# Patient Record
Sex: MALE | Race: WHITE | Employment: OTHER | URBAN - METROPOLITAN AREA
[De-identification: names, ages, dates, MRNs, and addresses within clinical notes are randomized per-mention and may not be internally consistent; named-entity substitution may affect disease eponyms.]

---

## 2017-01-16 ENCOUNTER — OFFICE VISIT (OUTPATIENT)
Dept: INTERNAL MEDICINE CLINIC | Age: 77
End: 2017-01-16

## 2017-01-16 VITALS
SYSTOLIC BLOOD PRESSURE: 130 MMHG | DIASTOLIC BLOOD PRESSURE: 82 MMHG | HEIGHT: 70 IN | BODY MASS INDEX: 26.2 KG/M2 | OXYGEN SATURATION: 98 % | HEART RATE: 56 BPM | TEMPERATURE: 97.7 F | WEIGHT: 183 LBS

## 2017-01-16 DIAGNOSIS — J01.90 SUBACUTE RHINOSINUSITIS: Primary | ICD-10-CM

## 2017-01-16 RX ORDER — LEVOFLOXACIN 500 MG/1
500 TABLET, FILM COATED ORAL DAILY
Qty: 10 TAB | Refills: 0 | Status: SHIPPED | OUTPATIENT
Start: 2017-01-16 | End: 2017-01-27

## 2017-01-16 RX ORDER — MOMETASONE FUROATE 50 UG/1
SPRAY, METERED NASAL
Qty: 1 CONTAINER | Refills: 5 | Status: SHIPPED | OUTPATIENT
Start: 2017-01-16 | End: 2021-07-20 | Stop reason: SINTOL

## 2017-01-16 NOTE — PROGRESS NOTES
HPI/History  Hue Pope is a 67 y.o.  male who returns for evaluation. Pt reports having cold like symptoms around 12/12 which improved to point of resolution but then worsening sinus issues then discolored drainage which prompted visit with Dr. Yasir Matos on 12/29. Was prescribed augmentin and instructed to continue mucinex. He has seen no changes since last visit. Green nasal drainage, mostly postnasal. Reports he seems fine from a pulmonary standpoint but occasionally with cough from copious drainage. Possible elevated temps but afrebrile before visit with Dr. Yasir Matos but wnl since. No other sxs or complaints. Of note, pt recalls bush-hogging and similar outdoor activities in [place] around time of original onset. He has not taken nasonex. He is not taking antihistamines. He reports a hx of deviated septum. 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 No WMA. Mild LVH. Gr 1 DDfx. RVE.  AKANKSHA. Mild TR. No significant chg from study of 6/8/11.  Cardiac nuclear imaging test 10/07/2009     Likely inferior & anterior septal artifact. EF 55%. Mild septal WMA could be related to pacemaker.  Cardiomyopathy ([address]     Diverticulosis of colon 11/10/2015    Dysphagia      Multiple esophageal dilations.     Essential hypertension with goal blood pressure less than 140/90     HLD (hyperlipidemia)     Hx of colonoscopy 11/10/2015     Dr. Sharri Green; tubulovillous adenoma.  Pacemaker 2007     Medtronic dual-chamber device.  Paroxysmal atrial fibrillation (HCC)      single episode noted on routine device check     Past Surgical History   Procedure Laterality Date    Pr cardiac surg procedure unlist      Endoscopy, colon, diagnostic      Hx cataract removal      Hx pacemaker  05/29/07     Medtronic Versa dual-chamber pacemaker for intermittent high-grade AV block and symptomatic bradycardia.  Hx pacemaker  5/3/16     Social History     Social History    Marital status:      Spouse name: N/A    Number of children: N/A    Years of education: N/A     Occupational History    Not on file. Social History Main Topics    Smoking status: Former Smoker     Quit date: 7/27/1966    Smokeless tobacco: Never Used    Alcohol use No      Comment: 03/16/1998 last time he had alcohol.  Drug use: No    Sexual activity: Yes     Partners: Female     Birth control/ protection: Condom     Other Topics Concern    Not on file     Social History Narrative     Family History   Problem Relation Age of Onset    Heart Surgery Mother     Stroke Father     Alcohol abuse Father      Current Outpatient Prescriptions   Medication Sig    levoFLOXacin (LEVAQUIN) 500 mg tablet Take 1 Tab by mouth daily for 10 days.  mometasone (NASONEX) 50 mcg/actuation nasal spray USE TWO SPRAYS IN EACH NOSTRIL DAILY    LORazepam (ATIVAN) 1 mg tablet Take 1 Tab by mouth every eight (8) hours as needed for Anxiety. Max Daily Amount: 3 mg.  lisinopril (PRINIVIL, ZESTRIL) 2.5 mg tablet TAKE ONE TABLET BY MOUTH TWICE DAILY.  butalbital-acetaminophen-caffeine (FIORICET, ESGIC) -40 mg per tablet Take 1 Tab by mouth every six (6) hours as needed for Pain.  VIT C/E/ZN/COPPR/LUTEIN/ZEAXAN (PRESERVISION AREDS 2 PO) Take  by mouth.     omeprazole (PRILOSEC) 20 mg capsule Take 20 mg by mouth daily as needed.  timolol (TIMOPTIC) 0.25 % ophthalmic solution Administer 1 Drop to both eyes two (2) times a day.  cyclobenzaprine (FLEXERIL) 5 mg tablet TAKE 1-2 TABLETS BY MOUTH EVERY 8 HOURS AS NEEDED FOR MUSCLE SPASM    MULTIVITAMINS W-MINERALS/LUT (CENTRUM SILVER PO) Take  by mouth daily.  OMEGA-3 FATTY ACIDS (OMEGA 3 PO) Take 2,400 mg by mouth two (2) times a day.  coenzyme q10 100 mg Cap Take  by mouth daily.  metronidazole (METROGEL) 1 % topical gel Apply  to affected area daily. Use a thin layer to affected areas after washing     zinc 50 mg capsule Take  by mouth daily.  magnesium 250 mg Tab Take  by mouth daily.  selenium 100 mcg Cap Take  by mouth daily.  ascorbic acid (VITAMIN C) 500 mg tablet Take  by mouth two (2) times a day.  Ginkgo Biloba Leaf Extract 30 mg Cap Take  by mouth daily. No current facility-administered medications for this visit. No Known Allergies    Review of Systems  Significant findings included in HPI. Physical Examination  Visit Vitals    /82    Pulse (!) 56    Temp 97.7 °F (36.5 °C) (Oral)    Ht 5' 10\" (1.778 m)    Wt 183 lb (83 kg)    SpO2 98%    BMI 26.26 kg/m2       General - Alert and in no acute distress. Pt appears well, comfortable, and in good spirits. Nontoxic. Not anxious, non-diaphoretic. Mental status - Appropriate mood, behavior, speech content, dress, and thought processes. Eyes - No periorbital findings. Pupils equal and reactive, extraocular eye movements intact. No erythema or discharge. Ears - Auditory canals and TMs unremarkable. Nose - Mild erythema. No rhinorrhea. Possible deviation toward right. Patent. Mouth - Mucous membranes moist. Pharynx without lesions, swelling, erythema, or exudate. Neck - Supple without rigidity. Lymph - No periauricular, perimandibular, or cervical tenderness or swelling. Pulm - No cough today. Complete sentences. No tachypnea, retractions, or cyanosis.  Good respiratory effort. Clear to auscultation bilat. No wheezes, rales, or rhonchi noted. Cardiovascular - Normal rate, slightly irregular rhythm. Assessment and Plan  1. Rhinosinusitis (acute vs subacute) - discussed possible causes and contributing factors including viral, bacterial, and allergic/irritant factors. Pt recently treated with augmentin without any change. Will start levaquin, zyrtec, and nasonex today. He will increase fluids and continue mucinex if needed. He will keep previously scheduled appt with Dr. Deborah Curiel in February and can use this as f/u. May consider ENT pending his progression. Pt happily agrees with plan. PLEASE NOTE:   This document has been produced using voice recognition software. Unrecognized errors in transcription may be present.     [facility] of [address] [place] [address]  [phone]  1/16/2017

## 2017-01-16 NOTE — MR AVS SNAPSHOT
Visit Information Date & Time Provider Department Dept. Phone Encounter #  
 1/16/2017  3:30 PM Shilpi Saenz, [address] Internist of [address] [place] [address] [id number] Your Appointments 1/27/2017  2:00 PM  
Follow Up with Marley Shetty MD  
[facility]"" ([facility]) Appt Note: 6 month f/u; 6 month f/u  
 [address] [place] [address] [address]  
[phone] [facility] [place] [facility] [phone]  
  
    
 2/1/2017  8:55 AM  
LAB with [facility] NURSE VISIT Internist of [facility] ([facility]) Appt Note: lab; lab  
 [address] [facility], [address] [place]  
  
   
 [address], [address]  
  
    
 2/9/2017 12:00 PM  
Office Visit with Beck Long MD  
Internist of [address] [place] [address]) Appt Note: ov sarris [address], [address] [place]  
  
   
 [address], [address]  
  
    
 4/26/2017  2:20 PM  
[facility]EL[facility] with [facility] \[facility]"" ([facility]) Appt Note: [address] [facility] [address]  
[phone] [address] [place] [address] [address] Upcoming Health Maintenance Date Due  
 MEDICARE YEARLY EXAM 11/28/2004 GLAUCOMA SCREENING Q2Y 7/22/2017 COLONOSCOPY 11/10/2018 DTaP/Tdap/Td series (2 - Td) 7/12/2022 Allergies as of 1/16/2017  Review Complete On: 1/16/2017 By: Winnie Reza LPN No Known Allergies Current Immunizations  Reviewed on 8/3/2015 Name Date Influenza High Dose Vaccine PF 12/9/2016, 11/16/2015, 1/10/2013 Influenza Vaccine 11/20/2014, 12/31/2013 Pneumococcal Conjugate (PCV-13) 8/3/2015  1:34 PM  
 Pneumococcal Vaccine (Unspecified Type) 1/1/2007 TDAP Vaccine 7/12/2012 Zoster 7/25/2007 Not reviewed this visit You Were Diagnosed With   
  
 Codes Comments Subacute rhinosinusitis    -  Primary ICD-10-CM: J01.90 ICD-9-CM: 461.9 Vitals BP Pulse Temp Height(growth percentile) Weight(growth percentile) SpO2  
 130/82 (!) 56 97.7 °F (36.5 °C) (Oral) 5' 10\" (1.778 m) 183 lb (83 kg) 98% BMI Smoking Status 26.26 kg/m2 Former Smoker Vitals History BMI and BSA Data Body Mass Index Body Surface Area  
 26.26 kg/m 2 2.02 m 2 Preferred Pharmacy Pharmacy Name Phone [facility]  [phone] Your Updated Medication List  
  
   
This list is accurate as of: 1/16/17  3:39 PM.  Always use your most recent med list.  
  
  
  
  
 butalbital-acetaminophen-caffeine -40 mg per tablet Commonly known as:  Violet Yanira Take 1 Tab by mouth every six (6) hours as needed for Pain. CENTRUM SILVER PO Take  by mouth daily. co-enzyme Q-10 100 mg capsule Commonly known as:  CO Q-10 Take  by mouth daily. cyclobenzaprine 5 mg tablet Commonly known as:  FLEXERIL  
TAKE 1-2 TABLETS BY MOUTH EVERY 8 HOURS AS NEEDED FOR MUSCLE SPASM Ginkgo Biloba Leaf Extract 30 mg Cap Take  by mouth daily. levoFLOXacin 500 mg tablet Commonly known as:  Yenni Schneiders Take 1 Tab by mouth daily for 10 days. lisinopril 2.5 mg tablet Commonly known as:  PRINIVIL, ZESTRIL  
TAKE ONE TABLET BY MOUTH TWICE DAILY. LORazepam 1 mg tablet Commonly known as:  ATIVAN Take 1 Tab by mouth every eight (8) hours as needed for Anxiety. Max Daily Amount: 3 mg.  
  
 magnesium 250 mg Tab Take  by mouth daily. METROGEL 1 % topical gel Generic drug:  metroNIDAZOLE Apply  to affected area daily. Use a thin layer to affected areas after washing  
  
 mometasone 50 mcg/actuation nasal spray Commonly known as:  NASONEX  
USE TWO SPRAYS IN EACH NOSTRIL DAILY OMEGA 3 PO Take 2,400 mg by mouth two (2) times a day. PRESERVISION AREDS 2 PO Take  by mouth. PriLOSEC 20 mg capsule Generic drug:  omeprazole Take 20 mg by mouth daily as needed. selenium 100 mcg Cap Take  by mouth daily. TIMOPTIC 0.25 % ophthalmic solution Generic drug:  timolol Administer 1 Drop to both eyes two (2) times a day. VITAMIN C 500 mg tablet Generic drug:  ascorbic acid (vitamin C) Take  by mouth two (2) times a day. zinc 50 mg capsule Take  by mouth daily. Prescriptions Printed Refills  
 levoFLOXacin (LEVAQUIN) 500 mg tablet 0 Sig: Take 1 Tab by mouth daily for 10 days. Class: Print Route: Oral  
 mometasone (NASONEX) 50 mcg/actuation nasal spray 5 Sig: USE TWO SPRAYS IN EACH NOSTRIL DAILY Class: Print Patient Instructions 1. Plenty of water. 2. Continue mucinex if needed. 3. Nasonex for at least 3 wks. 4. Zyrtec (OTC) for at least 3 wks. 5. Complete antibiotic. 6. Will consider ENT if no improvement. Introducing [facility]! Dear Nile Lundborg: 
Thank you for requesting a [facility]odlee account. Our records indicate that you already have an active Yodlee account. You can access your account anytime at [email or web address] Did you know that you can access your hospital and ER discharge instructions at any time in Yodlee? You can also review all of your test results from your hospital stay or ER visit. Additional Information If you have questions, please visit the Frequently Asked Questions section of the Yodlee website at [email or web address] Remember, Yodlee is NOT to be used for urgent needs. For medical emergencies, dial 911. Now available from your iPhone and Android! Please provide this summary of care documentation to your next provider. Your primary care clinician is listed as Sushant Hernandez.  If you have any questions after today's visit, please call [phone].

## 2017-01-16 NOTE — PATIENT INSTRUCTIONS
1. Plenty of water. 2. Continue mucinex if needed. 3. Nasonex for at least 3 wks. 4. Zyrtec (OTC) for at least 3 wks. 5. Complete antibiotic. 6. Will consider ENT if no improvement.

## 2017-01-20 ENCOUNTER — OFFICE VISIT (OUTPATIENT)
Dept: INTERNAL MEDICINE CLINIC | Age: 77
End: 2017-01-20

## 2017-01-20 VITALS
SYSTOLIC BLOOD PRESSURE: 148 MMHG | OXYGEN SATURATION: 97 % | HEART RATE: 64 BPM | DIASTOLIC BLOOD PRESSURE: 84 MMHG | BODY MASS INDEX: 26.05 KG/M2 | TEMPERATURE: 97.7 F | WEIGHT: 182 LBS | HEIGHT: 70 IN

## 2017-01-20 DIAGNOSIS — J31.0 RHINOSINUSITIS: Primary | ICD-10-CM

## 2017-01-20 DIAGNOSIS — J32.9 RHINOSINUSITIS: Primary | ICD-10-CM

## 2017-01-20 RX ORDER — AMOXICILLIN AND CLAVULANATE POTASSIUM 875; 125 MG/1; MG/1
1 TABLET, FILM COATED ORAL 2 TIMES DAILY
Qty: 20 TAB | Refills: 0 | Status: SHIPPED | OUTPATIENT
Start: 2017-01-20 | End: 2017-01-30

## 2017-01-20 NOTE — PROGRESS NOTES
HPI/History  Hue Pope is a 67 y.o.  male who presents for evaluation. Pt with cold like symptoms around 12/12. Saw Dr. Yasir Matos on 12/29 and prescribed augmentin. No change and saw me on 1/16. At that time he reported some outdoor exposures around initial time of onset from bush-hogging, etc. We discussed various potential causes and contributing factors but decided to treat with levaquin, zyrtec, and nasonex. Pt report he is taking nasonex and zyrtec and thought he was improving but then returned previous status. He did not start the levaquin as he has a hx of tendinopathy and ruptures. No other developments. He is rather insistent on trying augmentin again but brand only. 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 No WMA. Mild LVH. Gr 1 DDfx. RVE.  AKANKSHA. Mild TR. No significant chg from study of 6/8/11.  Cardiac nuclear imaging test 10/07/2009     Likely inferior & anterior septal artifact. EF 55%. Mild septal WMA could be related to pacemaker.  Cardiomyopathy ([address]     Diverticulosis of colon 11/10/2015    Dysphagia      Multiple esophageal dilations.  Essential hypertension with goal blood pressure less than 140/90     HLD (hyperlipidemia)     Hx of colonoscopy 11/10/2015     Dr. Go Soriano; tubulovillous adenoma.    Ankit Shield Pacemaker 2007     Medtronic dual-chamber device.  Paroxysmal atrial fibrillation (HCC)      single episode noted on routine device check     Past Surgical History   Procedure Laterality Date    Pr cardiac surg procedure unlist      Endoscopy, colon, diagnostic      Hx cataract removal      Hx pacemaker  05/29/07     Medtronic Versa dual-chamber pacemaker for intermittent high-grade AV block and symptomatic bradycardia.  Hx pacemaker  5/3/16     Social History     Social History    Marital status:      Spouse name: N/A    Number of children: N/A    Years of education: N/A     Occupational History    Not on file. Social History Main Topics    Smoking status: Former Smoker     Quit date: 7/27/1966    Smokeless tobacco: Never Used    Alcohol use No      Comment: 03/16/1998 last time he had alcohol.  Drug use: No    Sexual activity: Yes     Partners: Female     Birth control/ protection: Condom     Other Topics Concern    Not on file     Social History Narrative     Family History   Problem Relation Age of Onset    Heart Surgery Mother     Stroke Father     Alcohol abuse Father      Current Outpatient Prescriptions   Medication Sig    amoxicillin-clavulanate (AUGMENTIN) 875-125 mg per tablet Take 1 Tab by mouth two (2) times a day for 10 days. Brand only.  levoFLOXacin (LEVAQUIN) 500 mg tablet Take 1 Tab by mouth daily for 10 days.  mometasone (NASONEX) 50 mcg/actuation nasal spray USE TWO SPRAYS IN EACH NOSTRIL DAILY    LORazepam (ATIVAN) 1 mg tablet Take 1 Tab by mouth every eight (8) hours as needed for Anxiety. Max Daily Amount: 3 mg.  lisinopril (PRINIVIL, ZESTRIL) 2.5 mg tablet TAKE ONE TABLET BY MOUTH TWICE DAILY.  butalbital-acetaminophen-caffeine (FIORICET, ESGIC) -40 mg per tablet Take 1 Tab by mouth every six (6) hours as needed for Pain.  VIT C/E/ZN/COPPR/LUTEIN/ZEAXAN (PRESERVISION AREDS 2 PO) Take  by mouth.  omeprazole (PRILOSEC) 20 mg capsule Take 20 mg by mouth daily as needed.     timolol (TIMOPTIC) 0.25 % ophthalmic solution Administer 1 Drop to both eyes two (2) times a day.  cyclobenzaprine (FLEXERIL) 5 mg tablet TAKE 1-2 TABLETS BY MOUTH EVERY 8 HOURS AS NEEDED FOR MUSCLE SPASM    MULTIVITAMINS W-MINERALS/LUT (CENTRUM SILVER PO) Take  by mouth daily.  OMEGA-3 FATTY ACIDS (OMEGA 3 PO) Take 2,400 mg by mouth two (2) times a day.  coenzyme q10 100 mg Cap Take  by mouth daily.  metronidazole (METROGEL) 1 % topical gel Apply  to affected area daily. Use a thin layer to affected areas after washing     zinc 50 mg capsule Take  by mouth daily.  magnesium 250 mg Tab Take  by mouth daily.  selenium 100 mcg Cap Take  by mouth daily.  ascorbic acid (VITAMIN C) 500 mg tablet Take  by mouth two (2) times a day.  Ginkgo Biloba Leaf Extract 30 mg Cap Take  by mouth daily. No current facility-administered medications for this visit. No Known Allergies    Review of Systems  Significant findings included in HPI. Physical Examination  Visit Vitals    /84    Pulse 64    Temp 97.7 °F (36.5 °C)    Ht 5' 10\" (1.778 m)    Wt 182 lb (82.6 kg)    SpO2 97%    BMI 26.11 kg/m2       General - Alert and in no acute distress. Pt appears well, comfortable, and in good spirits. Nontoxic. Not anxious, non-diaphoretic. Mental status - Appropriate mood, behavior, speech content, dress, and thought processes. Eyes - No periorbital findings. No erythema or discharge. Mouth - Normal phonation. Pulm - No tachypnea, retractions, or cyanosis. Good respiratory effort. Cardiovascular - Normal rate. Assessment and Plan  1. Rhinosinusitis - At last visit, we discussed possible causes and contributing factors including viral, bacterial, and allergic/irritant factors. Pt previously treated with augmentin without any change. He has started zyrtec and nasonex as instructed at last visit but did not start levaquin due to pre-existing tendinopathy with ruptures.  He is rather insistent upon trying brand name augmentin even after discussing multiple considerations and alternatives. Will refer to Dr. Ginette Rubio if no improvement with this round. He will keep previously scheduled appt with Dr. Lacy Felix in February. Pt happily agrees with plan. PLEASE NOTE:   This document has been produced using voice recognition software. Unrecognized errors in transcription may be present.     [employer] of [address] [place] [address]  [phone]  1/20/2017

## 2017-01-20 NOTE — MR AVS SNAPSHOT
Visit Information Date & Time Provider Department Dept. Phone Encounter #  
 1/20/2017  1:30 PM Yonny De Jesusma Internist of [address] [id number] Your Appointments 1/20/2017  1:30 PM  
Office Visit with CESAR De Jesus Internist of [facility] ([facility]) Appt Note: f/u sinus problems  syb 01/19/17  
 [address] [facility], [address] [address] [address]  
  
   
 [address], [phone]  
  
    
 1/27/2017  2:00 PM  
Follow Up with Priscilla Richmond MD  
[facility] ([facility]) Appt Note: 6 month f/u; 6 month f/u  
 [address] [id number] [address] [id number] [phone] [address] [phone]  
  
    
 2/1/2017  8:55 AM  
LAB with [facility] NURSE VISIT Internist of [facility] ([facility]) Appt Note: lab; lab  
 [address] [facility], [address] [address] [address]  
  
   
 [address], [address]  
  
    
 2/9/2017 12:00 PM  
Office Visit with Maranda Tovar MD  
Internist of [address] [place] [address]) Appt Note: ov sarris [address], [address] [place] [address] [address]  
  
   
 [address], [address]  
  
    
 4/26/2017  2:20 PM  
CARELINK with Pedrito Musei [facility] ([facility]) Appt Note: [address] [facility] [address] [address] [address]  
[phone] [address] [facility] [address] Upcoming Health Maintenance Date Due  
 MEDICARE YEARLY EXAM 11/28/2004 GLAUCOMA SCREENING Q2Y 7/22/2017 COLONOSCOPY 11/10/2018 DTaP/Tdap/Td series (2 - Td) 7/12/2022 Allergies as of 1/20/2017  Review Complete On: 1/20/2017 By: CESAR De Jesus No Known Allergies Current Immunizations  Reviewed on 8/3/2015 Name Date Influenza High Dose Vaccine PF 12/9/2016, 11/16/2015, 1/10/2013 Influenza Vaccine 11/20/2014, 12/31/2013 Pneumococcal Conjugate (PCV-13) 8/3/2015  1:34 PM  
 Pneumococcal Vaccine (Unspecified Type) 1/1/2007 TDAP Vaccine 7/12/2012 Zoster 7/25/2007 Not reviewed this visit You Were Diagnosed With   
  
 Codes Comments Rhinosinusitis    -  Primary ICD-10-CM: J32.9 ICD-9-CM: 473.9 Vitals BP Pulse Temp Height(growth percentile) Weight(growth percentile) SpO2  
 148/84 64 97.7 °F (36.5 °C) 5' 10\" (1.778 m) 182 lb (82.6 kg) 97% BMI Smoking Status 26.11 kg/m2 Former Smoker Vitals History BMI and BSA Data Body Mass Index Body Surface Area  
 26.11 kg/m 2 2.02 m 2 Preferred Pharmacy Pharmacy Name Phone [facility], [address] [place] [address] [phone] Your Updated Medication List  
  
   
This list is accurate as of: 1/20/17  1:19 PM.  Always use your most recent med list.  
  
  
  
  
 amoxicillin-clavulanate 875-125 mg per tablet Commonly known as:  AUGMENTIN Take 1 Tab by mouth two (2) times a day for 10 days. Brand only. butalbital-acetaminophen-caffeine -40 mg per tablet Commonly known as:  [place] Take 1 Tab by mouth every six (6) hours as needed for Pain. CENTRUM SILVER PO Take  by mouth daily. co-enzyme Q-10 100 mg capsule Commonly known as:  CO Q-10 Take  by mouth daily. cyclobenzaprine 5 mg tablet Commonly known as:  FLEXERIL  
TAKE 1-2 TABLETS BY MOUTH EVERY 8 HOURS AS NEEDED FOR MUSCLE SPASM Ginkgo Biloba Leaf Extract 30 mg Cap Take  by mouth daily. levoFLOXacin 500 mg tablet Commonly known as:  Madiha Born Take 1 Tab by mouth daily for 10 days. lisinopril 2.5 mg tablet Commonly known as:  PRINIVIL, ZESTRIL  
TAKE ONE TABLET BY MOUTH TWICE DAILY. LORazepam 1 mg tablet Commonly known as:  ATIVAN Take 1 Tab by mouth every eight (8) hours as needed for Anxiety. Max Daily Amount: 3 mg.  
  
 magnesium 250 mg Tab Take  by mouth daily. METROGEL 1 % topical gel Generic drug:  metroNIDAZOLE Apply  to affected area daily. Use a thin layer to affected areas after washing  
  
 mometasone 50 mcg/actuation nasal spray Commonly known as:  NASONEX  
USE TWO SPRAYS IN EACH NOSTRIL DAILY  
  
 OMEGA 3 PO Take 2,400 mg by mouth two (2) times a day. PRESERVISION AREDS 2 PO Take  by mouth. PriLOSEC 20 mg capsule Generic drug:  omeprazole Take 20 mg by mouth daily as needed. selenium 100 mcg Cap Take  by mouth daily. TIMOPTIC 0.25 % ophthalmic solution Generic drug:  timolol Administer 1 Drop to both eyes two (2) times a day. VITAMIN C 500 mg tablet Generic drug:  ascorbic acid (vitamin C) Take  by mouth two (2) times a day. zinc 50 mg capsule Take  by mouth daily. Prescriptions Printed Refills  
 amoxicillin-clavulanate (AUGMENTIN) 875-125 mg per tablet 0 Sig: Take 1 Tab by mouth two (2) times a day for 10 days. Brand only. Class: Print Route: Oral  
  
Introducing [facility] & HEALTH SERVICES! Dear Gordy Juan: 
Thank you for requesting a [employer] account. Our records indicate that you already have an active [employer] account. You can access your account anytime at [email or web address] Did you know that you can access your hospital and ER discharge instructions at any time in [employer]? You can also review all of your test results from your hospital stay or ER visit. Additional Information If you have questions, please visit the Frequently Asked Questions section of the [employer] website at [email or web address] Remember, [employer] is NOT to be used for urgent needs. For medical emergencies, dial 911. Now available from your iPhone and Android! Please provide this summary of care documentation to your next provider. Your primary care clinician is listed as Tom Gaitan. If you have any questions after today's visit, please call [phone].

## 2017-01-27 ENCOUNTER — OFFICE VISIT (OUTPATIENT)
Dept: CARDIOLOGY CLINIC | Age: 77
End: 2017-01-27

## 2017-01-27 VITALS
SYSTOLIC BLOOD PRESSURE: 128 MMHG | WEIGHT: 178 LBS | HEART RATE: 80 BPM | DIASTOLIC BLOOD PRESSURE: 78 MMHG | HEIGHT: 70 IN | OXYGEN SATURATION: 98 % | BODY MASS INDEX: 25.48 KG/M2

## 2017-01-27 DIAGNOSIS — I48.0 PAROXYSMAL ATRIAL FIBRILLATION (HCC): Primary | ICD-10-CM

## 2017-01-27 DIAGNOSIS — I44.30 AV BLOCK: ICD-10-CM

## 2017-01-27 DIAGNOSIS — I42.9 CARDIOMYOPATHY (HCC): ICD-10-CM

## 2017-01-27 DIAGNOSIS — I10 ESSENTIAL HYPERTENSION: ICD-10-CM

## 2017-01-27 NOTE — MR AVS SNAPSHOT
Visit Information Date & Time Provider Department Dept. Phone Encounter #  
 1/27/2017  2:00 PM Karyle Fairly, MD [facility] [address] [id number] Your Appointments 2/1/2017  8:55 AM  
LAB with IOC NURSE VISIT Internist of [facility] ([facility]) Appt Note: lab; lab  
 [address] [facility], [address] [address] [address]  
  
   
 [address], [address]  
  
    
 2/9/2017 12:00 PM  
Office Visit with Lay Garcia MD  
Internist of [address] [place] [address] Appt Note: ov sarris [address], [address] [place] [address]  
  
   
 [address], [address]  
  
    
 4/26/2017  2:20 PM  
CARELINK with Pacer [facility] Appt Note: [address] [address] [address] [address]  
[phone] [address] Upcoming Health Maintenance Date Due  
 MEDICARE YEARLY EXAM 11/28/2004 GLAUCOMA SCREENING Q2Y 7/22/2017 COLONOSCOPY 11/10/2018 DTaP/Tdap/Td series (2 - Td) 7/12/2022 Allergies as of 1/27/2017  Review Complete On: 1/20/2017 By: CESAR Gooden No Known Allergies Current Immunizations  Reviewed on 8/3/2015 Name Date Influenza High Dose Vaccine PF 12/9/2016, 11/16/2015, 1/10/2013 Influenza Vaccine 11/20/2014, 12/31/2013 Pneumococcal Conjugate (PCV-13) 8/3/2015  1:34 PM  
 Pneumococcal Vaccine (Unspecified Type) 1/1/2007 TDAP Vaccine 7/12/2012 Zoster 7/25/2007 Not reviewed this visit You Were Diagnosed With   
  
 Codes Comments Paroxysmal atrial fibrillation (HCC)    -  Primary ICD-10-CM: I48.0 ICD-9-CM: 427.31 Essential hypertension     ICD-10-CM: I10 
ICD-9-CM: 401.9 Vitals BP Pulse Height(growth percentile) Weight(growth percentile) SpO2 BMI  
 128/78 80 5' 10\" (1.778 m) 178 lb (80.7 kg) 98% 25.54 kg/m2 Smoking Status Former Smoker BMI and BSA Data Body Mass Index Body Surface Area 25.54 kg/m 2 2 m 2 Preferred Pharmacy Pharmacy Name Phone [facility], [address] [phone] Your Updated Medication List  
  
   
This list is accurate as of: 1/27/17  2:36 PM.  Always use your most recent med list.  
  
  
  
  
 amoxicillin-clavulanate 875-125 mg per tablet Commonly known as:  AUGMENTIN Take 1 Tab by mouth two (2) times a day for 10 days. Brand only. butalbital-acetaminophen-caffeine -40 mg per tablet Commonly known as:  Violet Yanira Take 1 Tab by mouth every six (6) hours as needed for Pain. CENTRUM SILVER PO Take  by mouth daily. co-enzyme Q-10 100 mg capsule Commonly known as:  CO Q-10 Take  by mouth daily. cyclobenzaprine 5 mg tablet Commonly known as:  FLEXERIL  
TAKE 1-2 TABLETS BY MOUTH EVERY 8 HOURS AS NEEDED FOR MUSCLE SPASM Ginkgo Biloba Leaf Extract 30 mg Cap Take  by mouth daily. lisinopril 2.5 mg tablet Commonly known as:  PRINIVIL, ZESTRIL  
TAKE ONE TABLET BY MOUTH TWICE DAILY. LORazepam 1 mg tablet Commonly known as:  ATIVAN Take 1 Tab by mouth every eight (8) hours as needed for Anxiety. Max Daily Amount: 3 mg.  
  
 magnesium 250 mg Tab Take  by mouth daily. METROGEL 1 % topical gel Generic drug:  metroNIDAZOLE Apply  to affected area daily. Use a thin layer to affected areas after washing  
  
 mometasone 50 mcg/actuation nasal spray Commonly known as:  NASONEX  
USE TWO SPRAYS IN EACH NOSTRIL DAILY  
  
 OMEGA 3 PO Take 2,400 mg by mouth two (2) times a day. PRESERVISION AREDS 2 PO Take  by mouth. PriLOSEC 20 mg capsule Generic drug:  omeprazole Take 20 mg by mouth daily as needed. selenium 100 mcg Cap Take  by mouth daily. TIMOPTIC 0.25 % ophthalmic solution Generic drug:  timolol Administer 1 Drop to both eyes two (2) times a day. VITAMIN C 500 mg tablet Generic drug:  ascorbic acid (vitamin C) Take  by mouth two (2) times a day. zinc 50 mg capsule Take  by mouth daily. We Performed the Following AMB POC EKG ROUTINE W/ 12 LEADS, INTER & REP [26182 CPT(R)] Introducing [facility] SERVICES! Dear Jeniffer Scales: 
Thank you for requesting a [employer] account. Our records indicate that you already have an active [employer] account. You can access your account anytime at [email or web address] Did you know that you can access your hospital and ER discharge instructions at any time in [employer]? You can also review all of your test results from your hospital stay or ER visit. Additional Information If you have questions, please visit the Frequently Asked Questions section of the [employer] website at [email or web address] Remember, [employer] is NOT to be used for urgent needs. For medical emergencies, dial 911. Now available from your iPhone and Android! Please provide this summary of care documentation to your next provider. Your primary care clinician is listed as Vadim Sandhu. If you have any questions after today's visit, please call [phone].

## 2017-01-27 NOTE — PROGRESS NOTES
1. Have you been to the ER, urgent care clinic since your last visit? Hospitalized since your last visit? No    2. Have you seen or consulted any other health care providers outside of the [address] [place] [address] since your last visit? Include any pap smears or colon screening.  No

## 2017-01-27 NOTE — PROGRESS NOTES
HISTORY OF PRESENT ILLNESS  Christal Hill is a 67 y.o. male. Follow-up   Pertinent negatives include no chest pain, no abdominal pain, no headaches and no shortness of breath. Patient presents for a scheduled followup visit. He has a history of intermittent high-grade AV block, status post to dual-chamber permanent pacemaker in 2007. He also has a history of hypertension, Brief episodes approximately atrial fibrillation, and a mildly depressed LV systolic function,  Which returned to low normal on echocardiogram in June 2013 showing an ejection fraction of 50-55%. The patient underwent a pacemaker generator exchange in May 2016 With a Medtronic MRI safe device after his device has reached elective replacement indicator. The patient was last seen in the office 6-7 months ago. Since that time, he has been feeling well. He denies any new palpitations, no dizziness, no syncope. No major changes in his activity tolerance. No orthopnea, PND, leg swelling. No chest pain or pressure. He does report that his blood pressure had been running higher than usual this winter, so he increased his lisinopril dosage from 5 mg daily to 7.5 mg daily and this has brought his blood pressure back down to normal.    Past Medical History   Diagnosis Date    Anxiety     AV block      intermittent, high-grade    Bilateral inguinal hernia     Cardiac echocardiogram 06/25/2013     EF 50-55%. No WMA. Mild LVH. Gr 1 DDfx. RVE.  AKANKSHA. Mild TR. No significant chg from study of 6/8/11.  Cardiac nuclear imaging test 10/07/2009     Likely inferior & anterior septal artifact. EF 55%. Mild septal WMA could be related to pacemaker.  Cardiomyopathy ([address]     Diverticulosis of colon 11/10/2015    Dysphagia      Multiple esophageal dilations.  Essential hypertension with goal blood pressure less than 140/90     HLD (hyperlipidemia)     Hx of colonoscopy 11/10/2015     Dr. Siri Acevedo; tubulovillous adenoma.  Pacemaker 2007     Medtronic dual-chamber device.  Paroxysmal atrial fibrillation (HCC)      single episode noted on routine device check      Current Outpatient Prescriptions   Medication Sig Dispense Refill    amoxicillin-clavulanate (AUGMENTIN) 875-125 mg per tablet Take 1 Tab by mouth two (2) times a day for 10 days. Brand only. 20 Tab 0    mometasone (NASONEX) 50 mcg/actuation nasal spray USE TWO SPRAYS IN EACH NOSTRIL DAILY 1 Container 5    LORazepam (ATIVAN) 1 mg tablet Take 1 Tab by mouth every eight (8) hours as needed for Anxiety. Max Daily Amount: 3 mg. 60 Tab 0    lisinopril (PRINIVIL, ZESTRIL) 2.5 mg tablet TAKE ONE TABLET BY MOUTH TWICE DAILY. (Patient taking differently: Take 2 tabs qam 1 qhs.) 180 Tab 2    butalbital-acetaminophen-caffeine (FIORICET, ESGIC) -40 mg per tablet Take 1 Tab by mouth every six (6) hours as needed for Pain. 30 Tab 0    VIT C/E/ZN/COPPR/LUTEIN/ZEAXAN (PRESERVISION AREDS 2 PO) Take  by mouth.  omeprazole (PRILOSEC) 20 mg capsule Take 20 mg by mouth daily as needed.  timolol (TIMOPTIC) 0.25 % ophthalmic solution Administer 1 Drop to both eyes two (2) times a day.  cyclobenzaprine (FLEXERIL) 5 mg tablet TAKE 1-2 TABLETS BY MOUTH EVERY 8 HOURS AS NEEDED FOR MUSCLE SPASM 25 Tab 0    MULTIVITAMINS W-MINERALS/LUT (CENTRUM SILVER PO) Take  by mouth daily.  OMEGA-3 FATTY ACIDS (OMEGA 3 PO) Take 2,400 mg by mouth two (2) times a day.  coenzyme q10 100 mg Cap Take  by mouth daily.  metronidazole (METROGEL) 1 % topical gel Apply  to affected area daily. Use a thin layer to affected areas after washing       zinc 50 mg capsule Take  by mouth daily.  magnesium 250 mg Tab Take  by mouth daily.  selenium 100 mcg Cap Take  by mouth daily.  ascorbic acid (VITAMIN C) 500 mg tablet Take  by mouth two (2) times a day.  Ginkgo Biloba Leaf Extract 30 mg Cap Take  by mouth daily.          No Known Allergies     Social History Substance Use Topics    Smoking status: Former Smoker     Quit date: 7/27/1966    Smokeless tobacco: Never Used    Alcohol use No      Comment: 03/16/1998 last time he had alcohol. Review of Systems   Constitutional: Negative for chills, fever and weight loss. HENT: Negative for nosebleeds. Eyes: Negative for blurred vision and double vision. Respiratory: Negative for cough, shortness of breath and wheezing. Cardiovascular: Negative for chest pain, palpitations, orthopnea, claudication, leg swelling and PND. Gastrointestinal: Negative for abdominal pain, heartburn, nausea and vomiting. Genitourinary: Negative for dysuria and hematuria. Musculoskeletal: Negative for falls and myalgias. Skin: Negative for rash. Neurological: Negative for dizziness, focal weakness and headaches. Endo/Heme/Allergies: Does not bruise/bleed easily. Psychiatric/Behavioral: Negative for substance abuse. Visit Vitals    /78    Pulse 80    Ht 5' 10\" (1.778 m)    Wt 80.7 kg (178 lb)    SpO2 98%    BMI 25.54 kg/m2      Physical Exam   Constitutional: He is oriented to person, place, and time. He appears well-developed and well-nourished. HENT:   Head: Normocephalic and atraumatic. Eyes: Conjunctivae are normal.   Neck: Neck supple. No JVD present. Carotid bruit is not present. Cardiovascular: Normal rate, regular rhythm, S1 normal, S2 normal and normal pulses. Exam reveals no gallop and no S3. No murmur heard. Pulmonary/Chest: Breath sounds normal. He has no wheezes. He has no rales. Abdominal: Soft. Bowel sounds are normal. There is no tenderness. Musculoskeletal: He exhibits no edema. Neurological: He is alert and oriented to person, place, and time. Skin: Skin is warm and dry. EKG: Normal sinus rhythm, 100% Ventricular paced rhythm, No change from the previous EKG. Pacemaker interrogation: Pacemaker generator at beginning of life. Estimated longevity 9.5 years. No arrhythmias. Pacemaker dependent in the ventricle. Stable activity level. ASSESSMENT and PLAN  Paroxysmal atrial fibrillation. This was a short documented episode several years ago in the past on routine device check, and this has not reoccurred since. No significant arrhythmias on his last 2 device checks. No need for anticoagulation. High-grade AV block: Status post dual-chamber permanent pacemaker with recent generator exchange in May 2016. Patient has an MRI compatible device. Normal device function on interrogation today. He is pacemaker dependent in the ventricle. History of cardiomyopathy: His LV function did return to low normal on echocardiogram in June 2013. He has remained asymptomatic and euvolemic. Hypertension: Remains well controlled on lisinopril, which I would continue. Dyslipidemia. Patient has been managing this with diet control and takes a fish oil supplement. [facility] device check in 3 months. Followup in 6 months, sooner if needed.

## 2017-02-01 ENCOUNTER — HOSPITAL ENCOUNTER (OUTPATIENT)
Dept: LAB | Age: 77
Discharge: HOME | End: 2017-02-01
Payer: MEDICARE

## 2017-02-01 DIAGNOSIS — I10 ESSENTIAL HYPERTENSION WITH GOAL BLOOD PRESSURE LESS THAN 140/90: ICD-10-CM

## 2017-02-01 DIAGNOSIS — E78.5 HYPERLIPIDEMIA, UNSPECIFIED HYPERLIPIDEMIA TYPE: ICD-10-CM

## 2017-02-01 DIAGNOSIS — Z80.42 FH: PROSTATE CANCER: ICD-10-CM

## 2017-02-01 LAB
ANION GAP BLD CALC-SCNC: 8 MMOL/L (ref 3–18)
BUN SERPL-MCNC: 11 MG/DL (ref 7–18)
BUN/CREAT SERPL: 13 (ref 12–20)
CALCIUM SERPL-MCNC: 8.9 MG/DL (ref 8.5–10.1)
CHLORIDE SERPL-SCNC: 100 MMOL/L (ref 100–108)
CHOLEST SERPL-MCNC: 182 MG/DL
CO2 SERPL-SCNC: 29 MMOL/L (ref 21–32)
CREAT SERPL-MCNC: 0.87 MG/DL (ref 0.6–1.3)
GLUCOSE SERPL-MCNC: 94 MG/DL (ref 74–99)
HDLC SERPL-MCNC: 44 MG/DL (ref 40–60)
HDLC SERPL: 4.1 {RATIO} (ref 0–5)
LDLC SERPL CALC-MCNC: 123.6 MG/DL (ref 0–100)
LIPID PROFILE,FLP: ABNORMAL
POTASSIUM SERPL-SCNC: 4.7 MMOL/L (ref 3.5–5.5)
PSA SERPL-MCNC: 0.8 NG/ML (ref 0–4)
SODIUM SERPL-SCNC: 137 MMOL/L (ref 136–145)
TRIGL SERPL-MCNC: 72 MG/DL (ref ?–150)
VLDLC SERPL CALC-MCNC: 14.4 MG/DL

## 2017-02-01 PROCEDURE — 36415 COLL VENOUS BLD VENIPUNCTURE: CPT | Performed by: INTERNAL MEDICINE

## 2017-02-01 PROCEDURE — 84153 ASSAY OF PSA TOTAL: CPT | Performed by: INTERNAL MEDICINE

## 2017-02-01 PROCEDURE — 80061 LIPID PANEL: CPT | Performed by: INTERNAL MEDICINE

## 2017-02-01 PROCEDURE — 80048 BASIC METABOLIC PNL TOTAL CA: CPT | Performed by: INTERNAL MEDICINE

## 2017-02-09 ENCOUNTER — OFFICE VISIT (OUTPATIENT)
Dept: INTERNAL MEDICINE CLINIC | Age: 77
End: 2017-02-09

## 2017-02-09 ENCOUNTER — PATIENT OUTREACH (OUTPATIENT)
Dept: INTERNAL MEDICINE CLINIC | Age: 77
End: 2017-02-09

## 2017-02-09 VITALS
HEIGHT: 70 IN | SYSTOLIC BLOOD PRESSURE: 130 MMHG | WEIGHT: 182.2 LBS | HEART RATE: 72 BPM | BODY MASS INDEX: 26.08 KG/M2 | TEMPERATURE: 97.6 F | DIASTOLIC BLOOD PRESSURE: 88 MMHG

## 2017-02-09 DIAGNOSIS — Z12.5 SCREENING PSA (PROSTATE SPECIFIC ANTIGEN): ICD-10-CM

## 2017-02-09 DIAGNOSIS — K63.5 COLON POLYP: ICD-10-CM

## 2017-02-09 DIAGNOSIS — F41.9 ANXIETY: ICD-10-CM

## 2017-02-09 DIAGNOSIS — I10 ESSENTIAL HYPERTENSION: Primary | ICD-10-CM

## 2017-02-09 DIAGNOSIS — E78.5 HYPERLIPIDEMIA, UNSPECIFIED HYPERLIPIDEMIA TYPE: ICD-10-CM

## 2017-02-09 DIAGNOSIS — Z80.42 FH: PROSTATE CANCER: ICD-10-CM

## 2017-02-09 DIAGNOSIS — I48.0 PAROXYSMAL ATRIAL FIBRILLATION (HCC): ICD-10-CM

## 2017-02-09 DIAGNOSIS — Z78.9 ADVANCE DIRECTIVE ON FILE: ICD-10-CM

## 2017-02-09 DIAGNOSIS — Z71.89 OTHER SPECIFIED COUNSELING: ICD-10-CM

## 2017-02-09 DIAGNOSIS — K21.9 GASTROESOPHAGEAL REFLUX DISEASE WITHOUT ESOPHAGITIS: ICD-10-CM

## 2017-02-09 DIAGNOSIS — I44.30 AV BLOCK: ICD-10-CM

## 2017-02-09 DIAGNOSIS — Z00.00 MEDICARE ANNUAL WELLNESS VISIT, INITIAL: ICD-10-CM

## 2017-02-09 RX ORDER — BUTALBITAL, ACETAMINOPHEN AND CAFFEINE 50; 325; 40 MG/1; MG/1; MG/1
1 TABLET ORAL
Qty: 30 TAB | Refills: 0 | Status: SHIPPED | OUTPATIENT
Start: 2017-02-09 | End: 2017-08-15 | Stop reason: SDUPTHER

## 2017-02-09 RX ORDER — LORAZEPAM 1 MG/1
1 TABLET ORAL
Qty: 60 TAB | Refills: 0 | Status: SHIPPED | OUTPATIENT
Start: 2017-02-09 | End: 2017-03-16 | Stop reason: SDUPTHER

## 2017-02-09 NOTE — PROGRESS NOTES
1. Have you been to the ER, urgent care clinic or hospitalized since your last visit? NO.     2. Have you seen or consulted any other health care providers outside of the [facility] since your last visit (Include any pap smears or colon screening)? NO      Do you have an Advanced Directive? YES    Would you like information on Advanced Directives?  NO

## 2017-02-09 NOTE — PROGRESS NOTES
Advance Care Planning (ACP) Provider Note - Comprehensive     Date of ACP Conversation: 02/09/17  Persons included in Conversation:  patient  Length of ACP Conversation in minutes:  16 minutes    Authorized Decision Maker (if patient is incapable of making informed decisions): Deepika Gtz  This person is:  Healthcare Agent/Medical Power of  under Advance Directive          General ACP for ALL Patients with Decision Making Capacity:   Importance of advance care planning, including choosing a healthcare agent to communicate patient's healthcare decisions if patient lost the ability to make decisions, such as after a sudden illness or accident  Understanding of the healthcare agent role was assessed and information provided  Exploration of values, goals, and preferences if recovery is not expected, even with continued medical treatment in the event of: Imminent death  Severe, permanent brain injury  \"In these circumstances, what matters most to you? \"  Care focused more on comfort or quality of life. Review of Existing Advance Directive:  Patient has a completed living will and advance directive on file. He states that he does not have family in the area, so he selected an aquaintance to be his health care agent. Huey Arizmendi He is aware of his wishes and will abide by them.      For Serious or Chronic Illness:  Understanding of medical condition      Interventions Provided:  Reviewed existing Advance Directive   Recommended review of completed ACP document annually or upon change in health status

## 2017-02-09 NOTE — PROGRESS NOTES
Mateo Earl is a 67 y.o. male and presents for annual Medicare Wellness Visit. Problem List: Reviewed with patient and discussed risk factors. 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    Gastroesophageal reflux disease without esophagitis K21.9    Acute non-recurrent maxillary sinusitis J01.00       Current medical providers:  Patient Care Team:  Sushant Hernandez MD as PCP - General (Internal Medicine)  Linda Carrel, MD (Cardiology)  Kristian Ross RN as [facility] (Internal Medicine)  Ricki Hernandez MD (Gastroenterology)  Teresa Barrett RN as [facility] (Internal Medicine)  Ángel Guillen MD (Ophthalmology)  Donny Saxena MD (Dermatology)    PSH: Reviewed with patient  Past Surgical History   Procedure Laterality Date    Pr cardiac surg procedure unlist      Endoscopy, colon, diagnostic      Hx cataract removal      Hx pacemaker  05/29/07     Medtronic Versa dual-chamber pacemaker for intermittent high-grade AV block and symptomatic bradycardia.  Hx pacemaker  5/3/16        SH: Reviewed with patient  Social History   Substance Use Topics    Smoking status: Former Smoker     Quit date: 7/27/1966    Smokeless tobacco: Never Used    Alcohol use No      Comment: 03/16/1998 last time he had alcohol.        FH: Reviewed with patient  Family History   Problem Relation Age of Onset    Heart Surgery Mother     Stroke Father     Alcohol abuse Father        Medications/Allergies: Reviewed with patient  Current Outpatient Prescriptions on File Prior to Visit   Medication Sig Dispense Refill    mometasone (NASONEX) 50 mcg/actuation nasal spray USE TWO SPRAYS IN EACH NOSTRIL DAILY 1 Container 5    LORazepam (ATIVAN) 1 mg tablet Take 1 Tab by mouth every eight (8) hours as needed for Anxiety. Max Daily Amount: 3 mg. 60 Tab 0    lisinopril (PRINIVIL, ZESTRIL) 2.5 mg tablet TAKE ONE TABLET BY MOUTH TWICE DAILY. (Patient taking differently: Take 2 tabs qam 1 qhs.) 180 Tab 2    butalbital-acetaminophen-caffeine (FIORICET, ESGIC) -40 mg per tablet Take 1 Tab by mouth every six (6) hours as needed for Pain. 30 Tab 0    VIT C/E/ZN/COPPR/LUTEIN/ZEAXAN (PRESERVISION AREDS 2 PO) Take  by mouth.  omeprazole (PRILOSEC) 20 mg capsule Take 20 mg by mouth daily as needed.  timolol (TIMOPTIC) 0.25 % ophthalmic solution Administer 1 Drop to both eyes two (2) times a day.  MULTIVITAMINS W-MINERALS/LUT (CENTRUM SILVER PO) Take  by mouth daily.  OMEGA-3 FATTY ACIDS (OMEGA 3 PO) Take 2,400 mg by mouth two (2) times a day.  coenzyme q10 100 mg Cap Take  by mouth daily.  metronidazole (METROGEL) 1 % topical gel Apply  to affected area daily. Use a thin layer to affected areas after washing       zinc 50 mg capsule Take  by mouth daily.  magnesium 250 mg Tab Take  by mouth daily.  selenium 100 mcg Cap Take  by mouth daily.  ascorbic acid (VITAMIN C) 500 mg tablet Take  by mouth two (2) times a day.  Ginkgo Biloba Leaf Extract 30 mg Cap Take  by mouth daily.  cyclobenzaprine (FLEXERIL) 5 mg tablet TAKE 1-2 TABLETS BY MOUTH EVERY 8 HOURS AS NEEDED FOR MUSCLE SPASM 25 Tab 0     No current facility-administered medications on file prior to visit. No Known Allergies    Objective:  Visit Vitals    /88 (BP 1 Location: Left arm, BP Patient Position: Sitting)    Pulse 72    Temp 97.6 °F (36.4 °C)    Ht 5' 10\" (1.778 m)    Wt 182 lb 3.2 oz (82.6 kg)    BMI 26.14 kg/m2    Body mass index is 26.14 kg/(m^2).     Assessment of cognitive impairment: Alert and oriented x 3    Depression Screen:   PHQ 2 / 9, over the last two weeks 2/9/2017   Little interest or pleasure in doing things Not at all   Feeling down, depressed or hopeless Not at all   Total Score PHQ 2 0       Fall Risk Assessment:    Fall Risk Assessment, last 12 mths 2/9/2017   Able to walk? Yes   Fall in past 12 months? No       Functional Ability:   Does the patient exhibit a steady gait? yes   How long did it take the patient to get up and walk from a sitting position? 1 second. Is the patient self reliant?  (ie can do own laundry, meals, household chores)  yes     Does the patient handle his/her own medications? yes     Does the patient handle his/her own money? yes     Is the patients home safe (ie good lighting, handrails on stairs and bath, etc.)? yes     Did you notice or did patient express any hearing difficulties? no     Did you notice or did patient express any vision difficulties?   no     Were distance and reading eye charts used? no       Advance Care Planning:   Patient was offered the opportunity to discuss advance care planning:  yes     Does patient have an Advance Directive:  Yes, copy on file. If no, did you provide information on [facility]? N/A       Plan:      No orders of the defined types were placed in this encounter. Health Maintenance   Topic Date Due    GLAUCOMA SCREENING Q2Y  07/22/2017    MEDICARE YEARLY EXAM  02/10/2018    COLONOSCOPY  11/10/2018    DTaP/Tdap/Td series (2 - Td) 07/12/2022    ZOSTER VACCINE AGE 60>  Completed    Pneumococcal 65+ Low/Medium Risk  Completed    INFLUENZA AGE 9 TO ADULT  Completed       *Patient verbalized understanding and agreement with the plan. A copy of the After Visit Summary with personalized health plan was given to the patient today.

## 2017-02-09 NOTE — PROGRESS NOTES
Nurse Navigator contacted Dr. Dinh Lacks office to request a copy of the patients most recent glaucoma screening; contact information provided.

## 2017-02-09 NOTE — MR AVS SNAPSHOT
Visit Information Date & Time Provider Department Dept. Phone Encounter #  
 2/9/2017 12:00 PM Blanka Corona MD Internist of [address] [place] [address] [phone] [id number] Follow-up Instructions Return in about 6 months (around 8/9/2017), or if symptoms worsen or fail to improve. Your Appointments 4/26/2017  2:20 PM  
CARELINK with [facility] ([facility]) Appt Note: [address] [place] [address] [address]  
[phone] [address] [facility] [address] [address]  
  
    
 7/28/2017  2:20 PM  
Follow Up with Ceci Luke MD  
[facility] ([facility]) Appt Note: 6 month f/up [place] [place] [address]  
[phone] [facility] [address] [phone]  
  
    
 8/8/2017  8:35 AM  
LAB with [facility] NURSE VISIT Internist of [facility] ([facility]) Appt Note: labs 6mos. sarris [address], [address] [place] [address] [place]  
  
   
 [address], [address]  
  
    
 8/15/2017  1:00 PM  
Office Visit with Blanka Corona MD  
Internist of [facility]) Appt Note: ov 6mos. sarris [address], [address] [facility] [place] [address]  
  
   
 [address], [address] Upcoming Health Maintenance Date Due  
 GLAUCOMA SCREENING Q2Y 7/22/2017 MEDICARE YEARLY EXAM 2/10/2018 COLONOSCOPY 11/10/2018 DTaP/Tdap/Td series (2 - Td) 7/12/2022 Allergies as of 2/9/2017  Review Complete On: 1/27/2017 By: Ceci Luke MD  
 No Known Allergies Current Immunizations  Reviewed on 8/3/2015 Name Date Influenza High Dose Vaccine PF 12/9/2016, 11/16/2015, 1/10/2013 Influenza Vaccine 11/20/2014, 12/31/2013 Pneumococcal Conjugate (PCV-13) 8/3/2015  1:34 PM  
 Pneumococcal Vaccine (Unspecified Type) 1/1/2007 TDAP Vaccine 7/12/2012 Zoster 7/25/2007 Not reviewed this visit You Were Diagnosed With   
  
 Codes Comments Medicare annual wellness visit, initial    -  Primary ICD-10-CM: Z00.00 ICD-9-CM: V70.0 Advance directive on file     ICD-10-CM: Z78.9 ICD-9-CM: V49.89 Vitals BP Pulse Temp Height(growth percentile) Weight(growth percentile) BMI  
 130/88 (BP 1 Location: Left arm, BP Patient Position: Sitting) 72 97.6 °F (36.4 °C) 5' 10\" (1.778 m) 182 lb 3.2 oz (82.6 kg) 26.14 kg/m2 Smoking Status Former Smoker Vitals History BMI and BSA Data Body Mass Index Body Surface Area  
 26.14 kg/m 2 2.02 m 2 Preferred Pharmacy Pharmacy Name Phone [address] [phone] Your Updated Medication List  
  
   
This list is accurate as of: 2/9/17 12:47 PM.  Always use your most recent med list.  
  
  
  
  
 butalbital-acetaminophen-caffeine -40 mg per tablet Commonly known as:  Laveda Cristin Take 1 Tab by mouth every six (6) hours as needed for Pain. CENTRUM SILVER PO Take  by mouth daily. co-enzyme Q-10 100 mg capsule Commonly known as:  CO Q-10 Take  by mouth daily. cyclobenzaprine 5 mg tablet Commonly known as:  FLEXERIL  
TAKE 1-2 TABLETS BY MOUTH EVERY 8 HOURS AS NEEDED FOR MUSCLE SPASM Ginkgo Biloba Leaf Extract 30 mg Cap Take  by mouth daily. lisinopril 2.5 mg tablet Commonly known as:  PRINIVIL, ZESTRIL  
TAKE ONE TABLET BY MOUTH TWICE DAILY. LORazepam 1 mg tablet Commonly known as:  ATIVAN Take 1 Tab by mouth every eight (8) hours as needed for Anxiety. Max Daily Amount: 3 mg.  
  
 magnesium 250 mg Tab Take  by mouth daily. METROGEL 1 % topical gel Generic drug:  metroNIDAZOLE  
 Apply  to affected area daily. Use a thin layer to affected areas after washing  
  
 mometasone 50 mcg/actuation nasal spray Commonly known as:  NASONEX  
USE TWO SPRAYS IN EACH NOSTRIL DAILY  
  
 OMEGA 3 PO Take 2,400 mg by mouth two (2) times a day. PRESERVISION AREDS 2 PO Take  by mouth. PriLOSEC 20 mg capsule Generic drug:  omeprazole Take 20 mg by mouth daily as needed. selenium 100 mcg Cap Take  by mouth daily. TIMOPTIC 0.25 % ophthalmic solution Generic drug:  timolol Administer 1 Drop to both eyes two (2) times a day. VITAMIN C 500 mg tablet Generic drug:  ascorbic acid (vitamin C) Take  by mouth two (2) times a day. zinc 50 mg capsule Take  by mouth daily. Follow-up Instructions Return in about 6 months (around 8/9/2017), or if symptoms worsen or fail to improve. Patient Instructions Medicare Part B Preventive Services Limitations Recommendation Scheduled Bone Mass Measurement 
(age 72 & older, biennial) Requires diagnosis related to osteoporosis or estrogen deficiency. Biennial benefit unless patient has history of long-term glucocorticoid tx or baseline is needed because initial test was by other method Every 2 years or more frequently if medically necessary. N/A Cardiovascular Screening Blood Tests (every 5 years) Total cholesterol, HDL, Triglycerides Order as a panel if possible Every 5 years. Done: 
7/28/2016 Colorectal Cancer Screening 
-Fecal occult blood test (annual) -Flexible sigmoidoscopy (5y) 
-Screening colonoscopy (10y) -Barium Enema Colorectal cancer screening, ages 54-65. For all patients 48 and older: 
-Annual fecal occult blood test or 
colonoscopy every 10 years or 
-Flexible sigmoidoscopy every 5 years or 
-lower endoscopy to be performed more frequently, if advised by GI. Done: 
11/10/2015 Counseling to Prevent Tobacco Use (up to 8 sessions per year) - Counseling greater than 3 and up to 10 minutes - Counseling greater than 10 minutes Patients must be asymptomatic of tobacco-related conditions to receive as preventive service Two cessation counseling attempts (up to 8 counseling sessions) per year. N/A Diabetes Screening Tests (at least every 3 years, Medicare covers annually or at 6-month intervals for prediabetic patients) Fasting blood sugar (FBS) or glucose tolerance test (GTT) Patient must be diagnosed with one of the following: 
-Hypertension, Dyslipidemia, obesity, previous impaired FBS or GTT 
Or any two of the following: overweight, FH of diabetes, age ? 72, history of gestational diabetes, birth of baby weighing more than 9 pounds Annually or every 6 months if previous diagnosis of elevated FBS, elevated HbA1c, or impaired GTT, or glucosuria. Done: 
7/28/2016 Diabetes Self-Management Training (DSMT) (no USPSTF recommendation) Requires referral by treating physician for patient with diabetes or renal disease. 10 hours of initial DSMT session of no less than 30 minutes each in a continuous 12-month period. 2 hours of follow-up DSMT in subsequent years. Up to 10 hours of initial training within a continuous 12 month period of subsequent years: up to 2 hours of follow-up training each year after the initial year. N/A Glaucoma Screening (no USPSTF recommendation) Diabetes mellitus, family history, , age 48 or over,  American, age 72 or over Annually for covered beneficiaries. Done: 
1/2017 Human Immunodeficiency Virus (HIV) Screening (annually for increased risk patients) HIV-1 and HIV-2 by EIA, FELIX, rapid antibody test, or oral mucosa transudate Patient must be at increased risk for HIV infection per USPSTF guidelines or pregnant. Tests covered annually for patients at increased risk. Pregnant patients may receive up to 3 test during pregnancy. Annually for beneficiaries at increased risk, including anyone who asks for the test. Not high risk Medical Nutrition Therapy (MNT) (for diabetes or renal disease not recommended schedule) Requires referral by treating physician for patient with diabetes or renal disease. Can be provided in same year as diabetes self-management training (DSMT), and CMS recommends medical nutrition therapy take place after DSMT. Up to 3 hours for initial year and 2 hours in subsequent years. First year: 3 hours of one-on-one counseling or subsequent years: 2 hours. N/A Prostate Cancer Screening (annually up to age 76) - Digital rectal exam (WEN) - Prostate specific antigen (PSA) Annually (age 48 or over), WEN not paid separately when covered E/M service is provided on same date Once every 12 months for patients age older than 48years of age includes: digital rectal exam and/or prostate specific antigen test. Done: 
2/1/2017 PSA: 0.8 Seasonal Influenza Vaccination (annually)  Once per fall or winter season. Done: 
12/9/2016 Pneumococcal Vaccination (once after 72)  Once after age 72 and if more than 5 years since last vaccination and/or uncertainty of vaccine status. Pneumococcal: 
1/1/2007 Prevnar 13: 
8/3/2015 Hepatitis B Vaccinations (if medium/high risk) Medium/high risk factors:  End-stage renal disease, Hemophiliacs who received Factor VIII or IX concentrates, Clients of institutions for the mentally retarded, Persons who live in the same house as a HepB virus carrier, Homosexual men, Illicit injectable drug abusers. Schedule course of vaccines if patient not previously vaccinated *additional shots if medically necessary. Not high risk Screening Mammography (biennial age 54-69)? Annually (age 36 or over) Age 28 through 44: one baseline or aged 36 and older: annually. N/A Screening Pap Tests and Pelvic Examination (up to age 79 and after 79 if unknown history or abnormal study last 10 years) Every 24 months except high risk Annually if at high risk for developing cervical or vaginal cancer, or childbearing, age with abnormal Pap test within past 3 years or every 2 years for women at normal risk. N/A Ultrasound Screening for Abdominal Aortic Aneurysm (AAA) (once) Patient must be referred through IPPE and not have had a screening for abdominal aortic aneurysm before under Medicare. Limited to patients who meet one of the following criteria: 
- Men who are 73-68 years old and have smoked more than 100 cigarettes in their lifetime. 
-Anyone with a FH of AAA 
-Anyone recommended for screening by USPSTF Once in a lifetime. N/A Schedule of Personalized Health Plan (Provide Copy to Patient) The best way to stay healthy is to live a healthy lifestyle. A healthy lifestyle includes regular exercise, eating a well-balanced diet, keeping a healthy weight and not smoking. Regular physical exams and screening tests are another important way to take care of yourself. Preventive exams provided by health care providers can find health problems early when treatment works best and can keep you from getting certain diseases or illnesses. Preventive services include exams, lab tests, screenings, shots, monitoring and information to help you take care of your own health. All people over 65 should have a pneumonia shot. Pneumonia shots are usually only needed once in a lifetime unless your doctor decides differently. All people over 65 should have a yearly flu shot. People over 65 are at medium to high risk for Hepatitis B. Three shots are needed for complete protection. In addition to your physical exam, some screening tests are recommended: 
 
Bone mass measurement (dexa scan) is recommended every two years if you have certain risk factors, such as personal history of vertebral fracture or chronic steroid medication use Diabetes Mellitus screening is recommended every year. Glaucoma is an eye disease caused by high pressure in the eye. An eye exam is recommended every year. Cardiovascular screening tests that check your cholesterol and other blood fat (lipid) levels are recommended every five years. Colorectal Cancer screening tests help to find pre-cancerous polyps (growths in the colon) so they can be removed before they turn into cancer. Tests ordered for screening depend on your personal and family history risk factors. Screening for Prostate Cancer is recommended yearly with a digital rectal exam and/or a PSA test 
 
Here is a list of your current Health Maintenance items with a due date: 
Health Maintenance Topic Date Due  GLAUCOMA SCREENING Q2Y  07/22/2017  MEDICARE YEARLY EXAM  02/10/2018  COLONOSCOPY  11/10/2018  DTaP/Tdap/Td series (2 - Td) 07/12/2022  ZOSTER VACCINE AGE 60>  Completed  Pneumococcal 65+ Low/Medium Risk  Completed  INFLUENZA AGE 9 TO ADULT  Completed Preventing Falls: Care Instructions Your Care Instructions Getting around your home safely can be a challenge if you have injuries or health problems that make it easy for you to fall. Loose rugs and furniture in walkways are among the dangers for many older people who have problems walking or who have poor eyesight. People who have conditions such as arthritis, osteoporosis, or dementia also have to be careful not to fall. You can make your home safer with a few simple measures. Follow-up care is a key part of your treatment and safety. Be sure to make and go to all appointments, and call your doctor if you are having problems. It's also a good idea to know your test results and keep a list of the medicines you take. How can you care for yourself at home? Taking care of yourself · You may get dizzy if you do not drink enough water.  To prevent dehydration, drink plenty of fluids, enough so that your urine is light yellow or clear like water. Choose water and other caffeine-free clear liquids. If you have kidney, heart, or liver disease and have to limit fluids, talk with your doctor before you increase the amount of fluids you drink. · Exercise regularly to improve your strength, muscle tone, and balance. Walk if you can. Swimming may be a good choice if you cannot walk easily. · Have your vision and hearing checked each year or any time you notice a change. If you have trouble seeing and hearing, you might not be able to avoid objects and could lose your balance. · Know the side effects of the medicines you take. Ask your doctor or pharmacist whether the medicines you take can affect your balance. Sleeping pills or sedatives can affect your balance. · Limit the amount of alcohol you drink. Alcohol can impair your balance and other senses. · Ask your doctor whether calluses or corns on your feet need to be removed. If you wear loose-fitting shoes because of calluses or corns, you can lose your balance and fall. · Talk to your doctor if you have numbness in your feet. Preventing falls at home · Remove raised doorway thresholds, throw rugs, and clutter. Repair loose carpet or raised areas in the floor. · Move furniture and electrical cords to keep them out of walking paths. · Use nonskid floor wax, and wipe up spills right away, especially on ceramic tile floors. · If you use a walker or cane, put rubber tips on it. If you use crutches, clean the bottoms of them regularly with an abrasive pad, such as steel wool. · Keep your house well lit, especially [facility], and outside walkways. Use night-lights in areas such as hallways and bathrooms. Add extra light switches or use remote switches (such as switches that go on or off when you clap your hands) to make it easier to turn lights on if you have to get up during the night. · Install sturdy handrails on stairways. · Move items in your cabinets so that the things you use a lot are on the lower shelves (about waist level). · Keep a cordless phone and a flashlight with new batteries by your bed. If possible, put a phone in each of the main rooms of your house, or carry a cell phone in case you fall and cannot reach a phone. Or, you can wear a device around your neck or wrist. You push a button that sends a signal for help. · Wear low-heeled shoes that fit well and give your feet good support. Use footwear with nonskid soles. Check the heels and soles of your shoes for wear. Repair or replace worn heels or soles. · Do not wear socks without shoes on wood floors. · Walk on the grass when the sidewalks are slippery. If you live in an area that gets snow and ice in the winter, sprinkle salt on slippery steps and sidewalks. Preventing falls in the bath · Install grab bars and nonskid mats inside and outside your shower or tub and near the toilet and sinks. · Use shower chairs and bath benches. · Use a hand-held shower head that will allow you to sit while showering. · Get into a tub or shower by putting the weaker leg in first. Get out of a tub or shower with your strong side first. 
· Repair loose toilet seats and consider installing a raised toilet seat to make getting on and off the toilet easier. · Keep your bathroom door unlocked while you are in the shower. Where can you learn more? Go to http://hector-scott.info/. Enter [id number] [id number] in the search box to learn more about \"Preventing Falls: Care Instructions. \" Current as of: August 4, 2016 Content Version: 11.1 © 5010-0397 [employer]. Care instructions adapted under license by [employer] (which disclaims liability or warranty for this information).  If you have questions about a medical condition or this instruction, always ask your healthcare professional. [employer] disclaims any warranty or liability for your use of this information. Hyperlipidemia: After Your Visit Your Care Instructions Hyperlipidemia is too much fat in your blood. The body has several kinds of fat, including cholesterol and triglycerides. Your body needs fat for many things, such as making new cells. But too much fat in your blood increases your chances of having a heart attack or stroke. You may be able to lower your cholesterol and triglycerides with a heart-healthy diet, exercise, and if needed, medicine. Your doctor may want you to try lifestyle changes first to see whether they lower the fat in your blood. You may need to take medicine if lifestyle changes do not lower the fat in your blood enough. Follow-up care is a key part of your treatment and safety. Be sure to make and go to all appointments, and call your doctor if you are having problems. Its also a good idea to know your test results and keep a list of the medicines you take. How can you care for yourself at home? Take your medicines · Take your medicines exactly as prescribed. Call your doctor if you think you are having a problem with your medicine. · If you take medicine to lower your cholesterol, go to follow-up visits. You will need to have blood tests. · Do not take large doses of niacin, which is a B vitamin, while taking medicine called statins. It may increase the chance of muscle pain and liver problems. · Talk to your doctor about avoiding grapefruit juice if you are taking statins. Grapefruit juice can raise the level of this medicine in your blood. This could increase side effects. Eat more fruits, vegetables, and fiber · Fruits and vegetables have lots of nutrients that help protect against heart disease, and they have littleif anyfat. Try to eat at least five servings a day. Dark green, deep orange, or yellow fruits and vegetables are healthy choices. · Keep carrots, celery, and other veggies handy for snacks. Buy fruit that is in season and store it where you can see it so that you will be tempted to eat it. Cook dishes that have a lot of veggies in them, such as stir-fries and soups. · Foods high in fiber may reduce your cholesterol and provide important vitamins and minerals. High-fiber foods include whole-grain cereals and breads, oatmeal, beans, brown rice, citrus fruits, and apples. · Buy whole-grain breads and cereals instead of white bread and pastries. Limit saturated fat · Read food labels and try to avoid saturated fat and trans fat. They increase your risk of heart disease. · Use olive or canola oil when you cook. Try cholesterol-lowering spreads, such as Benecol or Take Control. · Bake, broil, grill, or steam foods instead of frying them. · Limit the amount of high-fat meats you eat, including hot dogs and sausages. Cut out all visible fat when you prepare meat. · Eat fish, skinless poultry, and soy products such as tofu instead of high-fat meats. Soybeans may be especially good for your heart. Eat at least two servings of fish a week. Certain fish, such as salmon, contain omega-3 fatty acids, which may help reduce your risk of heart attack. · Choose low-fat or fat-free milk and dairy products. Get exercise, limit alcohol, and quit smoking · Get more exercise. Work with your doctor to set up an exercise program. Even if you can do only a small amount, exercise will help you get stronger, have more energy, and manage your weight and your stress. Walking is an easy way to get exercise. Gradually increase the amount you walk every day. Aim for at least 30 minutes on most days of the week. You also may want to swim, bike, or do other activities. · Limit alcohol to no more than 2 drinks a day for men and 1 drink a day for women. · Do not smoke.  If you need help quitting, talk to your doctor about stop-smoking programs and medicines. These can increase your chances of quitting for good. When should you call for help? Call 911 anytime you think you may need emergency care. For example, call if: 
· You have symptoms of a heart attack. These may include: ¨ Chest pain or pressure, or a strange feeling in the chest. 
¨ Sweating. ¨ Shortness of breath. ¨ Nausea or vomiting. ¨ Pain, pressure, or a strange feeling in the back, neck, jaw, or upper belly or in one or both shoulders or arms. ¨ Lightheadedness or sudden weakness. ¨ A fast or irregular heartbeat. After you call 911, the  may tell you to chew 1 adult-strength or 2 to 4 low-dose aspirin. Wait for an ambulance. Do not try to drive yourself. · You have signs of a stroke. These may include: 
¨ Sudden numbness, paralysis, or weakness in your face, arm, or leg, especially on only one side of your body. ¨ New problems with walking or balance. ¨ Sudden vision changes. ¨ Drooling or slurred speech. ¨ New problems speaking or understanding simple statements, or feeling confused. ¨ A sudden, severe headache that is different from past headaches. · You passed out (lost consciousness). Call your doctor now or seek immediate medical care if: 
· You have muscle pain or weakness. Watch closely for changes in your health, and be sure to contact your doctor if: 
· You are very tired. · You have an upset stomach, gas, constipation, or belly pain or cramps. Where can you learn more? Go to Power Liens.be Enter C406 in the search box to learn more about \"Hyperlipidemia: After Your Visit. \"  
© 4620-0990 [employer], Incorporated. Care instructions adapted under license by Meka De Jesus (which disclaims liability or warranty for this information).  This care instruction is for use with your licensed healthcare professional. If you have questions about a medical condition or this instruction, always ask your healthcare professional. [address] any warranty or liability for your use of this information. Content Version: 3.1.314824; Last Revised: October 13, 2011 Introducing [facility]! Dear Vivienne Parker: 
Thank you for requesting a [employer] account. Our records indicate that you already have an active [employer] account. You can access your account anytime at [email or web address] Did you know that you can access your hospital and ER discharge instructions at any time in [employer]? You can also review all of your test results from your hospital stay or ER visit. Additional Information If you have questions, please visit the Frequently Asked Questions section of the [employer] website at [email or web address] Remember, [employer] is NOT to be used for urgent needs. For medical emergencies, dial 911. Now available from your iPhone and Android! Please provide this summary of care documentation to your next provider. Your primary care clinician is listed as Blanka Corona. If you have any questions after today's visit, please call [phone].

## 2017-02-13 NOTE — PROGRESS NOTES
HPI:   Kelly Riely is a 67y.o. year old male who presents today for evaluation of hypertension, hyperlipidemia, AV block s/p pacemaker, paroxysmal atrial fibrillation, and anxiety. He reports that he is doing relatively well. He was last seen on 12/29/2016 and diagnosed with acute rhinosinusitis and treated with a course of Augmentin. He returned and saw PA Mei Borges on 1/16/2016 and 1/20/2016 for recurrent symptoms, and was given a second course of Augmentin. He reports today that he has significantly improved. He does still have allergy symptoms, for which he is treated with Mucinex and Zyrtec. He reports a six pound weight gain since 8/2016 which he attributes to decreased activity. He is otherwise without complaints. He has a history of hypertension, treated with lisinopril. He does monitor his blood pressure regularly, and adjusts his dose of lisinopril accordingly. He was taking lisinopril 2.5 mg twice per day, and he increased his dose to 5 mg in the morning and 2.5 mg in the evening when he noticed his blood pressure was elevated. He reports that his blood pressure in mainly 125-130/82-85 at home. He has a history of intermittent high grade AV block, and underwent placement of a dual chamber Medtronic pacemaker in 2007. He underwent pacemaker generator exchange in 5/2016 after his device reached elective replacement indicator. A Medtronic MRI safe device was installed. He has a history of a single episode of atrial fibrillation noted on device check several years ago. It has not recurred. He was noted in the past to have mildly decreased LV function, but a repeat echocardiogram in 6/2013 showed normal size and lower limits of normal function with EF 50-55%; no wall motion abnormalities, and grade 1 diastolic dysfunction. He is followed by Dr. Erick Puentes. 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 He denies any chest pain, shortness of breath at rest or with exertion, palpitations, lightheadedness, or edema. He has a history of dyslipidemia, managed by diet in the past. His weight has currently increased 22 pounds since 5/2016. He has a history of a GI bleeding in 1997 secondary to NSAID use for headaches. He also has a history of GERD and dysphagia and has undergone multiple esophageal dilatations in the past by Dr. Jelly Herrera. In 11/2015, he had an upper endoscopy which showed a small hiatal hernia in the cardia, a Schatzki's ring at the gastroesophageal junction, which was dilated, and otherwise normal stomach and duodenum. He also had a screening colonoscopy which revealed mild colonic diverticulosis and a 7 mm polyp in the proximal ascending colon (pathology: tubulovillous adenoma). Follow-up recommended for 3 years. He has a history of anxiety, and uses lorazepam occasionally. He also reports he uses cyclobenzaprine occasionally for back spasms due to straining his back with excessive physical work. Past Medical History   Diagnosis Date    Anxiety     AV block      intermittent, high-grade    Bilateral inguinal hernia     Cardiac echocardiogram 06/25/2013     EF 50-55%. No WMA. Mild LVH. Gr 1 DDfx. RVE.  AKANKSHA. Mild TR. No significant chg from study of 6/8/11.  Cardiac nuclear imaging test 10/07/2009     Likely inferior & anterior septal artifact. EF 55%. Mild septal WMA could be related to pacemaker.  Cardiomyopathy ([address]     Diverticulosis of colon 11/10/2015    Dysphagia      Multiple esophageal dilations.  Essential hypertension with goal blood pressure less than 140/90     HLD (hyperlipidemia)     Hx of colonoscopy 11/10/2015     Dr. Jelly Herrera; tubulovillous adenoma.  Pacemaker 2007     Medtronic dual-chamber device.     Paroxysmal atrial fibrillation (HCC)      single episode noted on routine device check     Past Surgical History Procedure Laterality Date    Pr cardiac surg procedure unlist      Endoscopy, colon, diagnostic      Hx cataract removal      Hx pacemaker  05/29/07     Medtronic Versa dual-chamber pacemaker for intermittent high-grade AV block and symptomatic bradycardia.  Hx pacemaker  5/3/16     Current Outpatient Prescriptions   Medication Sig    mometasone (NASONEX) 50 mcg/actuation nasal spray USE TWO SPRAYS IN EACH NOSTRIL DAILY    lisinopril (PRINIVIL, ZESTRIL) 2.5 mg tablet TAKE ONE TABLET BY MOUTH TWICE DAILY. (Patient taking differently: Take 2 tabs qam 1 qhs.)    VIT C/E/ZN/COPPR/LUTEIN/ZEAXAN (PRESERVISION AREDS 2 PO) Take  by mouth.  omeprazole (PRILOSEC) 20 mg capsule Take 20 mg by mouth daily as needed.  timolol (TIMOPTIC) 0.25 % ophthalmic solution Administer 1 Drop to both eyes two (2) times a day.  MULTIVITAMINS W-MINERALS/LUT (CENTRUM SILVER PO) Take  by mouth daily.  OMEGA-3 FATTY ACIDS (OMEGA 3 PO) Take 2,400 mg by mouth two (2) times a day.  coenzyme q10 100 mg Cap Take  by mouth daily.  metronidazole (METROGEL) 1 % topical gel Apply  to affected area daily. Use a thin layer to affected areas after washing     zinc 50 mg capsule Take  by mouth daily.  magnesium 250 mg Tab Take  by mouth daily.  selenium 100 mcg Cap Take  by mouth daily.  ascorbic acid (VITAMIN C) 500 mg tablet Take  by mouth two (2) times a day.  Ginkgo Biloba Leaf Extract 30 mg Cap Take  by mouth daily.  butalbital-acetaminophen-caffeine (FIORICET, ESGIC) -40 mg per tablet Take 1 Tab by mouth every six (6) hours as needed for Pain.  LORazepam (ATIVAN) 1 mg tablet Take 1 Tab by mouth every eight (8) hours as needed for Anxiety. Max Daily Amount: 3 mg.  cyclobenzaprine (FLEXERIL) 5 mg tablet TAKE 1-2 TABLETS BY MOUTH EVERY 8 HOURS AS NEEDED FOR MUSCLE SPASM     No current facility-administered medications for this visit.       Allergies and Intolerances:   No Known Allergies     Family History: His brother  from metastatic prostate cancer. His mother lived until the age of 80, although she had CAD. His father  at the age of 59 from a CVA. He was an alcoholic. He has 5 sisters that are all healthy. Family History   Problem Relation Age of Onset    Heart Surgery Mother     Stroke Father     Alcohol abuse Father      Social History:   He  reports that he quit smoking about 50 years ago. He has never used smokeless tobacco. He smoked 1 ppd for 2 years, and then quit (). He is retired  and worked for the [employer]. He lives with his girlfriend and has one son. History   Alcohol Use No     Comment: 1998 last time he had alcohol. Immunization History:  Immunization History   Administered Date(s) Administered    Influenza High Dose Vaccine PF 01/10/2013, 2015, 2016    Influenza Vaccine 2013, 2014    Pneumococcal Conjugate (PCV-13) 2015    Pneumococcal Vaccine (Unspecified Type) 2007    TDAP Vaccine 2012    Zoster 2007       Review of Systems:   As above included in HPI. Otherwise 11 point review of systems negative including constitutional, skin, HENT, eyes, respiratory, cardiovascular, gastrointestinal, genitourinary, musculoskeletal, endocrine, hematologic, allergy, and neurologic. Physical:   Vitals:   BP: 130/88   HR: 72  WT: 182 lb 3.2 oz (82.6 kg)  BMI:  26.14 kg/m2    Exam:   Pt appears well; alert and oriented x 3; appropriate affect. HEENT: PERRLA, anicteric, oropharynx clear, no JVD, adenopathy or thyromegaly. No sinus tenderness to palpation. No carotid bruits or radiated murmur. Lungs: clear to auscultation, no wheezes, rhonchi, or rales. Heart: regular rate and rhythm. No murmur, rubs, gallops  Abdomen: soft, nontender, nondistended, normal bowel sounds, no hepatosplenomegaly or masses. Extremities: without edema. Pulses 1-2+ bilaterally.     Review of Data:  Labs:  Hospital Outpatient Visit on 02/01/2017   Component Date Value Ref Range Status    LIPID PROFILE 02/01/2017        Final    Cholesterol, total 02/01/2017 182  <200 MG/DL Final    Triglyceride 02/01/2017 72  <150 MG/DL Final    HDL Cholesterol 02/01/2017 44  40 - 60 MG/DL Final    LDL, calculated 02/01/2017 123.6* 0 - 100 MG/DL Final    VLDL, calculated 02/01/2017 14.4  MG/DL Final    CHOL/HDL Ratio 02/01/2017 4.1  0 - 5.0   Final    Prostate Specific Ag 02/01/2017 0.8  0.0 - 4.0 ng/mL Final    Sodium 02/01/2017 137  136 - 145 mmol/L Final    Potassium 02/01/2017 4.7  3.5 - 5.5 mmol/L Final    Chloride 02/01/2017 100  100 - 108 mmol/L Final    CO2 02/01/2017 29  21 - 32 mmol/L Final    Anion gap 02/01/2017 8  3.0 - 18 mmol/L Final    Glucose 02/01/2017 94  74 - 99 mg/dL Final    BUN 02/01/2017 11  7.0 - 18 MG/DL Final    Creatinine 02/01/2017 0.87  0.6 - 1.3 MG/DL Final    BUN/Creatinine ratio 02/01/2017 13  12 - 20   Final    GFR est AA 02/01/2017 >60  >60 ml/min/1.73m2 Final    GFR est non-AA 02/01/2017 >60  >60 ml/min/1.73m2 Final    Calcium 02/01/2017 8.9  8.5 - 10.1 MG/DL Final       Calculated 10 year ASCVD risk score: 32.8 %    Health Maintenance:  Screening:    Colorectal: colonoscopy (11/2015) tubulovillous adenoma. Dr. Sharri Green. Due 11/2018. Depression: none   DM (HbA1c/FPG): FPG 94 (2/2017)   Hepatitis C: N/A   Falls: none   DEXA: N/A   PSA/WEN: PSA 0.8/ WEN (8/2016)   Glaucoma: regular eye exams with Dr. Claudette Promise (last 1/2017) for glaucoma q 4 months.    Smoking: none   Vitamin D: 32.2 (7/2016)   Medicare Wellness: today    Impression:  Patient Active Problem List   Diagnosis Code    AV block I44.30    Pacemaker Z95.0    Cardiomyopathy ([facility] [address] I42.9    Paroxysmal atrial fibrillation (HCC) I48.0    HLD (hyperlipidemia) E78.5    FH: prostate cancer Z80.42    Bilateral inguinal hernia K40.20    Anxiety F41.9    Essential hypertension I10    Colon polyp; tubulovillous adenoma 2015 K63.5    Gastroesophageal reflux disease without esophagitis K21.9    Acute non-recurrent maxillary sinusitis J01.00       Plan:  1. Acute sinusitis. Resolved with Augmentin. Now with seasonal allergies responsive to Mucinex and Zyrtec. Follow. 2. Hypertension. Blood pressure controlled today on 7.5 mg dose of lisinopril. He continues to monitor his blood pressure carefully at home. Renal function normal with creatinine 0.87 / eGFR >60. Continue to follow. 3. AV block, intermittent high grade. S/P Medtronic dual chamber pacemaker with generator change in 2016 with an MRI safe device installed. Interrogated and followed every 3 months by Dr. Prieto Penaloza, last 2017. Repeat echocardiogram with low normal LV function. 4. Paroxysmal atrial fibrillation. Single episode noted on device check several years ago. None since. No need for anti-coagulation. Continue to follow. 5. Hyperlipidemia. Calculated 10 year ASCVD risk is 32.8 %. However, his age > 76 does not place him in one of the four statin benefit groups as per new AHA/ACC guidelines. Also, with no evidence of ASCVD. However,  which has increased steadily over the last year. Most likely attributed to recent weight gain. Weight increased another  6 pounds since last visit. Emphasized importance of lifestyle modifications, including diet, exercise, and weight loss. Continue to follow and repeat lipid panel at next visit. 6. GERD/dysphagia. Recent EGD with esophageal dilatation for Schatzki's ring. Small hiatal hernia. Symptoms well controlled with omeprazole. Follow. 7. Family history of prostate cancer. Patient quite concerned about his own risk given that his brother  quite rapidly from metastatic disease. Reassured that his PSA level was quite low,  He requested to have it rechecked in 6 months. 8. Anxiety. Patient is quite anxious about his health. Reassured that he is living a healthy lifestyle and doing well.  Continue lorazepam prn.   9. Health maintenance. Already received influenza vaccine. Other immunizations up to date. Colonoscopy due 2018. Continue regular eye exams with Dr. Tristen Van. Vitamin D level normal. Will perform prostate cancer screening at next visit. Total time: 40 minutes spent with the patient in face-to-face consultation of which greater than 50% was spent on counseling, answering questions and/or coordination of care. Complex medical review and management performed. In addition, an annual Medicare wellness visit was done today. Reviewed and agree with assessment. Patient understands recommendations and agrees with plan. Follow-up in 6 months.

## 2017-03-13 ENCOUNTER — PATIENT OUTREACH (OUTPATIENT)
Dept: INTERNAL MEDICINE CLINIC | Age: 77
End: 2017-03-13

## 2017-03-16 RX ORDER — LORAZEPAM 1 MG/1
1 TABLET ORAL
Qty: 60 TAB | Refills: 0 | OUTPATIENT
Start: 2017-03-16 | End: 2017-05-01 | Stop reason: SDUPTHER

## 2017-03-16 NOTE — TELEPHONE ENCOUNTER
Reviewed report generated by the [facility]. Does not demonstrate aberrancies or inconsistencies with regard to the prescribing of controlled medications to this patient by other providers.   Last filled 2/10/17 by Dr German Ta

## 2017-03-16 NOTE — TELEPHONE ENCOUNTER
From: Anaid Navarro  To:  Berta Farrell MD  Sent: 3/16/2017 1:31 PM EDT  Subject: Medication Renewal Request    Original authorizing provider: MD Anaid Tomlinson would like a refill of the following medications:  LORazepam (ATIVAN) 1 mg tablet Berta Farrell MD]    Preferred pharmacy: [address]:

## 2017-04-25 ENCOUNTER — OFFICE VISIT (OUTPATIENT)
Dept: INTERNAL MEDICINE CLINIC | Age: 77
End: 2017-04-25

## 2017-04-25 VITALS
BODY MASS INDEX: 25.2 KG/M2 | HEART RATE: 73 BPM | DIASTOLIC BLOOD PRESSURE: 82 MMHG | OXYGEN SATURATION: 98 % | TEMPERATURE: 98 F | WEIGHT: 176 LBS | HEIGHT: 70 IN | SYSTOLIC BLOOD PRESSURE: 126 MMHG

## 2017-04-25 DIAGNOSIS — Z95.0 PACEMAKER: ICD-10-CM

## 2017-04-25 DIAGNOSIS — E86.0 DEHYDRATION: ICD-10-CM

## 2017-04-25 DIAGNOSIS — I10 ESSENTIAL HYPERTENSION: ICD-10-CM

## 2017-04-25 DIAGNOSIS — R42 POSTURAL DIZZINESS WITH PRESYNCOPE: Primary | ICD-10-CM

## 2017-04-25 DIAGNOSIS — K21.9 GASTROESOPHAGEAL REFLUX DISEASE WITHOUT ESOPHAGITIS: ICD-10-CM

## 2017-04-25 DIAGNOSIS — F41.9 ANXIETY: ICD-10-CM

## 2017-04-25 DIAGNOSIS — E78.5 HYPERLIPIDEMIA, UNSPECIFIED HYPERLIPIDEMIA TYPE: ICD-10-CM

## 2017-04-25 DIAGNOSIS — R55 POSTURAL DIZZINESS WITH PRESYNCOPE: Primary | ICD-10-CM

## 2017-04-25 DIAGNOSIS — R91.1 PULMONARY NODULE, LEFT: ICD-10-CM

## 2017-04-25 DIAGNOSIS — I44.30 AV BLOCK: ICD-10-CM

## 2017-04-25 NOTE — PROGRESS NOTES
1. Have you been to the ER, urgent care clinic or hospitalized since your last visit? YES. [address] [place] [address] ER for dehydration 4/21/2017    2. Have you seen or consulted any other health care providers outside of the [address] [place] [address] since your last visit (Include any pap smears or colon screening)? NO      Do you have an Advanced Directive? YES     Would you like information on Advanced Directives?  NO

## 2017-04-25 NOTE — PATIENT INSTRUCTIONS

## 2017-04-25 NOTE — MR AVS SNAPSHOT
Visit Information Date & Time Provider Department Dept. Phone Encounter #  
 4/25/2017 11:30 AM Renetta Gutierrez MD Internist of [address] [place] [address] Road [phone] [id number] Your Appointments 4/26/2017  2:20 PM  
CAREL[facility] with Pedrito [facility]so [facility] ([facility]) Appt Note: [address] [place] [address] [place] [id number] [phone] [address] [place] [address] [address] [address]  
  
    
 7/28/2017  2:20 PM  
Follow Up with Radha Soto MD  
[facility] ([facility]) Appt Note: 6 month f/up [place] [phone]  
[phone] [facility] [address] [phone]  
  
    
 8/8/2017  8:35 AM  
LAB with [facility] NURSE VISIT Internist of [facility] ([facility]) Appt Note: labs 6mos. sarris [address], [address] [place] [address]  
  
   
 [address], [address]  
  
    
 8/15/2017  1:00 PM  
Office Visit with Renetta Gutierrez MD  
Internist of [facility]) Appt Note: ov 6mos. sarris [address], [address] [place]  
  
   
 [address], [address] Upcoming Health Maintenance Date Due  
 MEDICARE YEARLY EXAM 2/10/2018 COLONOSCOPY 11/10/2018 GLAUCOMA SCREENING Q2Y 1/26/2019 DTaP/Tdap/Td series (2 - Td) 7/12/2022 Allergies as of 4/25/2017  Review Complete On: 4/25/2017 By: Tad Chase No Known Allergies Current Immunizations  Reviewed on 8/3/2015 Name Date Influenza High Dose Vaccine PF 12/9/2016, 11/16/2015, 1/10/2013 Influenza Vaccine 11/20/2014, 12/31/2013 Pneumococcal Conjugate (PCV-13) 8/3/2015  1:34 PM  
 Pneumococcal Vaccine (Unspecified Type) 1/1/2007 TDAP Vaccine 7/12/2012 Zoster 7/25/2007 Not reviewed this visit Vitals BP Pulse Temp Height(growth percentile) Weight(growth percentile) SpO2  
 126/82 73 98 °F (36.7 °C) (Oral) 5' 10\" (1.778 m) 176 lb (79.8 kg) 98% BMI Smoking Status 25.25 kg/m2 Former Smoker Vitals History BMI and BSA Data Body Mass Index Body Surface Area  
 25.25 kg/m 2 1.99 m 2 Preferred Pharmacy Pharmacy Name Phone [facility], [address] [place] [address] [phone] Your Updated Medication List  
  
   
This list is accurate as of: 4/25/17 12:22 PM.  Always use your most recent med list.  
  
  
  
  
 butalbital-acetaminophen-caffeine -40 mg per tablet Commonly known as:  Huma Bougie Take 1 Tab by mouth every six (6) hours as needed for Pain. CENTRUM SILVER PO Take  by mouth daily. co-enzyme Q-10 100 mg capsule Commonly known as:  CO Q-10 Take  by mouth daily. cyclobenzaprine 5 mg tablet Commonly known as:  FLEXERIL  
TAKE 1-2 TABLETS BY MOUTH EVERY 8 HOURS AS NEEDED FOR MUSCLE SPASM Ginkgo Biloba Leaf Extract 30 mg Cap Take  by mouth daily. lisinopril 2.5 mg tablet Commonly known as:  PRINIVIL, ZESTRIL  
TAKE ONE TABLET BY MOUTH TWICE DAILY. LORazepam 1 mg tablet Commonly known as:  ATIVAN Take 1 Tab by mouth every eight (8) hours as needed for Anxiety. Max Daily Amount: 3 mg.  
  
 magnesium 250 mg Tab Take  by mouth daily. METROGEL 1 % topical gel Generic drug:  metroNIDAZOLE Apply  to affected area daily. Use a thin layer to affected areas after washing  
  
 mometasone 50 mcg/actuation nasal spray Commonly known as:  NASONEX  
USE TWO SPRAYS IN EACH NOSTRIL DAILY  
  
 OMEGA 3 PO Take 2,400 mg by mouth two (2) times a day. PRESERVISION AREDS 2 PO Take  by mouth. PriLOSEC 20 mg capsule Generic drug:  omeprazole Take 20 mg by mouth daily as needed. selenium 100 mcg Cap Take  by mouth daily. TIMOPTIC 0.25 % ophthalmic solution Generic drug:  timolol Administer 1 Drop to both eyes two (2) times a day. VITAMIN C 500 mg tablet Generic drug:  ascorbic acid (vitamin C) Take  by mouth two (2) times a day. zinc 50 mg capsule Take  by mouth daily. Patient Instructions High Blood Pressure: Care Instructions Your Care Instructions If your blood pressure is usually above 140/90, you have high blood pressure, or hypertension. That means the top number is 140 or higher or the bottom number is 90 or higher, or both. Despite what a lot of people think, high blood pressure usually doesn't cause headaches or make you feel dizzy or lightheaded. It usually has no symptoms. But it does increase your risk for heart attack, stroke, and kidney or eye damage. The higher your blood pressure, the more your risk increases. Your doctor will give you a goal for your blood pressure. Your goal will be based on your health and your age. An example of a goal is to keep your blood pressure below 140/90. Lifestyle changes, such as eating healthy and being active, are always important to help lower blood pressure. You might also take medicine to reach your blood pressure goal. 
Follow-up care is a key part of your treatment and safety. Be sure to make and go to all appointments, and call your doctor if you are having problems. It's also a good idea to know your test results and keep a list of the medicines you take. How can you care for yourself at home? Medical treatment · If you stop taking your medicine, your blood pressure will go back up. You may take one or more types of medicine to lower your blood pressure. Be safe with medicines. Take your medicine exactly as prescribed. Call your doctor if you think you are having a problem with your medicine. · Talk to your doctor before you start taking aspirin every day.  Aspirin can help certain people lower their risk of a heart attack or stroke. But taking aspirin isn't right for everyone, because it can cause serious bleeding. · See your doctor regularly. You may need to see the doctor more often at first or until your blood pressure comes down. · If you are taking blood pressure medicine, talk to your doctor before you take decongestants or anti-inflammatory medicine, such as ibuprofen. Some of these medicines can raise blood pressure. · Learn how to check your blood pressure at home. Lifestyle changes · Stay at a healthy weight. This is especially important if you put on weight around the waist. Losing even 10 pounds can help you lower your blood pressure. · If your doctor recommends it, get more exercise. Walking is a good choice. Bit by bit, increase the amount you walk every day. Try for at least 30 minutes on most days of the week. You also may want to swim, bike, or do other activities. · Avoid or limit alcohol. Talk to your doctor about whether you can drink any alcohol. · Try to limit how much sodium you eat to less than 2,300 milligrams (mg) a day. Your doctor may ask you to try to eat less than 1,500 mg a day. · Eat plenty of fruits (such as bananas and oranges), vegetables, legumes, whole grains, and low-fat dairy products. · Lower the amount of saturated fat in your diet. Saturated fat is found in animal products such as milk, cheese, and meat. Limiting these foods may help you lose weight and also lower your risk for heart disease. · Do not smoke. Smoking increases your risk for heart attack and stroke. If you need help quitting, talk to your doctor about stop-smoking programs and medicines. These can increase your chances of quitting for good. When should you call for help? Call 911 anytime you think you may need emergency care.  This may mean having symptoms that suggest that your blood pressure is causing a serious heart or blood vessel problem. Your blood pressure may be over 180/110. For example, call 911 if: 
· You have symptoms of a heart attack. These may include: ¨ Chest pain or pressure, or a strange feeling in the chest. 
¨ Sweating. ¨ Shortness of breath. ¨ Nausea or vomiting. ¨ Pain, pressure, or a strange feeling in the back, neck, jaw, or upper belly or in one or both shoulders or arms. ¨ Lightheadedness or sudden weakness. ¨ A fast or irregular heartbeat. · You have symptoms of a stroke. These may include: 
¨ Sudden numbness, tingling, weakness, or loss of movement in your face, arm, or leg, especially on only one side of your body. ¨ Sudden vision changes. ¨ Sudden trouble speaking. ¨ Sudden confusion or trouble understanding simple statements. ¨ Sudden problems with walking or balance. ¨ A sudden, severe headache that is different from past headaches. · You have severe back or belly pain. Do not wait until your blood pressure comes down on its own. Get help right away. Call your doctor now or seek immediate care if: 
· Your blood pressure is much higher than normal (such as 180/110 or higher), but you don't have symptoms. · You think high blood pressure is causing symptoms, such as: ¨ Severe headache. ¨ Blurry vision. Watch closely for changes in your health, and be sure to contact your doctor if: 
· Your blood pressure measures 140/90 or higher at least 2 times. That means the top number is 140 or higher or the bottom number is 90 or higher, or both. · You think you may be having side effects from your blood pressure medicine. · Your blood pressure is usually normal, but it goes above normal at least 2 times. Where can you learn more? Go to http://hector-scott.info/. Enter T138 in the search box to learn more about \"High Blood Pressure: Care Instructions. \" Current as of: August 8, 2016 Content Version: 11.2 © 0005-3728 Healthwise[employer]orporated. Care instructions adapted under license by [employer] (which disclaims liability or warranty for this information). If you have questions about a medical condition or this instruction, always ask your healthcare professional. [address] any warranty or liability for your use of this information. Introducing [facility]! Dear Marianne Nj: 
Thank you for requesting a Mygeni account. Our records indicate that you already have an active Mygeni account. You can access your account anytime at [email or web address] Did you know that you can access your hospital and ER discharge instructions at any time in Mygeni? You can also review all of your test results from your hospital stay or ER visit. Additional Information If you have questions, please visit the Frequently Asked Questions section of the Mygeni website at [email or web address] Remember, Mygeni is NOT to be used for urgent needs. For medical emergencies, dial 911. Now available from your iPhone and Android! Please provide this summary of care documentation to your next provider. Your primary care clinician is listed as Hari Shepherd. If you have any questions after today's visit, please call [phone].

## 2017-04-28 PROBLEM — R55 POSTURAL DIZZINESS WITH PRESYNCOPE: Status: ACTIVE | Noted: 2017-04-28

## 2017-04-28 PROBLEM — R42 POSTURAL DIZZINESS WITH PRESYNCOPE: Status: ACTIVE | Noted: 2017-04-28

## 2017-04-28 PROBLEM — R91.1 PULMONARY NODULE, LEFT: Status: ACTIVE | Noted: 2017-04-28

## 2017-04-28 PROBLEM — E86.0 DEHYDRATION: Status: ACTIVE | Noted: 2017-04-28

## 2017-04-28 NOTE — PROGRESS NOTES
HPI:   Mook Mcarthur is a 67y.o. year old male who presents today for follow-up following [address] ED evaluation on 4/21/2017 for presyncopal episode. He has a history of   hypertension, hyperlipidemia, AV block s/p pacemaker, paroxysmal atrial fibrillation, and anxiety. He reports that on 4/21/2017 he was outside painting a trailer for approximately four hours. After he finished painting, he went inside and took a shower and began noticing that he was feeling light headed. He took an Ativan because he thought he was experiencing anxiety. However, his symptoms persisted and he began to feel like he was going to pass out so he called 911 for help. He was instructed to four baby aspirins while he waited for EMS to arrive. When EMS arrived, his symptoms had resolved but he was noted to have a low blood pressure of approximately 112/67. He was taken to [facility] for evaluation and was asymptomatic upon arrival. He admitted to not eating or drinking anything for the four hour period that he was outside, and acknowledged that the day was quite warm. Evaluation in the ED included an EKG which showing a paced rhythm, serial troponins which were negative, and blood work showing WBC 14.2, Hb 14.5, Hct 42.7, creatinine 1.3/ eGFR 51.7. Chest x-ray showed no acute process, but did note an incidental pulmonary nodule over the left lower lobe. His vitals remained stable and he was discharged home with instructions to drink plenty of fluids. He presents today and states that he feels fine. He reports that he felt foolish for not realizing that his symptoms were from not drinking fluids while outside painting in the heat. He is otherwise without complaints. .    He has a history of hypertension, treated with lisinopril. He does monitor his blood pressure regularly, and adjusts his dose of lisinopril accordingly.  He was taking lisinopril 2.5 mg twice per day, and he increased his dose to 5 mg in the morning and 2.5 mg in the evening when he noticed his blood pressure was elevated. He reports that his blood pressure in mainly 125-130/82-85 at home. He has a history of intermittent high grade AV block, and underwent placement of a dual chamber Medtronic pacemaker in 2007. He underwent pacemaker generator exchange in 5/2016 after his device reached elective replacement indicator. A Medtronic MRI safe device was installed. He has a history of a single episode of atrial fibrillation noted on device check several years ago. It has not recurred. He was noted in the past to have mildly decreased LV function, but a repeat echocardiogram in 6/2013 showed normal size and lower limits of normal function with EF 50-55%; no wall motion abnormalities, and grade 1 diastolic dysfunction. He is followed by Dr. Tisa Lundborg. 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 He denies any chest pain, shortness of breath at rest or with exertion, palpitations, lightheadedness, or edema. He has a history of dyslipidemia, managed by diet in the past.    He has a history of a GI bleeding in 1997 secondary to NSAID use for headaches. He also has a history of GERD and dysphagia and has undergone multiple esophageal dilatations in the past by Dr. Pio Bautista. In 11/2015, he had an upper endoscopy which showed a small hiatal hernia in the cardia, a Schatzki's ring at the gastroesophageal junction, which was dilated, and otherwise normal stomach and duodenum. He also had a screening colonoscopy which revealed mild colonic diverticulosis and a 7 mm polyp in the proximal ascending colon (pathology: tubulovillous adenoma). Follow-up recommended for 3 years. He has a history of anxiety, and uses lorazepam occasionally.  He also reports he uses cyclobenzaprine occasionally for back spasms which occur at times due to straining his back with excessive physical work. Past Medical History:   Diagnosis Date    Anxiety     AV block     intermittent, high-grade    Bilateral inguinal hernia     Cardiac echocardiogram 06/25/2013    EF 50-55%. No WMA. Mild LVH. Gr 1 DDfx. RVE.  AKANKSHA. Mild TR. No significant chg from study of 6/8/11.  Cardiac nuclear imaging test 10/07/2009    Likely inferior & anterior septal artifact. EF 55%. Mild septal WMA could be related to pacemaker.  Cardiomyopathy ([address]     Diverticulosis of colon 11/10/2015    Dysphagia     Multiple esophageal dilations.  Essential hypertension with goal blood pressure less than 140/90     HLD (hyperlipidemia)     Hx of colonoscopy 11/10/2015    Dr. Kristina Bernard; tubulovillous adenoma.  Pacemaker 2007    Medtronic dual-chamber device.  Paroxysmal atrial fibrillation (HCC)     single episode noted on routine device check     Past Surgical History:   Procedure Laterality Date    CARDIAC SURG PROCEDURE UNLIST      ENDOSCOPY, COLON, DIAGNOSTIC      HX CATARACT REMOVAL      HX PACEMAKER  05/29/07    Medtronic Versa dual-chamber pacemaker for intermittent high-grade AV block and symptomatic bradycardia.  HX PACEMAKER  5/3/16     Current Outpatient Prescriptions   Medication Sig    LORazepam (ATIVAN) 1 mg tablet Take 1 Tab by mouth every eight (8) hours as needed for Anxiety. Max Daily Amount: 3 mg.  butalbital-acetaminophen-caffeine (FIORICET, ESGIC) -40 mg per tablet Take 1 Tab by mouth every six (6) hours as needed for Pain.  lisinopril (PRINIVIL, ZESTRIL) 2.5 mg tablet TAKE ONE TABLET BY MOUTH TWICE DAILY. (Patient taking differently: Take 2 tabs qam 1 qhs.)    VIT C/E/ZN/COPPR/LUTEIN/ZEAXAN (PRESERVISION AREDS 2 PO) Take  by mouth.  omeprazole (PRILOSEC) 20 mg capsule Take 20 mg by mouth daily as needed.  timolol (TIMOPTIC) 0.25 % ophthalmic solution Administer 1 Drop to both eyes two (2) times a day.       MULTIVITAMINS W-MINERALS/LUT (CENTRUM SILVER PO) Take  by mouth daily.  OMEGA-3 FATTY ACIDS (OMEGA 3 PO) Take 2,400 mg by mouth two (2) times a day.  coenzyme q10 100 mg Cap Take  by mouth daily.  metronidazole (METROGEL) 1 % topical gel Apply  to affected area daily. Use a thin layer to affected areas after washing     zinc 50 mg capsule Take  by mouth daily.  magnesium 250 mg Tab Take  by mouth daily.  selenium 100 mcg Cap Take  by mouth daily.  ascorbic acid (VITAMIN C) 500 mg tablet Take  by mouth two (2) times a day.  Ginkgo Biloba Leaf Extract 30 mg Cap Take  by mouth daily.  mometasone (NASONEX) 50 mcg/actuation nasal spray USE TWO SPRAYS IN EACH NOSTRIL DAILY    cyclobenzaprine (FLEXERIL) 5 mg tablet TAKE 1-2 TABLETS BY MOUTH EVERY 8 HOURS AS NEEDED FOR MUSCLE SPASM     No current facility-administered medications for this visit. Allergies and Intolerances:   No Known Allergies     Family History: His brother  from metastatic prostate cancer. His mother lived until the age of 80, although she had CAD. His father  at the age of 59 from a CVA. He was an alcoholic. He has 5 sisters that are all healthy. Family History   Problem Relation Age of Onset    Heart Surgery Mother     Stroke Father     Alcohol abuse Father      Social History:   He  reports that he quit smoking about 50 years ago. He has never used smokeless tobacco. He smoked 1 ppd for 2 years, and then quit (). He is retired  and worked for the [employer]. He lives with his girlfriend and has one son. History   Alcohol Use No     Comment: 1998 last time he had alcohol.      Immunization History:  Immunization History   Administered Date(s) Administered    Influenza High Dose Vaccine PF 01/10/2013, 2015, 2016    Influenza Vaccine 2013, 2014    Pneumococcal Conjugate (PCV-13) 2015    Pneumococcal Vaccine (Unspecified Type) 2007    TDAP Vaccine 07/12/2012    Zoster 07/25/2007       Review of Systems:   As above included in HPI. Otherwise 11 point review of systems negative including constitutional, skin, HENT, eyes, respiratory, cardiovascular, gastrointestinal, genitourinary, musculoskeletal, endocrine, hematologic, allergy, and neurologic. Physical:   Vitals:   BP: 126/82   HR: 73  WT: 176 lb (79.8 kg)  BMI:  25.25 kg/m2    Exam:   Pt appears well; alert and oriented x 3; appropriate affect. HEENT: PERRLA, anicteric, oropharynx clear, no JVD, adenopathy or thyromegaly. No sinus tenderness to palpation. No carotid bruits or radiated murmur. Lungs: clear to auscultation, no wheezes, rhonchi, or rales. Heart: regular rate and rhythm. No murmur, rubs, gallops  Abdomen: soft, nontender, nondistended, normal bowel sounds, no hepatosplenomegaly or masses. Extremities: without edema. Pulses 1-2+ bilaterally. Review of Data:  Labs:  No visits with results within 1 Month(s) from this visit.   Latest known visit with results is:    Hospital Outpatient Visit on 02/01/2017   Component Date Value Ref Range Status    LIPID PROFILE 02/01/2017        Final    Cholesterol, total 02/01/2017 182  <200 MG/DL Final    Triglyceride 02/01/2017 72  <150 MG/DL Final    HDL Cholesterol 02/01/2017 44  40 - 60 MG/DL Final    LDL, calculated 02/01/2017 123.6* 0 - 100 MG/DL Final    VLDL, calculated 02/01/2017 14.4  MG/DL Final    CHOL/HDL Ratio 02/01/2017 4.1  0 - 5.0   Final    Prostate Specific Ag 02/01/2017 0.8  0.0 - 4.0 ng/mL Final    Sodium 02/01/2017 137  136 - 145 mmol/L Final    Potassium 02/01/2017 4.7  3.5 - 5.5 mmol/L Final    Chloride 02/01/2017 100  100 - 108 mmol/L Final    CO2 02/01/2017 29  21 - 32 mmol/L Final    Anion gap 02/01/2017 8  3.0 - 18 mmol/L Final    Glucose 02/01/2017 94  74 - 99 mg/dL Final    BUN 02/01/2017 11  7.0 - 18 MG/DL Final    Creatinine 02/01/2017 0.87  0.6 - 1.3 MG/DL Final    BUN/Creatinine ratio 02/01/2017 13 12 - 20   Final    GFR est AA 02/01/2017 >60  >60 ml/min/1.73m2 Final    GFR est non-AA 02/01/2017 >60  >60 ml/min/1.73m2 Final    Calcium 02/01/2017 8.9  8.5 - 10.1 MG/DL Final       Calculated 10 year ASCVD risk score: 32.8 %    Health Maintenance:  Screening:    Colorectal: colonoscopy (11/2015) tubulovillous adenoma. Dr. Adriana Lau. Due 11/2018. Depression: none   DM (HbA1c/FPG): FPG 94 (2/2017)   Hepatitis C: N/A   Falls: none   DEXA: N/A   PSA/WEN: PSA 0.8/ WEN (8/2016)   Glaucoma: regular eye exams with Dr. Amirah Cee (last 1/2017) for glaucoma q 4 months. Smoking: none   Vitamin D: 32.2 (7/2016)   Medicare Wellness: 2/9/2017    Impression:  Patient Active Problem List   Diagnosis Code    AV block I44.30    Pacemaker Z95.0    Cardiomyopathy ([address] I42.9    Paroxysmal atrial fibrillation ([address] I48.0    HLD (hyperlipidemia) E78.5    FH: prostate cancer Z80.42    Bilateral inguinal hernia K40.20    Anxiety F41.9    Essential hypertension I10    Colon polyp; tubulovillous adenoma 11/2015 K63.5    Gastroesophageal reflux disease without esophagitis K21.9    Postural dizziness with presyncope R42, R55    Dehydration E86.0    Pulmonary nodule, left R91.1       Plan:  1. Presyncope. Appears clearly related to dehydration secondary to physical exertion without adequate hydration. Reports no further symptoms. Monitors his blood pressure daily and has been controlled at his baseline. Encouraged to continue drinking plenty of fluids. Follow. 2. Hypertension. Blood pressure well controlled today on 7.5 mg dose of lisinopril. He continues to monitor his blood pressure carefully at home. Renal function has been normal with creatinine 0.87/ eGFR >60. Mild elevation of creatinine in ED due to dehydration. Will recheck at next visit. Continue to follow. 3. AV block, intermittent high grade. S/P Medtronic dual chamber pacemaker with generator change in 5/2016 with an MRI safe device installed.  Interrogated and followed every 3 months by Dr. Jaquan Manzanares. Reports interrogation scheduled for next week. Repeat echocardiogram with low normal LV function. 4. Paroxysmal atrial fibrillation. Single episode noted on device check several years ago. None since. No need for anti-coagulation. Continue to follow. 5. Hyperlipidemia. Calculated 10 year ASCVD risk is 32.8 %. However, his age > 76 does not place him in one of the four statin benefit groups as per new AHA/ACC guidelines. Also, with no evidence of ASCVD. However,  which has increased steadily over the last year. Most likely attributed to recent weight gain. Weight increased another  6 pounds since last visit. Emphasized importance of lifestyle modifications, including diet, exercise, and weight loss. Continue to follow and repeat lipid panel at next visit. 6. Pulmonary nodule, LLL. Incidental finding on chest xray at [facility]. Will order chest CT scan to evaluate. No significant history of tobacco use. 7. GERD/dysphagia. Recent EGD with esophageal dilatation for Schatzki's ring. Small hiatal hernia. Symptoms well controlled with omeprazole. Follow. 8. Family history of prostate cancer. Patient quite concerned about his own risk given that his brother  quite rapidly from metastatic disease. Reassured that his PSA level remains quite low,  He requested to have it rechecked in 6 months. 9. Anxiety. Patient is quite anxious about his health. Reassured that he is living a healthy lifestyle and doing well. Continue lorazepam as needed. 10. Health maintenance. Already received influenza vaccine. Other immunizations up to date. Colonoscopy due 2018. Continue regular eye exams with Dr. Jaylin Snyder. Vitamin D level normal. Will perform prostate cancer screening at next visit. Patient understands recommendations and agrees with plan. Follow-up in 6 months.

## 2017-05-01 RX ORDER — LORAZEPAM 1 MG/1
1 TABLET ORAL
Qty: 60 TAB | Refills: 0 | OUTPATIENT
Start: 2017-05-01 | End: 2017-06-05 | Stop reason: SDUPTHER

## 2017-05-01 NOTE — TELEPHONE ENCOUNTER
Reviewed report generated by the [facility]. Does not demonstrate aberrancies or inconsistencies with regard to the prescribing of controlled medications to this patient by other providers.   Last filled 3/16/17

## 2017-05-01 NOTE — TELEPHONE ENCOUNTER
From: Diana Callahan  To:  Farhat Pickering MD  Sent: 5/1/2017 10:15 AM EDT  Subject: Medication Renewal Request    Original authorizing provider: MD Diana Kirkland would like a refill of the following medications:  LORazepam (ATIVAN) 1 mg tablet Farhat Pickering MD]    Preferred pharmacy: [address]:

## 2017-05-03 ENCOUNTER — TELEPHONE (OUTPATIENT)
Dept: INTERNAL MEDICINE CLINIC | Age: 77
End: 2017-05-03

## 2017-05-03 ENCOUNTER — HOSPITAL ENCOUNTER (OUTPATIENT)
Dept: CT IMAGING | Age: 77
Discharge: HOME | End: 2017-05-03
Attending: INTERNAL MEDICINE
Payer: MEDICARE

## 2017-05-03 DIAGNOSIS — R91.1 PULMONARY NODULE, LEFT: ICD-10-CM

## 2017-05-03 PROCEDURE — 71250 CT THORAX DX C-: CPT

## 2017-05-03 NOTE — TELEPHONE ENCOUNTER
Please let the patient know that the chest CT scan showed that the nodule seen on chest xray corresponded to an area of scarring at his left lung base and there was no focal nodule seen. Nothing else of concern was noted.

## 2017-06-05 RX ORDER — LORAZEPAM 1 MG/1
1 TABLET ORAL
Qty: 60 TAB | Refills: 0 | OUTPATIENT
Start: 2017-06-05 | End: 2017-07-21 | Stop reason: SDUPTHER

## 2017-06-05 NOTE — TELEPHONE ENCOUNTER
From: Ashok Osullivan  To:  Hoa Glass MD  Sent: 6/5/2017 11:03 AM EDT  Subject: Medication Renewal Request    Original authorizing provider: MD Ashok Chavez would like a refill of the following medications:  LORazepam (ATIVAN) 1 mg tablet Hoa Glass MD]    Preferred pharmacy: [address]:

## 2017-07-21 RX ORDER — LORAZEPAM 1 MG/1
1 TABLET ORAL
Qty: 60 TAB | Refills: 0 | OUTPATIENT
Start: 2017-07-21 | End: 2017-08-15 | Stop reason: SDUPTHER

## 2017-07-21 NOTE — TELEPHONE ENCOUNTER
From: Jenni Guzmán  To: CESAR Rubio  Sent: 7/21/2017 11:06 AM EDT  Subject: Medication Renewal Request    Original authorizing provider: CESAR Rubio would like a refill of the following medications:  LORazepam (ATIVAN) 1 mg tablet [CESAR Batista]    Preferred pharmacy: [facility] [place], [address]:

## 2017-07-28 ENCOUNTER — OFFICE VISIT (OUTPATIENT)
Dept: CARDIOLOGY CLINIC | Age: 77
End: 2017-07-28

## 2017-07-28 VITALS
HEART RATE: 64 BPM | BODY MASS INDEX: 25.48 KG/M2 | SYSTOLIC BLOOD PRESSURE: 134 MMHG | WEIGHT: 178 LBS | HEIGHT: 70 IN | OXYGEN SATURATION: 98 % | DIASTOLIC BLOOD PRESSURE: 90 MMHG

## 2017-07-28 DIAGNOSIS — I10 ESSENTIAL HYPERTENSION: ICD-10-CM

## 2017-07-28 DIAGNOSIS — I48.0 PAROXYSMAL ATRIAL FIBRILLATION (HCC): Primary | ICD-10-CM

## 2017-07-28 DIAGNOSIS — I44.30 AV BLOCK: ICD-10-CM

## 2017-07-28 DIAGNOSIS — E78.5 HYPERLIPIDEMIA, UNSPECIFIED HYPERLIPIDEMIA TYPE: ICD-10-CM

## 2017-07-28 NOTE — PROGRESS NOTES
1. Have you been to the ER, urgent care clinic since your last visit? Hospitalized since your last visit? No     2. Have you seen or consulted any other health care providers outside of the [address] [place] [address] since your last visit? Include any pap smears or colon screening.  No

## 2017-07-28 NOTE — PROGRESS NOTES
HISTORY OF PRESENT ILLNESS  Mary Cifuentes is a 67 y.o. male. Irregular Heart Beat    Pertinent negatives include no fever, no chest pain, no claudication, no orthopnea, no PND, no abdominal pain, no nausea, no vomiting, no headaches, no dizziness, no cough and no shortness of breath. Follow-up   Pertinent negatives include no chest pain, no abdominal pain, no headaches and no shortness of breath. Patient presents for a scheduled followup visit. He has a history of intermittent high-grade AV block, status post to dual-chamber permanent pacemaker in 2007. He also has a history of hypertension, Brief episodes approximately atrial fibrillation, and a mildly depressed LV systolic function,  Which returned to low normal on echocardiogram in June 2013 showing an ejection fraction of 50-55%. The patient underwent a pacemaker generator exchange in May 2016 With a Individual Digital MRI safe device after his device has reached elective replacement indicator. The patient was last seen in the office 6 months ago. Since that time, he has been feeling well. He denies any new palpitations, no dizziness, no syncope. No major changes in his activity tolerance. No new chest pain or shortness of breath. No orthopnea, PND, or leg swelling. Past Medical History:   Diagnosis Date    Anxiety     AV block     intermittent, high-grade    Bilateral inguinal hernia     Cardiac echocardiogram 06/25/2013    EF 50-55%. No WMA. Mild LVH. Gr 1 DDfx. RVE.  AKANKSHA. Mild TR. No significant chg from study of 6/8/11.  Cardiac nuclear imaging test 10/07/2009    Likely inferior & anterior septal artifact. EF 55%. Mild septal WMA could be related to pacemaker.  Cardiomyopathy ([address]     Diverticulosis of colon 11/10/2015    Dysphagia     Multiple esophageal dilations.     Essential hypertension with goal blood pressure less than 140/90     HLD (hyperlipidemia)     Hx of colonoscopy 11/10/2015    Dr. Bret Morataya; tubulovillous adenoma.  Pacemaker 2007    Medtronic dual-chamber device.  Paroxysmal atrial fibrillation (HCC)     single episode noted on routine device check      Current Outpatient Prescriptions   Medication Sig Dispense Refill    LORazepam (ATIVAN) 1 mg tablet Take 1 Tab by mouth every eight (8) hours as needed for Anxiety. Max Daily Amount: 3 mg. 60 Tab 0    butalbital-acetaminophen-caffeine (FIORICET, ESGIC) -40 mg per tablet Take 1 Tab by mouth every six (6) hours as needed for Pain. 30 Tab 0    mometasone (NASONEX) 50 mcg/actuation nasal spray USE TWO SPRAYS IN EACH NOSTRIL DAILY 1 Container 5    lisinopril (PRINIVIL, ZESTRIL) 2.5 mg tablet TAKE ONE TABLET BY MOUTH TWICE DAILY. (Patient taking differently: Take 2 tabs qam 1 qhs.) 180 Tab 2    VIT C/E/ZN/COPPR/LUTEIN/ZEAXAN (PRESERVISION AREDS 2 PO) Take  by mouth.  omeprazole (PRILOSEC) 20 mg capsule Take 20 mg by mouth daily as needed.  timolol (TIMOPTIC) 0.25 % ophthalmic solution Administer 1 Drop to both eyes two (2) times a day.  cyclobenzaprine (FLEXERIL) 5 mg tablet TAKE 1-2 TABLETS BY MOUTH EVERY 8 HOURS AS NEEDED FOR MUSCLE SPASM 25 Tab 0    MULTIVITAMINS W-MINERALS/LUT (CENTRUM SILVER PO) Take  by mouth daily.  OMEGA-3 FATTY ACIDS (OMEGA 3 PO) Take 2,400 mg by mouth two (2) times a day.  coenzyme q10 100 mg Cap Take  by mouth daily.  metronidazole (METROGEL) 1 % topical gel Apply  to affected area daily. Use a thin layer to affected areas after washing       zinc 50 mg capsule Take  by mouth daily.  magnesium 250 mg Tab Take  by mouth daily.  selenium 100 mcg Cap Take  by mouth daily.  ascorbic acid (VITAMIN C) 500 mg tablet Take  by mouth two (2) times a day.  Ginkgo Biloba Leaf Extract 30 mg Cap Take  by mouth daily.          No Known Allergies     Social History   Substance Use Topics    Smoking status: Former Smoker     Quit date: 7/27/1966    Smokeless tobacco: Never Used  Alcohol use No      Comment: 1998 last time he had alcohol. Review of Systems   Constitutional: Negative for chills, fever and weight loss. HENT: Negative for nosebleeds. Eyes: Negative for blurred vision and double vision. Respiratory: Negative for cough, shortness of breath and wheezing. Cardiovascular: Negative for chest pain, palpitations, orthopnea, claudication, leg swelling and PND. Gastrointestinal: Negative for abdominal pain, heartburn, nausea and vomiting. Genitourinary: Negative for dysuria and hematuria. Musculoskeletal: Negative for falls and myalgias. Skin: Negative for rash. Neurological: Negative for dizziness, focal weakness and headaches. Endo/Heme/Allergies: Does not bruise/bleed easily. Psychiatric/Behavioral: Negative for substance abuse. Visit Vitals    /90    Pulse 64    Ht 5' 10\" (1.778 m)    Wt 80.7 kg (178 lb)    SpO2 98%    BMI 25.54 kg/m2      Physical Exam   Constitutional: He is oriented to person, place, and time. He appears well-developed and well-nourished. HENT:   Head: Normocephalic and atraumatic. Eyes: Conjunctivae are normal.   Neck: Neck supple. No JVD present. Carotid bruit is not present. Cardiovascular: Normal rate, regular rhythm, S1 normal, S2 normal and normal pulses. Exam reveals no gallop and no S3. No murmur heard. Pulmonary/Chest: Breath sounds normal. He has no wheezes. He has no rales. Abdominal: Soft. Bowel sounds are normal. There is no tenderness. Musculoskeletal: He exhibits no edema. Neurological: He is alert and oriented to person, place, and time. Skin: Skin is warm and dry. EK% AV paced rhythm. Pacemaker interrogation: Pacemaker generator at beginning of life. Estimated longevity 8 years. No arrhythmias. Pacing in the atrium 14% in the ventricle 100%. ASSESSMENT and PLAN    Paroxysmal atrial fibrillation.   This was a short documented episode several years ago in the past on routine device check, and this has not reoccurred since. No significant arrhythmias on his last several device checks. No need for anticoagulation. High-grade AV block: Status post dual-chamber permanent pacemaker with recent generator exchange in May 2016. Patient has an MRI compatible device. Normal device function on interrogation today. He is pacemaker dependent in the ventricle. Hypertension: Remains well controlled on lisinopril, which I would continue. Dyslipidemia. Patient has been managing this with diet control and takes a fish oil supplement. [facility] device check in 3 months. Followup in 6 months, sooner if needed.

## 2017-07-28 NOTE — MR AVS SNAPSHOT
Visit Information Date & Time Provider Department Dept. Phone Encounter #  
 7/28/2017  2:20 PM Regla Anthony MD [facility] [id number] Your Appointments 8/8/2017  8:35 AM  
LAB with [facility] NURSE VISIT Internist of [facility] ([facility]) Appt Note: labs 6mos. sarris [address], [address] [place] [address] [place]  
  
   
 [address], [address]  
  
    
 8/15/2017  1:00 PM  
Office Visit with Annemarie Boyer MD  
Internist of [facility]) Appt Note: ov 6mos. sarris [address], [address] [place] [address] [place]  
  
   
 [address], [address] Upcoming Health Maintenance Date Due INFLUENZA AGE 9 TO ADULT 8/1/2017 MEDICARE YEARLY EXAM 2/10/2018 COLONOSCOPY 11/10/2018 GLAUCOMA SCREENING Q2Y 1/26/2019 DTaP/Tdap/Td series (2 - Td) 7/12/2022 Allergies as of 7/28/2017  Review Complete On: 4/28/2017 By: Annemarie Boyer MD  
 No Known Allergies Current Immunizations  Reviewed on 8/3/2015 Name Date Influenza High Dose Vaccine PF 12/9/2016, 11/16/2015, 1/10/2013 Influenza Vaccine 11/20/2014, 12/31/2013 Pneumococcal Conjugate (PCV-13) 8/3/2015  1:34 PM  
 TDAP Vaccine 7/12/2012 ZZZ-RETIRED (DO NOT USE) Pneumococcal Vaccine (Unspecified Type) 1/1/2007 Zoster 7/25/2007 Not reviewed this visit You Were Diagnosed With   
  
 Codes Comments Paroxysmal atrial fibrillation (HCC)    -  Primary ICD-10-CM: I48.0 ICD-9-CM: 427.31   
 AV block     ICD-10-CM: I44.30 ICD-9-CM: 426.10 Hyperlipidemia, unspecified hyperlipidemia type     ICD-10-CM: E78.5 ICD-9-CM: 272.4 Vitals BP Pulse Height(growth percentile) Weight(growth percentile) SpO2 BMI  
 134/90 64 5' 10\" (1.778 m) 178 lb (80.7 kg) 98% 25.54 kg/m2 Smoking Status Former Smoker Vitals History BMI and BSA Data Body Mass Index Body Surface Area 25.54 kg/m 2 2 m 2 Preferred Pharmacy Pharmacy Name Phone [facility], [address] [phone] Your Updated Medication List  
  
   
This list is accurate as of: 7/28/17  2:46 PM.  Always use your most recent med list.  
  
  
  
  
 butalbital-acetaminophen-caffeine -40 mg per tablet Commonly known as:  Vandana Round Take 1 Tab by mouth every six (6) hours as needed for Pain. CENTRUM SILVER PO Take  by mouth daily. co-enzyme Q-10 100 mg capsule Commonly known as:  CO Q-10 Take  by mouth daily. cyclobenzaprine 5 mg tablet Commonly known as:  FLEXERIL  
TAKE 1-2 TABLETS BY MOUTH EVERY 8 HOURS AS NEEDED FOR MUSCLE SPASM Ginkgo Biloba Leaf Extract 30 mg Cap Take  by mouth daily. lisinopril 2.5 mg tablet Commonly known as:  PRINIVIL, ZESTRIL  
TAKE ONE TABLET BY MOUTH TWICE DAILY. LORazepam 1 mg tablet Commonly known as:  ATIVAN Take 1 Tab by mouth every eight (8) hours as needed for Anxiety. Max Daily Amount: 3 mg.  
  
 magnesium 250 mg Tab Take  by mouth daily. METROGEL 1 % topical gel Generic drug:  metroNIDAZOLE Apply  to affected area daily. Use a thin layer to affected areas after washing  
  
 mometasone 50 mcg/actuation nasal spray Commonly known as:  NASONEX  
USE TWO SPRAYS IN EACH NOSTRIL DAILY  
  
 OMEGA 3 PO Take 2,400 mg by mouth two (2) times a day. PRESERVISION AREDS 2 PO Take  by mouth. PriLOSEC 20 mg capsule Generic drug:  omeprazole Take 20 mg by mouth daily as needed. selenium 100 mcg Cap Take  by mouth daily. TIMOPTIC 0.25 % ophthalmic solution Generic drug:  timolol Administer 1 Drop to both eyes two (2) times a day. VITAMIN C 500 mg tablet Generic drug:  ascorbic acid (vitamin C) Take  by mouth two (2) times a day. zinc 50 mg capsule Take  by mouth daily. We Performed the Following AMB POC EKG ROUTINE W/ 12 LEADS, INTER & REP [65230 CPT(R)] Introducing [facility]! Dear Jj Keen: 
Thank you for requesting a [employer] account. Our records indicate that you already have an active [employer] account. You can access your account anytime at [email or web address] Did you know that you can access your hospital and ER discharge instructions at any time in [employer]? You can also review all of your test results from your hospital stay or ER visit. Additional Information If you have questions, please visit the Frequently Asked Questions section of the [employer] website at [email or web address] Remember, [employer] is NOT to be used for urgent needs. For medical emergencies, dial 911. Now available from your iPhone and Android! Please provide this summary of care documentation to your next provider. Your primary care clinician is listed as Buzz Parra. If you have any questions after today's visit, please call [phone].

## 2017-08-08 ENCOUNTER — HOSPITAL ENCOUNTER (OUTPATIENT)
Dept: LAB | Age: 77
Discharge: HOME | End: 2017-08-08
Payer: MEDICARE

## 2017-08-08 DIAGNOSIS — I10 ESSENTIAL HYPERTENSION: ICD-10-CM

## 2017-08-08 DIAGNOSIS — Z12.5 SCREENING PSA (PROSTATE SPECIFIC ANTIGEN): ICD-10-CM

## 2017-08-08 DIAGNOSIS — E78.5 HYPERLIPIDEMIA, UNSPECIFIED HYPERLIPIDEMIA TYPE: ICD-10-CM

## 2017-08-08 LAB
ALBUMIN SERPL BCP-MCNC: 3.9 G/DL (ref 3.4–5)
ALBUMIN/GLOB SERPL: 1.3 {RATIO} (ref 0.8–1.7)
ALP SERPL-CCNC: 60 U/L (ref 45–117)
ALT SERPL-CCNC: 20 U/L (ref 16–61)
ANION GAP BLD CALC-SCNC: 10 MMOL/L (ref 3–18)
APPEARANCE UR: CLEAR
AST SERPL W P-5'-P-CCNC: 14 U/L (ref 15–37)
BACTERIA URNS QL MICRO: NEGATIVE /HPF
BASOPHILS # BLD AUTO: 0.1 K/UL (ref 0–0.06)
BASOPHILS # BLD: 1 % (ref 0–2)
BILIRUB SERPL-MCNC: 0.6 MG/DL (ref 0.2–1)
BILIRUB UR QL: NEGATIVE
BUN SERPL-MCNC: 11 MG/DL (ref 7–18)
BUN/CREAT SERPL: 13 (ref 12–20)
CALCIUM SERPL-MCNC: 8.8 MG/DL (ref 8.5–10.1)
CHLORIDE SERPL-SCNC: 101 MMOL/L (ref 100–108)
CHOLEST SERPL-MCNC: 156 MG/DL
CO2 SERPL-SCNC: 25 MMOL/L (ref 21–32)
COLOR UR: YELLOW
CREAT SERPL-MCNC: 0.87 MG/DL (ref 0.6–1.3)
DIFFERENTIAL METHOD BLD: ABNORMAL
EOSINOPHIL # BLD: 0.6 K/UL (ref 0–0.4)
EOSINOPHIL NFR BLD: 8 % (ref 0–5)
EPITH CASTS URNS QL MICRO: NORMAL /LPF (ref 0–5)
ERYTHROCYTE [DISTWIDTH] IN BLOOD BY AUTOMATED COUNT: 13.2 % (ref 11.6–14.5)
GLOBULIN SER CALC-MCNC: 2.9 G/DL (ref 2–4)
GLUCOSE SERPL-MCNC: 92 MG/DL (ref 74–99)
GLUCOSE UR STRIP.AUTO-MCNC: NEGATIVE MG/DL
HCT VFR BLD AUTO: 42.5 % (ref 36–48)
HDLC SERPL-MCNC: 42 MG/DL (ref 40–60)
HDLC SERPL: 3.7 {RATIO} (ref 0–5)
HGB BLD-MCNC: 14.4 G/DL (ref 13–16)
HGB UR QL STRIP: NEGATIVE
KETONES UR QL STRIP.AUTO: NEGATIVE MG/DL
LDLC SERPL CALC-MCNC: 95.2 MG/DL (ref 0–100)
LEUKOCYTE ESTERASE UR QL STRIP.AUTO: NEGATIVE
LIPID PROFILE,FLP: NORMAL
LYMPHOCYTES # BLD AUTO: 33 % (ref 21–52)
LYMPHOCYTES # BLD: 2.4 K/UL (ref 0.9–3.6)
MCH RBC QN AUTO: 31 PG (ref 24–34)
MCHC RBC AUTO-ENTMCNC: 33.9 G/DL (ref 31–37)
MCV RBC AUTO: 91.4 FL (ref 74–97)
MONOCYTES # BLD: 0.8 K/UL (ref 0.05–1.2)
MONOCYTES NFR BLD AUTO: 11 % (ref 3–10)
NEUTS SEG # BLD: 3.4 K/UL (ref 1.8–8)
NEUTS SEG NFR BLD AUTO: 47 % (ref 40–73)
NITRITE UR QL STRIP.AUTO: NEGATIVE
PH UR STRIP: 8 [PH] (ref 5–8)
PLATELET # BLD AUTO: 213 K/UL (ref 135–420)
PMV BLD AUTO: 9.4 FL (ref 9.2–11.8)
POTASSIUM SERPL-SCNC: 4.2 MMOL/L (ref 3.5–5.5)
PROT SERPL-MCNC: 6.8 G/DL (ref 6.4–8.2)
PROT UR STRIP-MCNC: NEGATIVE MG/DL
PSA SERPL-MCNC: 0.7 NG/ML (ref 0–4)
RBC # BLD AUTO: 4.65 M/UL (ref 4.7–5.5)
RBC #/AREA URNS HPF: 0 /HPF (ref 0–5)
SODIUM SERPL-SCNC: 136 MMOL/L (ref 136–145)
SP GR UR REFRACTOMETRY: 1.01 (ref 1–1.03)
TRIGL SERPL-MCNC: 94 MG/DL (ref ?–150)
TSH SERPL DL<=0.05 MIU/L-ACNC: 1.45 UIU/ML (ref 0.36–3.74)
UROBILINOGEN UR QL STRIP.AUTO: 0.2 EU/DL (ref 0.2–1)
VLDLC SERPL CALC-MCNC: 18.8 MG/DL
WBC # BLD AUTO: 7.3 K/UL (ref 4.6–13.2)
WBC URNS QL MICRO: NORMAL /HPF (ref 0–4)

## 2017-08-08 PROCEDURE — 80061 LIPID PANEL: CPT | Performed by: INTERNAL MEDICINE

## 2017-08-08 PROCEDURE — 84153 ASSAY OF PSA TOTAL: CPT | Performed by: INTERNAL MEDICINE

## 2017-08-08 PROCEDURE — 80053 COMPREHEN METABOLIC PANEL: CPT | Performed by: INTERNAL MEDICINE

## 2017-08-08 PROCEDURE — 84443 ASSAY THYROID STIM HORMONE: CPT | Performed by: INTERNAL MEDICINE

## 2017-08-08 PROCEDURE — 36415 COLL VENOUS BLD VENIPUNCTURE: CPT | Performed by: INTERNAL MEDICINE

## 2017-08-08 PROCEDURE — 81001 URINALYSIS AUTO W/SCOPE: CPT | Performed by: INTERNAL MEDICINE

## 2017-08-08 PROCEDURE — 85025 COMPLETE CBC W/AUTO DIFF WBC: CPT | Performed by: INTERNAL MEDICINE

## 2017-08-15 ENCOUNTER — OFFICE VISIT (OUTPATIENT)
Dept: INTERNAL MEDICINE CLINIC | Age: 77
End: 2017-08-15

## 2017-08-15 VITALS
WEIGHT: 174 LBS | HEIGHT: 70 IN | BODY MASS INDEX: 24.91 KG/M2 | DIASTOLIC BLOOD PRESSURE: 84 MMHG | OXYGEN SATURATION: 96 % | HEART RATE: 66 BPM | RESPIRATION RATE: 16 BRPM | SYSTOLIC BLOOD PRESSURE: 132 MMHG | TEMPERATURE: 98.2 F

## 2017-08-15 DIAGNOSIS — I10 ESSENTIAL HYPERTENSION: Primary | ICD-10-CM

## 2017-08-15 DIAGNOSIS — K21.9 GASTROESOPHAGEAL REFLUX DISEASE WITHOUT ESOPHAGITIS: ICD-10-CM

## 2017-08-15 DIAGNOSIS — Z80.42 FH: PROSTATE CANCER: ICD-10-CM

## 2017-08-15 DIAGNOSIS — Z95.0 PACEMAKER: ICD-10-CM

## 2017-08-15 DIAGNOSIS — F41.9 ANXIETY: ICD-10-CM

## 2017-08-15 DIAGNOSIS — I44.30 AV BLOCK: ICD-10-CM

## 2017-08-15 DIAGNOSIS — E78.5 HYPERLIPIDEMIA, UNSPECIFIED HYPERLIPIDEMIA TYPE: ICD-10-CM

## 2017-08-15 DIAGNOSIS — D12.2 ADENOMATOUS POLYP OF ASCENDING COLON: ICD-10-CM

## 2017-08-15 DIAGNOSIS — I48.0 PAROXYSMAL ATRIAL FIBRILLATION (HCC): ICD-10-CM

## 2017-08-15 RX ORDER — BUTALBITAL, ACETAMINOPHEN AND CAFFEINE 50; 325; 40 MG/1; MG/1; MG/1
1 TABLET ORAL
Qty: 30 TAB | Refills: 0 | Status: SHIPPED | OUTPATIENT
Start: 2017-08-15 | End: 2018-02-20 | Stop reason: SDUPTHER

## 2017-08-15 RX ORDER — LORAZEPAM 1 MG/1
1 TABLET ORAL
Qty: 60 TAB | Refills: 0 | Status: SHIPPED | OUTPATIENT
Start: 2017-08-15 | End: 2017-10-18 | Stop reason: SDUPTHER

## 2017-08-15 RX ORDER — LISINOPRIL 2.5 MG/1
7.5 TABLET ORAL DAILY
Qty: 270 TAB | Refills: 2 | Status: SHIPPED | OUTPATIENT
Start: 2017-08-15 | End: 2018-05-28 | Stop reason: SDUPTHER

## 2017-08-15 NOTE — PROGRESS NOTES
1. Have you been to the ER, urgent care clinic or hospitalized since your last visit? NO.     2. Have you seen or consulted any other health care providers outside of the [address] [place] [address] since your last visit (Include any pap smears or colon screening)? YES Patient states he saw Dr. Olman Johnson for eye exam about a month ago. Health Maintenance Due   Topic Date Due    INFLUENZA AGE 9 TO ADULT  08/01/2017       Patient would like to have all his medications refilled and printed out separately. Patient states he is taking Lisinopril 2.5 mg three times a day.

## 2017-08-15 NOTE — PATIENT INSTRUCTIONS
Hyperlipidemia: After Your Visit  Your Care Instructions  Hyperlipidemia is too much fat in your blood. The body has several kinds of fat, including cholesterol and triglycerides. Your body needs fat for many things, such as making new cells. But too much fat in your blood increases your chances of having a heart attack or stroke. You may be able to lower your cholesterol and triglycerides with a heart-healthy diet, exercise, and if needed, medicine. Your doctor may want you to try lifestyle changes first to see whether they lower the fat in your blood. You may need to take medicine if lifestyle changes do not lower the fat in your blood enough. Follow-up care is a key part of your treatment and safety. Be sure to make and go to all appointments, and call your doctor if you are having problems. Its also a good idea to know your test results and keep a list of the medicines you take. How can you care for yourself at home? Take your medicines  · Take your medicines exactly as prescribed. Call your doctor if you think you are having a problem with your medicine. · If you take medicine to lower your cholesterol, go to follow-up visits. You will need to have blood tests. · Do not take large doses of niacin, which is a B vitamin, while taking medicine called statins. It may increase the chance of muscle pain and liver problems. · Talk to your doctor about avoiding grapefruit juice if you are taking statins. Grapefruit juice can raise the level of this medicine in your blood. This could increase side effects. Eat more fruits, vegetables, and fiber  · Fruits and vegetables have lots of nutrients that help protect against heart disease, and they have littleif anyfat. Try to eat at least five servings a day. Dark green, deep orange, or yellow fruits and vegetables are healthy choices. · Keep carrots, celery, and other veggies handy for snacks.  Buy fruit that is in season and store it where you can see it so that you will be tempted to eat it. Cook dishes that have a lot of veggies in them, such as stir-fries and soups. · Foods high in fiber may reduce your cholesterol and provide important vitamins and minerals. High-fiber foods include whole-grain cereals and breads, oatmeal, beans, brown rice, citrus fruits, and apples. · Buy whole-grain breads and cereals instead of white bread and pastries. Limit saturated fat  · Read food labels and try to avoid saturated fat and trans fat. They increase your risk of heart disease. · Use olive or canola oil when you cook. Try cholesterol-lowering spreads, such as Benecol or Take Control. · Bake, broil, grill, or steam foods instead of frying them. · Limit the amount of high-fat meats you eat, including hot dogs and sausages. Cut out all visible fat when you prepare meat. · Eat fish, skinless poultry, and soy products such as tofu instead of high-fat meats. Soybeans may be especially good for your heart. Eat at least two servings of fish a week. Certain fish, such as salmon, contain omega-3 fatty acids, which may help reduce your risk of heart attack. · Choose low-fat or fat-free milk and dairy products. Get exercise, limit alcohol, and quit smoking  · Get more exercise. Work with your doctor to set up an exercise program. Even if you can do only a small amount, exercise will help you get stronger, have more energy, and manage your weight and your stress. Walking is an easy way to get exercise. Gradually increase the amount you walk every day. Aim for at least 30 minutes on most days of the week. You also may want to swim, bike, or do other activities. · Limit alcohol to no more than 2 drinks a day for men and 1 drink a day for women. · Do not smoke. If you need help quitting, talk to your doctor about stop-smoking programs and medicines. These can increase your chances of quitting for good. When should you call for help?   Call 911 anytime you think you may need emergency care. For example, call if:  · You have symptoms of a heart attack. These may include:  ¨ Chest pain or pressure, or a strange feeling in the chest.  ¨ Sweating. ¨ Shortness of breath. ¨ Nausea or vomiting. ¨ Pain, pressure, or a strange feeling in the back, neck, jaw, or upper belly or in one or both shoulders or arms. ¨ Lightheadedness or sudden weakness. ¨ A fast or irregular heartbeat. After you call 911, the  may tell you to chew 1 adult-strength or 2 to 4 low-dose aspirin. Wait for an ambulance. Do not try to drive yourself. · You have signs of a stroke. These may include:  ¨ Sudden numbness, paralysis, or weakness in your face, arm, or leg, especially on only one side of your body. ¨ New problems with walking or balance. ¨ Sudden vision changes. ¨ Drooling or slurred speech. ¨ New problems speaking or understanding simple statements, or feeling confused. ¨ A sudden, severe headache that is different from past headaches. · You passed out (lost consciousness). Call your doctor now or seek immediate medical care if:  · You have muscle pain or weakness. Watch closely for changes in your health, and be sure to contact your doctor if:  · You are very tired. · You have an upset stomach, gas, constipation, or belly pain or cramps. Where can you learn more? Go to BidRazor.be  Enter C406 in the search box to learn more about \"Hyperlipidemia: After Your Visit. \"   © 1672-6508 [employer]wise, [employer]. Care instructions adapted under license by Emily Barraza (which disclaims liability or warranty for this information). This care instruction is for use with your licensed healthcare professional. If you have questions about a medical condition or this instruction, always ask your healthcare professional. [place] [address] any warranty or liability for your use of this information.   Content Version: 2.7.162294; Last Revised: October 13, 2011 Learning About Healthy Weight  What is a healthy weight? A healthy weight is the weight at which you feel good about yourself and have energy for work and play. It's also one that lowers your risk for health problems. What can you do to stay at a healthy weight? It can be hard to stay at a healthy weight, especially when fast food, vending-machine snacks, and processed foods are so easy to find. And with your busy lifestyle, activity may be low on your list of things to do. But staying at a healthy weight may be easier than you think. Here are some dos and don'ts for staying at a healthy weight:  Do eat healthy foods  The kinds of foods you eat have a big impact on both your weight and your health. Reaching and staying at a healthy weight is not about going on a diet. It's about making healthier food choices every day and changing your diet for good. Healthy eating means eating a variety of foods so that you get all the nutrients you need. Your body needs protein, carbohydrate, and fats for energy. They keep your heart beating, your brain active, and your muscles working. On most days, try to eat from each food group. This means eating a variety of:  · Whole grains, such as whole wheat breads and pastas. · Fruits and vegetables. · Dairy products, such as low-fat milk, yogurt, and cheese. · Lean proteins, such as all types of fish, chicken without the skin, and beans. Don't have too much or too little of one thing. All foods, if eaten in moderation, can be part of healthy eating. Even sweets can be okay. If your favorite foods are high in fat, salt, sugar, or calories, limit how often you eat them. Eat smaller servings, or look for healthy substitutes. Do watch what you eat  Many people eat more than their bodies need. Part of staying at a healthy weight means learning how much food you really need from day to day and not eating more than that.  Even with healthy foods, eating too much can make you gain weight. Having a well-balanced diet means that you eat enough, but not too much, and that your food gives you the nutrients you need to stay healthy. So listen to your body. Eat when you're hungry. Stop when you feel satisfied. It's a good idea to have healthy snacks ready for when you get hungry. Keep healthy snacks with you at work, in your car, and at home. If you have a healthy snack easily available, you'll be less likely to pick a candy bar or bag of chips from a vending machine instead. Some healthy snacks you might want to keep on hand are fruit, low-fat yogurt, string cheese, low-fat microwave popcorn, raisins and other dried fruit, nuts, whole wheat crackers, pretzels, carrots, celery sticks, and broccoli. Do some physical activity  A big part of reaching and staying at a healthy weight is being active. When you're active, you burn calories. This makes it easier to reach and stay at a healthy weight. When you're active on a regular basis, your body burns more calories, even when you're at rest. Being active helps you lose fat and build lean muscle. Try to be active for at least 1 hour every day. This may sound like a lot, but it's okay to be active in smaller blocks of time that add up to 1 hour a day. Any activity that makes your heart beat faster and keeps it there for a while counts. A brisk walk, run, or swim will get your heart beating faster. So will climbing stairs, shooting baskets, or cycling. Even some household chores like vacuuming and mowing the lawn will get your heart rate up. Pick activities that you enjoyones that make your heart beat faster, your muscles stronger, and your muscles and joints more flexible. If you find more than one thing you like doing, do them all. You don't have to do the same thing every day. Don't diet  Diets don't work. Diets are temporary. Because you give up so much when you diet, you may be hungry and think about food all the time.  And after you stop dieting, you also may overeat to make up for what you missed. Most people who diet end up gaining back the pounds they lostand more. Remember that healthy bodies come in lots of shapes and sizes. Everyone can get healthier by eating better and being more active. Where can you learn more? Go to http://hector-scott.info/. Enter 408 [id number] in the search box to learn more about \"Learning About Healthy Weight. \"  Current as of: October 13, 2016  Content Version: 11.3  © 7525-6881 [employer]. Care instructions adapted under license by [employer] (which disclaims liability or warranty for this information). If you have questions about a medical condition or this instruction, always ask your healthcare professional. [address] any warranty or liability for your use of this information.

## 2017-08-15 NOTE — MR AVS SNAPSHOT
Visit Information Date & Time Provider Department Dept. Phone Encounter #  
 8/15/2017  1:00 PM Pete Serrato MD Internist of [address] [place] [address] [phone] Follow-up Instructions Return in about 6 months (around 2/15/2018), or if symptoms worsen or fail to improve. Your Appointments 10/2/2017  2:00 PM  
IMMUNIZATIONS/INJECTIONS with [facility] [place] NURSE VISIT Internist of [facility] ([address] [place] [address]) Appt Note: flu shot [address], [address] [place] [address] [place] [address] [address] [place]  
  
   
 [address], [address]  
  
    
 1/29/2018  2:20 PM  
Follow Up with Romana Villa MD  
[facility] ([facility]) Appt Note: 6 month f/up [place] [address] [place] [id number]  
[phone] [place] [phone]  
  
    
 2/13/2018  8:45 AM  
LAB with [facility] [place] NURSE VISIT Internist of [facility] ([address] [place] [address]) Appt Note: fasting labs [address], [address] [place] [address] [address] [place]  
  
   
 [address], [address]  
  
    
 2/20/2018  1:00 PM  
Office Visit with Pete Serrato MD  
Internist of [address]) Appt Note: 6 month ov with labs [address], [address] [place]  
  
   
 [address], [address] Upcoming Health Maintenance Date Due INFLUENZA AGE 9 TO ADULT 8/1/2017 MEDICARE YEARLY EXAM 2/10/2018 COLONOSCOPY 11/10/2018 GLAUCOMA SCREENING Q2Y 1/26/2019 DTaP/Tdap/Td series (2 - Td) 7/12/2022 Allergies as of 8/15/2017  Review Complete On: 8/15/2017 By: Osbaldo Carbajal No Known Allergies Current Immunizations  Reviewed on 8/3/2015 Name Date Influenza High Dose Vaccine PF 12/9/2016, 11/16/2015, 1/10/2013 Influenza Vaccine 11/20/2014, 12/31/2013 Pneumococcal Conjugate (PCV-13) 8/3/2015  1:34 PM  
 TDAP Vaccine 7/12/2012 ZZZ-RETIRED (DO NOT USE) Pneumococcal Vaccine (Unspecified Type) 1/1/2007 Zoster 7/25/2007 Not reviewed this visit Vitals BP Pulse Temp Resp Height(growth percentile) Weight(growth percentile) 132/84 (BP 1 Location: Left arm, BP Patient Position: Sitting) 66 98.2 °F (36.8 °C) (Oral) 16 5' 10\" (1.778 m) 174 lb (78.9 kg) SpO2 BMI Smoking Status 96% 24.97 kg/m2 Former Smoker Vitals History BMI and BSA Data Body Mass Index Body Surface Area 24.97 kg/m 2 1.97 m 2 Preferred Pharmacy Pharmacy Name Phone [facility], [address] [place], [place] [address]  [phone] Your Updated Medication List  
  
   
This list is accurate as of: 8/15/17  2:04 PM.  Always use your most recent med list.  
  
  
  
  
 butalbital-acetaminophen-caffeine -40 mg per tablet Commonly known as:  Georgiann Pallas Take 1 Tab by mouth every six (6) hours as needed for Pain. CENTRUM SILVER PO Take  by mouth daily. co-enzyme Q-10 100 mg capsule Commonly known as:  CO Q-10 Take  by mouth daily. cyclobenzaprine 5 mg tablet Commonly known as:  FLEXERIL  
TAKE 1-2 TABLETS BY MOUTH EVERY 8 HOURS AS NEEDED FOR MUSCLE SPASM Ginkgo Biloba Leaf Extract 30 mg Cap Take  by mouth daily. lisinopril 2.5 mg tablet Commonly known as:  Aparna Tee Take 3 Tabs by mouth daily. LORazepam 1 mg tablet Commonly known as:  ATIVAN Take 1 Tab by mouth every eight (8) hours as needed for Anxiety. Max Daily Amount: 3 mg.  
  
 magnesium 250 mg Tab Take  by mouth daily. METROGEL 1 % topical gel Generic drug:  metroNIDAZOLE Apply  to affected area daily. Use a thin layer to affected areas after washing  
  
 mometasone 50 mcg/actuation nasal spray Commonly known as:  NASONEX  
USE TWO SPRAYS IN EACH NOSTRIL DAILY  
  
 OMEGA 3 PO  
 Take 2,400 mg by mouth two (2) times a day. PRESERVISION AREDS 2 PO Take  by mouth. PriLOSEC 20 mg capsule Generic drug:  omeprazole Take 20 mg by mouth daily as needed. selenium 100 mcg Cap Take  by mouth daily. TIMOPTIC 0.25 % ophthalmic solution Generic drug:  timolol Administer 1 Drop to both eyes two (2) times a day. VITAMIN C 500 mg tablet Generic drug:  ascorbic acid (vitamin C) Take  by mouth two (2) times a day. zinc 50 mg capsule Take  by mouth daily. Prescriptions Printed Refills LORazepam (ATIVAN) 1 mg tablet 0 Sig: Take 1 Tab by mouth every eight (8) hours as needed for Anxiety. Max Daily Amount: 3 mg. Class: Print Route: Oral  
 lisinopril (PRINIVIL, ZESTRIL) 2.5 mg tablet 2 Sig: Take 3 Tabs by mouth daily. Class: Print Route: Oral  
 butalbital-acetaminophen-caffeine (FIORICET, ESGIC) -40 mg per tablet 0 Sig: Take 1 Tab by mouth every six (6) hours as needed for Pain. Class: Print Route: Oral  
  
Follow-up Instructions Return in about 6 months (around 2/15/2018), or if symptoms worsen or fail to improve. Patient Instructions Hyperlipidemia: After Your Visit Your Care Instructions Hyperlipidemia is too much fat in your blood. The body has several kinds of fat, including cholesterol and triglycerides. Your body needs fat for many things, such as making new cells. But too much fat in your blood increases your chances of having a heart attack or stroke. You may be able to lower your cholesterol and triglycerides with a heart-healthy diet, exercise, and if needed, medicine. Your doctor may want you to try lifestyle changes first to see whether they lower the fat in your blood. You may need to take medicine if lifestyle changes do not lower the fat in your blood enough. Follow-up care is a key part of your treatment and safety.  Be sure to make and go to all appointments, and call your doctor if you are having problems. Its also a good idea to know your test results and keep a list of the medicines you take. How can you care for yourself at home? Take your medicines · Take your medicines exactly as prescribed. Call your doctor if you think you are having a problem with your medicine. · If you take medicine to lower your cholesterol, go to follow-up visits. You will need to have blood tests. · Do not take large doses of niacin, which is a B vitamin, while taking medicine called statins. It may increase the chance of muscle pain and liver problems. · Talk to your doctor about avoiding grapefruit juice if you are taking statins. Grapefruit juice can raise the level of this medicine in your blood. This could increase side effects. Eat more fruits, vegetables, and fiber · Fruits and vegetables have lots of nutrients that help protect against heart disease, and they have littleif anyfat. Try to eat at least five servings a day. Dark green, deep orange, or yellow fruits and vegetables are healthy choices. · Keep carrots, celery, and other veggies handy for snacks. Buy fruit that is in season and store it where you can see it so that you will be tempted to eat it. Cook dishes that have a lot of veggies in them, such as stir-fries and soups. · Foods high in fiber may reduce your cholesterol and provide important vitamins and minerals. High-fiber foods include whole-grain cereals and breads, oatmeal, beans, brown rice, citrus fruits, and apples. · Buy whole-grain breads and cereals instead of white bread and pastries. Limit saturated fat · Read food labels and try to avoid saturated fat and trans fat. They increase your risk of heart disease. · Use olive or canola oil when you cook. Try cholesterol-lowering spreads, such as Benecol or Take Control. · Bake, broil, grill, or steam foods instead of frying them. · Limit the amount of high-fat meats you eat, including hot dogs and sausages. Cut out all visible fat when you prepare meat. · Eat fish, skinless poultry, and soy products such as tofu instead of high-fat meats. Soybeans may be especially good for your heart. Eat at least two servings of fish a week. Certain fish, such as salmon, contain omega-3 fatty acids, which may help reduce your risk of heart attack. · Choose low-fat or fat-free milk and dairy products. Get exercise, limit alcohol, and quit smoking · Get more exercise. Work with your doctor to set up an exercise program. Even if you can do only a small amount, exercise will help you get stronger, have more energy, and manage your weight and your stress. Walking is an easy way to get exercise. Gradually increase the amount you walk every day. Aim for at least 30 minutes on most days of the week. You also may want to swim, bike, or do other activities. · Limit alcohol to no more than 2 drinks a day for men and 1 drink a day for women. · Do not smoke. If you need help quitting, talk to your doctor about stop-smoking programs and medicines. These can increase your chances of quitting for good. When should you call for help? Call 911 anytime you think you may need emergency care. For example, call if: 
· You have symptoms of a heart attack. These may include: ¨ Chest pain or pressure, or a strange feeling in the chest. 
¨ Sweating. ¨ Shortness of breath. ¨ Nausea or vomiting. ¨ Pain, pressure, or a strange feeling in the back, neck, jaw, or upper belly or in one or both shoulders or arms. ¨ Lightheadedness or sudden weakness. ¨ A fast or irregular heartbeat. After you call 911, the  may tell you to chew 1 adult-strength or 2 to 4 low-dose aspirin. Wait for an ambulance. Do not try to drive yourself. · You have signs of a stroke.  These may include: 
¨ Sudden numbness, paralysis, or weakness in your face, arm, or leg, especially on only one side of your body. ¨ New problems with walking or balance. ¨ Sudden vision changes. ¨ Drooling or slurred speech. ¨ New problems speaking or understanding simple statements, or feeling confused. ¨ A sudden, severe headache that is different from past headaches. · You passed out (lost consciousness). Call your doctor now or seek immediate medical care if: 
· You have muscle pain or weakness. Watch closely for changes in your health, and be sure to contact your doctor if: 
· You are very tired. · You have an upset stomach, gas, constipation, or belly pain or cramps. Where can you learn more? Go to G[employer]ii [employer].be Enter C406 in the search box to learn more about \"Hyperlipidemia: After Your Visit. \"  
© 7851-4870 [employer]wise, [employer]orporated. Care instructions adapted under license by [facility] (which disclaims liability or warranty for this information). This care instruction is for use with your licensed healthcare professional. If you have questions about a medical condition or this instruction, always ask your healthcare professional. [place] [address] any warranty or liability for your use of this information. Content Version: 4.2.113033; Last Revised: October 13, 2011 Learning About Healthy Weight What is a healthy weight? A healthy weight is the weight at which you feel good about yourself and have energy for work and play. It's also one that lowers your risk for health problems. What can you do to stay at a healthy weight? It can be hard to stay at a healthy weight, especially when fast food, vending-machine snacks, and processed foods are so easy to find. And with your busy lifestyle, activity may be low on your list of things to do. But staying at a healthy weight may be easier than you think. Here are some dos and don'ts for staying at a healthy weight: 
Do eat healthy foods The kinds of foods you eat have a big impact on both your weight and your health. Reaching and staying at a healthy weight is not about going on a diet. It's about making healthier food choices every day and changing your diet for good. Healthy eating means eating a variety of foods so that you get all the nutrients you need. Your body needs protein, carbohydrate, and fats for energy. They keep your heart beating, your brain active, and your muscles working. On most days, try to eat from each food group. This means eating a variety of: · Whole grains, such as whole wheat breads and pastas. · Fruits and vegetables. · Dairy products, such as low-fat milk, yogurt, and cheese. · Lean proteins, such as all types of fish, chicken without the skin, and beans. Don't have too much or too little of one thing. All foods, if eaten in moderation, can be part of healthy eating. Even sweets can be okay. If your favorite foods are high in fat, salt, sugar, or calories, limit how often you eat them. Eat smaller servings, or look for healthy substitutes. Do watch what you eat Many people eat more than their bodies need. Part of staying at a healthy weight means learning how much food you really need from day to day and not eating more than that. Even with healthy foods, eating too much can make you gain weight. Having a well-balanced diet means that you eat enough, but not too much, and that your food gives you the nutrients you need to stay healthy. So listen to your body. Eat when you're hungry. Stop when you feel satisfied. It's a good idea to have healthy snacks ready for when you get hungry. Keep healthy snacks with you at work, in your car, and at home. If you have a healthy snack easily available, you'll be less likely to pick a candy bar or bag of chips from a vending machine instead.  
Some healthy snacks you might want to keep on hand are fruit, low-fat yogurt, string cheese, low-fat microwave popcorn, raisins and other dried fruit, nuts, whole wheat crackers, pretzels, carrots, celery sticks, and broccoli. Do some physical activity A big part of reaching and staying at a healthy weight is being active. When you're active, you burn calories. This makes it easier to reach and stay at a healthy weight. When you're active on a regular basis, your body burns more calories, even when you're at rest. Being active helps you lose fat and build lean muscle. Try to be active for at least 1 hour every day. This may sound like a lot, but it's okay to be active in smaller blocks of time that add up to 1 hour a day. Any activity that makes your heart beat faster and keeps it there for a while counts. A brisk walk, run, or swim will get your heart beating faster. So will climbing stairs, shooting baskets, or cycling. Even some household chores like vacuuming and mowing the lawn will get your heart rate up. Pick activities that you enjoyones that make your heart beat faster, your muscles stronger, and your muscles and joints more flexible. If you find more than one thing you like doing, do them all. You don't have to do the same thing every day. Don't diet Diets don't work. Diets are temporary. Because you give up so much when you diet, you may be hungry and think about food all the time. And after you stop dieting, you also may overeat to make up for what you missed. Most people who diet end up gaining back the pounds they lostand more. Remember that healthy bodies come in lots of shapes and sizes. Everyone can get healthier by eating better and being more active. Where can you learn more? Go to http://hector-scott.info/. Enter 104 6402 in the search box to learn more about \"Learning About Healthy Weight. \" Current as of: October 13, 2016 Content Version: 11.3 © 2786-3721 [employer].  Care instructions adapted under license by [address] (which disclaims liability or warranty for this information). If you have questions about a medical condition or this instruction, always ask your healthcare professional. [address] any warranty or liability for your use of this information. Introducing \[facility] [place] [facility]\"" & HEALTH SERVICES! Dear Sonia Velasquez: 
Thank you for requesting a Songfor account. Our records indicate that you already have an active Songfor account. You can access your account anytime at [email or web address] Did you know that you can access your hospital and ER discharge instructions at any time in Songfor? You can also review all of your test results from your hospital stay or ER visit. Additional Information If you have questions, please visit the Frequently Asked Questions section of the Songfor website at [email or web address] Remember, Songfor is NOT to be used for urgent needs. For medical emergencies, dial 911. Now available from your iPhone and Android! Please provide this summary of care documentation to your next provider. Your primary care clinician is listed as Harriet Cruz. If you have any questions after today's visit, please call [phone].

## 2017-08-19 PROBLEM — R91.1 PULMONARY NODULE, LEFT: Status: RESOLVED | Noted: 2017-04-28 | Resolved: 2017-08-19

## 2017-08-19 PROBLEM — E86.0 DEHYDRATION: Status: RESOLVED | Noted: 2017-04-28 | Resolved: 2017-08-19

## 2017-08-19 NOTE — PROGRESS NOTES
HPI:   Nellie Curran is a 67y.o. year old male who presents today for evaluation of hypertension, hyperlipidemia, AV block s/p pacemaker, paroxysmal atrial fibrillation, and anxiety. He reports that he is generally doing well and is currently without complaints. He has a history of hypertension, treated with lisinopril. He does monitor his blood pressure regularly, and is currently taking 5 mg in the morning and 2.5 mg in the evening. He reports that his blood pressure in mainly 120-130/ 70-80 at home. He has a history of intermittent high grade AV block, and underwent placement of a dual chamber Medtronic pacemaker in 2007. He underwent pacemaker generator exchange in 5/2016 after his device reached elective replacement indicator. A Medtronic MRI safe device was installed. He has a history of a single episode of atrial fibrillation noted on device check several years ago. It has not recurred. He was noted in the past to have mildly decreased LV function, but a repeat echocardiogram in 6/2013 showed normal size and lower limits of normal function with EF 50-55%; no wall motion abnormalities, and grade 1 diastolic dysfunction. He is followed by Dr. Babar Caldwell. 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 He denies any chest pain, shortness of breath at rest or with exertion, palpitations, lightheadedness, or edema. He has a history of dyslipidemia, managed by diet in the past.    In 4/2017 he developed lightheadedness after painting outside for several hours. He took an Ativan because he thought he was experiencing anxiety. However, his symptoms persisted and he began to feel like he was going to pass out so he called 911 for help. When EMS arrived, his symptoms had resolved but he was noted to have a low blood pressure of approximately 112/67.  He was taken to [facility] for evaluation and was asymptomatic upon arrival. He admitted to not eating or drinking anything for the four hour period that he was outside, and acknowledged that the day was quite warm. Evaluation in the ED included an EKG which showed a paced rhythm, serial troponins which were negative, and blood work showing WBC 14.2, Hb 14.5, Hct 42.7, creatinine 1.3/ eGFR 51.7. Chest x-ray showed no acute process, but did note an incidental pulmonary nodule over the left lower lobe. A chest CT scan was obtained (5/2017) showing mild subsegmental atelectasis or scarring at the left lung base without focal pulmonary nodule. He has a history of a GI bleeding in 1997 secondary to NSAID use for headaches. He also has a history of GERD and dysphagia and has undergone multiple esophageal dilatations in the past by Dr. Rosa Gtz. In 11/2015, he had an upper endoscopy which showed a small hiatal hernia in the cardia, a Schatzki's ring at the gastroesophageal junction, which was dilated, and otherwise normal stomach and duodenum. He also had a screening colonoscopy which revealed mild colonic diverticulosis and a 7 mm polyp in the proximal ascending colon (pathology: tubulovillous adenoma). Follow-up recommended for 3 years. He has a history of anxiety, and uses lorazepam occasionally. He also reports he uses cyclobenzaprine occasionally for back spasms which occur at times due to straining his back with excessive physical work. Past Medical History:   Diagnosis Date    Anxiety     AV block     intermittent, high-grade    Bilateral inguinal hernia     Cardiac echocardiogram 06/25/2013    EF 50-55%. No WMA. Mild LVH. Gr 1 DDfx. RVE.  AKANKSHA. Mild TR. No significant chg from study of 6/8/11.  Cardiac nuclear imaging test 10/07/2009    Likely inferior & anterior septal artifact. EF 55%. Mild septal WMA could be related to pacemaker.     Cardiomyopathy ([address]     Diverticulosis of colon 11/10/2015    Dysphagia Multiple esophageal dilations.  Essential hypertension with goal blood pressure less than 140/90     HLD (hyperlipidemia)     Hx of colonoscopy 11/10/2015    Dr. Sylvain Soriano; tubulovillous adenoma.  Pacemaker 2007    Medtronic dual-chamber device.  Paroxysmal atrial fibrillation (HCC)     single episode noted on routine device check     Past Surgical History:   Procedure Laterality Date    CARDIAC SURG PROCEDURE UNLIST      ENDOSCOPY, COLON, DIAGNOSTIC      HX CATARACT REMOVAL      HX PACEMAKER  05/29/07    Medtronic Versa dual-chamber pacemaker for intermittent high-grade AV block and symptomatic bradycardia.  HX PACEMAKER  5/3/16     Current Outpatient Prescriptions   Medication Sig    LORazepam (ATIVAN) 1 mg tablet Take 1 Tab by mouth every eight (8) hours as needed for Anxiety. Max Daily Amount: 3 mg.  lisinopril (PRINIVIL, ZESTRIL) 2.5 mg tablet Take 3 Tabs by mouth daily.  butalbital-acetaminophen-caffeine (FIORICET, ESGIC) -40 mg per tablet Take 1 Tab by mouth every six (6) hours as needed for Pain.  VIT C/E/ZN/COPPR/LUTEIN/ZEAXAN (PRESERVISION AREDS 2 PO) Take  by mouth.  omeprazole (PRILOSEC) 20 mg capsule Take 20 mg by mouth daily as needed.  timolol (TIMOPTIC) 0.25 % ophthalmic solution Administer 1 Drop to both eyes two (2) times a day.  MULTIVITAMINS W-MINERALS/LUT (CENTRUM SILVER PO) Take  by mouth daily.  OMEGA-3 FATTY ACIDS (OMEGA 3 PO) Take 2,400 mg by mouth two (2) times a day.  coenzyme q10 100 mg Cap Take  by mouth daily.  metronidazole (METROGEL) 1 % topical gel Apply  to affected area daily. Use a thin layer to affected areas after washing     zinc 50 mg capsule Take  by mouth daily.  magnesium 250 mg Tab Take  by mouth daily.  selenium 100 mcg Cap Take  by mouth daily.  ascorbic acid (VITAMIN C) 500 mg tablet Take  by mouth two (2) times a day.  Ginkgo Biloba Leaf Extract 30 mg Cap Take  by mouth daily.     mometasone (NASONEX) 50 mcg/actuation nasal spray USE TWO SPRAYS IN EACH NOSTRIL DAILY    cyclobenzaprine (FLEXERIL) 5 mg tablet TAKE 1-2 TABLETS BY MOUTH EVERY 8 HOURS AS NEEDED FOR MUSCLE SPASM     No current facility-administered medications for this visit. Allergies and Intolerances:   No Known Allergies     Family History: His brother  from metastatic prostate cancer. His mother lived until the age of 80, although she had CAD. His father  at the age of 59 from a CVA. He was an alcoholic. He has five sisters that are all healthy. Family History   Problem Relation Age of Onset    Heart Surgery Mother     Stroke Father     Alcohol abuse Father      Social History:   He  reports that he quit smoking about 51 years ago. He has never used smokeless tobacco. He smoked 1 ppd for 2 years, and then quit (). He is retired  and worked for the [employer]. He lives with his girlfriend and has one son. History   Alcohol Use No     Comment: 1998 last time he had alcohol. Immunization History:  Immunization History   Administered Date(s) Administered    Influenza High Dose Vaccine PF 01/10/2013, 2015, 2016    Influenza Vaccine 2013, 2014    Pneumococcal Conjugate (PCV-13) 2015    TDAP Vaccine 2012    ZZZ-RETIRED (DO NOT USE) Pneumococcal Vaccine (Unspecified Type) 2007    Zoster 2007       Review of Systems:   As above included in HPI. Otherwise 11 point review of systems negative including constitutional, skin, HENT, eyes, respiratory, cardiovascular, gastrointestinal, genitourinary, musculoskeletal, endocrine, hematologic, allergy, and neurologic. Physical:   Vitals:   BP: 132/84; repeat 126/80 left arm  HR: 66  WT: 174 lb (78.9 kg)  BMI:  24.97 kg/m2    Exam:   Patient appears in no apparent distress. Affect is appropriate.   HEENT --Anicteric sclerae, tympanic membranes normal,  ear canals normal.  PERRL, EOMI, conjunctiva and lids normal.   Sinuses were nontender, turbinates normal, hearing normal.  Oropharynx without  erythema, normal tongue, oral mucosa and tonsils. No cervical lymphadenopathy. No thyromegaly, JVD, or bruits. Carotid pulses 2+ with normal upstroke. Lungs --Clear to auscultation. No wheezing or rales. Heart --Regular rate and rhythm, no murmurs, rubs, gallops, or clicks. Chest wall --Nontender to palpation. PMI normal.  Abdomen -- Soft and nontender, no hepatosplenomegaly or masses. Extremities -- Without cyanosis, clubbing, edema. 2+ pulses equally and bilaterally. Normal looking digits, ROM intact  Neuro -- CN 2-12 intact, strength 5/5 with intact soft touch in all extremities  Derm  no obvious abnormalities noted, no rash    Review of Data:  Labs:  Hospital Outpatient Visit on 08/08/2017   Component Date Value Ref Range Status    WBC 08/08/2017 7.3  4.6 - 13.2 K/uL Final    RBC 08/08/2017 4.65* 4.70 - 5.50 M/uL Final    HGB 08/08/2017 14.4  13.0 - 16.0 g/dL Final    HCT 08/08/2017 42.5  36.0 - 48.0 % Final    MCV 08/08/2017 91.4  74.0 - 97.0 FL Final    MCH 08/08/2017 31.0  24.0 - 34.0 PG Final    MCHC 08/08/2017 33.9  31.0 - 37.0 g/dL Final    RDW 08/08/2017 13.2  11.6 - 14.5 % Final    PLATELET 69/87/4361 986  135 - 420 K/uL Final    MPV 08/08/2017 9.4  9.2 - 11.8 FL Final    NEUTROPHILS 08/08/2017 47  40 - 73 % Final    LYMPHOCYTES 08/08/2017 33  21 - 52 % Final    MONOCYTES 08/08/2017 11* 3 - 10 % Final    EOSINOPHILS 08/08/2017 8* 0 - 5 % Final    BASOPHILS 08/08/2017 1  0 - 2 % Final    ABS. NEUTROPHILS 08/08/2017 3.4  1.8 - 8.0 K/UL Final    ABS. LYMPHOCYTES 08/08/2017 2.4  0.9 - 3.6 K/UL Final    ABS. MONOCYTES 08/08/2017 0.8  0.05 - 1.2 K/UL Final    ABS. EOSINOPHILS 08/08/2017 0.6* 0.0 - 0.4 K/UL Final    ABS.  BASOPHILS 08/08/2017 0.1* 0.0 - 0.06 K/UL Final    DF 08/08/2017 AUTOMATED    Final    LIPID PROFILE 08/08/2017        Final    Cholesterol, total 08/08/2017 156  <200 MG/DL Final    Triglyceride 08/08/2017 94  <150 MG/DL Final    HDL Cholesterol 08/08/2017 42  40 - 60 MG/DL Final    LDL, calculated 08/08/2017 95.2  0 - 100 MG/DL Final    VLDL, calculated 08/08/2017 18.8  MG/DL Final    CHOL/HDL Ratio 08/08/2017 3.7  0 - 5.0   Final    Sodium 08/08/2017 136  136 - 145 mmol/L Final    Potassium 08/08/2017 4.2  3.5 - 5.5 mmol/L Final    Chloride 08/08/2017 101  100 - 108 mmol/L Final    CO2 08/08/2017 25  21 - 32 mmol/L Final    Anion gap 08/08/2017 10  3.0 - 18 mmol/L Final    Glucose 08/08/2017 92  74 - 99 mg/dL Final    BUN 08/08/2017 11  7.0 - 18 MG/DL Final    Creatinine 08/08/2017 0.87  0.6 - 1.3 MG/DL Final    BUN/Creatinine ratio 08/08/2017 13  12 - 20   Final    GFR est AA 08/08/2017 >60  >60 ml/min/1.73m2 Final    GFR est non-AA 08/08/2017 >60  >60 ml/min/1.73m2 Final    Calcium 08/08/2017 8.8  8.5 - 10.1 MG/DL Final    Bilirubin, total 08/08/2017 0.6  0.2 - 1.0 MG/DL Final    ALT (SGPT) 08/08/2017 20  16 - 61 U/L Final    AST (SGOT) 08/08/2017 14* 15 - 37 U/L Final    Alk.  phosphatase 08/08/2017 60  45 - 117 U/L Final    Protein, total 08/08/2017 6.8  6.4 - 8.2 g/dL Final    Albumin 08/08/2017 3.9  3.4 - 5.0 g/dL Final    Globulin 08/08/2017 2.9  2.0 - 4.0 g/dL Final    A-G Ratio 08/08/2017 1.3  0.8 - 1.7   Final    Color 08/08/2017 YELLOW    Final    Appearance 08/08/2017 CLEAR    Final    Specific gravity 08/08/2017 1.011  1.005 - 1.030   Final    pH (UA) 08/08/2017 8.0  5.0 - 8.0   Final    Protein 08/08/2017 NEGATIVE   NEG mg/dL Final    Glucose 08/08/2017 NEGATIVE   NEG mg/dL Final    Ketone 08/08/2017 NEGATIVE   NEG mg/dL Final    Bilirubin 08/08/2017 NEGATIVE   NEG   Final    Blood 08/08/2017 NEGATIVE   NEG   Final    Urobilinogen 08/08/2017 0.2  0.2 - 1.0 EU/dL Final    Nitrites 08/08/2017 NEGATIVE   NEG   Final    Leukocyte Esterase 08/08/2017 NEGATIVE   NEG   Final    WBC 08/08/2017 0 to 1  0 - 4 /hpf Final    RBC 08/08/2017 0  0 - 5 /hpf Final    Epithelial cells 08/08/2017 FEW  0 - 5 /lpf Final    Bacteria 08/08/2017 NEGATIVE   NEG /hpf Final    TSH 08/08/2017 1.45  0.36 - 3.74 uIU/mL Final    Prostate Specific Ag 08/08/2017 0.7  0.0 - 4.0 ng/mL Final       Calculated 10 year ASCVD risk score: 28.5 %    Health Maintenance:  Screening:    Colorectal: colonoscopy (11/2015) tubulovillous adenoma. Dr. Miquel Lora. Due 11/2018. Depression: none   DM (HbA1c/FPG): FPG 92 (8/2017)   Hepatitis C: N/A   Falls: none   DEXA: N/A   PSA/WEN: PSA 0.7 (8/2017)/ WEN (8/2016)   Glaucoma: regular eye exams with Dr. Savanah Robles for glaucoma q 4 months. Smoking: none   Vitamin D: 32.2 (7/2016)   Medicare Wellness: 2/9/2017    Impression:  Patient Active Problem List   Diagnosis Code    AV block I44.30    Pacemaker Z95.0    Cardiomyopathy ([address] I42.9    Paroxysmal atrial fibrillation ([address] I48.0    HLD (hyperlipidemia) E78.5    FH: prostate cancer Z80.42    Bilateral inguinal hernia K40.20    Anxiety F41.9    Essential hypertension I10    Colon polyp; tubulovillous adenoma 11/2015 K63.5    Gastroesophageal reflux disease without esophagitis K21.9    Postural dizziness with presyncope R42, R55    Dehydration E86.0    Pulmonary nodule, left R91.1       Plan:  1. Presyncope. Related to dehydration secondary to physical exertion in the heat without adequate hydration. Reports no further symptoms. Monitors his blood pressure daily and has been controlled at his baseline. Encouraged to continue drinking plenty of fluids. Follow. 2. Hypertension. Blood pressure remains well controlled today on 7.5 mg daily dose of lisinopril. He continues to monitor his blood pressure carefully at home. Renal function remains normal with creatinine 0.87/ eGFR >60. Continue to follow. 3. AV block, intermittent high grade. S/P Medtronic dual chamber pacemaker with generator change in 5/2016 with an MRI safe device installed.  Interrogated and followed every 3 months by Dr. Samanta Enrique, last 2017. Repeat echocardiogram with low normal LV function. 4. Paroxysmal atrial fibrillation. Single episode noted on device check several years ago. None since. No need for anti-coagulation. Continue to follow. 5. Hyperlipidemia. Calculated 10 year ASCVD risk is 28.5 %. However, his age > 76 does not place him in one of the four statin benefit groups as per new AHA/ACC guidelines. Also, with no evidence of ASCVD. LDL improved to 95 with eight pound weight loss over last 6 months. Emphasized importance of continued lifestyle modifications, including diet, exercise, and weight loss. Continue to follow and repeat lipid panel at next visit. 6. Pulmonary nodule on chest x-ray. Incidental finding on chest xray at [facility], but chest CT scan without evidence of any nodule. Did note elevated left hemidiaphragm and focal atelectasis or scarring at left lung base. No follow-up needed. 7. GERD/dysphagia. Recent EGD with esophageal dilatation for Schatzki's ring. Small hiatal hernia. Symptoms well controlled with omeprazole. Follow. 8. Family history of prostate cancer. Patient quite concerned about his own risk given that his brother  quite rapidly from metastatic disease. Reassured that his PSA level remains quite low. He is requesting to continue to have it checked despite his age, which would suggest that testing should be discontinued. Discussed the controversy of continuing prostatic cancer screening after age 75. Patient still requesting to continue PSA testing, but states that he would prefer not to have WEN unless PSA increases. 9. Anxiety. Patient is quite anxious about his health. Reassured that he is living a healthy lifestyle and doing well. Continue lorazepam as needed. 10. Health maintenance. Will schedule appointment for influenza vaccine. Other immunizations up to date. Colonoscopy due 2018. Continue regular eye exams with Dr. Apple Mckay. Vitamin D level normal.     Patient understands recommendations and agrees with plan. Follow-up in 6 months.

## 2017-10-02 ENCOUNTER — CLINICAL SUPPORT (OUTPATIENT)
Dept: INTERNAL MEDICINE CLINIC | Age: 77
End: 2017-10-02

## 2017-10-02 DIAGNOSIS — Z23 ENCOUNTER FOR IMMUNIZATION: ICD-10-CM

## 2017-10-02 NOTE — PROGRESS NOTES
Keila Wade is a 67 y.o. male who presents for routine immunizations. He denies any symptoms , reactions or allergies that would exclude them from being immunized today. Risks and adverse reactions were discussed and the VIS was given to them. All questions were addressed. He was observed for 10 min post injection. There were no reactions observed.     Jose Lan LPN

## 2017-10-18 RX ORDER — LORAZEPAM 1 MG/1
1 TABLET ORAL
Qty: 60 TAB | Refills: 0 | OUTPATIENT
Start: 2017-10-18 | End: 2017-11-22 | Stop reason: SDUPTHER

## 2017-10-18 NOTE — TELEPHONE ENCOUNTER
Last OV: 08/15/2017  Last Fill: 08/15/2017    Reviewed report generated by the [address] [place] [address]. Does not demonstrate aberrancies or inconsistencies with regard to the prescribing of controlled medications to this patient by other providers.

## 2017-10-18 NOTE — TELEPHONE ENCOUNTER
From: Jean Ashley  To: Shobha Hancock MD  Sent: 10/18/2017 9:52 AM EDT  Subject: Medication Renewal Request    Original authorizing provider: MD Raphael Parks would like a refill of the following medications:  LORazepam (ATIVAN) 1 mg tablet Shobha Hancock MD]    Preferred pharmacy: [facility] [id number] [place] [address] 59:

## 2017-11-22 RX ORDER — LORAZEPAM 1 MG/1
1 TABLET ORAL
Qty: 60 TAB | Refills: 0 | OUTPATIENT
Start: 2017-11-22 | End: 2017-12-26 | Stop reason: SDUPTHER

## 2017-11-22 NOTE — TELEPHONE ENCOUNTER
From: Oral Ringer  To: Lay Garcia MD  Sent: 11/22/2017 9:04 AM EST  Subject: Medication Renewal Request    Original authorizing provider: MD Saeid Rowley Caro.  Palak would like a refill of the following medications:  LORazepam (ATIVAN) 1 mg tablet Lay Garcia MD]    Preferred pharmacy: [facility] [id number] [place] [address] 59:

## 2017-11-22 NOTE — TELEPHONE ENCOUNTER
[facility]19[facility]17 [facility]   Reviewed report generated by the [facility]. Does not demonstrate aberrancies or inconsistencies with regard to the prescribing of controlled medications to this patient by other providers.

## 2017-12-26 DIAGNOSIS — F41.9 ANXIETY: Primary | ICD-10-CM

## 2017-12-26 RX ORDER — LORAZEPAM 1 MG/1
1 TABLET ORAL
Qty: 60 TAB | Refills: 0 | OUTPATIENT
Start: 2017-12-26 | End: 2018-01-30 | Stop reason: SDUPTHER

## 2017-12-26 NOTE — TELEPHONE ENCOUNTER
From: Letty Huang  To: Sarah Buckner MD  Sent: 12/26/2017 1:17 PM EST  Subject: Medication Renewal Request    Original authorizing provider: MD Krystina Lee.  Palak would like a refill of the following medications:  LORazepam (ATIVAN) 1 mg tablet Sarah Buckner MD]    [facility] [address] [facility]:

## 2018-01-29 ENCOUNTER — OFFICE VISIT (OUTPATIENT)
Dept: CARDIOLOGY CLINIC | Age: 78
End: 2018-01-29

## 2018-01-29 VITALS
SYSTOLIC BLOOD PRESSURE: 110 MMHG | HEART RATE: 74 BPM | OXYGEN SATURATION: 97 % | BODY MASS INDEX: 25.2 KG/M2 | DIASTOLIC BLOOD PRESSURE: 76 MMHG | HEIGHT: 70 IN | WEIGHT: 176 LBS

## 2018-01-29 DIAGNOSIS — I44.30 AV BLOCK: ICD-10-CM

## 2018-01-29 DIAGNOSIS — I10 ESSENTIAL HYPERTENSION: ICD-10-CM

## 2018-01-29 DIAGNOSIS — I48.0 PAROXYSMAL ATRIAL FIBRILLATION (HCC): Primary | ICD-10-CM

## 2018-01-29 DIAGNOSIS — E78.5 HYPERLIPIDEMIA, UNSPECIFIED HYPERLIPIDEMIA TYPE: ICD-10-CM

## 2018-01-29 NOTE — MR AVS SNAPSHOT
[address] [address] [address] [address] 
[phone] Patient: Ronen Putnam MRN: [id number] :1940 Visit Information Date & Time Provider Department Dept. Phone Encounter #  
 2018  2:20 PM Saintclair Hires, MD [facility] [place] [phone] Your Appointments 2018  8:45 AM  
LAB with [facility] NURSE VISIT [facility] [address] [place] [address] ([address] [place] [address]) Appt Note: fasting labs [address], [address] [place] [id number] [place] [address] [address] [place]  
  
   
 [address], [place]  
  
    
 2018  1:00 PM  
Office Visit with Justino Tamayo MD  
[facility] [address] Appt Note: 6 month ov with labs [address], [address] [place]  
  
   
 [address], [address] [phone]  
  
    
 2018  2:00 PM  
Follow Up with Saintclair Hires, MD  
[facility] [place] [facility] ([address] [place] [address]) [address] [address]  
[phone] [address] [place] [address] [address] Upcoming Health Maintenance Date Due  
 MEDICARE YEARLY EXAM 2/10/2018 COLONOSCOPY 11/10/2018 GLAUCOMA SCREENING Q2Y 2019 DTaP/Tdap/Td series (2 - Td) 2022 Allergies as of 2018  Review Complete On: 10/2/2017 By: Brannon Ferreira LPN No Known Allergies Current Immunizations  Reviewed on 8/3/2015 Name Date Influenza High Dose Vaccine PF 10/2/2017, 2016, 2015, 1/10/2013 Influenza Vaccine 2014, 2013 Pneumococcal Conjugate (PCV-13) 8/3/2015  1:34 PM  
 TDAP Vaccine 2012 ZZZ-RETIRED (DO NOT USE) Pneumococcal Vaccine (Unspecified Type) 2007 Zoster 2007 Not reviewed this visit You Were Diagnosed With   
  
 Codes Comments Paroxysmal atrial fibrillation (HCC)    -  Primary ICD-10-CM: I48.0 ICD-9-CM: 427.31 Essential hypertension     ICD-10-CM: I10 
ICD-9-CM: 401.9 Hyperlipidemia, unspecified hyperlipidemia type     ICD-10-CM: E78.5 ICD-9-CM: 272.4 AV block     ICD-10-CM: I44.30 ICD-9-CM: 426.10 Vitals BP Pulse Height(growth percentile) Weight(growth percentile) SpO2 BMI  
 110/76 74 5' 10\" (1.778 m) 176 lb (79.8 kg) 97% 25.25 kg/m2 Smoking Status Former Smoker Vitals History BMI and BSA Data Body Mass Index Body Surface Area  
 25.25 kg/m 2 1.99 m 2 Preferred Pharmacy Pharmacy Name Phone [facility], [address] [place] [address] [phone] Your Updated Medication List  
  
   
This list is accurate as of: 1/29/18  3:15 PM.  Always use your most recent med list.  
  
  
  
  
 butalbital-acetaminophen-caffeine -40 mg per tablet Commonly known as:  Flonnie Min Take 1 Tab by mouth every six (6) hours as needed for Pain. CENTRUM SILVER PO Take  by mouth daily. co-enzyme Q-10 100 mg capsule Commonly known as:  CO Q-10 Take  by mouth daily. cyclobenzaprine 5 mg tablet Commonly known as:  FLEXERIL  
TAKE 1-2 TABLETS BY MOUTH EVERY 8 HOURS AS NEEDED FOR MUSCLE SPASM Ginkgo Biloba Leaf Extract 30 mg Cap Take  by mouth daily. lisinopril 2.5 mg tablet Commonly known as:  Valiente Mina Take 3 Tabs by mouth daily. LORazepam 1 mg tablet Commonly known as:  ATIVAN Take 1 Tab by mouth every eight (8) hours as needed for Anxiety. Max Daily Amount: 3 mg.  
  
 magnesium 250 mg Tab Take  by mouth daily. METROGEL 1 % topical gel Generic drug:  metroNIDAZOLE Apply  to affected area daily. Use a thin layer to affected areas after washing  
  
 mometasone 50 mcg/actuation nasal spray Commonly known as:  Real Johannyk USE TWO SPRAYS IN EACH NOSTRIL DAILY  
  
 OMEGA 3 PO Take 2,400 mg by mouth two (2) times a day. PRESERVISION AREDS 2 PO Take  by mouth. PriLOSEC 20 mg capsule Generic drug:  omeprazole Take 20 mg by mouth daily as needed. selenium 100 mcg Cap Take  by mouth daily. TIMOPTIC 0.25 % ophthalmic solution Generic drug:  timolol Administer 1 Drop to both eyes two (2) times a day. VITAMIN C 500 mg tablet Generic drug:  ascorbic acid (vitamin C) Take  by mouth two (2) times a day. zinc 50 mg capsule Take  by mouth daily. We Performed the Following AMB POC EKG ROUTINE W/ 12 LEADS, INTER & REP [57777 CPT(R)] Introducing [facility]! Dear Fany Abbott: 
Thank you for requesting a [employer] account. Our records indicate that you already have an active [employer] account. You can access your account anytime at [email or web address] Did you know that you can access your hospital and ER discharge instructions at any time in [employer]? You can also review all of your test results from your hospital stay or ER visit. Additional Information If you have questions, please visit the Frequently Asked Questions section of the [employer] website at [email or web address] Remember, [employer] is NOT to be used for urgent needs. For medical emergencies, dial 911. Now available from your iPhone and Android! Please provide this summary of care documentation to your next provider. Your primary care clinician is listed as Annemarie Gauthier. If you have any questions after today's visit, please call [phone].

## 2018-01-29 NOTE — PROGRESS NOTES
1. Have you been to the ER, urgent care clinic since your last visit? Hospitalized since your last visit? No     2. Have you seen or consulted any other health care providers outside of the [address] [place] [address] since your last visit? Include any pap smears or colon screening.  No

## 2018-01-29 NOTE — PROGRESS NOTES
HISTORY OF PRESENT ILLNESS  Marichuy Johnson is a 65 y.o. male. Irregular Heart Beat    Pertinent negatives include no fever, no chest pain, no claudication, no orthopnea, no PND, no abdominal pain, no nausea, no vomiting, no headaches, no dizziness, no cough and no shortness of breath. Follow-up   Pertinent negatives include no chest pain, no abdominal pain, no headaches and no shortness of breath. Patient presents for a scheduled followup visit. He has a history of intermittent high-grade AV block, status post to dual-chamber permanent pacemaker in 2007. He also has a history of hypertension, Brief episodes approximately atrial fibrillation, and a mildly depressed LV systolic function,  Which returned to low normal on echocardiogram in June 2013 showing an ejection fraction of 50-55%. The patient underwent a pacemaker generator exchange in May 2016 With a [employer] MRI safe device after his device has reached elective replacement indicator. The patient was last seen in the office 6 months ago. Since that time, he has been feeling well. He denies any new palpitations, no dizziness, no syncope. No major changes in his activity tolerance. He states  that he is still active as he was at last visit. He has not noted any leg swelling, orthopnea, PND or claudication. Past Medical History:   Diagnosis Date    Anxiety     AV block     intermittent, high-grade    Bilateral inguinal hernia     Cardiac echocardiogram 06/25/2013    EF 50-55%. No WMA. Mild LVH. Gr 1 DDfx. RVE.  AKANKSHA. Mild TR. No significant chg from study of 6/8/11.  Cardiac nuclear imaging test 10/07/2009    Likely inferior & anterior septal artifact. EF 55%. Mild septal WMA could be related to pacemaker.  Cardiomyopathy ([address]     Diverticulosis of colon 11/10/2015    Dysphagia     Multiple esophageal dilations.     Essential hypertension with goal blood pressure less than 140/90     HLD (hyperlipidemia)     Hx of colonoscopy 11/10/2015    Dr. Neo Osorio; tubulovillous adenoma.  Pacemaker 2007    Medtronic dual-chamber device.  Paroxysmal atrial fibrillation (HCC)     single episode noted on routine device check      Current Outpatient Prescriptions   Medication Sig Dispense Refill    LORazepam (ATIVAN) 1 mg tablet Take 1 Tab by mouth every eight (8) hours as needed for Anxiety. Max Daily Amount: 3 mg. 60 Tab 0    lisinopril (PRINIVIL, ZESTRIL) 2.5 mg tablet Take 3 Tabs by mouth daily. 270 Tab 2    butalbital-acetaminophen-caffeine (FIORICET, ESGIC) -40 mg per tablet Take 1 Tab by mouth every six (6) hours as needed for Pain. 30 Tab 0    mometasone (NASONEX) 50 mcg/actuation nasal spray USE TWO SPRAYS IN EACH NOSTRIL DAILY 1 Container 5    VIT C/E/ZN/COPPR/LUTEIN/ZEAXAN (PRESERVISION AREDS 2 PO) Take  by mouth.  omeprazole (PRILOSEC) 20 mg capsule Take 20 mg by mouth daily as needed.  timolol (TIMOPTIC) 0.25 % ophthalmic solution Administer 1 Drop to both eyes two (2) times a day.  cyclobenzaprine (FLEXERIL) 5 mg tablet TAKE 1-2 TABLETS BY MOUTH EVERY 8 HOURS AS NEEDED FOR MUSCLE SPASM 25 Tab 0    MULTIVITAMINS W-MINERALS/LUT (CENTRUM SILVER PO) Take  by mouth daily.  OMEGA-3 FATTY ACIDS (OMEGA 3 PO) Take 2,400 mg by mouth two (2) times a day.  coenzyme q10 100 mg Cap Take  by mouth daily.  metronidazole (METROGEL) 1 % topical gel Apply  to affected area daily. Use a thin layer to affected areas after washing       zinc 50 mg capsule Take  by mouth daily.  magnesium 250 mg Tab Take  by mouth daily.  selenium 100 mcg Cap Take  by mouth daily.  ascorbic acid (VITAMIN C) 500 mg tablet Take  by mouth two (2) times a day.  Ginkgo Biloba Leaf Extract 30 mg Cap Take  by mouth daily.          No Known Allergies     Social History   Substance Use Topics    Smoking status: Former Smoker     Quit date: 7/27/1966    Smokeless tobacco: Never Used    Alcohol use No Comment: 1998 last time he had alcohol. Review of Systems   Constitutional: Negative for chills, fever and weight loss. HENT: Negative for nosebleeds. Eyes: Negative for blurred vision and double vision. Respiratory: Negative for cough, shortness of breath and wheezing. Cardiovascular: Negative for chest pain, palpitations, orthopnea, claudication, leg swelling and PND. Gastrointestinal: Negative for abdominal pain, heartburn, nausea and vomiting. Genitourinary: Negative for dysuria and hematuria. Musculoskeletal: Negative for falls and myalgias. Skin: Negative for rash. Neurological: Negative for dizziness, focal weakness and headaches. Endo/Heme/Allergies: Does not bruise/bleed easily. Psychiatric/Behavioral: Negative for substance abuse. Visit Vitals    /76    Pulse 74    Ht 5' 10\" (1.778 m)    Wt 79.8 kg (176 lb)    SpO2 97%    BMI 25.25 kg/m2      Physical Exam   Constitutional: He is oriented to person, place, and time. He appears well-developed and well-nourished. HENT:   Head: Normocephalic and atraumatic. Eyes: Conjunctivae are normal.   Neck: Neck supple. No JVD present. Carotid bruit is not present. Cardiovascular: Normal rate, regular rhythm, S1 normal, S2 normal and normal pulses. Exam reveals no gallop and no S3. No murmur heard. Pulmonary/Chest: Breath sounds normal. He has no wheezes. He has no rales. Abdominal: Soft. Bowel sounds are normal. There is no tenderness. Musculoskeletal: He exhibits no edema. Neurological: He is alert and oriented to person, place, and time. Skin: Skin is warm and dry. EK% AV paced rhythm. Occasional PVCs. Compared to the previous EKG, PVCs are new    Pacemaker interrogation: Pacemaker generator at beginning of life. Estimated longevity 7.5 years. Single episode of atrial fibrillation lasting for 1 hour and 45 minutes. No high rate episodes.   Pacing in the atrium 18% in the ventricle 100%. Scanned document for details. ASSESSMENT and PLAN    Paroxysmal atrial fibrillation. Patient had a single episode of atrial fibrillation in November 2017 lasting for little less than 2 hours. He was asymptomatic to the arrhythmia. Unless he starts developing more frequent or prolonged episodes, I do not feel anticoagulation is  needed. However we will continue to monitor this on routine device checks. High-grade AV block: Status post dual-chamber permanent pacemaker with recent generator exchange in May 2016. Patient has an MRI compatible device. Normal device function on interrogation today. He is now pacemaker dependent in the ventricle. Hypertension: Remains well controlled on lisinopril, which I would continue. Dyslipidemia. Patient has been managing this with diet control and takes a fish oil supplement. [facility] device check in 3 months. Followup in 6 months, sooner if needed.

## 2018-01-29 NOTE — LETTER
12/7/2017 1:38 PM 
 
Mr. Korin Santiago [address] [address] Dear Mr. Cid, This letter is being sent to you because we have been unable to reach you by telephone. Please call the our office at your earliest convenience to reschedule your appointment with Dr. Giancarlo Limon on 1/29/17. Please call [phone] and press 1 to reschedule. We look forward to hearing from you soon.

## 2018-01-30 DIAGNOSIS — F41.9 ANXIETY: ICD-10-CM

## 2018-01-30 RX ORDER — LORAZEPAM 1 MG/1
1 TABLET ORAL
Qty: 60 TAB | Refills: 0 | OUTPATIENT
Start: 2018-01-30 | End: 2018-02-20 | Stop reason: SDUPTHER

## 2018-01-30 NOTE — TELEPHONE ENCOUNTER
From: Albaro Villaseñor  To: Demetris Paul MD  Sent: 1/30/2018 1:19 PM EST  Subject: Medication Renewal Request    Original authorizing provider: MD Toshia Tinoco.  Palak would like a refill of the following medications:  LORazepam (ATIVAN) 1 mg tablet Demetris Paul MD]    Preferred pharmacy: [address]:

## 2018-02-13 ENCOUNTER — HOSPITAL ENCOUNTER (OUTPATIENT)
Dept: LAB | Age: 78
Discharge: HOME | End: 2018-02-13
Payer: MEDICARE

## 2018-02-13 ENCOUNTER — LAB ONLY (OUTPATIENT)
Dept: INTERNAL MEDICINE CLINIC | Age: 78
End: 2018-02-13

## 2018-02-13 DIAGNOSIS — I10 ESSENTIAL HYPERTENSION: ICD-10-CM

## 2018-02-13 DIAGNOSIS — E78.5 HYPERLIPIDEMIA, UNSPECIFIED HYPERLIPIDEMIA TYPE: ICD-10-CM

## 2018-02-13 DIAGNOSIS — Z12.5 SCREENING PSA (PROSTATE SPECIFIC ANTIGEN): Primary | ICD-10-CM

## 2018-02-13 DIAGNOSIS — Z12.5 SCREENING PSA (PROSTATE SPECIFIC ANTIGEN): ICD-10-CM

## 2018-02-13 LAB
ANION GAP SERPL CALC-SCNC: 9 MMOL/L (ref 3–18)
BUN SERPL-MCNC: 12 MG/DL (ref 7–18)
BUN/CREAT SERPL: 14 (ref 12–20)
CALCIUM SERPL-MCNC: 8.9 MG/DL (ref 8.5–10.1)
CHLORIDE SERPL-SCNC: 100 MMOL/L (ref 100–108)
CHOLEST SERPL-MCNC: 174 MG/DL
CO2 SERPL-SCNC: 29 MMOL/L (ref 21–32)
CREAT SERPL-MCNC: 0.85 MG/DL (ref 0.6–1.3)
GLUCOSE SERPL-MCNC: 87 MG/DL (ref 74–99)
HDLC SERPL-MCNC: 56 MG/DL (ref 40–60)
HDLC SERPL: 3.1 {RATIO} (ref 0–5)
LDLC SERPL CALC-MCNC: 101.2 MG/DL (ref 0–100)
LIPID PROFILE,FLP: ABNORMAL
POTASSIUM SERPL-SCNC: 4.7 MMOL/L (ref 3.5–5.5)
SODIUM SERPL-SCNC: 138 MMOL/L (ref 136–145)
TRIGL SERPL-MCNC: 84 MG/DL (ref ?–150)
VLDLC SERPL CALC-MCNC: 16.8 MG/DL

## 2018-02-13 PROCEDURE — 36415 COLL VENOUS BLD VENIPUNCTURE: CPT | Performed by: INTERNAL MEDICINE

## 2018-02-13 PROCEDURE — 84153 ASSAY OF PSA TOTAL: CPT | Performed by: INTERNAL MEDICINE

## 2018-02-13 PROCEDURE — 80048 BASIC METABOLIC PNL TOTAL CA: CPT | Performed by: INTERNAL MEDICINE

## 2018-02-13 PROCEDURE — 80061 LIPID PANEL: CPT | Performed by: INTERNAL MEDICINE

## 2018-02-14 LAB — PSA SERPL-MCNC: 0.6 NG/ML (ref 0–4)

## 2018-02-20 ENCOUNTER — OFFICE VISIT (OUTPATIENT)
Dept: INTERNAL MEDICINE CLINIC | Age: 78
End: 2018-02-20

## 2018-02-20 VITALS
WEIGHT: 175 LBS | HEART RATE: 62 BPM | OXYGEN SATURATION: 99 % | RESPIRATION RATE: 16 BRPM | DIASTOLIC BLOOD PRESSURE: 84 MMHG | BODY MASS INDEX: 25.05 KG/M2 | TEMPERATURE: 98.5 F | SYSTOLIC BLOOD PRESSURE: 132 MMHG | HEIGHT: 70 IN

## 2018-02-20 DIAGNOSIS — D12.2 ADENOMATOUS POLYP OF ASCENDING COLON: ICD-10-CM

## 2018-02-20 DIAGNOSIS — R42 POSTURAL DIZZINESS WITH PRESYNCOPE: ICD-10-CM

## 2018-02-20 DIAGNOSIS — Z12.5 SCREENING PSA (PROSTATE SPECIFIC ANTIGEN): ICD-10-CM

## 2018-02-20 DIAGNOSIS — F41.9 ANXIETY: ICD-10-CM

## 2018-02-20 DIAGNOSIS — Z71.89 ACP (ADVANCE CARE PLANNING): ICD-10-CM

## 2018-02-20 DIAGNOSIS — Z80.42 FH: PROSTATE CANCER: ICD-10-CM

## 2018-02-20 DIAGNOSIS — E78.5 HYPERLIPIDEMIA, UNSPECIFIED HYPERLIPIDEMIA TYPE: ICD-10-CM

## 2018-02-20 DIAGNOSIS — Z00.00 MEDICARE ANNUAL WELLNESS VISIT, SUBSEQUENT: ICD-10-CM

## 2018-02-20 DIAGNOSIS — I10 ESSENTIAL HYPERTENSION: Primary | ICD-10-CM

## 2018-02-20 DIAGNOSIS — Z95.0 PACEMAKER: ICD-10-CM

## 2018-02-20 DIAGNOSIS — I44.30 AV BLOCK: ICD-10-CM

## 2018-02-20 DIAGNOSIS — K21.9 GASTROESOPHAGEAL REFLUX DISEASE WITHOUT ESOPHAGITIS: ICD-10-CM

## 2018-02-20 DIAGNOSIS — I42.9 CARDIOMYOPATHY, UNSPECIFIED TYPE (HCC): ICD-10-CM

## 2018-02-20 DIAGNOSIS — R55 POSTURAL DIZZINESS WITH PRESYNCOPE: ICD-10-CM

## 2018-02-20 DIAGNOSIS — I48.0 PAROXYSMAL ATRIAL FIBRILLATION (HCC): ICD-10-CM

## 2018-02-20 RX ORDER — BUTALBITAL, ACETAMINOPHEN AND CAFFEINE 50; 325; 40 MG/1; MG/1; MG/1
1 TABLET ORAL
Qty: 30 TAB | Refills: 0 | Status: SHIPPED | OUTPATIENT
Start: 2018-02-20 | End: 2018-09-27 | Stop reason: SDUPTHER

## 2018-02-20 RX ORDER — LORAZEPAM 1 MG/1
1 TABLET ORAL
Qty: 60 TAB | Refills: 0 | Status: SHIPPED | OUTPATIENT
Start: 2018-02-20 | End: 2018-04-03 | Stop reason: SDUPTHER

## 2018-02-20 NOTE — PROGRESS NOTES
This is a Subsequent Medicare Annual Wellness Exam (AWV) (Performed 12 months after IPPE or effective date of Medicare Part B enrollment)    I have reviewed the patient's medical history in detail and updated the computerized patient record. History     Past Medical History:   Diagnosis Date    Anxiety     AV block     intermittent, high-grade    Bilateral inguinal hernia     Cardiac echocardiogram 06/25/2013    EF 50-55%. No WMA. Mild LVH. Gr 1 DDfx. RVE.  AKANKSHA. Mild TR. No significant chg from study of 6/8/11.  Cardiac nuclear imaging test 10/07/2009    Likely inferior & anterior septal artifact. EF 55%. Mild septal WMA could be related to pacemaker.  Cardiomyopathy ([address]     Diverticulosis of colon 11/10/2015    Dysphagia     Multiple esophageal dilations.  Essential hypertension with goal blood pressure less than 140/90     HLD (hyperlipidemia)     Hx of colonoscopy 11/10/2015    Dr. Tomas Donovan; tubulovillous adenoma.  Pacemaker 2007    Medtronic dual-chamber device.  Paroxysmal atrial fibrillation (HCC)     single episode noted on routine device check      Past Surgical History:   Procedure Laterality Date    CARDIAC SURG PROCEDURE UNLIST      ENDOSCOPY, COLON, DIAGNOSTIC      HX CATARACT REMOVAL      HX PACEMAKER  05/29/07    Medtronic Versa dual-chamber pacemaker for intermittent high-grade AV block and symptomatic bradycardia.  HX PACEMAKER  5/3/16     Current Outpatient Prescriptions   Medication Sig Dispense Refill    butalbital-acetaminophen-caffeine (FIORICET, ESGIC) -40 mg per tablet Take 1 Tab by mouth every six (6) hours as needed for Pain. 30 Tab 0    LORazepam (ATIVAN) 1 mg tablet Take 1 Tab by mouth every eight (8) hours as needed for Anxiety. Max Daily Amount: 3 mg. 60 Tab 0    lisinopril (PRINIVIL, ZESTRIL) 2.5 mg tablet Take 3 Tabs by mouth daily. 270 Tab 2    VIT C/E/ZN/COPPR/LUTEIN/ZEAXAN (PRESERVISION AREDS 2 PO) Take  by mouth.       omeprazole (PRILOSEC) 20 mg capsule Take 20 mg by mouth daily as needed.  timolol (TIMOPTIC) 0.25 % ophthalmic solution Administer 1 Drop to both eyes two (2) times a day.  MULTIVITAMINS W-MINERALS/LUT (CENTRUM SILVER PO) Take  by mouth daily.  OMEGA-3 FATTY ACIDS (OMEGA 3 PO) Take 2,400 mg by mouth two (2) times a day.  coenzyme q10 100 mg Cap Take  by mouth daily.  metronidazole (METROGEL) 1 % topical gel Apply  to affected area daily. Use a thin layer to affected areas after washing       zinc 50 mg capsule Take  by mouth daily.  magnesium 250 mg Tab Take  by mouth daily.  selenium 100 mcg Cap Take  by mouth daily.  ascorbic acid (VITAMIN C) 500 mg tablet Take  by mouth two (2) times a day.  Ginkgo Biloba Leaf Extract 30 mg Cap Take  by mouth daily.  mometasone (NASONEX) 50 mcg/actuation nasal spray USE TWO SPRAYS IN EACH NOSTRIL DAILY 1 Container 5    cyclobenzaprine (FLEXERIL) 5 mg tablet TAKE 1-2 TABLETS BY MOUTH EVERY 8 HOURS AS NEEDED FOR MUSCLE SPASM 25 Tab 0     No Known Allergies  Family History   Problem Relation Age of Onset    Heart Surgery Mother     Stroke Father     Alcohol abuse Father      Social History   Substance Use Topics    Smoking status: Former Smoker     Quit date: 7/27/1966    Smokeless tobacco: Never Used    Alcohol use No      Comment: 03/16/1998 last time he had alcohol.      Patient Active Problem List   Diagnosis Code    AV block I44.30    Pacemaker Z95.0    Cardiomyopathy ([address] I42.9    Paroxysmal atrial fibrillation ([address] I48.0    HLD (hyperlipidemia) E78.5    FH: prostate cancer Z80.42    Bilateral inguinal hernia K40.20    Anxiety F41.9    Essential hypertension I10    Colon polyp; tubulovillous adenoma 11/2015 K63.5    Gastroesophageal reflux disease without esophagitis K21.9    Postural dizziness with presyncope R42, R55       Depression Risk Factor Screening:     PHQ over the last two weeks 2/20/2018   Little interest or pleasure in doing things Not at all   Feeling down, depressed or hopeless Not at all   Total Score PHQ 2 0     Alcohol Risk Factor Screening: You do not drink alcohol or very rarely. Functional Ability and Level of Safety:   Hearing Loss  Hearing is good. Activities of Daily Living  The home contains: no safety equipment. Patient does total self care    Fall Risk  Fall Risk Assessment, last 12 mths 2/20/2018   Able to walk? Yes   Fall in past 12 months? No   Number of falls in past 12 months -       Abuse Screen  Patient is not abused    Cognitive Screening   Evaluation of Cognitive Function:  Has your family/caregiver stated any concerns about your memory: no  Normal    Patient Care Team   Patient Care Team:  Tomás Duke MD as PCP - General (Internal Medicine)  Sourav Brady MD (Cardiology)  Anat Soto, RN as Ambulatory Care Navigator (Internal Medicine)  Travon Isaac MD (Gastroenterology)  Aquilino Santiago, VAMSHI as Ambulatory Care Navigator (Internal Medicine)  Ziyad Guevara MD (Ophthalmology)  Los Hudson MD (Dermatology)    Assessment/Plan   Education and counseling provided:  Are appropriate based on today's review and evaluation  End-of-Life planning (with patient's consent)  Influenza Vaccine  Prostate cancer screening tests (PSA, covered annually)  Colorectal cancer screening tests  Cardiovascular screening blood test  Screening for glaucoma  Diabetes screening test    Diagnoses and all orders for this visit:    1. Essential hypertension    2. Paroxysmal atrial fibrillation (HCC)    3. Cardiomyopathy, unspecified type ([address]    4. AV block    5. Postural dizziness with presyncope    6. Hyperlipidemia, unspecified hyperlipidemia type    7. Pacemaker    8. Anxiety  -     LORazepam (ATIVAN) 1 mg tablet; Take 1 Tab by mouth every eight (8) hours as needed for Anxiety. Max Daily Amount: 3 mg.    9. Gastroesophageal reflux disease without esophagitis    10. Adenomatous polyp of ascending colon    11. FH: prostate cancer    12. Medicare annual wellness visit, subsequent    13. ACP (advance care planning)    Other orders  -     butalbital-acetaminophen-caffeine (FIORICET, ESGIC) -40 mg per tablet; Take 1 Tab by mouth every six (6) hours as needed for Pain. There are no preventive care reminders to display for this patient.

## 2018-02-20 NOTE — MR AVS SNAPSHOT
[address] [facility] 
 
 
 [address], [address] 
[phone] Patient: Asif Donovan MRN: [id number] Visit Information Date & Time Provider Department Dept. Phone Encounter #  
 2018  1:00 PM Arlette Catalan MD [facility] [address] [place] [address] [phone] [id number] Follow-up Instructions Return in about 6 months (around 2018), or if symptoms worsen or fail to improve. Your Appointments 2018  3:20 PM  
CARELINK with [facility] ([facility] Appt Note: 3 month carelink [address] [address]  
[phone] [facility] [place] [address] [address]  
  
    
 2018  2:00 PM  
Follow Up with Diana Ta MD  
[facility] ([facility] Appt Note: 6 month follow up [facility] [address] [place] [id number]  
[phone] [place] [phone]  
  
    
 2018  8:00 AM  
LAB with [facility] NURSE VISIT [facility] [address] [place] [address] ([facility]) Appt Note: lab  
 [address], [address] [id number] [address] [place]  
  
   
 [address], [address]  
  
    
 2018  1:00 PM  
Office Visit with Arlette Catalan MD  
[facility] Appt Note: 6 month f/u  
 [address] [facility], [address] [address] [address] [place]  
  
   
 [address], [address] Upcoming Health Maintenance Date Due COLONOSCOPY 11/10/2018 GLAUCOMA SCREENING Q2Y 2019 MEDICARE YEARLY EXAM 2019 DTaP/Tdap/Td series (2 - Td) 2022 Allergies as of 2018  Review Complete On: 2018 By: Raymundo Durant No Known Allergies Current Immunizations  Reviewed on 8/3/2015 Name Date Influenza High Dose Vaccine PF 10/2/2017, 12/9/2016, 11/16/2015, 1/10/2013 Influenza Vaccine 11/20/2014, 12/31/2013 Pneumococcal Conjugate (PCV-13) 8/3/2015  1:34 PM  
 TDAP Vaccine 7/12/2012 ZZZ-RETIRED (DO NOT USE) Pneumococcal Vaccine (Unspecified Type) 1/1/2007 Zoster 7/25/2007 Not reviewed this visit You Were Diagnosed With   
  
 Codes Comments Anxiety     ICD-10-CM: F41.9 ICD-9-CM: 300.00 Vitals BP Pulse Temp Resp Height(growth percentile) Weight(growth percentile) 132/84 (BP 1 Location: Left arm, BP Patient Position: Sitting) 62 98.5 °F (36.9 °C) (Oral) 16 5' 10\" (1.778 m) 175 lb (79.4 kg) SpO2 BMI Smoking Status 99% 25.11 kg/m2 Former Smoker Vitals History BMI and BSA Data Body Mass Index Body Surface Area  
 25.11 kg/m 2 1.98 m 2 Preferred Pharmacy Pharmacy Name Phone [address] [phone] Your Updated Medication List  
  
   
This list is accurate as of: 2/20/18  2:12 PM.  Always use your most recent med list.  
  
  
  
  
 butalbital-acetaminophen-caffeine -40 mg per tablet Commonly known as:  Sage Poisson Take 1 Tab by mouth every six (6) hours as needed for Pain. CENTRUM SILVER PO Take  by mouth daily. co-enzyme Q-10 100 mg capsule Commonly known as:  CO Q-10 Take  by mouth daily. cyclobenzaprine 5 mg tablet Commonly known as:  FLEXERIL  
TAKE 1-2 TABLETS BY MOUTH EVERY 8 HOURS AS NEEDED FOR MUSCLE SPASM Ginkgo Biloba Leaf Extract 30 mg Cap Take  by mouth daily. lisinopril 2.5 mg tablet Commonly known as:  Zully Fair Take 3 Tabs by mouth daily. LORazepam 1 mg tablet Commonly known as:  ATIVAN Take 1 Tab by mouth every eight (8) hours as needed for Anxiety. Max Daily Amount: 3 mg.  
  
 magnesium 250 mg Tab Take  by mouth daily. METROGEL 1 % topical gel Generic drug:  metroNIDAZOLE Apply  to affected area daily. Use a thin layer to affected areas after washing  
  
 mometasone 50 mcg/actuation nasal spray Commonly known as:  NASONEX  
USE TWO SPRAYS IN EACH NOSTRIL DAILY  
  
 OMEGA 3 PO Take 2,400 mg by mouth two (2) times a day. PRESERVISION AREDS 2 PO Take  by mouth. PriLOSEC 20 mg capsule Generic drug:  omeprazole Take 20 mg by mouth daily as needed. selenium 100 mcg Cap Take  by mouth daily. TIMOPTIC 0.25 % ophthalmic solution Generic drug:  timolol Administer 1 Drop to both eyes two (2) times a day. VITAMIN C 500 mg tablet Generic drug:  ascorbic acid (vitamin C) Take  by mouth two (2) times a day. zinc 50 mg capsule Take  by mouth daily. Prescriptions Printed Refills  
 butalbital-acetaminophen-caffeine (FIORICET, ESGIC) -40 mg per tablet 0 Sig: Take 1 Tab by mouth every six (6) hours as needed for Pain. Class: Print Route: Oral  
 LORazepam (ATIVAN) 1 mg tablet 0 Sig: Take 1 Tab by mouth every eight (8) hours as needed for Anxiety. Max Daily Amount: 3 mg. Class: Print Route: Oral  
  
Follow-up Instructions Return in about 6 months (around 8/20/2018), or if symptoms worsen or fail to improve. Patient Instructions Medicare Wellness Visit, Male The best way to improve and maintain good health is to have a healthy lifestyle by eating a well-balanced diet, exercising regularly, limiting alcohol and stopping smoking. Regular visits with your physician or non-physician health care provider also support your good health. Preventive screening tests can find health problems before they become diseases or illnesses. Preventive services such as immunizations prevent serious infections.  
 
All people over age 72 should have a Pneumovax and a Prevnar-13 shot to prevent potentially life threatening infections with the pneumococcus bacteria, a common cause of pneumonia. These are once in a lifetime unless you and your provider decide differently. All people over 65 should have a yearly influenza vaccine or \"flu\" shot. This does not prevent infection with cold viruses but has been proven to prevent hospitalization and death from influenza. Although Medicare part B \"regular Medicare\" currently only covers tetanus vaccination in the context of an injury, a tetanus vaccine (Tdap or Td) is recommended every 10 years. A shingles vaccine is recommended once in a lifetime after age 61. The Shingles vaccine is also not covered by Medicare part B. Note, however, that both the Shingles vaccine and Tdap/Td are generally covered by secondary carriers. Please check your coverage and out of pocket expenses. Consider contacting your local health department because it may stock these vaccines for a reasonable charge. We currently have documentation of the following immunization history for you: 
Immunization History Administered Date(s) Administered  Influenza High Dose Vaccine PF 01/10/2013, 11/16/2015, 12/09/2016, 10/02/2017  Influenza Vaccine 12/31/2013, 11/20/2014  Pneumococcal Conjugate (PCV-13) 08/03/2015  TDAP Vaccine 07/12/2012  ZZZ-RETIRED (DO NOT USE) Pneumococcal Vaccine (Unspecified Type) 01/01/2007  Zoster 07/25/2007 Screening for infection with Hepatitis C is recommended for anyone born between [birth year] through [address]. The table at the bottom of this document indicates the status of this and other screening services. Screening for diabetes mellitus with a blood sugar test (glucose) should be done at least every 3 years until age 79. You and your health care provider may decide whether to continue screening after age 79. The most recent blood glucose we have on file for you is:  
Lab Results Component Value Date/Time Glucose 87 02/13/2018 08:43 AM  
 
 
Glaucoma is a disease of the eye due to increased ocular pressure that can lead to blindness. People with risk factors for glaucoma ( race, diabetes, family history) should be screened at least every 2 years by an eye professional. This may be covered annually if indicated as determined by you and your doctor. Cardiovascular screening tests that check for elevated lipids or cholesterol (fatty part of blood) which can lead to heart disease and strokes should be done every 4-6 years through age 79. You and your health care provider may decide whether to continue screening after age 79. The most recent lipid panel we have on file for you is:  
Lab Results Component Value Date/Time Cholesterol, total 174 02/13/2018 08:43 AM  
 HDL Cholesterol 56 02/13/2018 08:43 AM  
 LDL, calculated 101.2 (H) 02/13/2018 08:43 AM  
 VLDL, calculated 16.8 02/13/2018 08:43 AM  
 Triglyceride 84 02/13/2018 08:43 AM  
 CHOL/HDL Ratio 3.1 02/13/2018 08:43 AM  
 
 
Colorectal cancer screening that evaluates for blood or polyps in your colon for people with average risk should be done yearly as a stool test, every five years as a flexible sigmoidoscope or every 10 years as a colonoscopy up to age 76. You and your health care provider may decide whether to continue screening after age 76. Men up to age 76 may elect to screen for prostate cancer with a blood test called a PSA at certain intervals, depending on their personal and family history. This decision is between the patient and his provider. The most recent PSA values we have on file for you are: 
Lab Results Component Value Date/Time  
 Prostate Specific Ag 0.6 02/13/2018 08:43 AM  
 Prostate Specific Ag 0.7 08/08/2017 08:49 AM  
 Prostate Specific Ag 0.8 02/01/2017 09:00 AM  
 Prostate Specific Ag 1.4 07/27/2015 07:41 AM  
 Prostate Specific Ag 0.4 07/15/2014 07:42 AM  
 Prostate Specific Ag 0.5 07/12/2013 07:30 AM  
 
 
 If you have been a smoker or had family history of abdominal aortic aneurysms, you and your provider may decide to schedule an ultrasound test of your aorta. Our records show this was done on:  N/A People who have smoked the equivalent of 1 pack per day for 30 years or more may benefit from screening for lung cancer with a yearly low dose CT scan until they have been non smokers for 15 years or competing health conditions render this unlikely to be beneficial. Our records show: N/A Your Medicare Wellness Exam is recommended annually. Here is a list of your current Health Maintenance items with a due date: 
Health Maintenance Topic Date Due  
 COLONOSCOPY  11/10/2018  GLAUCOMA SCREENING Q2Y  01/26/2019  MEDICARE YEARLY EXAM  02/21/2019  
 DTaP/Tdap/Td series (2 - Td) 07/12/2022  ZOSTER VACCINE AGE 60>  Completed  Pneumococcal 65+ Low/Medium Risk  Completed  Influenza Age 5 to Adult  Completed Introducing [facility] HE[facility]! Dear Ara Simeon: 
Thank you for requesting a [employer] account. Our records indicate that you already have an active Trunk[employer] account. You can access your account anytime at [email or web address] Did you know that you can access your hospital and ER discharge instructions at any time in Trunk[employer]? You can also review all of your test results from your hospital stay or ER visit. Additional Information If you have questions, please visit the Frequently Asked Questions section of the [employer] website at [email or web address] Remember, Trunk[employer] is NOT to be used for urgent needs. For medical emergencies, dial 911. Now available from your iPhone and Android! Please provide this summary of care documentation to your next provider. Your primary care clinician is listed as Varun Block. If you have any questions after today's visit, please call [phone].

## 2018-02-20 NOTE — PROGRESS NOTES
Chief Complaint   Patient presents with    Hypertension     6 month follow up with lab review. Health Maintenance Due   Topic Date Due    MEDICARE YEARLY EXAM  02/10/2018     1. Have you been to the ER, urgent care clinic or hospitalized since your last visit? NO.     2. Have you seen or consulted any other health care providers outside of the [address] [place] [address] since your last visit (Include any pap smears or colon screening)?  NO

## 2018-02-20 NOTE — ACP (ADVANCE CARE PLANNING)
Advance Care Planning (ACP) Provider Note - Comprehensive     Date of ACP Conversation: 02/20/18  Persons included in Conversation:  patient  Length of ACP Conversation in minutes:  16 minutes    Authorized Decision Maker (if patient is incapable of making informed decisions): Katarzyna Reveles ()  This person is:  Healthcare Agent/Medical Power of  under Advance Directive          General ACP for ALL Patients with Decision Making Capacity:   Importance of advance care planning, including choosing a healthcare agent to communicate patient's healthcare decisions if patient lost the ability to make decisions, such as after a sudden illness or accident  Understanding of the healthcare agent role was assessed and information provided  Exploration of values, goals, and preferences if recovery is not expected, even with continued medical treatment in the event of: Imminent death  Severe, permanent brain injury  \"In these circumstances, what matters most to you? \"  Care focused more on comfort or quality of life. Review of Existing Advance Directive:  Patient has an existing advance directive on file, signed 2/22/2007. It designates his  as his healthcare agent and expresses that she does not wish life prolonging procedures for end of life care. It was reveiwed with him and still reflects his wishes.      For Serious or Chronic Illness:  Understanding of medical condition      Interventions Provided:  Reviewed existing Advance Directive   Recommended review of completed ACP document annually or upon change in health status

## 2018-02-20 NOTE — PATIENT INSTRUCTIONS
Medicare Wellness Visit, Male    The best way to improve and maintain good health is to have a healthy lifestyle by eating a well-balanced diet, exercising regularly, limiting alcohol and stopping smoking. Regular visits with your physician or non-physician health care provider also support your good health. Preventive screening tests can find health problems before they become diseases or illnesses. Preventive services such as immunizations prevent serious infections. All people over age 72 should have a Pneumovax and a Prevnar-13 shot to prevent potentially life threatening infections with the pneumococcus bacteria, a common cause of pneumonia. These are once in a lifetime unless you and your provider decide differently. All people over 65 should have a yearly influenza vaccine or \"flu\" shot. This does not prevent infection with cold viruses but has been proven to prevent hospitalization and death from influenza. Although Medicare part B \"regular Medicare\" currently only covers tetanus vaccination in the context of an injury, a tetanus vaccine (Tdap or Td) is recommended every 10 years. A shingles vaccine is recommended once in a lifetime after age 61. The Shingles vaccine is also not covered by Medicare part B. Note, however, that both the Shingles vaccine and Tdap/Td are generally covered by secondary carriers. Please check your coverage and out of pocket expenses. Consider contacting your local health department because it may stock these vaccines for a reasonable charge.     We currently have documentation of the following immunization history for you:  Immunization History   Administered Date(s) Administered    Influenza High Dose Vaccine PF 01/10/2013, 11/16/2015, 12/09/2016, 10/02/2017    Influenza Vaccine 12/31/2013, 11/20/2014    Pneumococcal Conjugate (PCV-13) 08/03/2015    TDAP Vaccine 07/12/2012    ZZZ-RETIRED (DO NOT USE) Pneumococcal Vaccine (Unspecified Type) 01/01/2007    Zoster 07/25/2007       Screening for infection with Hepatitis C is recommended for anyone born between [birth year] through [address]. The table at the bottom of this document indicates the status of this and other screening services. Screening for diabetes mellitus with a blood sugar test (glucose) should be done at least every 3 years until age 79. You and your health care provider may decide whether to continue screening after age 79. The most recent blood glucose we have on file for you is:   Lab Results   Component Value Date/Time    Glucose 87 02/13/2018 08:43 AM       Glaucoma is a disease of the eye due to increased ocular pressure that can lead to blindness. People with risk factors for glaucoma ( race, diabetes, family history) should be screened at least every 2 years by an eye professional. This may be covered annually if indicated as determined by you and your doctor. Cardiovascular screening tests that check for elevated lipids or cholesterol (fatty part of blood) which can lead to heart disease and strokes should be done every 4-6 years through age 79. You and your health care provider may decide whether to continue screening after age 79. The most recent lipid panel we have on file for you is:   Lab Results   Component Value Date/Time    Cholesterol, total 174 02/13/2018 08:43 AM    HDL Cholesterol 56 02/13/2018 08:43 AM    LDL, calculated 101.2 (H) 02/13/2018 08:43 AM    VLDL, calculated 16.8 02/13/2018 08:43 AM    Triglyceride 84 02/13/2018 08:43 AM    CHOL/HDL Ratio 3.1 02/13/2018 08:43 AM       Colorectal cancer screening that evaluates for blood or polyps in your colon for people with average risk should be done yearly as a stool test, every five years as a flexible sigmoidoscope or every 10 years as a colonoscopy up to age 76. You and your health care provider may decide whether to continue screening after age 76.     Men up to age 76 may elect to screen for prostate cancer with a blood test called a PSA at certain intervals, depending on their personal and family history. This decision is between the patient and his provider. The most recent PSA values we have on file for you are:  Lab Results   Component Value Date/Time    Prostate Specific Ag 0.6 02/13/2018 08:43 AM    Prostate Specific Ag 0.7 08/08/2017 08:49 AM    Prostate Specific Ag 0.8 02/01/2017 09:00 AM    Prostate Specific Ag 1.4 07/27/2015 07:41 AM    Prostate Specific Ag 0.4 07/15/2014 07:42 AM    Prostate Specific Ag 0.5 07/12/2013 07:30 AM       If you have been a smoker or had family history of abdominal aortic aneurysms, you and your provider may decide to schedule an ultrasound test of your aorta. Our records show this was done on:  N/A    People who have smoked the equivalent of 1 pack per day for 30 years or more may benefit from screening for lung cancer with a yearly low dose CT scan until they have been non smokers for 15 years or competing health conditions render this unlikely to be beneficial. Our records show: N/A    Your Medicare Wellness Exam is recommended annually.     Here is a list of your current Health Maintenance items with a due date:  Health Maintenance   Topic Date Due    COLONOSCOPY  11/10/2018    GLAUCOMA SCREENING Q2Y  01/26/2019    MEDICARE YEARLY EXAM  02/21/2019    DTaP/Tdap/Td series (2 - Td) 07/12/2022    ZOSTER VACCINE AGE 60>  Completed    Pneumococcal 65+ Low/Medium Risk  Completed    Influenza Age 5 to Adult  Completed

## 2018-02-24 NOTE — PROGRESS NOTES
HPI:   Shantanu Hull is a 65y.o. year old male who presents today for evaluation of hypertension, hyperlipidemia, AV block s/p pacemaker, paroxysmal atrial fibrillation, and anxiety. He reports that he is generally doing well and is currently without complaints. He has a history of hypertension, treated with lisinopril. He does monitor his blood pressure regularly, and is currently taking 3.25 mg in the morning and evening. He reports that his blood pressure in mainly 120-130/ 70-80 at home. He has a history of intermittent high grade AV block, and underwent placement of a dual chamber Medtronic pacemaker in 2007. He underwent pacemaker generator exchange in 5/2016 after his device reached elective replacement indicator. A Medtronic MRI safe device was installed. He has a history of a single episode of atrial fibrillation noted on device check several years ago. It has not recurred. He was noted in the past to have mildly decreased LV function, but a repeat echocardiogram in 6/2013 showed normal size and lower limits of normal function with EF 50-55%; no wall motion abnormalities, and grade 1 diastolic dysfunction. He is followed by Dr. Alford Ganser. 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 He also reports walking 2.5 miles each day. He denies any chest pain, shortness of breath at rest or with exertion, palpitations, lightheadedness, or edema. He has a history of dyslipidemia, managed by diet in the past.    In 4/2017 he developed lightheadedness after painting outside for several hours. He took an Ativan because he thought he was experiencing anxiety. However, his symptoms persisted and he began to feel like he was going to pass out so he called 911 for help. When EMS arrived, his symptoms had resolved but he was noted to have a low blood pressure of approximately 112/67.  He was taken to [facility] for evaluation and was asymptomatic upon arrival. He admitted to not eating or drinking anything for the four hour period that he was outside, and acknowledged that the day was quite warm. Evaluation in the ED included an EKG which showed a paced rhythm, serial troponins which were negative, and blood work showing WBC 14.2, Hb 14.5, Hct 42.7, creatinine 1.3/ eGFR 51.7. Chest x-ray showed no acute process, but did note an incidental pulmonary nodule over the left lower lobe. A chest CT scan was obtained (5/2017) showing mild subsegmental atelectasis or scarring at the left lung base without focal pulmonary nodule. He has a history of a GI bleeding in 1997 secondary to NSAID use for headaches. He also has a history of GERD and dysphagia and has undergone multiple esophageal dilatations in the past by Dr. Joss Fry. In 11/2015, he had an upper endoscopy which showed a small hiatal hernia in the cardia, a Schatzki's ring at the gastroesophageal junction, which was dilated, and otherwise normal stomach and duodenum. He also had a screening colonoscopy which revealed mild colonic diverticulosis and a 7 mm polyp in the proximal ascending colon (pathology: tubulovillous adenoma). Follow-up recommended for 3 years. He has a history of anxiety, and uses lorazepam occasionally. He also reports he uses cyclobenzaprine occasionally for back spasms which occur at times due to straining his back with excessive physical work. Past Medical History:   Diagnosis Date    Anxiety     AV block     intermittent, high-grade    Bilateral inguinal hernia     Cardiac echocardiogram 06/25/2013    EF 50-55%. No WMA. Mild LVH. Gr 1 DDfx. RVE.  AKANKSHA. Mild TR. No significant chg from study of 6/8/11.  Cardiac nuclear imaging test 10/07/2009    Likely inferior & anterior septal artifact. EF 55%. Mild septal WMA could be related to pacemaker.     Cardiomyopathy ([address]     Diverticulosis of colon 11/10/2015    Dysphagia     Multiple esophageal dilations.  Essential hypertension with goal blood pressure less than 140/90     HLD (hyperlipidemia)     Hx of colonoscopy 11/10/2015    Dr. Joss Fry; tubulovillous adenoma.  Pacemaker 2007    Medtronic dual-chamber device.  Paroxysmal atrial fibrillation (HCC)     single episode noted on routine device check     Past Surgical History:   Procedure Laterality Date    CARDIAC SURG PROCEDURE UNLIST      ENDOSCOPY, COLON, DIAGNOSTIC      HX CATARACT REMOVAL      HX PACEMAKER  05/29/07    Medtronic Versa dual-chamber pacemaker for intermittent high-grade AV block and symptomatic bradycardia.  HX PACEMAKER  5/3/16     Current Outpatient Prescriptions   Medication Sig    butalbital-acetaminophen-caffeine (FIORICET, ESGIC) -40 mg per tablet Take 1 Tab by mouth every six (6) hours as needed for Pain.  LORazepam (ATIVAN) 1 mg tablet Take 1 Tab by mouth every eight (8) hours as needed for Anxiety. Max Daily Amount: 3 mg.  lisinopril (PRINIVIL, ZESTRIL) 2.5 mg tablet Take 3 Tabs by mouth daily.  VIT C/E/ZN/COPPR/LUTEIN/ZEAXAN (PRESERVISION AREDS 2 PO) Take  by mouth.  omeprazole (PRILOSEC) 20 mg capsule Take 20 mg by mouth daily as needed.  timolol (TIMOPTIC) 0.25 % ophthalmic solution Administer 1 Drop to both eyes two (2) times a day.  MULTIVITAMINS W-MINERALS/LUT (CENTRUM SILVER PO) Take  by mouth daily.  OMEGA-3 FATTY ACIDS (OMEGA 3 PO) Take 2,400 mg by mouth two (2) times a day.  coenzyme q10 100 mg Cap Take  by mouth daily.  metronidazole (METROGEL) 1 % topical gel Apply  to affected area daily. Use a thin layer to affected areas after washing     zinc 50 mg capsule Take  by mouth daily.  magnesium 250 mg Tab Take  by mouth daily.  selenium 100 mcg Cap Take  by mouth daily.  ascorbic acid (VITAMIN C) 500 mg tablet Take  by mouth two (2) times a day.  Ginkgo Biloba Leaf Extract 30 mg Cap Take  by mouth daily.  mometasone (NASONEX) 50 mcg/actuation nasal spray USE TWO SPRAYS IN EACH NOSTRIL DAILY    cyclobenzaprine (FLEXERIL) 5 mg tablet TAKE 1-2 TABLETS BY MOUTH EVERY 8 HOURS AS NEEDED FOR MUSCLE SPASM     No current facility-administered medications for this visit. Allergies and Intolerances:   No Known Allergies     Family History: His brother  from metastatic prostate cancer. His mother lived until the age of 80, although she had CAD. His father  at the age of 59 from a CVA. He was an alcoholic. He has five sisters that are all healthy. Family History   Problem Relation Age of Onset    Heart Surgery Mother     Stroke Father     Alcohol abuse Father      Social History:   He  reports that he quit smoking about 51 years ago. He has never used smokeless tobacco. He smoked 1 ppd for 2 years, and then quit (). He is retired  and worked for the [employer]. He lives with his girlfriend and has one son. History   Alcohol Use No     Comment: 1998 last time he had alcohol. Immunization History:  Immunization History   Administered Date(s) Administered    Influenza High Dose Vaccine PF 01/10/2013, 2015, 2016, 10/02/2017    Influenza Vaccine 2013, 2014    Pneumococcal Conjugate (PCV-13) 2015    TDAP Vaccine 2012    ZZZ-RETIRED (DO NOT USE) Pneumococcal Vaccine (Unspecified Type) 2007    Zoster 2007       Review of Systems:   As above included in HPI. Otherwise 11 point review of systems negative including constitutional, skin, HENT, eyes, respiratory, cardiovascular, gastrointestinal, genitourinary, musculoskeletal, endocrine, hematologic, allergy, and neurologic. Physical:   Vitals:   BP: 132/84  HR: 62  WT: 175 lb (79.4 kg)  BMI:  25.11 kg/m2    Exam:   Pt appears well; alert and oriented x 3; appropriate affect. HEENT: PERRLA, anicteric, oropharynx clear, no JVD, adenopathy or thyromegaly.  No carotid bruits or radiated murmur. Lungs: clear to auscultation, no wheezes, rhonchi, or rales. Heart: regular rate and rhythm. No murmur, rubs, gallops  Abdomen: soft, nontender, nondistended, normal bowel sounds, no hepatosplenomegaly or masses. Extremities: without edema. Pulses 1-2+ bilaterally. Review of Data:  Labs:  Hospital Outpatient Visit on 02/13/2018   Component Date Value Ref Range Status    LIPID PROFILE 02/13/2018        Final    Cholesterol, total 02/13/2018 174  <200 MG/DL Final    Triglyceride 02/13/2018 84  <150 MG/DL Final    HDL Cholesterol 02/13/2018 56  40 - 60 MG/DL Final    LDL, calculated 02/13/2018 101.2* 0 - 100 MG/DL Final    VLDL, calculated 02/13/2018 16.8  MG/DL Final    CHOL/HDL Ratio 02/13/2018 3.1  0 - 5.0   Final    Sodium 02/13/2018 138  136 - 145 mmol/L Final    Potassium 02/13/2018 4.7  3.5 - 5.5 mmol/L Final    Chloride 02/13/2018 100  100 - 108 mmol/L Final    CO2 02/13/2018 29  21 - 32 mmol/L Final    Anion gap 02/13/2018 9  3.0 - 18 mmol/L Final    Glucose 02/13/2018 87  74 - 99 mg/dL Final    BUN 02/13/2018 12  7.0 - 18 MG/DL Final    Creatinine 02/13/2018 0.85  0.6 - 1.3 MG/DL Final    BUN/Creatinine ratio 02/13/2018 14  12 - 20   Final    GFR est AA 02/13/2018 >60  >60 ml/min/1.73m2 Final    GFR est non-AA 02/13/2018 >60  >60 ml/min/1.73m2 Final    Calcium 02/13/2018 8.9  8.5 - 10.1 MG/DL Final    Prostate Specific Ag 02/13/2018 0.6  0.0 - 4.0 ng/mL Final       Calculated 10 year ASCVD risk score: 32.5 %    Health Maintenance:  Screening:    Colorectal: colonoscopy (11/2015) tubulovillous adenoma. Dr. Roxanne Obrien. Due 11/2018. Depression: none   DM (HbA1c/FPG): FPG 87 (2/2018)   Hepatitis C: N/A   Falls: none   DEXA: N/A   PSA/WEN: PSA 0.6 (2/2018)/ WEN (8/2016)   Glaucoma: regular eye exams with Dr. Homero Madsen for glaucoma q 4 months.    Smoking: none   Vitamin D: 32.2 (7/2016)   Medicare Wellness: today    Impression:  Patient Active Problem List   Diagnosis Code    AV block I44.30    Pacemaker Z95.0    Cardiomyopathy ([facility] I42.9    Paroxysmal atrial fibrillation ([facility] I48.0    HLD (hyperlipidemia) E78.5    FH: prostate cancer Z80.42    Bilateral inguinal hernia K40.20    Anxiety F41.9    Essential hypertension I10    Colon polyp; tubulovillous adenoma 11/2015 K63.5    Gastroesophageal reflux disease without esophagitis K21.9    Postural dizziness with presyncope R42, R55       Plan:  1. Hypertension. Blood pressure remains well controlled today on 7.5 mg daily dose of lisinopril. He continues to monitor his blood pressure carefully at home. Renal function remains normal with creatinine 0.85/ eGFR >60. Continue to follow. 2. AV block, intermittent high grade. S/P Medtronic dual chamber pacemaker with generator change in 5/2016 with an MRI safe device installed. Interrogated and followed every 3 months by Dr. Alford Ganser, last1/2018. Repeat echocardiogram with low normal LV function. 3. Paroxysmal atrial fibrillation. Single episode noted on device check several years ago. None since. No need for anti-coagulation. Continue to follow. 4. Hyperlipidemia. Calculated 10 year ASCVD risk is 32.5 %. However, his age > 76 does not place him in one of the four statin benefit groups as per new AHA/ACC guidelines. Also, with no evidence of ASCVD. LDL with mild elevation at 101. Emphasized importance of continued lifestyle modifications, including diet, exercise, and weight loss. Continue to follow and repeat lipid panel at next visit. 5. Presyncope. Related to dehydration secondary to physical exertion in the heat without adequate hydration. Reports no further symptoms. Monitors his blood pressure daily and has been controlled at his baseline. Encouraged to continue drinking plenty of fluids. Follow. 6. Pulmonary nodule on chest x-ray. Incidental finding on chest xray at [facility], but chest CT scan without evidence of any nodule.  Did note elevated left hemidiaphragm and focal atelectasis or scarring at left lung base. No follow-up needed. 7. GERD/dysphagia. Recent EGD with esophageal dilatation for Schatzki's ring. Small hiatal hernia. Symptoms well controlled with omeprazole. Follow. 8. Family history of prostate cancer. Patient quite concerned about his own risk given that his brother  quite rapidly from metastatic disease. Reassured that his PSA level remains quite low. He is requesting to continue to have it checked despite his age, which would suggest that testing should be discontinued. Discussed the controversy of continuing prostatic cancer screening after age 75. Patient still requesting to continue PSA testing, but states that he would prefer not to have WEN unless PSA increases. 9. Anxiety. Patient is quite anxious about his health. Reassured that he is living a healthy lifestyle and doing well. Continue lorazepam as needed. 10. Health maintenance. Already received influenza vaccine. Other immunizations up to date. Colonoscopy due 2018. Continue regular eye exams with Dr. Dianne Tellez. Vitamin D level normal.     In addition, an annual Medicare wellness visit was done today. Patient understands recommendations and agrees with plan. Follow-up in 6 months.

## 2018-04-03 DIAGNOSIS — F41.9 ANXIETY: ICD-10-CM

## 2018-04-03 RX ORDER — LORAZEPAM 1 MG/1
1 TABLET ORAL
Qty: 60 TAB | Refills: 0 | OUTPATIENT
Start: 2018-04-03 | End: 2018-05-09 | Stop reason: SDUPTHER

## 2018-04-03 NOTE — TELEPHONE ENCOUNTER
Last filled per [facility] 2/20/18  I'm locked out of the [facility] website right now

## 2018-04-03 NOTE — TELEPHONE ENCOUNTER
Reviewed report generated by the [facility]. Does not demonstrate aberrancies or inconsistencies with regard to the prescribing of controlled medications to this patient by other providers. Last filled Ativan 2/26/2018 per .

## 2018-04-03 NOTE — TELEPHONE ENCOUNTER
From: Mallory Cantrell  To: Casandra Gonzales MD  Sent: 4/3/2018 10:31 AM EDT  Subject: Medication Renewal Request    Original authorizing provider: MD Kylie Rich would like a refill of the following medications:  LORazepam (ATIVAN) 1 mg tablet Casandra Gonzales MD]    Preferred pharmacy: [facility] [id number] [place] [address] 59:

## 2018-04-25 ENCOUNTER — OFFICE VISIT (OUTPATIENT)
Dept: CARDIOLOGY CLINIC | Age: 78
End: 2018-04-25

## 2018-04-25 DIAGNOSIS — I44.30 AV BLOCK: Primary | ICD-10-CM

## 2018-04-25 DIAGNOSIS — Z95.0 PACEMAKER: ICD-10-CM

## 2018-05-03 NOTE — PROGRESS NOTES
I have personally seen and evaluated the device findings. Interrogation reviewed and I agree with assessment.     Nessa Molina

## 2018-05-09 DIAGNOSIS — F41.9 ANXIETY: ICD-10-CM

## 2018-05-09 NOTE — TELEPHONE ENCOUNTER
From: Valente Chandler  To: Harriet Cruz MD  Sent: 5/9/2018 9:27 AM EDT  Subject: Medication Renewal Request    Original authorizing provider: MD Rebecca Bateman would like a refill of the following medications:  LORazepam (ATIVAN) 1 mg tablet Harriet Cruz MD]    [facility]:

## 2018-05-10 RX ORDER — LORAZEPAM 1 MG/1
1 TABLET ORAL
Qty: 60 TAB | Refills: 0 | OUTPATIENT
Start: 2018-05-10 | End: 2018-06-14 | Stop reason: SDUPTHER

## 2018-05-10 NOTE — TELEPHONE ENCOUNTER
Reviewed report generated by the [facility]. Does not demonstrate aberrancies or inconsistencies with regard to the prescribing of controlled medications to this patient by other providers. Last filled Ativan 4/4/2018 per .

## 2018-05-28 RX ORDER — LISINOPRIL 2.5 MG/1
TABLET ORAL
Qty: 270 TAB | Refills: 2 | Status: SHIPPED | OUTPATIENT
Start: 2018-05-28 | End: 2019-03-05 | Stop reason: SDUPTHER

## 2018-06-13 ENCOUNTER — TELEPHONE (OUTPATIENT)
Dept: INTERNAL MEDICINE CLINIC | Age: 78
End: 2018-06-13

## 2018-06-13 NOTE — TELEPHONE ENCOUNTER
I was not aware he was in the hospital. Where was he hospitalized? Not seen in [facility]. Please inquire.

## 2018-06-13 NOTE — TELEPHONE ENCOUNTER
Franklyn Holloway from [facility] ([phone]) regarding the patient.   He wanted to let the provider know that the patient had been admitted into [address] [place] [address] Marcus Vazquez [facility] after leaving the hospital.

## 2018-06-13 NOTE — TELEPHONE ENCOUNTER
Wrong patient. I called Tino Larsen from [address] [place] [address] Marcus Araujo and he states that this isn't the patient he was talking about.

## 2018-06-14 DIAGNOSIS — F41.9 ANXIETY: ICD-10-CM

## 2018-06-14 RX ORDER — LORAZEPAM 1 MG/1
1 TABLET ORAL
Qty: 60 TAB | Refills: 0 | OUTPATIENT
Start: 2018-06-14 | End: 2018-07-26 | Stop reason: SDUPTHER

## 2018-06-14 NOTE — TELEPHONE ENCOUNTER
From: Valente Chandler  To: Harriet Cruz MD  Sent: 6/14/2018 3:45 PM EDT  Subject: Medication Renewal Request    Original authorizing provider: MD Rebecca Bateman would like a refill of the following medications:  LORazepam (ATIVAN) 1 mg tablet Harriet Cruz MD]    Preferred pharmacy: [facility] [id number] [place] [address] 59:

## 2018-06-14 NOTE — TELEPHONE ENCOUNTER
Last ov 2/20/18  Last fill 5/10/18    Reviewed report generated by the [facility]. Does not demonstrate aberrancies or inconsistencies with regard to the prescribing of controlled medications to this patient by other providers.

## 2018-07-26 DIAGNOSIS — F41.9 ANXIETY: ICD-10-CM

## 2018-07-26 RX ORDER — LORAZEPAM 1 MG/1
1 TABLET ORAL
Qty: 60 TAB | Refills: 0 | OUTPATIENT
Start: 2018-07-26 | End: 2018-09-05 | Stop reason: SDUPTHER

## 2018-07-26 NOTE — TELEPHONE ENCOUNTER
PHONE IN [facility] reports the last fill date for Ativan as 06/14/2018. There appears to be no inconsistencies in regards to the prescribing of this medication. Last Visit: 02/20/2018 with MD Adenike Lebron    Next Appointment: noted to f/u in 6 months   Previous Refill Encounters: 06/14/2018 per MD Adenike Lebron #[id number]    Requested Prescriptions     Pending Prescriptions Disp Refills    LORazepam (ATIVAN) 1 mg tablet 60 Tab 0     Sig: Take 1 Tab by mouth every eight (8) hours as needed for Anxiety. Max Daily Amount: 3 mg.

## 2018-07-30 ENCOUNTER — OFFICE VISIT (OUTPATIENT)
Dept: CARDIOLOGY CLINIC | Age: 78
End: 2018-07-30

## 2018-07-30 VITALS
BODY MASS INDEX: 24.91 KG/M2 | HEART RATE: 73 BPM | SYSTOLIC BLOOD PRESSURE: 130 MMHG | OXYGEN SATURATION: 98 % | HEIGHT: 70 IN | WEIGHT: 174 LBS | DIASTOLIC BLOOD PRESSURE: 90 MMHG

## 2018-07-30 DIAGNOSIS — Z95.0 PACEMAKER: ICD-10-CM

## 2018-07-30 DIAGNOSIS — I48.0 PAROXYSMAL ATRIAL FIBRILLATION (HCC): Primary | ICD-10-CM

## 2018-07-30 DIAGNOSIS — I10 ESSENTIAL HYPERTENSION: ICD-10-CM

## 2018-07-30 DIAGNOSIS — E78.5 HYPERLIPIDEMIA, UNSPECIFIED HYPERLIPIDEMIA TYPE: ICD-10-CM

## 2018-07-30 DIAGNOSIS — I44.30 AV BLOCK: ICD-10-CM

## 2018-07-30 NOTE — PROGRESS NOTES
HISTORY OF PRESENT ILLNESS Jose Rafael Gamez is a 65 y.o. male. Irregular Heart Beat Pertinent negatives include no fever, no chest pain, no claudication, no orthopnea, no PND, no abdominal pain, no nausea, no vomiting, no headaches, no dizziness, no cough and no shortness of breath. Follow-up Pertinent negatives include no chest pain, no abdominal pain, no headaches and no shortness of breath. Patient presents for a scheduled followup visit. He has a history of intermittent high-grade AV block, status post to dual-chamber permanent pacemaker in 2007. He also has a history of hypertension, Brief episodes approximately atrial fibrillation, and a mildly depressed LV systolic function,  Which returned to low normal on echocardiogram in June 2013 showing an ejection fraction of 50-55%. The patient underwent a pacemaker generator exchange in May 2016 With a [employer] MRI safe device after his device has reached elective replacement indicator. The patient was last seen in the office 6 months ago. Since last visit he has been feeling well. He denies any major change in his activity level. No new shortness of breath at rest or with exertion, no orthopnea, PND or leg swelling. No claudication. No chest pain or pressure. No palpitations, dizziness or syncope. Past Medical History:  
Diagnosis Date  Anxiety  AV block   
 intermittent, high-grade  Bilateral inguinal hernia  Cardiac echocardiogram 06/25/2013 EF 50-55%. No WMA. Mild LVH. Gr 1 DDfx. RVE.  AKANKSHA. Mild TR. No significant chg from study of 6/8/11.  Cardiac nuclear imaging test 10/07/2009 Likely inferior & anterior septal artifact. EF 55%. Mild septal WMA could be related to pacemaker.  Cardiomyopathy ([address]  Diverticulosis of colon 11/10/2015  Dysphagia Multiple esophageal dilations.  Essential hypertension with goal blood pressure less than 140/90   
 HLD (hyperlipidemia)  Hx of colonoscopy 11/10/2015 Dr. Go Soriano; tubulovillous adenoma. [facility] Pacemaker 2007 Medtronic dual-chamber device.  Paroxysmal atrial fibrillation (HCC) single episode noted on routine device check Current Outpatient Prescriptions Medication Sig Dispense Refill  LORazepam (ATIVAN) 1 mg tablet Take 1 Tab by mouth every eight (8) hours as needed for Anxiety. Max Daily Amount: 3 mg. 60 Tab 0  
 lisinopril (PRINIVIL, ZESTRIL) 2.5 mg tablet TAKE THREE TABLETS BY MOUTH ONCE DAILY 270 Tab 2  
 butalbital-acetaminophen-caffeine (FIORICET, ESGIC) -40 mg per tablet Take 1 Tab by mouth every six (6) hours as needed for Pain. 30 Tab 0  
 mometasone (NASONEX) 50 mcg/actuation nasal spray USE TWO SPRAYS IN EACH NOSTRIL DAILY 1 Container 5  
 VIT C/E/ZN/COPPR/LUTEIN/ZEAXAN (PRESERVISION AREDS 2 PO) Take  by mouth.  omeprazole (PRILOSEC) 20 mg capsule Take 20 mg by mouth daily as needed.  timolol (TIMOPTIC) 0.25 % ophthalmic solution Administer 1 Drop to both eyes two (2) times a day.  cyclobenzaprine (FLEXERIL) 5 mg tablet TAKE 1-2 TABLETS BY MOUTH EVERY 8 HOURS AS NEEDED FOR MUSCLE SPASM 25 Tab 0  MULTIVITAMINS W-MINERALS/LUT (CENTRUM SILVER PO) Take  by mouth daily.  OMEGA-3 FATTY ACIDS (OMEGA 3 PO) Take 2,400 mg by mouth two (2) times a day.  coenzyme q10 100 mg Cap Take  by mouth daily.  metronidazole (METROGEL) 1 % topical gel Apply  to affected area daily. Use a thin layer to affected areas after washing  zinc 50 mg capsule Take  by mouth daily.  magnesium 250 mg Tab Take  by mouth daily.  selenium 100 mcg Cap Take  by mouth daily.  ascorbic acid (VITAMIN C) 500 mg tablet Take  by mouth two (2) times a day.  Ginkgo Biloba Leaf Extract 30 mg Cap Take  by mouth daily. No Known Allergies Social History Substance Use Topics  Smoking status: Former Smoker Quit date: 7/27/1966  Smokeless tobacco: Never Used  Alcohol use No  
   Comment: 03/16/1998 last time he had alcohol. Review of Systems Constitutional: Negative for chills, fever and weight loss. HENT: Negative for nosebleeds. Eyes: Negative for blurred vision and double vision. Respiratory: Negative for cough, shortness of breath and wheezing. Cardiovascular: Negative for chest pain, palpitations, orthopnea, claudication, leg swelling and PND. Gastrointestinal: Negative for abdominal pain, heartburn, nausea and vomiting. Genitourinary: Negative for dysuria and hematuria. Musculoskeletal: Negative for falls and myalgias. Skin: Negative for rash. Neurological: Negative for dizziness, focal weakness and headaches. Endo/Heme/Allergies: Does not bruise/bleed easily. Psychiatric/Behavioral: Negative for substance abuse. Visit Vitals  /90  Pulse 73  Ht 5' 10\" (1.778 m)  Wt 78.9 kg (174 lb)  SpO2 98%  BMI 24.97 kg/m2 Physical Exam  
Constitutional: He is oriented to person, place, and time. He appears well-developed and well-nourished. HENT:  
Head: Normocephalic and atraumatic. Eyes: Conjunctivae are normal.  
Neck: Neck supple. No JVD present. Carotid bruit is not present. Cardiovascular: Normal rate, regular rhythm, S1 normal, S2 normal and normal pulses. Exam reveals no gallop and no S3. No murmur heard. Pulmonary/Chest: Breath sounds normal. He has no wheezes. He has no rales. Abdominal: Soft. Bowel sounds are normal. There is no tenderness. Musculoskeletal: He exhibits no edema. Neurological: He is alert and oriented to person, place, and time. Skin: Skin is warm and dry. EKG: Underlying sinus rhythm 100% ventricular pacing. Pacemaker interrogation: Pacemaker generator at beginning of life. Estimated longevity 7 years. No significant arrhythmias since last device check. Pacing in the atrium 16% in the ventricle 100%. Scanned document for details.  
 
ASSESSMENT and PLAN 
 
Paroxysmal atrial fibrillation. Patient had a single episode of atrial fibrillation in November 2017 lasting for little less than 2 hours. No recurrent episodes since that time. He was asymptomatic to the arrhythmia. Unless he starts developing more frequent or prolonged episodes, I do not feel anticoagulation is needed. However we will continue to monitor this on routine device checks. High-grade AV block: Status post dual-chamber permanent pacemaker with recent generator exchange in May 2016. Patient has an MRI compatible device. Normal device function on interrogation today. He is now pacemaker dependent in the ventricle. His battery status 7 years left. Hypertension: Remains well controlled on lisinopril, which I would continue. Dyslipidemia. Patient has been managing this with diet control and takes a fish oil supplement. [facility] device check in 3 months. Followup in 6 months, sooner if needed.

## 2018-07-30 NOTE — MR AVS SNAPSHOT
[address] [address] [address] [address] 
[phone] Patient: Joao Melendez MRN: [id number] :1940 Visit Information Date & Time Provider Department Dept. Phone Encounter #  
 2018  2:00 PM Mildred Woodard MD [facility] [address] [id number] Your Appointments 2018  8:00 AM  
LAB with [facility] NURSE VISIT [facility] ([address]) Appt Note: lab  
 [address], [address] [address] [address] [place]  
  
   
 [address], [address]  
  
    
 2019  2:00 PM  
Follow Up with Mildred Woodard MD  
[facility] ([facility]) Appt Note: 6 month f/up [place] [address] [address] [id number] [phone] [address] [place] [address] [address] Upcoming Health Maintenance Date Due COLONOSCOPY 11/10/2018 Influenza Age 5 to Adult 2018 GLAUCOMA SCREENING Q2Y 2019 MEDICARE YEARLY EXAM 2019 DTaP/Tdap/Td series (2 - Td) 2022 Allergies as of 2018  Review Complete On: 2018 By: Uma Coles MD  
 No Known Allergies Current Immunizations  Reviewed on 8/3/2015 Name Date Influenza High Dose Vaccine PF 10/2/2017, 2016, 2015, 1/10/2013 Influenza Vaccine 2014, 2013 Pneumococcal Conjugate (PCV-13) 8/3/2015  1:34 PM  
 TDAP Vaccine 2012 ZZZ-RETIRED (DO NOT USE) Pneumococcal Vaccine (Unspecified Type) 2007 Zoster 2007 Not reviewed this visit You Were Diagnosed With   
  
 Codes Comments Paroxysmal atrial fibrillation (HCC)    -  Primary ICD-10-CM: I48.0 ICD-9-CM: 427.31 Essential hypertension     ICD-10-CM: I10 
ICD-9-CM: 401.9 Hyperlipidemia, unspecified hyperlipidemia type     ICD-10-CM: E78.5 ICD-9-CM: 272.4 AV block     ICD-10-CM: I44.30 ICD-9-CM: 426.10 Cardiomyopathy, unspecified type ([facility].)     ICD-10-CM: I42.9 ICD-9-CM: 425.4 Vitals BP Pulse Height(growth percentile) Weight(growth percentile) SpO2 BMI  
 130/90 73 5' 10\" (1.778 m) 174 lb (78.9 kg) 98% 24.97 kg/m2 Smoking Status Former Smoker Vitals History BMI and BSA Data Body Mass Index Body Surface Area 24.97 kg/m 2 1.97 m 2 Preferred Pharmacy Pharmacy Name Phone [facility], [facility] [phone] Your Updated Medication List  
  
   
This list is accurate as of 7/30/18  2:23 PM.  Always use your most recent med list.  
  
  
  
  
 butalbital-acetaminophen-caffeine -40 mg per tablet Commonly known as:  [place] Fresh Take 1 Tab by mouth every six (6) hours as needed for Pain. CENTRUM SILVER PO Take  by mouth daily. co-enzyme Q-10 100 mg capsule Commonly known as:  CO Q-10 Take  by mouth daily. cyclobenzaprine 5 mg tablet Commonly known as:  FLEXERIL  
TAKE 1-2 TABLETS BY MOUTH EVERY 8 HOURS AS NEEDED FOR MUSCLE SPASM Ginkgo Biloba Leaf Extract 30 mg Cap Take  by mouth daily. lisinopril 2.5 mg tablet Commonly known as:  PRINIVIL, ZESTRIL  
TAKE THREE TABLETS BY MOUTH ONCE DAILY LORazepam 1 mg tablet Commonly known as:  ATIVAN Take 1 Tab by mouth every eight (8) hours as needed for Anxiety. Max Daily Amount: 3 mg.  
  
 magnesium 250 mg Tab Take  by mouth daily. METROGEL 1 % topical gel Generic drug:  metroNIDAZOLE Apply  to affected area daily. Use a thin layer to affected areas after washing  
  
 mometasone 50 mcg/actuation nasal spray Commonly known as:  NASONEX  
USE TWO SPRAYS IN EACH NOSTRIL DAILY  
  
 OMEGA 3 PO Take 2,400 mg by mouth two (2) times a day. PRESERVISION AREDS 2 PO Take  by mouth. PriLOSEC 20 mg capsule Generic drug:  omeprazole Take 20 mg by mouth daily as needed. selenium 100 mcg Cap Take  by mouth daily. TIMOPTIC 0.25 % ophthalmic solution Generic drug:  timolol Administer 1 Drop to both eyes two (2) times a day. VITAMIN C 500 mg tablet Generic drug:  ascorbic acid (vitamin C) Take  by mouth two (2) times a day. zinc 50 mg capsule Take  by mouth daily. We Performed the Following AMB POC EKG ROUTINE W/ 12 LEADS, INTER & REP [42516 CPT(R)] Introducing [facility] & [facility] SERVICES! Dear Carley Jimenez: 
Thank you for requesting a Cognotion account. Our records indicate that you already have an active Cognotion account. You can access your account anytime at [email or web address]. [email or web address] Did you know that you can access your hospital and ER discharge instructions at any time in Cognotion? You can also review all of your test results from your hospital stay or ER visit. Additional Information If you have questions, please visit the Frequently Asked Questions section of the Cognotion website at [email or web address] Remember, Cognotion is NOT to be used for urgent needs. For medical emergencies, dial 911. Now available from your iPhone and Android! Please provide this summary of care documentation to your next provider. Your primary care clinician is listed as Yadi Hansen. If you have any questions after today's visit, please call [phone].

## 2018-07-30 NOTE — PROGRESS NOTES
1. Have you been to the ER, urgent care clinic since your last visit? Hospitalized since your last visit? No  
 
2. Have you seen or consulted any other health care providers outside of the [facility] since your last visit? Include any pap smears or colon screening.  No

## 2018-08-22 ENCOUNTER — APPOINTMENT (OUTPATIENT)
Dept: INTERNAL MEDICINE CLINIC | Age: 78
End: 2018-08-22

## 2018-08-22 ENCOUNTER — HOSPITAL ENCOUNTER (OUTPATIENT)
Dept: LAB | Age: 78
Discharge: HOME | End: 2018-08-22
Payer: MEDICARE

## 2018-08-22 DIAGNOSIS — Z12.5 SCREENING PSA (PROSTATE SPECIFIC ANTIGEN): ICD-10-CM

## 2018-08-22 DIAGNOSIS — I48.0 PAROXYSMAL ATRIAL FIBRILLATION (HCC): ICD-10-CM

## 2018-08-22 DIAGNOSIS — E78.5 HYPERLIPIDEMIA, UNSPECIFIED HYPERLIPIDEMIA TYPE: ICD-10-CM

## 2018-08-22 DIAGNOSIS — I10 ESSENTIAL HYPERTENSION: ICD-10-CM

## 2018-08-22 LAB
ALBUMIN SERPL-MCNC: 3.8 G/DL (ref 3.4–5)
ALBUMIN/GLOB SERPL: 1.3 {RATIO} (ref 0.8–1.7)
ALP SERPL-CCNC: 62 U/L (ref 45–117)
ALT SERPL-CCNC: 23 U/L (ref 16–61)
ANION GAP SERPL CALC-SCNC: 8 MMOL/L (ref 3–18)
APPEARANCE UR: CLEAR
AST SERPL-CCNC: 21 U/L (ref 15–37)
BACTERIA URNS QL MICRO: NEGATIVE /HPF
BASOPHILS # BLD: 0 K/UL (ref 0–0.1)
BASOPHILS NFR BLD: 1 % (ref 0–2)
BILIRUB SERPL-MCNC: 0.6 MG/DL (ref 0.2–1)
BILIRUB UR QL: NEGATIVE
BUN SERPL-MCNC: 12 MG/DL (ref 7–18)
BUN/CREAT SERPL: 14 (ref 12–20)
CALCIUM SERPL-MCNC: 8.9 MG/DL (ref 8.5–10.1)
CHLORIDE SERPL-SCNC: 100 MMOL/L (ref 100–108)
CHOLEST SERPL-MCNC: 180 MG/DL
CO2 SERPL-SCNC: 28 MMOL/L (ref 21–32)
COLOR UR: YELLOW
CREAT SERPL-MCNC: 0.87 MG/DL (ref 0.6–1.3)
DIFFERENTIAL METHOD BLD: ABNORMAL
EOSINOPHIL # BLD: 0.6 K/UL (ref 0–0.4)
EOSINOPHIL NFR BLD: 8 % (ref 0–5)
EPITH CASTS URNS QL MICRO: NORMAL /LPF (ref 0–5)
ERYTHROCYTE [DISTWIDTH] IN BLOOD BY AUTOMATED COUNT: 13.7 % (ref 11.6–14.5)
GLOBULIN SER CALC-MCNC: 3 G/DL (ref 2–4)
GLUCOSE SERPL-MCNC: 83 MG/DL (ref 74–99)
GLUCOSE UR STRIP.AUTO-MCNC: NEGATIVE MG/DL
HCT VFR BLD AUTO: 43.2 % (ref 36–48)
HDLC SERPL-MCNC: 49 MG/DL (ref 40–60)
HDLC SERPL: 3.7 {RATIO} (ref 0–5)
HGB BLD-MCNC: 14.5 G/DL (ref 13–16)
HGB UR QL STRIP: NEGATIVE
KETONES UR QL STRIP.AUTO: NEGATIVE MG/DL
LDLC SERPL CALC-MCNC: 113.6 MG/DL (ref 0–100)
LEUKOCYTE ESTERASE UR QL STRIP.AUTO: NEGATIVE
LIPID PROFILE,FLP: ABNORMAL
LYMPHOCYTES # BLD: 2.8 K/UL (ref 0.9–3.6)
LYMPHOCYTES NFR BLD: 38 % (ref 21–52)
MCH RBC QN AUTO: 31.1 PG (ref 24–34)
MCHC RBC AUTO-ENTMCNC: 33.6 G/DL (ref 31–37)
MCV RBC AUTO: 92.7 FL (ref 74–97)
MONOCYTES # BLD: 0.6 K/UL (ref 0.05–1.2)
MONOCYTES NFR BLD: 8 % (ref 3–10)
NEUTS SEG # BLD: 3.3 K/UL (ref 1.8–8)
NEUTS SEG NFR BLD: 45 % (ref 40–73)
NITRITE UR QL STRIP.AUTO: NEGATIVE
PH UR STRIP: 7.5 [PH] (ref 5–8)
PLATELET # BLD AUTO: 215 K/UL (ref 135–420)
PMV BLD AUTO: 8.9 FL (ref 9.2–11.8)
POTASSIUM SERPL-SCNC: 4.2 MMOL/L (ref 3.5–5.5)
PROT SERPL-MCNC: 6.8 G/DL (ref 6.4–8.2)
PROT UR STRIP-MCNC: NEGATIVE MG/DL
PSA SERPL-MCNC: 0.7 NG/ML (ref 0–4)
RBC # BLD AUTO: 4.66 M/UL (ref 4.7–5.5)
RBC #/AREA URNS HPF: 0 /HPF (ref 0–5)
SODIUM SERPL-SCNC: 136 MMOL/L (ref 136–145)
SP GR UR REFRACTOMETRY: 1.01 (ref 1–1.03)
TRIGL SERPL-MCNC: 87 MG/DL (ref ?–150)
TSH SERPL DL<=0.05 MIU/L-ACNC: 2.5 UIU/ML (ref 0.36–3.74)
UROBILINOGEN UR QL STRIP.AUTO: 0.2 EU/DL (ref 0.2–1)
VLDLC SERPL CALC-MCNC: 17.4 MG/DL
WBC # BLD AUTO: 7.3 K/UL (ref 4.6–13.2)
WBC URNS QL MICRO: NORMAL /HPF (ref 0–4)

## 2018-08-22 PROCEDURE — 80053 COMPREHEN METABOLIC PANEL: CPT | Performed by: INTERNAL MEDICINE

## 2018-08-22 PROCEDURE — 36415 COLL VENOUS BLD VENIPUNCTURE: CPT | Performed by: INTERNAL MEDICINE

## 2018-08-22 PROCEDURE — 80061 LIPID PANEL: CPT | Performed by: INTERNAL MEDICINE

## 2018-08-22 PROCEDURE — 81001 URINALYSIS AUTO W/SCOPE: CPT | Performed by: INTERNAL MEDICINE

## 2018-08-22 PROCEDURE — 84443 ASSAY THYROID STIM HORMONE: CPT | Performed by: INTERNAL MEDICINE

## 2018-08-22 PROCEDURE — 84153 ASSAY OF PSA TOTAL: CPT | Performed by: INTERNAL MEDICINE

## 2018-08-22 PROCEDURE — 85025 COMPLETE CBC W/AUTO DIFF WBC: CPT | Performed by: INTERNAL MEDICINE

## 2018-09-05 DIAGNOSIS — F41.9 ANXIETY: ICD-10-CM

## 2018-09-05 RX ORDER — LORAZEPAM 1 MG/1
1 TABLET ORAL
Qty: 60 TAB | Refills: 0 | OUTPATIENT
Start: 2018-09-05 | End: 2018-09-27 | Stop reason: SDUPTHER

## 2018-09-05 NOTE — TELEPHONE ENCOUNTER
Reviewed report generated by the [facility]. Does not demonstrate aberrancies or inconsistencies with regard to the prescribing of controlled medications to this patient by other providers. Last filled Ativan 7/26/2018 per .

## 2018-09-05 NOTE — TELEPHONE ENCOUNTER
From: [facility] To: Get Martinez MD 
Sent: 9/5/2018 3:16 PM EDT Subject:  Medication Renewal Request 
 
Original  authorizing provider: Get Martinez MD 
 
[facility] [address] [place] would like a refill of the following medications: LORazepam  (ATIVAN) 1 mg tablet Get Martinez MD] Preferred  pharmacy: [address] Comment:

## 2018-09-27 ENCOUNTER — OFFICE VISIT (OUTPATIENT)
Dept: INTERNAL MEDICINE CLINIC | Age: 78
End: 2018-09-27

## 2018-09-27 VITALS
HEIGHT: 70 IN | SYSTOLIC BLOOD PRESSURE: 132 MMHG | BODY MASS INDEX: 24.65 KG/M2 | RESPIRATION RATE: 16 BRPM | HEART RATE: 62 BPM | WEIGHT: 172.2 LBS | OXYGEN SATURATION: 96 % | DIASTOLIC BLOOD PRESSURE: 80 MMHG | TEMPERATURE: 97.7 F

## 2018-09-27 DIAGNOSIS — R55 POSTURAL DIZZINESS WITH PRESYNCOPE: ICD-10-CM

## 2018-09-27 DIAGNOSIS — K21.9 GASTROESOPHAGEAL REFLUX DISEASE WITHOUT ESOPHAGITIS: ICD-10-CM

## 2018-09-27 DIAGNOSIS — I10 ESSENTIAL HYPERTENSION: Primary | ICD-10-CM

## 2018-09-27 DIAGNOSIS — E78.5 HYPERLIPIDEMIA, UNSPECIFIED HYPERLIPIDEMIA TYPE: ICD-10-CM

## 2018-09-27 DIAGNOSIS — I44.30 AV BLOCK: ICD-10-CM

## 2018-09-27 DIAGNOSIS — I48.0 PAROXYSMAL ATRIAL FIBRILLATION (HCC): ICD-10-CM

## 2018-09-27 DIAGNOSIS — D12.2 ADENOMATOUS POLYP OF ASCENDING COLON: ICD-10-CM

## 2018-09-27 DIAGNOSIS — Z95.0 PACEMAKER: ICD-10-CM

## 2018-09-27 DIAGNOSIS — I42.9 CARDIOMYOPATHY, UNSPECIFIED TYPE (HCC): ICD-10-CM

## 2018-09-27 DIAGNOSIS — Z12.11 COLON CANCER SCREENING: ICD-10-CM

## 2018-09-27 DIAGNOSIS — R42 POSTURAL DIZZINESS WITH PRESYNCOPE: ICD-10-CM

## 2018-09-27 DIAGNOSIS — F41.9 ANXIETY: ICD-10-CM

## 2018-09-27 RX ORDER — LORAZEPAM 1 MG/1
1 TABLET ORAL
Qty: 60 TAB | Refills: 0 | Status: SHIPPED | OUTPATIENT
Start: 2018-09-27 | End: 2018-11-08 | Stop reason: SDUPTHER

## 2018-09-27 RX ORDER — BUTALBITAL, ACETAMINOPHEN AND CAFFEINE 50; 325; 40 MG/1; MG/1; MG/1
1 TABLET ORAL
Qty: 30 TAB | Refills: 0 | Status: SHIPPED | OUTPATIENT
Start: 2018-09-27 | End: 2019-04-02 | Stop reason: SDUPTHER

## 2018-09-27 NOTE — PROGRESS NOTES
1. Have you been to the ER, urgent care clinic or hospitalized since your last visit? NO 
      
 
2. Have you seen or consulted any other health care providers outside of the [facility] since your last visit (Include any pap smears or colon screening)? YES Do you have an Advanced Directive? YES Would you like information on Advanced Directives?  NO

## 2018-09-27 NOTE — MR AVS SNAPSHOT
[facility] 
 
 
 [address], [address] [place] [address] [facility] 
[phone] Patient: Steven Israel MRN: [id number] :1940 Visit Information Date & Time Provider Department Dept. Phone Encounter #  
 2018  2:30 PM Sofía Hooper MD [phone] Follow-up Instructions Return in about 6 months (around 3/27/2019), or if symptoms worsen or fail to improve. Your Appointments 2018  2:00 PM  
IMMUNIZATIONS/INJECTIONS with [place] [facility] NURSE VISIT [facility] ([facility]) Appt Note: flu shot [address], [address] [place] [address] [address] [place]  
  
   
 [address], [address]  
  
    
 2019  2:00 PM  
Follow Up with Paulo Delaney MD  
[facility] ([facility]) Appt Note: 6 month f/up [place] [address] [address] [address]  
[phone] [facility] 53 [address]  
  
    
 3/26/2019  8:00 AM  
LAB with [place] [facility] NURSE VISIT [facility] ([facility]) Appt Note: lab  
 [address], [address] [address] [place]  
  
   
 [address], [address]  
  
    
 2019  1:00 PM  
Office Visit with Sofía Hooper MD  
[facility]) Appt Note: 6 month f/u  
 [address] [facility], [address] [address] [place]  
  
   
 [address], [address] Upcoming Health Maintenance Date Due Shingrix Vaccine Age 50> (1 of 2) 1989 Influenza Age 5 to Adult 2018 COLONOSCOPY 11/10/2018 GLAUCOMA SCREENING Q2Y 2019 MEDICARE YEARLY EXAM 2019 DTaP/Tdap/Td series (2 - Td) 2022 Allergies as of 2018  Review Complete On: 2018 By: Harley Kayser Essie Cavanaugh No Known Allergies Current Immunizations  Reviewed on 8/3/2015 Name Date Influenza High Dose Vaccine PF 10/2/2017, 12/9/2016, 11/16/2015, 1/10/2013 Influenza Vaccine 11/20/2014, 12/31/2013 Pneumococcal Conjugate (PCV-13) 8/3/2015  1:34 PM  
 TDAP Vaccine 7/12/2012 ZZZ-RETIRED (DO NOT USE) Pneumococcal Vaccine (Unspecified Type) 1/1/2007 Zoster 7/25/2007 Not reviewed this visit You Were Diagnosed With   
  
 Codes Comments Anxiety     ICD-10-CM: F41.9 ICD-9-CM: 300.00 Vitals BP Pulse Temp Resp Height(growth percentile) Weight(growth percentile) 132/80 (BP 1 Location: Right arm, BP Patient Position: Sitting) 62 97.7 °F (36.5 °C) (Oral) 16 5' 10\" (1.778 m) 172 lb 3.2 oz (78.1 kg) SpO2 BMI Smoking Status 96% 24.71 kg/m2 Former Smoker BMI and BSA Data Body Mass Index Body Surface Area 24.71 kg/m 2 1.96 m 2 Preferred Pharmacy Pharmacy Name Phone [facility], [address] [place] [address] [phone] Your Updated Medication List  
  
   
This list is accurate as of 9/27/18  3:26 PM.  Always use your most recent med list.  
  
  
  
  
 butalbital-acetaminophen-caffeine -40 mg per tablet Commonly known as:  Andrés Perez Take 1 Tab by mouth every six (6) hours as needed for Pain. CENTRUM SILVER PO Take  by mouth daily. co-enzyme Q-10 100 mg capsule Commonly known as:  CO Q-10 Take  by mouth daily. cyclobenzaprine 5 mg tablet Commonly known as:  FLEXERIL  
TAKE 1-2 TABLETS BY MOUTH EVERY 8 HOURS AS NEEDED FOR MUSCLE SPASM Ginkgo Biloba Leaf Extract 30 mg Cap Take  by mouth daily. lisinopril 2.5 mg tablet Commonly known as:  PRINIVIL, ZESTRIL  
TAKE THREE TABLETS BY MOUTH ONCE DAILY LORazepam 1 mg tablet Commonly known as:  ATIVAN Take 1 Tab by mouth every eight (8) hours as needed for Anxiety. Max Daily Amount: 3 mg. magnesium 250 mg Tab Take  by mouth daily. METROGEL 1 % topical gel Generic drug:  metroNIDAZOLE Apply  to affected area daily. Use a thin layer to affected areas after washing  
  
 mometasone 50 mcg/actuation nasal spray Commonly known as:  NASONEX  
USE TWO SPRAYS IN EACH NOSTRIL DAILY  
  
 OMEGA 3 PO Take 2,400 mg by mouth two (2) times a day. PRESERVISION AREDS 2 PO Take  by mouth. PriLOSEC 20 mg capsule Generic drug:  omeprazole Take 20 mg by mouth daily as needed. selenium 100 mcg Cap Take  by mouth daily. TIMOPTIC 0.25 % ophthalmic solution Generic drug:  timolol Administer 1 Drop to both eyes two (2) times a day. varicella-zoster recombinant (PF) 50 mcg/0.5 mL Susr injection Commonly known as:  SHINGRIX  
0.5 mL by IntraMUSCular route once for 1 dose. Repeat x 1 dose in 2-6 months VITAMIN C 500 mg tablet Generic drug:  ascorbic acid (vitamin C) Take  by mouth two (2) times a day. zinc 50 mg capsule Take  by mouth daily. Prescriptions Printed Refills  
 butalbital-acetaminophen-caffeine (FIORICET, ESGIC) -40 mg per tablet 0 Sig: Take 1 Tab by mouth every six (6) hours as needed for Pain. Class: Print Route: Oral  
 LORazepam (ATIVAN) 1 mg tablet 0 Sig: Take 1 Tab by mouth every eight (8) hours as needed for Anxiety. Max Daily Amount: 3 mg. Class: Print Route: Oral  
 varicella-zoster recombinant, PF, (SHINGRIX) 50 mcg/0.5 mL susr injection 1 Si.5 mL by IntraMUSCular route once for 1 dose. Repeat x 1 dose in 2-6 months Class: Print Route: IntraMUSCular Follow-up Instructions Return in about 6 months (around 3/27/2019), or if symptoms worsen or fail to improve. Patient Instructions Hyperlipidemia: After Your Visit Your Care Instructions Hyperlipidemia is too much fat in your blood.  The body has several kinds of fat, including cholesterol and triglycerides. Your body needs fat for many things, such as making new cells. But too much fat in your blood increases your chances of having a heart attack or stroke. You may be able to lower your cholesterol and triglycerides with a heart-healthy diet, exercise, and if needed, medicine. Your doctor may want you to try lifestyle changes first to see whether they lower the fat in your blood. You may need to take medicine if lifestyle changes do not lower the fat in your blood enough. Follow-up care is a key part of your treatment and safety. Be sure to make and go to all appointments, and call your doctor if you are having problems. Its also a good idea to know your test results and keep a list of the medicines you take. How can you care for yourself at home? Take your medicines · Take your medicines exactly as prescribed. Call your doctor if you think you are having a problem with your medicine. · If you take medicine to lower your cholesterol, go to follow-up visits. You will need to have blood tests. · Do not take large doses of niacin, which is a B vitamin, while taking medicine called statins. It may increase the chance of muscle pain and liver problems. · Talk to your doctor about avoiding grapefruit juice if you are taking statins. Grapefruit juice can raise the level of this medicine in your blood. This could increase side effects. Eat more fruits, vegetables, and fiber · Fruits and vegetables have lots of nutrients that help protect against heart disease, and they have littleif anyfat. Try to eat at least five servings a day. Dark green, deep orange, or yellow fruits and vegetables are healthy choices. · Keep carrots, celery, and other veggies handy for snacks. Buy fruit that is in season and store it where you can see it so that you will be tempted to eat it. Cook dishes that have a lot of veggies in them, such as stir-fries and soups. · Foods high in fiber may reduce your cholesterol and provide important vitamins and minerals. High-fiber foods include whole-grain cereals and breads, oatmeal, beans, brown rice, citrus fruits, and apples. · Buy whole-grain breads and cereals instead of white bread and pastries. Limit saturated fat · Read food labels and try to avoid saturated fat and trans fat. They increase your risk of heart disease. · Use olive or canola oil when you cook. Try cholesterol-lowering spreads, such as Benecol or Take Control. · Bake, broil, grill, or steam foods instead of frying them. · Limit the amount of high-fat meats you eat, including hot dogs and sausages. Cut out all visible fat when you prepare meat. · Eat fish, skinless poultry, and soy products such as tofu instead of high-fat meats. Soybeans may be especially good for your heart. Eat at least two servings of fish a week. Certain fish, such as salmon, contain omega-3 fatty acids, which may help reduce your risk of heart attack. · Choose low-fat or fat-free milk and dairy products. Get exercise, limit alcohol, and quit smoking · Get more exercise. Work with your doctor to set up an exercise program. Even if you can do only a small amount, exercise will help you get stronger, have more energy, and manage your weight and your stress. Walking is an easy way to get exercise. Gradually increase the amount you walk every day. Aim for at least 30 minutes on most days of the week. You also may want to swim, bike, or do other activities. · Limit alcohol to no more than 2 drinks a day for men and 1 drink a day for women. · Do not smoke. If you need help quitting, talk to your doctor about stop-smoking programs and medicines. These can increase your chances of quitting for good. When should you call for help? Call 911 anytime you think you may need emergency care. For example, call if: 
· You have symptoms of a heart attack. These may include: ¨ Chest pain or pressure, or a strange feeling in the chest. 
¨ Sweating. ¨ Shortness of breath. ¨ Nausea or vomiting. ¨ Pain, pressure, or a strange feeling in the back, neck, jaw, or upper belly or in one or both shoulders or arms. ¨ Lightheadedness or sudden weakness. ¨ A fast or irregular heartbeat. After you call 911, the  may tell you to chew 1 adult-strength or 2 to 4 low-dose aspirin. Wait for an ambulance. Do not try to drive yourself. · You have signs of a stroke. These may include: 
¨ Sudden numbness, paralysis, or weakness in your face, arm, or leg, especially on only one side of your body. ¨ New problems with walking or balance. ¨ Sudden vision changes. ¨ Drooling or slurred speech. ¨ New problems speaking or understanding simple statements, or feeling confused. ¨ A sudden, severe headache that is different from past headaches. · You passed out (lost consciousness). Call your doctor now or seek immediate medical care if: 
· You have muscle pain or weakness. Watch closely for changes in your health, and be sure to contact your doctor if: 
· You are very tired. · You have an upset stomach, gas, constipation, or belly pain or cramps. Where can you learn more? Go to TapTrack.be Enter C406 in the search box to learn more about \"Hyperlipidemia: After Your Visit. \"  
© 5105-9686 Healthwise, Incorporated. Care instructions adapted under license by Emelina Mcintyre (which disclaims liability or warranty for this information). This care instruction is for use with your licensed healthcare professional. If you have questions about a medical condition or this instruction, always ask your healthcare professional. [place] [address] any warranty or liability for your use of this information. Content Version: 6.4.736132; Last Revised: October 13, 2011 Introducing \[facility]\"" & HEALTH SERVICES! Dear Alissa Cavazos: Thank you for requesting a Cellufun account. Our records indicate that you already have an active Cellufun account. You can access your account anytime at [email or web address] Did you know that you can access your hospital and ER discharge instructions at any time in Cellu[employer]? You can also review all of your test results from your hospital stay or ER visit. Additional Information If you have questions, please visit the Frequently Asked Questions section of the Cellu[employer] website at [email or web address] Remember, Cellufun is NOT to be used for urgent needs. For medical emergencies, dial 911. Now available from your iPhone and Android! Please provide this summary of care documentation to your next provider. Your primary care clinician is listed as Ramonita Ayala. If you have any questions after today's visit, please call [phone].

## 2018-09-27 NOTE — PATIENT INSTRUCTIONS
Hyperlipidemia: After Your Visit Your Care Instructions Hyperlipidemia is too much fat in your blood. The body has several kinds of fat, including cholesterol and triglycerides. Your body needs fat for many things, such as making new cells. But too much fat in your blood increases your chances of having a heart attack or stroke. You may be able to lower your cholesterol and triglycerides with a heart-healthy diet, exercise, and if needed, medicine. Your doctor may want you to try lifestyle changes first to see whether they lower the fat in your blood. You may need to take medicine if lifestyle changes do not lower the fat in your blood enough. Follow-up care is a key part of your treatment and safety. Be sure to make and go to all appointments, and call your doctor if you are having problems. Its also a good idea to know your test results and keep a list of the medicines you take. How can you care for yourself at home? Take your medicines · Take your medicines exactly as prescribed. Call your doctor if you think you are having a problem with your medicine. · If you take medicine to lower your cholesterol, go to follow-up visits. You will need to have blood tests. · Do not take large doses of niacin, which is a B vitamin, while taking medicine called statins. It may increase the chance of muscle pain and liver problems. · Talk to your doctor about avoiding grapefruit juice if you are taking statins. Grapefruit juice can raise the level of this medicine in your blood. This could increase side effects. Eat more fruits, vegetables, and fiber · Fruits and vegetables have lots of nutrients that help protect against heart disease, and they have littleif anyfat. Try to eat at least five servings a day. Dark green, deep orange, or yellow fruits and vegetables are healthy choices. · Keep carrots, celery, and other veggies handy for snacks.  Buy fruit that is in season and store it where you can see it so that you will be tempted to eat it. Cook dishes that have a lot of veggies in them, such as stir-fries and soups. · Foods high in fiber may reduce your cholesterol and provide important vitamins and minerals. High-fiber foods include whole-grain cereals and breads, oatmeal, beans, brown rice, citrus fruits, and apples. · Buy whole-grain breads and cereals instead of white bread and pastries. Limit saturated fat · Read food labels and try to avoid saturated fat and trans fat. They increase your risk of heart disease. · Use olive or canola oil when you cook. Try cholesterol-lowering spreads, such as Benecol or Take Control. · Bake, broil, grill, or steam foods instead of frying them. · Limit the amount of high-fat meats you eat, including hot dogs and sausages. Cut out all visible fat when you prepare meat. · Eat fish, skinless poultry, and soy products such as tofu instead of high-fat meats. Soybeans may be especially good for your heart. Eat at least two servings of fish a week. Certain fish, such as salmon, contain omega-3 fatty acids, which may help reduce your risk of heart attack. · Choose low-fat or fat-free milk and dairy products. Get exercise, limit alcohol, and quit smoking · Get more exercise. Work with your doctor to set up an exercise program. Even if you can do only a small amount, exercise will help you get stronger, have more energy, and manage your weight and your stress. Walking is an easy way to get exercise. Gradually increase the amount you walk every day. Aim for at least 30 minutes on most days of the week. You also may want to swim, bike, or do other activities. · Limit alcohol to no more than 2 drinks a day for men and 1 drink a day for women. · Do not smoke. If you need help quitting, talk to your doctor about stop-smoking programs and medicines. These can increase your chances of quitting for good. When should you call for help? Call 911 anytime you think you may need emergency care. For example, call if: 
· You have symptoms of a heart attack. These may include: ¨ Chest pain or pressure, or a strange feeling in the chest. 
¨ Sweating. ¨ Shortness of breath. ¨ Nausea or vomiting. ¨ Pain, pressure, or a strange feeling in the back, neck, jaw, or upper belly or in one or both shoulders or arms. ¨ Lightheadedness or sudden weakness. ¨ A fast or irregular heartbeat. After you call 911, the  may tell you to chew 1 adult-strength or 2 to 4 low-dose aspirin. Wait for an ambulance. Do not try to drive yourself. · You have signs of a stroke. These may include: 
¨ Sudden numbness, paralysis, or weakness in your face, arm, or leg, especially on only one side of your body. ¨ New problems with walking or balance. ¨ Sudden vision changes. ¨ Drooling or slurred speech. ¨ New problems speaking or understanding simple statements, or feeling confused. ¨ A sudden, severe headache that is different from past headaches. · You passed out (lost consciousness). Call your doctor now or seek immediate medical care if: 
· You have muscle pain or weakness. Watch closely for changes in your health, and be sure to contact your doctor if: 
· You are very tired. · You have an upset stomach, gas, constipation, or belly pain or cramps. Where can you learn more? Go to [employer] Enter C406 in the search box to learn more about \"Hyperlipidemia: After Your Visit. \"  
© 6842-9927 [employer]. Care instructions adapted under license by [employer] (which disclaims liability or warranty for this information).  This care instruction is for use with your licensed healthcare professional. If you have questions about a medical condition or this instruction, always ask your healthcare professional. [place] [address] any warranty or liability for your use of this information. Content Version: 2.6.866202; Last Revised: October 13, 2011

## 2018-10-01 NOTE — PROGRESS NOTES
HPI:  
Navarro Acevedo is a 65y.o. year old male who presents today for evaluation of hypertension, hyperlipidemia, AV block s/p pacemaker, paroxysmal atrial fibrillation, and anxiety. He reports that he is generally doing well. He reports that he underwent follow-up appointment with Dr. Asia Ribera on 7/30/2018, and interrogation of his pacemaker during the visit revealed a single episode of atrial fibrillation in November 2017 lasting for less than 2 hours. There were no recurrent episodes since that time, and he was asymptomatic during the episode. He denies any palpitations and is otherwise without complaints. He has a history of hypertension, treated with lisinopril. He does monitor his blood pressure regularly, and is currently taking 3.25 mg in the morning and evening. He reports that his blood pressure in mainly 120-130/ 70-80 at home. He has a history of intermittent high grade AV block, and underwent placement of a dual chamber Medtronic pacemaker in 2007. He underwent pacemaker generator exchange in 5/2016 after his device reached elective replacement indicator. A Medtronic MRI safe device was installed. He has a history of a single episode of atrial fibrillation noted on device check several years ago. It has not recurred. He was noted in the past to have mildly decreased LV function, but a repeat echocardiogram in 6/2013 showed normal size and lower limits of normal function with EF 50-55%; no wall motion abnormalities, and grade 1 diastolic dysfunction. He is followed by Dr. Asia Ribera. 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 He also was walking 2.5 miles each day. However, he states that he has not been that active this summer due to the heat. He denies any chest pain, shortness of breath at rest or with exertion, palpitations, lightheadedness, or edema. He has a history of dyslipidemia, managed by diet in the past. 
 
In 4/2017 he developed lightheadedness after painting outside for several hours. He took an Ativan because he thought he was experiencing anxiety. However, his symptoms persisted and he began to feel like he was going to pass out so he called 911 for help. When EMS arrived, his symptoms had resolved but he was noted to have a low blood pressure of approximately 112/67. He was taken to [facility] for evaluation and was asymptomatic upon arrival. He admitted to not eating or drinking anything for the four hour period that he was outside, and acknowledged that the day was quite warm. Evaluation in the ED included an EKG which showed a paced rhythm, serial troponins which were negative, and blood work showing WBC 14.2, Hb 14.5, Hct 42.7, creatinine 1.3/ eGFR 51.7. Chest x-ray showed no acute process, but did note an incidental pulmonary nodule over the left lower lobe. A chest CT scan was obtained (5/2017) showing mild subsegmental atelectasis or scarring at the left lung base without focal pulmonary nodule. He has a history of a GI bleeding in 1997 secondary to NSAID use for headaches. He also has a history of GERD and dysphagia and has undergone multiple esophageal dilatations in the past by Dr. Patrick Montez. In 11/2015, he had an upper endoscopy which showed a small hiatal hernia in the cardia, a Schatzki's ring at the gastroesophageal junction, which was dilated, and otherwise normal stomach and duodenum. He also had a screening colonoscopy which revealed mild colonic diverticulosis and a 7 mm polyp in the proximal ascending colon (pathology: tubulovillous adenoma). Follow-up recommended for 3 years. He has a history of anxiety, and uses lorazepam occasionally. He also reports he uses cyclobenzaprine occasionally for back spasms which occur at times due to straining his back with excessive physical work. Past Medical History: Diagnosis Date  Anxiety  AV block   
 intermittent, high-grade  Bilateral inguinal hernia  Cardiac echocardiogram 06/25/2013 EF 50-55%. No WMA. Mild LVH. Gr 1 DDfx. RVE.  AKANKSHA. Mild TR. No significant chg from study of 6/8/11.  Cardiac nuclear imaging test 10/07/2009 Likely inferior & anterior septal artifact. EF 55%. Mild septal WMA could be related to pacemaker.  Cardiomyopathy ([address]  Diverticulosis of colon 11/10/2015  Dysphagia Multiple esophageal dilations.  Essential hypertension with goal blood pressure less than 140/90   
 HLD (hyperlipidemia)  Hx of colonoscopy 11/10/2015 Dr. Greta Blackwood; tubulovillous adenoma. [facility] Pacemaker 2007 Medtronic dual-chamber device.  Paroxysmal atrial fibrillation (HCC) single episode noted on routine device check Past Surgical History:  
Procedure Laterality Date  CARDIAC SURG PROCEDURE UNLIST  ENDOSCOPY, COLON, DIAGNOSTIC    
 HX CATARACT REMOVAL    
 HX PACEMAKER  05/29/07 Medtronic Versa dual-chamber pacemaker for intermittent high-grade AV block and symptomatic bradycardia.  HX PACEMAKER  5/3/16 Current Outpatient Prescriptions Medication Sig  butalbital-acetaminophen-caffeine (FIORICET, ESGIC) -40 mg per tablet Take 1 Tab by mouth every six (6) hours as needed for Pain.  LORazepam (ATIVAN) 1 mg tablet Take 1 Tab by mouth every eight (8) hours as needed for Anxiety. Max Daily Amount: 3 mg.  lisinopril (PRINIVIL, ZESTRIL) 2.5 mg tablet TAKE THREE TABLETS BY MOUTH ONCE DAILY  mometasone (NASONEX) 50 mcg/actuation nasal spray USE TWO SPRAYS IN EACH NOSTRIL DAILY  VIT C/E/ZN/COPPR/LUTEIN/ZEAXAN (PRESERVISION AREDS 2 PO) Take  by mouth.  omeprazole (PRILOSEC) 20 mg capsule Take 20 mg by mouth daily as needed.  timolol (TIMOPTIC) 0.25 % ophthalmic solution Administer 1 Drop to both eyes two (2) times a day.  cyclobenzaprine (FLEXERIL) 5 mg tablet TAKE 1-2 TABLETS BY MOUTH EVERY 8 HOURS AS NEEDED FOR MUSCLE SPASM  MULTIVITAMINS W-MINERALS/LUT (CENTRUM SILVER PO) Take  by mouth daily.  OMEGA-3 FATTY ACIDS (OMEGA 3 PO) Take 2,400 mg by mouth two (2) times a day.  coenzyme q10 100 mg Cap Take  by mouth daily.  metronidazole (METROGEL) 1 % topical gel Apply  to affected area daily. Use a thin layer to affected areas after washing  zinc 50 mg capsule Take  by mouth daily.  magnesium 250 mg Tab Take  by mouth daily.  selenium 100 mcg Cap Take  by mouth daily.  ascorbic acid (VITAMIN C) 500 mg tablet Take  by mouth two (2) times a day.  Ginkgo Biloba Leaf Extract 30 mg Cap Take  by mouth daily. No current facility-administered medications for this visit. Allergies and Intolerances:  
No Known Allergies Family History: His brother  from metastatic prostate cancer. His mother lived until the age of 80, although she had CAD. His father  at the age of 59 from a CVA. He was an alcoholic. He has five sisters that are all healthy. Family History Problem Relation Age of Onset  Heart Surgery Mother  Stroke Father  Alcohol abuse Father Social History: He  reports that he quit smoking about 52 years ago. He has never used smokeless tobacco. He smoked 1 ppd for 2 years, and then quit (). He is retired  and worked for the [employer]. He lives with his girlfriend and has one son. History Alcohol Use No  
  Comment: 1998 last time he had alcohol. Immunization History: 
Immunization History Administered Date(s) Administered  Influenza High Dose Vaccine PF 01/10/2013, 2015, 2016, 10/02/2017  Influenza Vaccine 2013, 2014  Pneumococcal Conjugate (PCV-13) 2015  TDAP Vaccine 2012  ZZZ-RETIRED (DO NOT USE) Pneumococcal Vaccine (Unspecified Type) 2007  Zoster 07/25/2007 Review of Systems: As above included in HPI. Otherwise 11 point review of systems negative including constitutional, skin, HENT, eyes, respiratory, cardiovascular, gastrointestinal, genitourinary, musculoskeletal, endocrine, hematologic, allergy, and neurologic. Physical:  
Vitals:  
BP: 132/80 HR: 62 
WT: 172 lb 3.2 oz (78.1 kg) BMI:  24.71 kg/m2 Exam:  
Patient appears in no apparent distress. Affect is appropriate. HEENT --Anicteric sclerae, tympanic membranes normal,  ear canals normal. 
PERRL, EOMI, conjunctiva and lids normal.  
Sinuses were nontender, turbinates normal, hearing normal.  Oropharynx without 
erythema, normal tongue, oral mucosa and tonsils. No cervical lymphadenopathy. No thyromegaly, JVD, or bruits. Carotid pulses 2+ with normal upstroke. Lungs --Clear to auscultation. No wheezing or rales. Heart --Regular rate and rhythm, no murmurs, rubs, gallops, or clicks. Chest wall --Nontender to palpation. PMI normal. 
Abdomen -- Soft and nontender, no hepatosplenomegaly or masses. Extremities -- Without cyanosis, clubbing, edema. 2+ pulses equally and bilaterally. Neuro -- CN 2-12 intact, strength 5/5 with intact soft touch in all extremities Derm  no obvious abnormalities noted, no rash Review of Data: 
Labs: 
No visits with results within 1 Month(s) from this visit. Latest known visit with results is: 
 
Hospital Outpatient Visit on 08/22/2018 Component Date Value Ref Range Status  WBC 08/22/2018 7.3  4.6 - 13.2 K/uL Final  
 RBC 08/22/2018 4.66* 4.70 - 5.50 M/uL Final  
 HGB 08/22/2018 14.5  13.0 - 16.0 g/dL Final  
 HCT 08/22/2018 43.2  36.0 - 48.0 % Final  
 MCV 08/22/2018 92.7  74.0 - 97.0 FL Final  
 MCH 08/22/2018 31.1  24.0 - 34.0 PG Final  
 MCHC 08/22/2018 33.6  31.0 - 37.0 g/dL Final  
 RDW 08/22/2018 13.7  11.6 - 14.5 % Final  
 PLATELET 11/81/5185 486  135 - 420 K/uL Final  
 MPV 08/22/2018 8.9* 9.2 - 11.8 FL Final  
  NEUTROPHILS 08/22/2018 45  40 - 73 % Final  
 LYMPHOCYTES 08/22/2018 38  21 - 52 % Final  
 MONOCYTES 08/22/2018 8  3 - 10 % Final  
 EOSINOPHILS 08/22/2018 8* 0 - 5 % Final  
 BASOPHILS 08/22/2018 1  0 - 2 % Final  
 ABS. NEUTROPHILS 08/22/2018 3.3  1.8 - 8.0 K/UL Final  
 ABS. LYMPHOCYTES 08/22/2018 2.8  0.9 - 3.6 K/UL Final  
 ABS. MONOCYTES 08/22/2018 0.6  0.05 - 1.2 K/UL Final  
 ABS. EOSINOPHILS 08/22/2018 0.6* 0.0 - 0.4 K/UL Final  
 ABS. BASOPHILS 08/22/2018 0.0  0.0 - 0.1 K/UL Final  
 DF 08/22/2018 AUTOMATED    Final  
 LIPID PROFILE 08/22/2018        Final  
 Cholesterol, total 08/22/2018 180  <200 MG/DL Final  
 Triglyceride 08/22/2018 87  <150 MG/DL Final  
 HDL Cholesterol 08/22/2018 49  40 - 60 MG/DL Final  
 LDL, calculated 08/22/2018 113.6* 0 - 100 MG/DL Final  
 VLDL, calculated 08/22/2018 17.4  MG/DL Final  
 CHOL/HDL Ratio 08/22/2018 3.7  0 - 5.0   Final  
 Sodium 08/22/2018 136  136 - 145 mmol/L Final  
 Potassium 08/22/2018 4.2  3.5 - 5.5 mmol/L Final  
 Chloride 08/22/2018 100  100 - 108 mmol/L Final  
 CO2 08/22/2018 28  21 - 32 mmol/L Final  
 Anion gap 08/22/2018 8  3.0 - 18 mmol/L Final  
 Glucose 08/22/2018 83  74 - 99 mg/dL Final  
 BUN 08/22/2018 12  7.0 - 18 MG/DL Final  
 Creatinine 08/22/2018 0.87  0.6 - 1.3 MG/DL Final  
 BUN/Creatinine ratio 08/22/2018 14  12 - 20   Final  
 GFR est AA 08/22/2018 >60  >60 ml/min/1.73m2 Final  
 GFR est non-AA 08/22/2018 >60  >60 ml/min/1.73m2 Final  
 Calcium 08/22/2018 8.9  8.5 - 10.1 MG/DL Final  
 Bilirubin, total 08/22/2018 0.6  0.2 - 1.0 MG/DL Final  
 ALT (SGPT) 08/22/2018 23  16 - 61 U/L Final  
 AST (SGOT) 08/22/2018 21  15 - 37 U/L Final  
 Alk.  phosphatase 08/22/2018 62  45 - 117 U/L Final  
 Protein, total 08/22/2018 6.8  6.4 - 8.2 g/dL Final  
 Albumin 08/22/2018 3.8  3.4 - 5.0 g/dL Final  
 Globulin 08/22/2018 3.0  2.0 - 4.0 g/dL Final  
 A-G Ratio 08/22/2018 1.3  0.8 - 1.7   Final  
  Color 08/22/2018 YELLOW    Final  
 Appearance 08/22/2018 CLEAR    Final  
 Specific gravity 08/22/2018 1.008  1.005 - 1.030   Final  
 pH (UA) 08/22/2018 7.5  5.0 - 8.0   Final  
 Protein 08/22/2018 NEGATIVE   NEG mg/dL Final  
 Glucose 08/22/2018 NEGATIVE   NEG mg/dL Final  
 Ketone 08/22/2018 NEGATIVE   NEG mg/dL Final  
 Bilirubin 08/22/2018 NEGATIVE   NEG   Final  
 Blood 08/22/2018 NEGATIVE   NEG   Final  
 Urobilinogen 08/22/2018 0.2  0.2 - 1.0 EU/dL Final  
 Nitrites 08/22/2018 NEGATIVE   NEG   Final  
 Leukocyte Esterase 08/22/2018 NEGATIVE   NEG   Final  
 WBC 08/22/2018 0 to 1  0 - 4 /hpf Final  
 RBC 08/22/2018 0  0 - 5 /hpf Final  
 Epithelial cells 08/22/2018 FEW  0 - 5 /lpf Final  
 Bacteria 08/22/2018 NEGATIVE   NEG /hpf Final  
 TSH 08/22/2018 2.50  0.36 - 3.74 uIU/mL Final  
 Prostate Specific Ag 08/22/2018 0.7  0.0 - 4.0 ng/mL Final  
 
 
Calculated 10 year ASCVD risk score: 34.5 % Health Maintenance: 
Screening:  
 Colorectal: colonoscopy (11/2015) tubulovillous adenoma. Dr. Greta Blackwood. Due 11/2018. Depression: none DM (HbA1c/FPG): FPG 83 (8/2018) Hepatitis C: N/A Falls: none DEXA: N/A 
 PSA/WEN: PSA 0.7 (8/2018) Glaucoma: regular eye exams with Dr. Becki Crandall for glaucoma q 4 months. Smoking: none Vitamin D: 32.2 (7/2016) Medicare Wellness: 2/20/2018 Impression: 
Patient Active Problem List  
Diagnosis Code  AV block I44.30  Pacemaker Z95.0  Cardiomyopathy ([address] I42.9  Paroxysmal atrial fibrillation (HCC) I48.0  
 HLD (hyperlipidemia) E78.5  FH: prostate cancer Z80.42  Bilateral inguinal hernia K40.20  Anxiety F41.9  Essential hypertension I10  
 Colon polyp; tubulovillous adenoma 11/2015 K63.5  Gastroesophageal reflux disease without esophagitis K21.9  Postural dizziness with presyncope R42, R55 Plan: 1. Hypertension.  Blood pressure remains well controlled today on 7.5 mg daily dose of lisinopril. He continues to monitor his blood pressure carefully at home. Renal function remains normal with creatinine 0.87/ eGFR >60. Continue to follow. 2. AV block, intermittent high grade. S/P Medtronic dual chamber pacemaker with generator change in 2016 with an MRI safe device installed. Interrogated and followed every 3 months by Dr. Babs Tee, last 2018. Repeat echocardiogram with low normal LV function. 3. Paroxysmal atrial fibrillation. Single episode noted on device check several years ago. Had recurrence noted on device check in 2017, lasting approximately 2 hours. No recurrence since that time. Anti-coagulation felt not to be needed unless develops recurrence. Continue to follow. 4. Hyperlipidemia. Calculated 10 year ASCVD risk is 34.5 %. However, his age > 76 does not place him in one of the four statin benefit groups as per new AHA/ACC guidelines. Also, with no evidence of ASCVD. LDL with increase to 113. Emphasized importance of continued lifestyle modifications, including diet, exercise, and weight loss. Continue to follow and repeat lipid panel at next visit. 5. Presyncope. Related to dehydration secondary to physical exertion in the heat without adequate hydration. Reports no further symptoms. Monitors his blood pressure daily and has been controlled at his baseline. Encouraged to continue drinking plenty of fluids. Follow. 6. Pulmonary nodule on chest x-ray. Incidental finding on chest xray at [facility], but chest CT scan without evidence of any nodule. Did note elevated left hemidiaphragm and focal atelectasis or scarring at left lung base. No follow-up needed. 7. GERD/dysphagia. Recent EGD with esophageal dilatation for Schatzki's ring. Small hiatal hernia. Symptoms well controlled with omeprazole. Follow. 8. Family history of prostate cancer.  Patient quite concerned about his own risk given that his brother  quite rapidly from metastatic disease. Reassured that his PSA level remains quite low. He is requesting to continue to have it checked despite his age, which would suggest that testing should be discontinued. Discussed the controversy of continuing prostatic cancer screening after age 75. Patient still requesting to continue PSA testing, but states that he would prefer not to have WEN unless PSA increases. 9. Anxiety. Patient is quite anxious about his health. Reassured that he is living a healthy lifestyle and doing well. Continue lorazepam as needed. 10. Health maintenance. Wishing to receive influenza vaccine in 11/2018. Given script for Shingrix vaccine. Other immunizations up to date. Colonoscopy due 11/2018. Referral placed for Dr. Bennett Ferrara. Continue regular eye exams with Dr. Jorge Bishop. Vitamin D level normal. Medicare wellness visit up to date. Patient understands recommendations and agrees with plan. Follow-up in 6 months.

## 2018-11-08 DIAGNOSIS — F41.9 ANXIETY: ICD-10-CM

## 2018-11-09 RX ORDER — LORAZEPAM 1 MG/1
1 TABLET ORAL
Qty: 60 TAB | Refills: 0 | OUTPATIENT
Start: 2018-11-09 | End: 2018-12-27 | Stop reason: SDUPTHER

## 2018-11-09 NOTE — TELEPHONE ENCOUNTER
PHONE IN [facility] reports the last fill date for Ativan as 10/01/2018. There appears to be no inconsistencies in regards to the prescribing of this medication. Last Visit: 09/27/2018 with MD Edwina Rebollar Next Appointment: 04/02/2019 with MD Edwina Rebollar Previous Refill Encounters: 09/27/2018 per MD Edwina Rebollar #[id number] Requested Prescriptions Pending Prescriptions Disp Refills  LORazepam (ATIVAN) 1 mg tablet 60 Tab 0 Sig: Take 1 Tab by mouth every eight (8) hours as needed for Anxiety. Max Daily Amount: 3 mg.

## 2018-11-20 ENCOUNTER — CLINICAL SUPPORT (OUTPATIENT)
Dept: INTERNAL MEDICINE CLINIC | Age: 78
End: 2018-11-20

## 2018-11-20 DIAGNOSIS — Z23 ENCOUNTER FOR IMMUNIZATION: ICD-10-CM

## 2018-11-20 NOTE — PATIENT INSTRUCTIONS
Vaccine Information Statement    Influenza (Flu) Vaccine (Inactivated or Recombinant): What you need to know    Many Vaccine Information Statements are available in [place] and other languages. See www.immunize.org/vis  Hojas de Información Sobre Vacunas están disponibles en Español y en muchos otros idiomas. Visite www.immunize.org/vis    1. Why get vaccinated? Influenza (flu) is a contagious disease that spreads around the [place] every year, usually between October and May. Flu is caused by influenza viruses, and is spread mainly by coughing, sneezing, and close contact. Anyone can get flu. Flu strikes suddenly and can last several days. Symptoms vary by age, but can include:   fever/chills   sore throat   muscle aches   fatigue   cough   headache    runny or stuffy nose    Flu can also lead to pneumonia and blood infections, and cause diarrhea and seizures in children. If you have a medical condition, such as heart or lung disease, flu can make it worse. Flu is more dangerous for some people. Infants and young children, people 72years of age and older, pregnant women, and people with certain health conditions or a weakened immune system are at greatest risk. Each year thousands of people in the [facility] die from flu, and many more are hospitalized. Flu vaccine can:   keep you from getting flu,   make flu less severe if you do get it, and   keep you from spreading flu to your family and other people. 2. Inactivated and recombinant flu vaccines    A dose of flu vaccine is recommended every flu season. Children 6 months through 6years of age may need two doses during the same flu season. Everyone else needs only one dose each flu season.        Some inactivated flu vaccines contain a very small amount of a mercury-based preservative called thimerosal. Studies have not shown thimerosal in vaccines to be harmful, but flu vaccines that do not contain thimerosal are available. There is no live flu virus in flu shots. They cannot cause the flu. There are many flu viruses, and they are always changing. Each year a new flu vaccine is made to protect against three or four viruses that are likely to cause disease in the upcoming flu season. But even when the vaccine doesnt exactly match these viruses, it may still provide some protection    Flu vaccine cannot prevent:   flu that is caused by a virus not covered by the vaccine, or   illnesses that look like flu but are not. It takes about 2 weeks for protection to develop after vaccination, and protection lasts through the flu season. 3. Some people should not get this vaccine    Tell the person who is giving you the vaccine:     If you have any severe, life-threatening allergies. If you ever had a life-threatening allergic reaction after a dose of flu vaccine, or have a severe allergy to any part of this vaccine, you may be advised not to get vaccinated. Most, but not all, types of flu vaccine contain a small amount of egg protein.  If you ever had Guillain-Barré Syndrome (also called GBS). Some people with a history of GBS should not get this vaccine. This should be discussed with your doctor.  If you are not feeling well. It is usually okay to get flu vaccine when you have a mild illness, but you might be asked to come back when you feel better. 4. Risks of a vaccine reaction    With any medicine, including vaccines, there is a chance of reactions. These are usually mild and go away on their own, but serious reactions are also possible. Most people who get a flu shot do not have any problems with it.      Minor problems following a flu shot include:    soreness, redness, or swelling where the shot was given     hoarseness   sore, red or itchy eyes   cough   fever   aches   headache   itching   fatigue  If these problems occur, they usually begin soon after the shot and last 1 or 2 days. More serious problems following a flu shot can include the following:     There may be a small increased risk of Guillain-Barré Syndrome (GBS) after inactivated flu vaccine. This risk has been estimated at 1 or 2 additional cases per million people vaccinated. This is much lower than the risk of severe complications from flu, which can be prevented by flu vaccine.  Young children who get the flu shot along with pneumococcal vaccine (PCV13) and/or DTaP vaccine at the same time might be slightly more likely to have a seizure caused by fever. Ask your doctor for more information. Tell your doctor if a child who is getting flu vaccine has ever had a seizure. Problems that could happen after any injected vaccine:      People sometimes faint after a medical procedure, including vaccination. Sitting or lying down for about 15 minutes can help prevent fainting, and injuries caused by a fall. Tell your doctor if you feel dizzy, or have vision changes or ringing in the ears.  Some people get severe pain in the shoulder and have difficulty moving the arm where a shot was given. This happens very rarely.  Any medication can cause a severe allergic reaction. Such reactions from a vaccine are very rare, estimated at about 1 in a million doses, and would happen within a few minutes to a few hours after the vaccination. As with any medicine, there is a very remote chance of a vaccine causing a serious injury or death. The safety of vaccines is always being monitored. For more information, visit: www.cdc.gov/vaccinesafety/    5. What if there is a serious reaction? What should I look for?  Look for anything that concerns you, such as signs of a severe allergic reaction, very high fever, or unusual behavior.     Signs of a severe allergic reaction can include hives, swelling of the face and throat, difficulty breathing, a fast heartbeat, dizziness, and weakness  usually within a few minutes to a few hours after the vaccination. What should I do?  If you think it is a severe allergic reaction or other emergency that cant wait, call 9-1-1 and get the person to the nearest hospital. Otherwise, call your doctor.  Reactions should be reported to the Vaccine Adverse Event Reporting System (VAERS). Your doctor should file this report, or you can do it yourself through  the VAERS web site at www.vaers. [facility].gov, or by calling 2-207.136.7530. VAERS does not give medical advice. 6. The National Vaccine Injury Compensation Program    The [facility] Vaccine Injury Compensation Program (VICP) is a federal program that was created to compensate people who may have been injured by certain vaccines. Persons who believe they may have been injured by a vaccine can learn about the program and about filing a claim by calling 9-120.661.6857 or visiting the [employer] website at www.[facility].gov/vaccinecompensation. There is a time limit to file a claim for compensation. 7. How can I learn more?  Ask your healthcare provider. He or she can give you the vaccine package insert or suggest other sources of information.  Call your local or state health department.  Contact the Centers for Disease Control and Prevention (CDC):  - Call 8-108.949.1685 (1-800-CDC-INFO) or  - Visit CDCs website at www.cdc.gov/flu    Vaccine Information Statement   Inactivated Influenza Vaccine   8/7/2015  41 AYANA Noe Josh [id number]    Department of Health and Human Services  Centers for Disease Control and Prevention    Office Use Only

## 2018-12-27 DIAGNOSIS — F41.9 ANXIETY: ICD-10-CM

## 2018-12-27 RX ORDER — LORAZEPAM 1 MG/1
1 TABLET ORAL
Qty: 60 TAB | Refills: 0 | OUTPATIENT
Start: 2018-12-27 | End: 2019-02-04 | Stop reason: SDUPTHER

## 2019-02-04 ENCOUNTER — OFFICE VISIT (OUTPATIENT)
Dept: CARDIOLOGY CLINIC | Age: 79
End: 2019-02-04

## 2019-02-04 VITALS
BODY MASS INDEX: 25.62 KG/M2 | HEIGHT: 70 IN | SYSTOLIC BLOOD PRESSURE: 130 MMHG | DIASTOLIC BLOOD PRESSURE: 86 MMHG | HEART RATE: 68 BPM | OXYGEN SATURATION: 97 % | WEIGHT: 179 LBS

## 2019-02-04 DIAGNOSIS — F41.9 ANXIETY: ICD-10-CM

## 2019-02-04 DIAGNOSIS — I44.30 AV BLOCK: ICD-10-CM

## 2019-02-04 DIAGNOSIS — Z95.0 PACEMAKER: ICD-10-CM

## 2019-02-04 DIAGNOSIS — I48.0 PAROXYSMAL ATRIAL FIBRILLATION (HCC): Primary | ICD-10-CM

## 2019-02-04 DIAGNOSIS — I10 ESSENTIAL HYPERTENSION: ICD-10-CM

## 2019-02-04 DIAGNOSIS — E78.5 HYPERLIPIDEMIA, UNSPECIFIED HYPERLIPIDEMIA TYPE: ICD-10-CM

## 2019-02-04 RX ORDER — LORAZEPAM 1 MG/1
1 TABLET ORAL
Qty: 60 TAB | Refills: 0 | OUTPATIENT
Start: 2019-02-04 | End: 2019-03-12 | Stop reason: SDUPTHER

## 2019-02-04 NOTE — PROGRESS NOTES
Gabe Venegas presents today for Chief Complaint Patient presents with  Irregular Heart Beat  
  6 month follow up - no cardiac complaints Gabe Venegas preferred language for health care discussion is english/other. Is someone accompanying this pt? no 
 
Is the patient using any DME equipment during OV? no 
 
Depression Screening: PHQ over the last two weeks 9/27/2018 Little interest or pleasure in doing things Not at all Feeling down, depressed, irritable, or hopeless Not at all Total Score PHQ 2 0 Learning Assessment: 
Learning Assessment 1/29/2018 PRIMARY LEARNER Patient HIGHEST LEVEL OF EDUCATION - PRIMARY LEARNER  -  
BARRIERS PRIMARY LEARNER -  
CO-LEARNER CAREGIVER -  
PRIMARY LANGUAGE ENGLISH  
LEARNER PREFERENCE PRIMARY DEMONSTRATION  
ANSWERED BY Patient RELATIONSHIP SELF Abuse Screening: 
Abuse Screening Questionnaire 4/25/2017 Do you ever feel afraid of your partner? Regla Vaz Are you in a relationship with someone who physically or mentally threatens you? Regla Vaz Is it safe for you to go home? Reddy Knox Fall Risk Fall Risk Assessment, last 12 mths 9/27/2018 Able to walk? Yes Fall in past 12 months? No  
Number of falls in past 12 months - Pt currently taking Anticoagulant therapy? no 
 
Coordination of Care: 1. Have you been to the ER, urgent care clinic since your last visit? Hospitalized since your last visit? no 
 
2. Have you seen or consulted any other health care providers outside of the [facility] since your last visit? Include any pap smears or colon screening.  no

## 2019-02-04 NOTE — PROGRESS NOTES
HISTORY OF PRESENT ILLNESS Angelito Rousseau is a 77 y.o. male. Follow-up Patient presents for a scheduled followup visit. He has a history of intermittent high-grade AV block, status post to dual-chamber permanent pacemaker in 2007. He also has a history of hypertension, Brief episodes approximately atrial fibrillation, and a mildly depressed LV systolic function,  Which returned to low normal on echocardiogram in June 2013 showing an ejection fraction of 50-55%. The patient underwent a pacemaker generator exchange in May 2016 With a Medtronic MRI safe device after his device has reached elective replacement indicator. The patient was last seen in the office 6 months ago. Since last visit he has been feeling well. Patient reports that he remains very active. Without any significant change in his activity level. No heart palpitations, dizziness or syncope. No chest pain or shortness of breath. Past Medical History:  
Diagnosis Date  Anxiety  AV block   
 intermittent, high-grade  Bilateral inguinal hernia  Cardiac echocardiogram 06/25/2013 EF 50-55%. No WMA. Mild LVH. Gr 1 DDfx. RVE.  AKANKSHA. Mild TR. No significant chg from study of 6/8/11.  Cardiac nuclear imaging test 10/07/2009 Likely inferior & anterior septal artifact. EF 55%. Mild septal WMA could be related to pacemaker.  Cardiomyopathy ([address]  Diverticulosis of colon 11/10/2015  Dysphagia Multiple esophageal dilations.  Essential hypertension with goal blood pressure less than 140/90   
 HLD (hyperlipidemia)  Hx of colonoscopy 11/10/2015 Dr. Fabián Preston; tubulovillous adenoma. [facility] Pacemaker 2007 Medtronic dual-chamber device.  Paroxysmal atrial fibrillation (HCC) single episode noted on routine device check Current Outpatient Medications Medication Sig Dispense Refill  LORazepam (ATIVAN) 1 mg tablet Take 1 Tab by mouth every eight (8) hours as needed for Anxiety. Max Daily Amount: 3 mg. 60 Tab 0  
 butalbital-acetaminophen-caffeine (FIORICET, ESGIC) -40 mg per tablet Take 1 Tab by mouth every six (6) hours as needed for Pain. 30 Tab 0  
 lisinopril (PRINIVIL, ZESTRIL) 2.5 mg tablet TAKE THREE TABLETS BY MOUTH ONCE DAILY 270 Tab 2  
 mometasone (NASONEX) 50 mcg/actuation nasal spray USE TWO SPRAYS IN EACH NOSTRIL DAILY 1 Container 5  
 VIT C/E/ZN/COPPR/LUTEIN/ZEAXAN (PRESERVISION AREDS 2 PO) Take  by mouth.  omeprazole (PRILOSEC) 20 mg capsule Take 20 mg by mouth daily as needed.  timolol (TIMOPTIC) 0.25 % ophthalmic solution Administer 1 Drop to both eyes two (2) times a day.  cyclobenzaprine (FLEXERIL) 5 mg tablet TAKE 1-2 TABLETS BY MOUTH EVERY 8 HOURS AS NEEDED FOR MUSCLE SPASM 25 Tab 0  MULTIVITAMINS W-MINERALS/LUT (CENTRUM SILVER PO) Take  by mouth daily.  OMEGA-3 FATTY ACIDS (OMEGA 3 PO) Take 2,400 mg by mouth two (2) times a day.  coenzyme q10 100 mg Cap Take  by mouth daily.  metronidazole (METROGEL) 1 % topical gel Apply  to affected area daily. Use a thin layer to affected areas after washing  zinc 50 mg capsule Take  by mouth daily.  magnesium 250 mg Tab Take  by mouth daily.  selenium 100 mcg Cap Take  by mouth daily.  ascorbic acid (VITAMIN C) 500 mg tablet Take  by mouth two (2) times a day.  Ginkgo Biloba Leaf Extract 30 mg Cap Take  by mouth daily. No Known Allergies Social History Tobacco Use  Smoking status: Former Smoker Last attempt to quit: 1966 Years since quittin.5  Smokeless tobacco: Never Used Substance Use Topics  Alcohol use: No  
  Comment: 1998 last time he had alcohol.  Drug use: No  
   
 
Review of Systems Constitutional: Negative for chills and weight loss. HENT: Negative for nosebleeds. Eyes: Negative for blurred vision and double vision. Respiratory: Negative for wheezing. Cardiovascular: Negative for leg swelling. Gastrointestinal: Negative for heartburn. Genitourinary: Negative for dysuria and hematuria. Musculoskeletal: Negative for falls and myalgias. Skin: Negative for rash. Neurological: Negative for focal weakness. Endo/Heme/Allergies: Does not bruise/bleed easily. Psychiatric/Behavioral: Negative for substance abuse. Visit Vitals /86 Pulse 68 Ht 5' 10\" (1.778 m) Wt 81.2 kg (179 lb) SpO2 97% BMI 25.68 kg/m² Physical Exam  
Constitutional: He is oriented to person, place, and time. He appears well-developed and well-nourished. HENT:  
Head: Normocephalic and atraumatic. Eyes: Conjunctivae are normal.  
Neck: Neck supple. No JVD present. Carotid bruit is not present. Cardiovascular: Normal rate, regular rhythm, S1 normal, S2 normal and normal pulses. Exam reveals no gallop and no S3. No murmur heard. Pulmonary/Chest: Breath sounds normal. He has no wheezes. He has no rales. Abdominal: Soft. Bowel sounds are normal. There is no tenderness. Musculoskeletal: He exhibits no edema. Neurological: He is alert and oriented to person, place, and time. Skin: Skin is warm and dry. Pacemaker interrogation: Pacemaker generator at beginning of life. Estimated longevity 6.5 years. No significant arrhythmias since last device check. Pacing in the atrium 20 % in the ventricle 100%. Scanned document for details. ASSESSMENT and PLAN Paroxysmal atrial fibrillation. Patient had a single episode of atrial fibrillation in November 2017 lasting for little less than 2 hours. No recurrent episodes since that time. He was asymptomatic to the arrhythmia. Unless he starts developing more frequent or prolonged episodes, I do not feel anticoagulation is needed. This will be continued to be monitored every 3 months with routine device checks.  
 
High-grade AV block: Status post dual-chamber permanent pacemaker with recent generator exchange in May 2016. Patient has an MRI compatible device. Normal device function on interrogation today. He is now pacemaker dependent in the ventricle. His battery status 6.5 years left. Hypertension: Remains well controlled on lisinopril, which I would continue. Dyslipidemia. Patient has been managing this with diet control and takes a fish oil supplement. [facility] device check in 3 months. Followup in 6 months, sooner if needed.

## 2019-03-05 RX ORDER — LISINOPRIL 2.5 MG/1
TABLET ORAL
Qty: 270 TAB | Refills: 2 | Status: SHIPPED | OUTPATIENT
Start: 2019-03-05 | End: 2019-12-04 | Stop reason: SDUPTHER

## 2019-03-12 DIAGNOSIS — F41.9 ANXIETY: ICD-10-CM

## 2019-03-12 RX ORDER — LORAZEPAM 1 MG/1
1 TABLET ORAL
Qty: 60 TAB | Refills: 0 | OUTPATIENT
Start: 2019-03-12 | End: 2019-04-02 | Stop reason: SDUPTHER

## 2019-03-26 ENCOUNTER — APPOINTMENT (OUTPATIENT)
Dept: INTERNAL MEDICINE CLINIC | Age: 79
End: 2019-03-26

## 2019-03-26 ENCOUNTER — HOSPITAL ENCOUNTER (OUTPATIENT)
Dept: LAB | Age: 79
Discharge: HOME | End: 2019-03-26
Payer: MEDICARE

## 2019-03-26 DIAGNOSIS — E78.5 HYPERLIPIDEMIA, UNSPECIFIED HYPERLIPIDEMIA TYPE: ICD-10-CM

## 2019-03-26 DIAGNOSIS — Z12.11 COLON CANCER SCREENING: ICD-10-CM

## 2019-03-26 DIAGNOSIS — I48.0 PAROXYSMAL ATRIAL FIBRILLATION (HCC): ICD-10-CM

## 2019-03-26 DIAGNOSIS — Z95.0 PACEMAKER: ICD-10-CM

## 2019-03-26 DIAGNOSIS — I10 ESSENTIAL HYPERTENSION: ICD-10-CM

## 2019-03-26 DIAGNOSIS — F41.9 ANXIETY: ICD-10-CM

## 2019-03-26 DIAGNOSIS — R42 POSTURAL DIZZINESS WITH PRESYNCOPE: ICD-10-CM

## 2019-03-26 DIAGNOSIS — I42.9 CARDIOMYOPATHY, UNSPECIFIED TYPE (HCC): ICD-10-CM

## 2019-03-26 DIAGNOSIS — R55 POSTURAL DIZZINESS WITH PRESYNCOPE: ICD-10-CM

## 2019-03-26 DIAGNOSIS — K21.9 GASTROESOPHAGEAL REFLUX DISEASE WITHOUT ESOPHAGITIS: ICD-10-CM

## 2019-03-26 DIAGNOSIS — I44.30 AV BLOCK: ICD-10-CM

## 2019-03-26 DIAGNOSIS — D12.2 ADENOMATOUS POLYP OF ASCENDING COLON: ICD-10-CM

## 2019-03-26 LAB
ANION GAP SERPL CALC-SCNC: 4 MMOL/L (ref 3–18)
BUN SERPL-MCNC: 12 MG/DL (ref 7–18)
BUN/CREAT SERPL: 14 (ref 12–20)
CALCIUM SERPL-MCNC: 8.6 MG/DL (ref 8.5–10.1)
CHLORIDE SERPL-SCNC: 100 MMOL/L (ref 100–108)
CHOLEST SERPL-MCNC: 171 MG/DL
CO2 SERPL-SCNC: 31 MMOL/L (ref 21–32)
CREAT SERPL-MCNC: 0.83 MG/DL (ref 0.6–1.3)
GLUCOSE SERPL-MCNC: 94 MG/DL (ref 74–99)
HDLC SERPL-MCNC: 45 MG/DL (ref 40–60)
HDLC SERPL: 3.8 {RATIO} (ref 0–5)
LDLC SERPL CALC-MCNC: 110.2 MG/DL (ref 0–100)
LIPID PROFILE,FLP: ABNORMAL
POTASSIUM SERPL-SCNC: 4.5 MMOL/L (ref 3.5–5.5)
SODIUM SERPL-SCNC: 135 MMOL/L (ref 136–145)
TRIGL SERPL-MCNC: 79 MG/DL (ref ?–150)
VLDLC SERPL CALC-MCNC: 15.8 MG/DL

## 2019-03-26 PROCEDURE — 80048 BASIC METABOLIC PNL TOTAL CA: CPT

## 2019-03-26 PROCEDURE — 36415 COLL VENOUS BLD VENIPUNCTURE: CPT

## 2019-03-26 PROCEDURE — 80061 LIPID PANEL: CPT

## 2019-03-28 ENCOUNTER — ANESTHESIA EVENT (OUTPATIENT)
Dept: ENDOSCOPY | Age: 79
End: 2019-03-28
Payer: MEDICARE

## 2019-03-29 ENCOUNTER — HOSPITAL ENCOUNTER (OUTPATIENT)
Age: 79
Setting detail: OUTPATIENT SURGERY
Discharge: HOME | End: 2019-03-29
Attending: INTERNAL MEDICINE | Admitting: INTERNAL MEDICINE
Payer: MEDICARE

## 2019-03-29 ENCOUNTER — ANESTHESIA (OUTPATIENT)
Dept: ENDOSCOPY | Age: 79
End: 2019-03-29
Payer: MEDICARE

## 2019-03-29 VITALS
WEIGHT: 173.25 LBS | HEIGHT: 70 IN | SYSTOLIC BLOOD PRESSURE: 131 MMHG | HEART RATE: 65 BPM | TEMPERATURE: 96.2 F | DIASTOLIC BLOOD PRESSURE: 73 MMHG | OXYGEN SATURATION: 96 % | RESPIRATION RATE: 14 BRPM | BODY MASS INDEX: 24.8 KG/M2

## 2019-03-29 PROCEDURE — 77030009426 HC FCPS BIOP ENDOSC BSC -B: Performed by: INTERNAL MEDICINE

## 2019-03-29 PROCEDURE — 76060000032 HC ANESTHESIA 0.5 TO 1 HR: Performed by: INTERNAL MEDICINE

## 2019-03-29 PROCEDURE — C1726 CATH, BAL DIL, NON-VASCULAR: HCPCS | Performed by: INTERNAL MEDICINE

## 2019-03-29 PROCEDURE — 77030020268 HC MISC GENERAL SUPPLY: Performed by: INTERNAL MEDICINE

## 2019-03-29 PROCEDURE — 77030018846 HC SOL IRR STRL H20 ICUM -A: Performed by: INTERNAL MEDICINE

## 2019-03-29 PROCEDURE — 74011250636 HC RX REV CODE- 250/636

## 2019-03-29 PROCEDURE — 76040000007: Performed by: INTERNAL MEDICINE

## 2019-03-29 PROCEDURE — 74011250636 HC RX REV CODE- 250/636: Performed by: NURSE ANESTHETIST, CERTIFIED REGISTERED

## 2019-03-29 PROCEDURE — 77030031670 HC DEV INFL ENDOTEK BIG60 MRTM -B: Performed by: INTERNAL MEDICINE

## 2019-03-29 PROCEDURE — 88305 TISSUE EXAM BY PATHOLOGIST: CPT

## 2019-03-29 PROCEDURE — 77030008565 HC TBNG SUC IRR ERBE -B: Performed by: INTERNAL MEDICINE

## 2019-03-29 RX ORDER — SODIUM CHLORIDE, SODIUM LACTATE, POTASSIUM CHLORIDE, CALCIUM CHLORIDE 600; 310; 30; 20 MG/100ML; MG/100ML; MG/100ML; MG/100ML
50 INJECTION, SOLUTION INTRAVENOUS CONTINUOUS
Status: DISCONTINUED | OUTPATIENT
Start: 2019-03-29 | End: 2019-03-29 | Stop reason: HOSPADM

## 2019-03-29 RX ORDER — FAMOTIDINE 10 MG/ML
INJECTION INTRAVENOUS
Status: COMPLETED
Start: 2019-03-29 | End: 2019-03-29

## 2019-03-29 RX ORDER — LIDOCAINE HYDROCHLORIDE 10 MG/ML
0.1 INJECTION, SOLUTION EPIDURAL; INFILTRATION; INTRACAUDAL; PERINEURAL AS NEEDED
Status: DISCONTINUED | OUTPATIENT
Start: 2019-03-29 | End: 2019-03-29 | Stop reason: HOSPADM

## 2019-03-29 RX ORDER — SODIUM CHLORIDE 0.9 % (FLUSH) 0.9 %
5-40 SYRINGE (ML) INJECTION AS NEEDED
Status: DISCONTINUED | OUTPATIENT
Start: 2019-03-29 | End: 2019-03-29 | Stop reason: HOSPADM

## 2019-03-29 RX ORDER — SODIUM CHLORIDE 0.9 % (FLUSH) 0.9 %
5-40 SYRINGE (ML) INJECTION EVERY 8 HOURS
Status: DISCONTINUED | OUTPATIENT
Start: 2019-03-29 | End: 2019-03-29 | Stop reason: HOSPADM

## 2019-03-29 RX ORDER — SODIUM CHLORIDE, SODIUM LACTATE, POTASSIUM CHLORIDE, CALCIUM CHLORIDE 600; 310; 30; 20 MG/100ML; MG/100ML; MG/100ML; MG/100ML
INJECTION, SOLUTION INTRAVENOUS
Status: DISCONTINUED | OUTPATIENT
Start: 2019-03-29 | End: 2019-03-29 | Stop reason: HOSPADM

## 2019-03-29 RX ORDER — PROPOFOL 10 MG/ML
INJECTION, EMULSION INTRAVENOUS AS NEEDED
Status: DISCONTINUED | OUTPATIENT
Start: 2019-03-29 | End: 2019-03-29 | Stop reason: HOSPADM

## 2019-03-29 RX ORDER — FAMOTIDINE 20 MG/1
20 TABLET, FILM COATED ORAL ONCE
Status: DISCONTINUED | OUTPATIENT
Start: 2019-03-29 | End: 2019-03-29 | Stop reason: ALTCHOICE

## 2019-03-29 RX ADMIN — SODIUM CHLORIDE, SODIUM LACTATE, POTASSIUM CHLORIDE, CALCIUM CHLORIDE: 600; 310; 30; 20 INJECTION, SOLUTION INTRAVENOUS at 12:47

## 2019-03-29 RX ADMIN — PROPOFOL 50 MG: 10 INJECTION, EMULSION INTRAVENOUS at 12:55

## 2019-03-29 RX ADMIN — PROPOFOL 50 MG: 10 INJECTION, EMULSION INTRAVENOUS at 12:51

## 2019-03-29 RX ADMIN — SODIUM CHLORIDE, SODIUM LACTATE, POTASSIUM CHLORIDE, AND CALCIUM CHLORIDE 50 ML/HR: 600; 310; 30; 20 INJECTION, SOLUTION INTRAVENOUS at 11:42

## 2019-03-29 RX ADMIN — PROPOFOL 50 MG: 10 INJECTION, EMULSION INTRAVENOUS at 13:03

## 2019-03-29 RX ADMIN — PROPOFOL 50 MG: 10 INJECTION, EMULSION INTRAVENOUS at 12:59

## 2019-03-29 RX ADMIN — FAMOTIDINE: 10 INJECTION INTRAVENOUS at 11:42

## 2019-03-29 NOTE — DISCHARGE INSTRUCTIONS
Colon Polyps: Care Instructions  Your Care Instructions    Colon polyps are growths in the colon or the rectum. The cause of most colon polyps is not known, and most people who get them do not have any problems. But a certain kind can turn into cancer. For this reason, regular testing for colon polyps is important for people age 48 and older and anyone who has an increased risk for colon cancer. Polyps are usually found through routine colon cancer screening tests. Although most colon polyps are not cancerous, they are usually removed and then tested for cancer. Screening for colon cancer saves lives because the cancer can usually be cured if it is caught early. If you have a polyp that is the type that can turn into cancer, you may need more tests to examine your entire colon. The doctor will remove any other polyps that he or she finds, and you will be tested more often. Follow-up care is a key part of your treatment and safety. Be sure to make and go to all appointments, and call your doctor if you are having problems. It's also a good idea to know your test results and keep a list of the medicines you take. How can you care for yourself at home? Regular exams to look for colon polyps are the best way to prevent polyps from turning into colon cancer. These can include stool tests, sigmoidoscopy, colonoscopy, and CT colonography. Talk with your doctor about a testing schedule that is right for you. To prevent polyps  There is no home treatment that can prevent colon polyps. But these steps may help lower your risk for cancer. · Stay active. Being active can help you get to and stay at a healthy weight. Try to exercise on most days of the week. Walking is a good choice. · Eat well. Choose a variety of vegetables, fruits, legumes (such as peas and beans), fish, poultry, and whole grains. · Do not smoke. If you need help quitting, talk to your doctor about stop-smoking programs and medicines.  These can increase your chances of quitting for good. · If you drink alcohol, limit how much you drink. Limit alcohol to 2 drinks a day for men and 1 drink a day for women. When should you call for help? Call your doctor now or seek immediate medical care if:    · You have severe belly pain.     · Your stools are maroon or very bloody.    Watch closely for changes in your health, and be sure to contact your doctor if:    · You have a fever.     · You have nausea or vomiting.     · You have a change in bowel habits (new constipation or diarrhea).     · Your symptoms get worse or are not improving as expected. Where can you learn more? Go to http://hector-scott.info/. Enter 95 [id number] in the search box to learn more about \"Colon Polyps: Care Instructions. \"  Current as of: March 27, 2018  Content Version: 11.9  © 4619-5077 [employer]. Care instructions adapted under license by [employer] (which disclaims liability or warranty for this information). If you have questions about a medical condition or this instruction, always ask your healthcare professional. [address] any warranty or liability for your use of this information. Learning About Diverticulosis and Diverticulitis  What are diverticulosis and diverticulitis? In diverticulosis and diverticulitis, pouches called diverticula form in the wall of the large intestine, or colon. · In diverticulosis, the pouches do not cause any pain or other symptoms. · In diverticulitis, the pouches get inflamed or infected and cause symptoms. Doctors aren't sure what causes these pouches in the colon. But they think that a low-fiber diet may play a role. Without fiber to add bulk to the stool, the colon has to work harder than normal to push the stool forward. The pressure from this may cause pouches to form in weak spots along the colon. Some people with diverticulosis get diverticulitis.  But experts don't know why this happens. What are the symptoms? · In diverticulosis, most people don't have symptoms. But pouches sometimes bleed. · In diverticulitis, symptoms may last from a few hours to a week or more. They include:  ? Belly pain. This is usually in the lower left side. It is sometimes worse when you move. This is the most common symptom. ? Fever and chills. ? Bloating and gas. ? Diarrhea or constipation. ? Nausea and sometimes vomiting.  ? Not feeling like eating. How can you prevent these problems? You may be able to lower your chance of getting diverticulitis. You can do this by taking steps to prevent constipation. · Eat fruits, vegetables, beans, and whole grains every day. These foods are high in fiber. · Drink plenty of fluids (enough so that your urine is light yellow or clear like water). If you have kidney, heart, or liver disease and have to limit fluids, talk with your doctor before you increase the amount of fluids you drink. · Get at least 30 minutes of exercise on most days of the week. Walking is a good choice. You also may want to do other activities, such as running, swimming, cycling, or playing tennis or team sports. · Take a fiber supplement, such as Citrucel or Metamucil, every day if needed. Read and follow all instructions on the label. · Schedule time each day for a bowel movement. Having a daily routine may help. Take your time and do not strain when having a bowel movement. Some people avoid nuts, seeds, berries, and popcorn. They believe that these foods might get trapped in the diverticula and cause pain. But there is no proof that these foods cause diverticulitis or make it worse. How are these problems treated? · The best way to treat diverticulosis is to avoid constipation. (See the tips above.)  · Treatment for diverticulitis includes antibiotics and often a change in your diet.  You may need only liquids at first. Your doctor may suggest pain medicines for pain or belly cramps. In some cases, surgery may be needed. Follow-up care is a key part of your treatment and safety. Be sure to make and go to all appointments, and call your doctor if you are having problems. It's also a good idea to know your test results and keep a list of the medicines you take. Where can you learn more? Go to http://hector-scott.info/. Enter X395 in the search box to learn more about \"Learning About Diverticulosis and Diverticulitis. \"  Current as of: March 27, 2018  Content Version: 11.9  © 8983-4857 [employer]. Care instructions adapted under license by [employer] (which disclaims liability or warranty for this information). If you have questions about a medical condition or this instruction, always ask your healthcare professional. [address] any warranty or liability for your use of this information. Hiatal Hernia: Care Instructions  Your Care Instructions  A hiatal hernia occurs when part of the stomach bulges into the chest cavity. A hiatal hernia may allow stomach acid and juices to back up into the esophagus (acid reflux). This can cause a feeling of burning, warmth, heat, or pain behind the breastbone. This feeling may often occur after you eat, soon after you lie down, or when you bend forward, and it may come and go. You also may have a sour taste in your mouth. These symptoms are commonly known as heartburn or reflux. But not all hiatal hernias cause symptoms. Follow-up care is a key part of your treatment and safety. Be sure to make and go to all appointments, and call your doctor if you are having problems. It's also a good idea to know your test results and keep a list of the medicines you take. How can you care for yourself at home? · Take your medicines exactly as prescribed. Call your doctor if you think you are having a problem with your medicine.   · Do not take aspirin or other nonsteroidal anti-inflammatory drugs (NSAIDs), such as ibuprofen (Advil, Motrin) or naproxen (Aleve), unless your doctor says it is okay. Ask your doctor what you can take for pain. · Your doctor may recommend over-the-counter medicine. For mild or occasional indigestion, antacids such as Tums, Gaviscon, Maalox, or Mylanta may help. Your doctor also may recommend over-the-counter acid reducers, such as famotidine (Pepcid AC), cimetidine (Tagamet HB), ranitidine (Zantac 75 and Zantac 150), or omeprazole (Prilosec). Read and follow all instructions on the label. If you use these medicines often, talk with your doctor. · Change your eating habits. ? It's best to eat several small meals instead of two or three large meals. ? After you eat, wait 2 to 3 hours before you lie down. Late-night snacks aren't a good idea. ? Chocolate, mint, and alcohol can make heartburn worse. They relax the valve between the esophagus and the stomach. ? Spicy foods, foods that have a lot of acid (like tomatoes and oranges), and coffee can make heartburn symptoms worse in some people. If your symptoms are worse after you eat a certain food, you may want to stop eating that food to see if your symptoms get better. · Do not smoke or chew tobacco.  · If you get heartburn at night, raise the head of your bed 6 to 8 inches by putting the frame on blocks or placing a foam wedge under the head of your mattress. (Adding extra pillows does not work.)  · Do not wear tight clothing around your middle. · Lose weight if you need to. Losing just 5 to 10 pounds can help. When should you call for help? Call your doctor now or seek immediate medical care if:    · You have new or worse belly pain.     · You are vomiting.    Watch closely for changes in your health, and be sure to contact your doctor if:    · You have new or worse symptoms of indigestion.     · You have trouble or pain swallowing.     · You are losing weight.     · You do not get better as expected.    Where can you learn more? Go to http://hector-scott.info/. Enter O392 in the search box to learn more about \"Hiatal Hernia: Care Instructions. \"  Current as of: March 27, 2018  Content Version: 11.9  © 6585-3370 [employer]. Care instructions adapted under license by [employer] (which disclaims liability or warranty for this information). If you have questions about a medical condition or this instruction, always ask your healthcare professional. [place] [address] any warranty or liability for your use of this information. DISCHARGE SUMMARY from Nurse    PATIENT INSTRUCTIONS:    After general anesthesia or intravenous sedation, for 24 hours or while taking prescription Narcotics:  · Limit your activities  · Do not drive and operate hazardous machinery  · Do not make important personal or business decisions  · Do  not drink alcoholic beverages  · If you have not urinated within 8 hours after discharge, please contact your surgeon on call. Report the following to your surgeon:  · Excessive pain, swelling, redness or odor of or around the surgical area  · Temperature over 100.5  · Nausea and vomiting lasting longer than 4 hours or if unable to take medications  · Any signs of decreased circulation or nerve impairment to extremity: change in color, persistent  numbness, tingling, coldness or increase pain  · Any questions    *  Please give a list of your current medications to your Primary Care Provider. *  Please update this list whenever your medications are discontinued, doses are      changed, or new medications (including over-the-counter products) are added. *  Please carry medication information at all times in case of emergency situations.     These are general instructions for a healthy lifestyle:    No smoking/ No tobacco products/ Avoid exposure to second hand smoke  Surgeon General's Warning:  Quitting smoking now greatly reduces serious risk to your health. Obesity, smoking, and sedentary lifestyle greatly increases your risk for illness    A healthy diet, regular physical exercise & weight monitoring are important for maintaining a healthy lifestyle    You may be retaining fluid if you have a history of heart failure or if you experience any of the following symptoms:  Weight gain of 3 pounds or more overnight or 5 pounds in a week, increased swelling in our hands or feet or shortness of breath while lying flat in bed. Please call your doctor as soon as you notice any of these symptoms; do not wait until your next office visit. Recognize signs and symptoms of STROKE:    F-face looks uneven    A-arms unable to move or move unevenly    S-speech slurred or non-existent    T-time-call 911 as soon as signs and symptoms begin-DO NOT go       Back to bed or wait to see if you get better-TIME IS BRAIN. Warning Signs of HEART ATTACK     Call 911 if you have these symptoms:   Chest discomfort. Most heart attacks involve discomfort in the center of the chest that lasts more than a few minutes, or that goes away and comes back. It can feel like uncomfortable pressure, squeezing, fullness, or pain.  Discomfort in other areas of the upper body. Symptoms can include pain or discomfort in one or both arms, the back, neck, jaw, or stomach.  Shortness of breath with or without chest discomfort.  Other signs may include breaking out in a cold sweat, nausea, or lightheadedness. Don't wait more than five minutes to call 911 - MINUTES MATTER! Fast action can save your life. Calling 911 is almost always the fastest way to get lifesaving treatment. Emergency Medical Services staff can begin treatment when they arrive -- up to an hour sooner than if someone gets to the hospital by car. The discharge information has been reviewed with the patient. The patient verbalized understanding.   Discharge medications reviewed with the patient and appropriate educational materials and side effects teaching were provided.   ___________________________________________________________________________________________________________________________________

## 2019-03-29 NOTE — ANESTHESIA PREPROCEDURE EVALUATION
Relevant Problems No relevant active problems Anesthetic History No history of anesthetic complications Review of Systems / Medical History Patient summary reviewed and pertinent labs reviewed Pulmonary Within defined limits Neuro/Psych Within defined limits Cardiovascular Hypertension: well controlled Dysrhythmias (H/O PAF) Pacemaker (Last checked ~ 1 month ago) Exercise tolerance: >4 METS 
  
GI/Hepatic/Renal 
Within defined limits Endo/Other Within defined limits Other Findings Physical Exam 
 
Airway Mallampati: II 
TM Distance: 4 - 6 cm Neck ROM: normal range of motion Mouth opening: Normal 
 
 Cardiovascular Regular rate and rhythm,  S1 and S2 normal,  no murmur, click, rub, or gallop Dental 
 
 
Comments: Permanent upper and lower bridge Pulmonary Breath sounds clear to auscultation Abdominal 
GI exam deferred Other Findings Anesthetic Plan ASA: 3 Anesthesia type: MAC Induction: Intravenous Anesthetic plan and risks discussed with: Patient

## 2019-03-29 NOTE — H&P
[address] [facility], [address] History and Physical reviewed; I have examined the patient and there are no pertinent changes. Mukesh Christopher MD, MD  
12:43 PM 3/29/2019 [facility], [address] [facility] 
[email or web address]

## 2019-04-02 ENCOUNTER — TELEPHONE (OUTPATIENT)
Dept: INTERNAL MEDICINE CLINIC | Age: 79
End: 2019-04-02

## 2019-04-02 ENCOUNTER — OFFICE VISIT (OUTPATIENT)
Dept: INTERNAL MEDICINE CLINIC | Age: 79
End: 2019-04-02

## 2019-04-02 VITALS
DIASTOLIC BLOOD PRESSURE: 80 MMHG | OXYGEN SATURATION: 96 % | WEIGHT: 173.6 LBS | SYSTOLIC BLOOD PRESSURE: 116 MMHG | HEART RATE: 66 BPM | HEIGHT: 70 IN | RESPIRATION RATE: 16 BRPM | BODY MASS INDEX: 24.85 KG/M2 | TEMPERATURE: 98.2 F

## 2019-04-02 DIAGNOSIS — F41.9 ANXIETY: ICD-10-CM

## 2019-04-02 DIAGNOSIS — Z12.5 ENCOUNTER FOR SCREENING FOR MALIGNANT NEOPLASM OF PROSTATE: ICD-10-CM

## 2019-04-02 DIAGNOSIS — Z95.0 PACEMAKER: ICD-10-CM

## 2019-04-02 DIAGNOSIS — K44.9 HIATAL HERNIA: ICD-10-CM

## 2019-04-02 DIAGNOSIS — Z71.89 ACP (ADVANCE CARE PLANNING): ICD-10-CM

## 2019-04-02 DIAGNOSIS — K21.9 GASTROESOPHAGEAL REFLUX DISEASE WITHOUT ESOPHAGITIS: ICD-10-CM

## 2019-04-02 DIAGNOSIS — I44.30 AV BLOCK: ICD-10-CM

## 2019-04-02 DIAGNOSIS — I48.0 PAROXYSMAL ATRIAL FIBRILLATION (HCC): ICD-10-CM

## 2019-04-02 DIAGNOSIS — Z00.00 MEDICARE ANNUAL WELLNESS VISIT, SUBSEQUENT: ICD-10-CM

## 2019-04-02 DIAGNOSIS — E78.5 HYPERLIPIDEMIA, UNSPECIFIED HYPERLIPIDEMIA TYPE: ICD-10-CM

## 2019-04-02 DIAGNOSIS — R42 POSTURAL DIZZINESS WITH PRESYNCOPE: ICD-10-CM

## 2019-04-02 DIAGNOSIS — R55 POSTURAL DIZZINESS WITH PRESYNCOPE: ICD-10-CM

## 2019-04-02 DIAGNOSIS — I42.9 CARDIOMYOPATHY, UNSPECIFIED TYPE (HCC): ICD-10-CM

## 2019-04-02 DIAGNOSIS — D12.2 ADENOMATOUS POLYP OF ASCENDING COLON: ICD-10-CM

## 2019-04-02 DIAGNOSIS — I10 ESSENTIAL HYPERTENSION: Primary | ICD-10-CM

## 2019-04-02 RX ORDER — LORAZEPAM 1 MG/1
1 TABLET ORAL
Qty: 60 TAB | Refills: 0 | Status: SHIPPED | OUTPATIENT
Start: 2019-04-12 | End: 2019-05-20 | Stop reason: SDUPTHER

## 2019-04-02 RX ORDER — BUTALBITAL, ACETAMINOPHEN AND CAFFEINE 50; 325; 40 MG/1; MG/1; MG/1
1 TABLET ORAL
Qty: 30 TAB | Refills: 0 | Status: SHIPPED | OUTPATIENT
Start: 2019-04-02 | End: 2019-10-08 | Stop reason: SDUPTHER

## 2019-04-02 NOTE — PATIENT INSTRUCTIONS
Medicare Wellness Visit, Male The best way to improve and maintain good health is to have a healthy lifestyle by eating a well-balanced diet, exercising regularly, limiting alcohol and stopping smoking. Regular visits with your physician or non-physician health care provider also support your good health. Preventive screening tests can find health problems before they become diseases or illnesses. Preventive services such as immunizations prevent serious infections. All people over age 72 should have a Pneumovax and a Prevnar-13 shot to prevent potentially life threatening infections with the pneumococcus bacteria, a common cause of pneumonia. These are once in a lifetime unless you and your provider decide differently. All people over 65 should have a yearly influenza vaccine or \"flu\" shot. This does not prevent infection with cold viruses but has been proven to prevent hospitalization and death from influenza. Although Medicare part B \"regular Medicare\" currently only covers tetanus vaccination in the context of an injury, a tetanus vaccine (Tdap or Td) is recommended every 10 years. A shingles vaccine is recommended once in a lifetime after age 61. The Shingles vaccine is also not covered by Medicare part B. Note, however, that both the Shingles vaccine and Tdap/Td are generally covered by secondary carriers. Please check your coverage and out of pocket expenses. Consider contacting your local health department because it may stock these vaccines for a reasonable charge. We currently have documentation of the following immunization history for you: 
Immunization History Administered Date(s) Administered  (RETIRED) Pneumococcal Vaccine (Unspecified Type) 01/01/2007  Influenza High Dose Vaccine PF 01/10/2013, 11/16/2015, 12/09/2016, 10/02/2017  Influenza Vaccine 12/31/2013, 11/20/2014  Influenza Vaccine (Tri) Adjuvanted 11/20/2018  Pneumococcal Conjugate (PCV-13) 08/03/2015  TDAP Vaccine 07/12/2012  Zoster 07/25/2007 Screening for infection with Hepatitis C is recommended for anyone born between [birth year] through [address]. The table at the bottom of this document indicates the status of this and other screening services. Screening for diabetes mellitus with a blood sugar test (glucose) should be done at least every 3 years until age 79. You and your health care provider may decide whether to continue screening after age 79. The most recent blood glucose we have on file for you is:  
Lab Results Component Value Date/Time Glucose 94 03/26/2019 08:03 AM  
 
 
Glaucoma is a disease of the eye due to increased ocular pressure that can lead to blindness. People with risk factors for glaucoma ( race, diabetes, family history) should be screened at least every 2 years by an eye professional. This may be covered annually if indicated as determined by you and your doctor. Cardiovascular screening tests that check for elevated lipids or cholesterol (fatty part of blood) which can lead to heart disease and strokes should be done every 4-6 years through age 79. You and your health care provider may decide whether to continue screening after age 79. The most recent lipid panel we have on file for you is:  
Lab Results Component Value Date/Time Cholesterol, total 171 03/26/2019 08:03 AM  
 HDL Cholesterol 45 03/26/2019 08:03 AM  
 LDL, calculated 110.2 (H) 03/26/2019 08:03 AM  
 VLDL, calculated 15.8 03/26/2019 08:03 AM  
 Triglyceride 79 03/26/2019 08:03 AM  
 CHOL/HDL Ratio 3.8 03/26/2019 08:03 AM  
 
 
Colorectal cancer screening that evaluates for blood or polyps in your colon for people with average risk should be done yearly as a stool test, every five years as a flexible sigmoidoscope or every 10 years as a colonoscopy up to age 76.  You and your health care provider may decide whether to continue screening after age 76. Men up to age 76 may elect to screen for prostate cancer with a blood test called a PSA at certain intervals, depending on their personal and family history. This decision is between the patient and his provider. The most recent PSA values we have on file for you are: 
Lab Results Component Value Date/Time  
 Prostate Specific Ag 0.7 08/22/2018 07:47 AM  
 Prostate Specific Ag 0.6 02/13/2018 08:43 AM  
 Prostate Specific Ag 0.7 08/08/2017 08:49 AM  
 Prostate Specific Ag 1.4 07/27/2015 07:41 AM  
 Prostate Specific Ag 0.4 07/15/2014 07:42 AM  
 Prostate Specific Ag 0.5 07/12/2013 07:30 AM  
 
 
If you have been a smoker or had family history of abdominal aortic aneurysms, you and your provider may decide to schedule an ultrasound test of your aorta. Our records show this was done on: n/a People who have smoked the equivalent of 1 pack per day for 30 years or more may benefit from screening for lung cancer with a yearly low dose CT scan until they have been non smokers for 15 years or competing health conditions render this unlikely to be beneficial. Our records show: n/a Your Medicare Wellness Exam is recommended annually. Here is a list of your current Health Maintenance items with a due date: 
Health Maintenance Topic Date Due  Shingrix Vaccine Age 50> (1 of 2) 11/28/1989  Pneumococcal 65+ years (2 of 2 - PPSV23) 08/03/2016  Influenza Age 5 to Adult  08/01/2019  MEDICARE YEARLY EXAM  04/02/2020  GLAUCOMA SCREENING Q2Y  03/01/2021  COLONOSCOPY  03/29/2022  DTaP/Tdap/Td series (2 - Td) 07/12/2022

## 2019-04-02 NOTE — PROGRESS NOTES
This is the Subsequent Medicare Annual Wellness Exam, performed 12 months or more after the Initial AWV or the last Subsequent AWV I have reviewed the patient's medical history in detail and updated the computerized patient record. History Past Medical History:  
Diagnosis Date  Anxiety  AV block   
 intermittent, high-grade  Bilateral inguinal hernia  Cardiac echocardiogram 06/25/2013 EF 50-55%. No WMA. Mild LVH. Gr 1 DDfx. RVE.  AKANKSHA. Mild TR. No significant chg from study of 6/8/11.  Cardiac nuclear imaging test 10/07/2009 Likely inferior & anterior septal artifact. EF 55%. Mild septal WMA could be related to pacemaker.  Cardiomyopathy ([address]  Diverticulosis of colon 11/10/2015  Dysphagia Multiple esophageal dilations.  Essential hypertension with goal blood pressure less than 140/90   
 HLD (hyperlipidemia)  Hx of colonoscopy 11/10/2015 Dr. Karel Monroy; tubulovillous adenoma. [place] Pacemaker 2007 Medtronic dual-chamber device.  Paroxysmal atrial fibrillation (HCC) single episode noted on routine device check Past Surgical History:  
Procedure Laterality Date  CARDIAC SURG PROCEDURE UNLIST  COLONOSCOPY N/A 3/29/2019 COLONOSCOPY with polypectomy performed by Mayito Hanson MD at [facility] ENDOSCOPY  ENDOSCOPY, COLON, DIAGNOSTIC    
 HX CATARACT REMOVAL    
 HX PACEMAKER  05/29/07 Medtronic Versa dual-chamber pacemaker for intermittent high-grade AV block and symptomatic bradycardia.  HX PACEMAKER  5/3/16 Current Outpatient Medications Medication Sig Dispense Refill  butalbital-acetaminophen-caffeine (FIORICET, ESGIC) -40 mg per tablet Take 1 Tab by mouth every six (6) hours as needed for Pain. 30 Tab 0  
 [START ON 4/12/2019] LORazepam (ATIVAN) 1 mg tablet Take 1 Tab by mouth every eight (8) hours as needed for Anxiety.  Max Daily Amount: 3 mg. 60 Tab 0  
  lisinopril (PRINIVIL, ZESTRIL) 2.5 mg tablet TAKE 3 TABLETS BY MOUTH ONCE DAILY 270 Tab 2  
 VIT C/E/ZN/COPPR/LUTEIN/ZEAXAN (PRESERVISION AREDS 2 PO) Take  by mouth.  omeprazole (PRILOSEC) 20 mg capsule Take 20 mg by mouth daily as needed.  timolol (TIMOPTIC) 0.25 % ophthalmic solution Administer 1 Drop to both eyes two (2) times a day.  MULTIVITAMINS W-MINERALS/LUT (CENTRUM SILVER PO) Take  by mouth daily.  OMEGA-3 FATTY ACIDS (OMEGA 3 PO) Take 2,400 mg by mouth two (2) times a day.  coenzyme q10 100 mg Cap Take  by mouth daily.  metronidazole (METROGEL) 1 % topical gel Apply  to affected area daily. Use a thin layer to affected areas after washing  zinc 50 mg capsule Take  by mouth daily.  magnesium 250 mg Tab Take  by mouth daily.  selenium 100 mcg Cap Take  by mouth daily.  ascorbic acid (VITAMIN C) 500 mg tablet Take  by mouth two (2) times a day.  Ginkgo Biloba Leaf Extract 30 mg Cap Take  by mouth daily.  mometasone (NASONEX) 50 mcg/actuation nasal spray USE TWO SPRAYS IN EACH NOSTRIL DAILY 1 Container 5  cyclobenzaprine (FLEXERIL) 5 mg tablet TAKE 1-2 TABLETS BY MOUTH EVERY 8 HOURS AS NEEDED FOR MUSCLE SPASM 25 Tab 0 No Known Allergies Family History Problem Relation Age of Onset  Heart Surgery Mother  Stroke Father  Alcohol abuse Father Social History Tobacco Use  Smoking status: Former Smoker Last attempt to quit: 1966 Years since quittin.7  Smokeless tobacco: Never Used Substance Use Topics  Alcohol use: No  
  Comment: 1998 last time he had alcohol. Patient Active Problem List  
Diagnosis Code  AV block I44.30  Pacemaker Z95.0  Cardiomyopathy ([address] I42.9  Paroxysmal atrial fibrillation (HCC) I48.0  
 HLD (hyperlipidemia) E78.5  FH: prostate cancer Z80.42  Bilateral inguinal hernia K40.20  Anxiety F41.9  Essential hypertension I10  
  Adenomatous polyp of ascending colon D12.2  Gastroesophageal reflux disease without esophagitis K21.9  Postural dizziness with presyncope R42, R55  
 Hiatal hernia K44.9 Depression Risk Factor Screening:  
 
3 most recent PHQ Screens 9/27/2018 Little interest or pleasure in doing things Not at all Feeling down, depressed, irritable, or hopeless Not at all Total Score PHQ 2 0 Alcohol Risk Factor Screening: You do not drink alcohol or very rarely. Functional Ability and Level of Safety:  
Hearing Loss Hearing is good. Activities of Daily Living The home contains: no safety equipment. Patient does total self care Fall Risk Fall Risk Assessment, last 12 mths 9/27/2018 Able to walk? Yes Fall in past 12 months? No  
Number of falls in past 12 months -  
 
 
Abuse Screen Patient is not abused Cognitive Screening Evaluation of Cognitive Function: 
Has your family/caregiver stated any concerns about your memory: no 
Normal 
 
Patient Care Team  
Patient Care Team: 
Arline Guevara MD as PCP - General (Internal Medicine) Madelin Baez MD (Cardiology) Raine Brown MD (Gastroenterology) Sera Bean RN as Ambulatory Care Navigator (Internal Medicine) Andreea King MD (Ophthalmology) Jaren Dorsey MD (Dermatology) Assessment/Plan Education and counseling provided: 
Are appropriate based on today's review and evaluation End-of-Life planning (with patient's consent) Influenza Vaccine Prostate cancer screening tests (PSA, covered annually) Colorectal cancer screening tests Cardiovascular screening blood test 
Screening for glaucoma Diabetes screening test 
Shingrix vaccine Diagnoses and all orders for this visit: 1. Essential hypertension 
-     CBC WITH AUTOMATED DIFF; Future -     LIPID PANEL; Future -     METABOLIC PANEL, COMPREHENSIVE; Future -     PSA SCREENING (SCREENING); Future 
-     TSH 3RD GENERATION; Future -     URINALYSIS W/MICROSCOPIC; Future -     VITAMIN D, 25 HYDROXY; Future -     MAGNESIUM; Future 2. Cardiomyopathy, unspecified type ([facility] [address] 
-     CBC WITH AUTOMATED DIFF; Future -     LIPID PANEL; Future -     METABOLIC PANEL, COMPREHENSIVE; Future -     PSA SCREENING (SCREENING); Future 
-     TSH 3RD GENERATION; Future -     URINALYSIS W/MICROSCOPIC; Future -     VITAMIN D, 25 HYDROXY; Future -     MAGNESIUM; Future 3. Paroxysmal atrial fibrillation (HCC) 
-     CBC WITH AUTOMATED DIFF; Future -     LIPID PANEL; Future -     METABOLIC PANEL, COMPREHENSIVE; Future -     PSA SCREENING (SCREENING); Future 
-     TSH 3RD GENERATION; Future -     URINALYSIS W/MICROSCOPIC; Future -     VITAMIN D, 25 HYDROXY; Future -     MAGNESIUM; Future 4. AV block 
-     CBC WITH AUTOMATED DIFF; Future -     LIPID PANEL; Future -     METABOLIC PANEL, COMPREHENSIVE; Future -     PSA SCREENING (SCREENING); Future 
-     TSH 3RD GENERATION; Future -     URINALYSIS W/MICROSCOPIC; Future -     VITAMIN D, 25 HYDROXY; Future -     MAGNESIUM; Future 5. Pacemaker 
-     CBC WITH AUTOMATED DIFF; Future -     LIPID PANEL; Future -     METABOLIC PANEL, COMPREHENSIVE; Future -     PSA SCREENING (SCREENING); Future 
-     TSH 3RD GENERATION; Future -     URINALYSIS W/MICROSCOPIC; Future -     VITAMIN D, 25 HYDROXY; Future -     MAGNESIUM; Future 6. Postural dizziness with presyncope 
-     CBC WITH AUTOMATED DIFF; Future -     LIPID PANEL; Future -     METABOLIC PANEL, COMPREHENSIVE; Future -     PSA SCREENING (SCREENING); Future 
-     TSH 3RD GENERATION; Future -     URINALYSIS W/MICROSCOPIC; Future -     VITAMIN D, 25 HYDROXY; Future -     MAGNESIUM; Future 7. Hyperlipidemia, unspecified hyperlipidemia type 
-     CBC WITH AUTOMATED DIFF; Future -     LIPID PANEL; Future -     METABOLIC PANEL, COMPREHENSIVE; Future -     PSA SCREENING (SCREENING); Future 
-     TSH 3RD GENERATION; Future -     URINALYSIS W/MICROSCOPIC; Future -     VITAMIN D, 25 HYDROXY; Future -     MAGNESIUM; Future 8. Gastroesophageal reflux disease without esophagitis -     CBC WITH AUTOMATED DIFF; Future -     LIPID PANEL; Future -     METABOLIC PANEL, COMPREHENSIVE; Future -     PSA SCREENING (SCREENING); Future 
-     TSH 3RD GENERATION; Future -     URINALYSIS W/MICROSCOPIC; Future -     VITAMIN D, 25 HYDROXY; Future -     MAGNESIUM; Future 9. Anxiety -     LORazepam (ATIVAN) 1 mg tablet; Take 1 Tab by mouth every eight (8) hours as needed for Anxiety. Max Daily Amount: 3 mg. 
-     CBC WITH AUTOMATED DIFF; Future -     LIPID PANEL; Future -     METABOLIC PANEL, COMPREHENSIVE; Future -     PSA SCREENING (SCREENING); Future 
-     TSH 3RD GENERATION; Future -     URINALYSIS W/MICROSCOPIC; Future -     VITAMIN D, 25 HYDROXY; Future -     MAGNESIUM; Future 10. Hiatal hernia 
-     CBC WITH AUTOMATED DIFF; Future -     LIPID PANEL; Future -     METABOLIC PANEL, COMPREHENSIVE; Future -     PSA SCREENING (SCREENING); Future 
-     TSH 3RD GENERATION; Future -     URINALYSIS W/MICROSCOPIC; Future -     VITAMIN D, 25 HYDROXY; Future -     MAGNESIUM; Future 11. Adenomatous polyp of ascending colon 
-     CBC WITH AUTOMATED DIFF; Future -     LIPID PANEL; Future -     METABOLIC PANEL, COMPREHENSIVE; Future -     PSA SCREENING (SCREENING); Future 
-     TSH 3RD GENERATION; Future -     URINALYSIS W/MICROSCOPIC; Future -     VITAMIN D, 25 HYDROXY; Future -     MAGNESIUM; Future 12. Encounter for screening for malignant neoplasm of prostate -     PSA SCREENING (SCREENING); Future 13. Medicare annual wellness visit, subsequent 14. ACP (advance care planning) Other orders -     butalbital-acetaminophen-caffeine (FIORICET, ESGIC) -40 mg per tablet; Take 1 Tab by mouth every six (6) hours as needed for Pain. Health Maintenance Due Topic Date Due  
  Shingrix Vaccine Age 50> (1 of 2) 11/28/1989

## 2019-04-02 NOTE — PROGRESS NOTES
Chief Complaint Patient presents with  Hypertension 6 month follow up with lab results. Health Maintenance Due Topic Date Due  Shingrix Vaccine Age 50> (1 of 2) 11/28/1989  Pneumococcal 65+ years (2 of 2 - PPSV23) 08/03/2016  GLAUCOMA SCREENING Q2Y  01/26/2019  MEDICARE YEARLY EXAM  02/21/2019 1. Have you been to the ER, urgent care clinic or hospitalized since your last visit? NO.  
 
2. Have you seen or consulted any other health care providers outside of the [address] [place] [address] since your last visit (Include any pap smears or colon screening)?  NO

## 2019-04-02 NOTE — ACP (ADVANCE CARE PLANNING)
Advance Care Planning (ACP) Provider Note - Comprehensive     Date of ACP Conversation: 04/02/19  Persons included in Conversation:  patient  Length of ACP Conversation in minutes:  16 minutes    Authorized Decision Maker (if patient is incapable of making informed decisions): Alissa Cohen  This person is:  Healthcare Agent/Medical Power of  under Advance Directive          General ACP for ALL Patients with Decision Making Capacity:   Importance of advance care planning, including choosing a healthcare agent to communicate patient's healthcare decisions if patient lost the ability to make decisions, such as after a sudden illness or accident  Understanding of the healthcare agent role was assessed and information provided  Exploration of values, goals, and preferences if recovery is not expected, even with continued medical treatment in the event of: Imminent death  Severe, permanent brain injury  \"In these circumstances, what matters most to you? \"  Care focused more on comfort or quality of life. Review of Existing Advance Directive:  Patient has an existing advance directive on file, signed 2/22/2007 . It designates Ruby Bill  as his healthcare agents and expresses that he does not wish life prolonging procedures for end of life care. It was reviewed with him and still reflects his wishes.        For Serious or Chronic Illness:  Understanding of medical condition      Interventions Provided:  Reviewed existing Advance Directive   Recommended review of completed ACP document annually or upon change in health status

## 2019-04-02 NOTE — TELEPHONE ENCOUNTER
Please request recent eye exam from Dr. Kesha Perkins . Patient reports being seen in every 3 months . Thank you.

## 2019-04-07 PROBLEM — K44.9 HIATAL HERNIA: Status: ACTIVE | Noted: 2019-04-07

## 2019-04-07 NOTE — PROGRESS NOTES
HPI:  
Robert Grullon is a 77y.o. year old male who presents today for evaluation of hypertension, hyperlipidemia, AV block s/p pacemaker, paroxysmal atrial fibrillation, and anxiety. He reports that he is generally doing well. He denies any palpitations and is otherwise without complaints. He has a history of hypertension, treated with lisinopril. He does monitor his blood pressure regularly, and is currently taking 3.25 mg in the morning and evening. He reports that his blood pressure in mainly 120-130/ 70-80 at home. He has a history of intermittent high grade AV block, and underwent placement of a dual chamber Medtronic pacemaker in 2007. He underwent pacemaker generator exchange in 5/2016 after his device reached elective replacement indicator. A Medtronic MRI safe device was installed. He has a history of a single episode of atrial fibrillation noted on device check several years ago. He underwent follow-up appointment with Dr. Elver Pena on 7/30/2018, and interrogation of his pacemaker during the visit revealed a single episode of atrial fibrillation in November 2017 lasting for less than 2 hours. There were no recurrent episodes since that time, and he was asymptomatic during the episode. He was noted in the past to have mildly decreased LV function, but a repeat echocardiogram in 6/2013 showed normal size and lower limits of normal function with EF 50-55%; no wall motion abnormalities, and grade 1 diastolic dysfunction. He is followed by Dr. Elver Pena. 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 He also was walking 2.5 miles each day. He denies any chest pain, shortness of breath at rest or with exertion, palpitations, lightheadedness, or edema.  He has a history of dyslipidemia, managed by diet in the past. 
 
In 4/2017 he developed lightheadedness after painting outside for several hours. He took an Ativan because he thought he was experiencing anxiety. However, his symptoms persisted and he began to feel like he was going to pass out so he called 911 for help. When EMS arrived, his symptoms had resolved but he was noted to have a low blood pressure of approximately 112/67. He was taken to [facility] for evaluation and was asymptomatic upon arrival. He admitted to not eating or drinking anything for the four hour period that he was outside, and acknowledged that the day was quite warm. Evaluation in the ED included an EKG which showed a paced rhythm, serial troponins which were negative, and blood work showing WBC 14.2, Hb 14.5, Hct 42.7, creatinine 1.3/ eGFR 51.7. Chest x-ray showed no acute process, but did note an incidental pulmonary nodule over the left lower lobe. A chest CT scan was obtained (5/2017) showing mild subsegmental atelectasis or scarring at the left lung base without focal pulmonary nodule. He has a history of a GI bleeding in 1997 secondary to NSAID use for headaches. He also has a history of GERD and dysphagia and has undergone multiple esophageal dilatations in the past by Dr. Krishna Guzman. In 11/2015, he had an upper endoscopy which showed a small hiatal hernia in the cardia, a Schatzki's ring at the gastroesophageal junction, which was dilated, and otherwise normal stomach and duodenum. He also had a screening colonoscopy which revealed mild colonic diverticulosis and a 7 mm polyp in the proximal ascending colon (pathology: tubulovillous adenoma). He had a follow-up colonoscopy in 3/2019 by Dr. Krishna Guzman which showed two sessile 4 mm polyps in the cecum and ascending colon (pathology: tubular adenomas). He also simultaneously had an upper endoscopy showing normal stomach/duodenum, a small hiatal hernia in the cardia, and a Schatzki's ring (dilated). He denies any abdominal pain, nausea, vomiting, melena, hematochezia, or change in bowel movements. He has a history of anxiety, and uses lorazepam occasionally. He also reports he uses cyclobenzaprine occasionally for back spasms which occur at times due to straining his back with excessive physical work. Past Medical History:  
Diagnosis Date  Anxiety  AV block   
 intermittent, high-grade  Bilateral inguinal hernia  Cardiac echocardiogram 06/25/2013 EF 50-55%. No WMA. Mild LVH. Gr 1 DDfx. RVE.  AKANKSHA. Mild TR. No significant chg from study of 6/8/11.  Cardiac nuclear imaging test 10/07/2009 Likely inferior & anterior septal artifact. EF 55%. Mild septal WMA could be related to pacemaker.  Cardiomyopathy ([address]  Diverticulosis of colon 11/10/2015  Dysphagia Multiple esophageal dilations.  Essential hypertension with goal blood pressure less than 140/90   
 HLD (hyperlipidemia)  Hx of colonoscopy 11/10/2015 Dr. Krishna Guzman; tubulovillous adenoma. [facility] Pacemaker 2007 Medtronic dual-chamber device.  Paroxysmal atrial fibrillation (HCC) single episode noted on routine device check Past Surgical History:  
Procedure Laterality Date  CARDIAC SURG PROCEDURE UNLIST  COLONOSCOPY N/A 3/29/2019 COLONOSCOPY with polypectomy performed by Carine Dixon MD at [facility] ENDOSCOPY  ENDOSCOPY, COLON, DIAGNOSTIC    
 HX CATARACT REMOVAL    
 HX PACEMAKER  05/29/07 Medtronic Versa dual-chamber pacemaker for intermittent high-grade AV block and symptomatic bradycardia.  HX PACEMAKER  5/3/16 Current Outpatient Medications Medication Sig  butalbital-acetaminophen-caffeine (FIORICET, ESGIC) -40 mg per tablet Take 1 Tab by mouth every six (6) hours as needed for Pain.  [START ON 4/12/2019] LORazepam (ATIVAN) 1 mg tablet Take 1 Tab by mouth every eight (8) hours as needed for Anxiety. Max Daily Amount: 3 mg.  lisinopril (PRINIVIL, ZESTRIL) 2.5 mg tablet TAKE 3 TABLETS BY MOUTH ONCE DAILY  VIT C/E/ZN/COPPR/LUTEIN/ZEAXAN (PRESERVISION AREDS 2 PO) Take  by mouth.  omeprazole (PRILOSEC) 20 mg capsule Take 20 mg by mouth daily as needed.  timolol (TIMOPTIC) 0.25 % ophthalmic solution Administer 1 Drop to both eyes two (2) times a day.  MULTIVITAMINS W-MINERALS/LUT (CENTRUM SILVER PO) Take  by mouth daily.  OMEGA-3 FATTY ACIDS (OMEGA 3 PO) Take 2,400 mg by mouth two (2) times a day.  coenzyme q10 100 mg Cap Take  by mouth daily.  metronidazole (METROGEL) 1 % topical gel Apply  to affected area daily. Use a thin layer to affected areas after washing  zinc 50 mg capsule Take  by mouth daily.  magnesium 250 mg Tab Take  by mouth daily.  selenium 100 mcg Cap Take  by mouth daily.  ascorbic acid (VITAMIN C) 500 mg tablet Take  by mouth two (2) times a day.  Ginkgo Biloba Leaf Extract 30 mg Cap Take  by mouth daily.  mometasone (NASONEX) 50 mcg/actuation nasal spray USE TWO SPRAYS IN EACH NOSTRIL DAILY  cyclobenzaprine (FLEXERIL) 5 mg tablet TAKE 1-2 TABLETS BY MOUTH EVERY 8 HOURS AS NEEDED FOR MUSCLE SPASM No current facility-administered medications for this visit. Allergies and Intolerances:  
No Known Allergies Family History: His brother  from metastatic prostate cancer. His mother lived until the age of 80, although she had CAD. His father  at the age of 59 from a CVA. He was an alcoholic. He has five sisters that are all healthy. Family History Problem Relation Age of Onset  Heart Surgery Mother  Stroke Father  Alcohol abuse Father Social History: He  reports that he quit smoking about 52 years ago. He has never used smokeless tobacco. He smoked 1 ppd for 2 years, and then quit (). He is retired  and worked for the [employer]. He lives with his girlfriend and has one son. Social History Substance and Sexual Activity Alcohol Use No  
 Comment: 03/16/1998 last time he had alcohol. Immunization History: 
Immunization History Administered Date(s) Administered  (RETIRED) Pneumococcal Vaccine (Unspecified Type) 01/01/2007  Influenza High Dose Vaccine PF 01/10/2013, 11/16/2015, 12/09/2016, 10/02/2017  Influenza Vaccine 12/31/2013, 11/20/2014  Influenza Vaccine (Tri) Adjuvanted 11/20/2018  Pneumococcal Conjugate (PCV-13) 08/03/2015  Pneumococcal Polysaccharide (PPSV-23) 01/01/2007  TDAP Vaccine 07/12/2012  Zoster 07/25/2007 Review of Systems: As above included in HPI. Otherwise 11 point review of systems negative including constitutional, skin, HENT, eyes, respiratory, cardiovascular, gastrointestinal, genitourinary, musculoskeletal, endocrine, hematologic, allergy, and neurologic. Physical:  
Vitals:  
BP: 116/80 HR: 66 
WT: 173 lb 9.6 oz (78.7 kg) BMI:  24.91 kg/m2 Exam:  
Patient appears in no apparent distress. Affect is appropriate. HEENT: PERRLA, anicteric, oropharynx clear, no JVD, adenopathy or thyromegaly. No carotid bruits or radiated murmur. Lungs: clear to auscultation, no wheezes, rhonchi, or rales. Heart: regular rate and rhythm. No murmur, rubs, gallops Abdomen: soft, nontender, nondistended, normal bowel sounds, no hepatosplenomegaly or masses. Extremities: without edema. Pulses 1-2+ bilaterally. Review of Data: 
Labs: Hospital Outpatient Visit on 03/26/2019 Component Date Value Ref Range Status  LIPID PROFILE 03/26/2019        Final  
 Cholesterol, total 03/26/2019 171  <200 MG/DL Final  
 Triglyceride 03/26/2019 79  <150 MG/DL Final  
 HDL Cholesterol 03/26/2019 45  40 - 60 MG/DL Final  
 LDL, calculated 03/26/2019 110.2* 0 - 100 MG/DL Final  
 VLDL, calculated 03/26/2019 15.8  MG/DL Final  
 CHOL/HDL Ratio 03/26/2019 3.8  0 - 5.0   Final  
 Sodium 03/26/2019 135* 136 - 145 mmol/L Final  
 Potassium 03/26/2019 4.5  3.5 - 5.5 mmol/L Final  
  Chloride 03/26/2019 100  100 - 108 mmol/L Final  
 CO2 03/26/2019 31  21 - 32 mmol/L Final  
 Anion gap 03/26/2019 4  3.0 - 18 mmol/L Final  
 Glucose 03/26/2019 94  74 - 99 mg/dL Final  
 BUN 03/26/2019 12  7.0 - 18 MG/DL Final  
 Creatinine 03/26/2019 0.83  0.6 - 1.3 MG/DL Final  
 BUN/Creatinine ratio 03/26/2019 14  12 - 20   Final  
 GFR est AA 03/26/2019 >60  >60 ml/min/1.73m2 Final  
 GFR est non-AA 03/26/2019 >60  >60 ml/min/1.73m2 Final  
 Calcium 03/26/2019 8.6  8.5 - 10.1 MG/DL Final  
 
 
Calculated 10 year ASCVD risk score: 30.5 % Health Maintenance: 
Screening:  
 Colorectal: colonoscopy (3/2019) tubular adenomas. Dr. Thanh Caldwell. Depression: none DM (HbA1c/FPG): FPG 94 (3/2019) Hepatitis C: N/A Falls: none DEXA: N/A 
 PSA/WEN: PSA 0.7 (8/2018) Glaucoma: regular eye exams with Dr. Irais Dow for glaucoma q4 months. Smoking: none Vitamin D: 32.2 (7/2016) Medicare Wellness: today Impression: 
Patient Active Problem List  
Diagnosis Code  AV block I44.30  Pacemaker Z95.0  [address] [address] I42.9  Paroxysmal atrial fibrillation (HCC) I48.0  
 HLD (hyperlipidemia) E78.5  FH: prostate cancer Z80.42  Bilateral inguinal hernia K40.20  Anxiety F41.9  Essential hypertension I10  
 Colon polyp; tubulovillous adenoma 11/2015 K63.5  Gastroesophageal reflux disease without esophagitis K21.9  Postural dizziness with presyncope R42, R55 Plan: 1. Hypertension. Blood pressure remains well controlled today on lisinopril 3.75 mg bid. He continues to monitor his blood pressure carefully at home. Renal function remains normal with creatinine 0.83/ eGFR >60. Continue to follow. 2. AV block, intermittent high grade. S/P Medtronic dual chamber pacemaker with generator change in 5/2016 with an MRI safe device installed. Interrogated and followed every 3 months by Dr. Sainz, last 2/2019. Repeat echocardiogram with low normal LV function. 3. Paroxysmal atrial fibrillation. Single episode noted on device check several years ago. Had recurrence noted on device check in 2017, lasting approximately 2 hours. No recurrence since that time. Anti-coagulation felt not to be needed unless develops more frequent or prolonged episodes. Monitored closely every three months with routine device checks. Continue to follow. 4. Hyperlipidemia. Calculated 10 year ASCVD risk is 30.5 %. However, his age > 76 does not place him in one of the four statin benefit groups as per new AHA/ACC guidelines. Also, with no evidence of ASCVD. LDL stable at 110. Patient not wishing to begin therapy with a statin. Emphasized importance of continued lifestyle modifications, including diet, exercise, and weight loss. Continue to follow and repeat lipid panel at next visit. 5. Presyncope. Related to dehydration secondary to physical exertion in the heat without adequate hydration. Reports no further symptoms. Monitors his blood pressure daily and has been controlled at his baseline. Encouraged to continue drinking plenty of fluids. Follow. 6. Pulmonary nodule on chest x-ray. Incidental finding on chest xray at [facility], but chest CT scan without evidence of any nodule. Did note elevated left hemidiaphragm and focal atelectasis or scarring at left lung base. No follow-up needed. 7. GERD/dysphagia. Repeat upper endoscopy (3/2019) with esophageal dilatation for Schatzki's ring. Small hiatal hernia. Symptoms well controlled with omeprazole. Follow. 8. Family history of prostate cancer. Patient quite concerned about his own risk given that his brother  quite rapidly from metastatic disease. Reassured that his PSA level remains quite low. He is requesting to continue to have it checked despite his age, which would suggest that testing should be discontinued. Discussed the controversy of continuing prostatic cancer screening after age 75.  Patient still requesting to continue PSA testing, but states that he would prefer not to have WEN unless PSA increases. 9. Anxiety. Patient is quite anxious about his health. Reassured that he is living a healthy lifestyle and doing well. Continue lorazepam as needed. 10. Health maintenance. Received influenza vaccine. Given script for Shingrix vaccine previously and on wait list. Other immunizations up to date. Colonoscopy completed. Continue regular eye exams with Dr. Windy Meyer. Vitamin D level normal.  
 
In addition, an annual Medicare wellness visit was done today. Patient understands recommendations and agrees with plan. Follow-up in 6 months.

## 2019-05-20 DIAGNOSIS — F41.9 ANXIETY: ICD-10-CM

## 2019-05-20 RX ORDER — LORAZEPAM 1 MG/1
1 TABLET ORAL
Qty: 60 TAB | Refills: 0 | OUTPATIENT
Start: 2019-05-20 | End: 2019-06-27 | Stop reason: SDUPTHER

## 2019-05-22 ENCOUNTER — OFFICE VISIT (OUTPATIENT)
Dept: CARDIOLOGY CLINIC | Age: 79
End: 2019-05-22

## 2019-05-22 DIAGNOSIS — Z95.0 PACEMAKER: ICD-10-CM

## 2019-05-22 DIAGNOSIS — I42.9 CARDIOMYOPATHY, UNSPECIFIED TYPE (HCC): Primary | ICD-10-CM

## 2019-05-31 NOTE — PROGRESS NOTES
I have personally seen and evaluated the device findings. Interrogation reviewed and I agree with assessment.     Efren Hammonds

## 2019-06-27 DIAGNOSIS — F41.9 ANXIETY: ICD-10-CM

## 2019-06-27 RX ORDER — LORAZEPAM 1 MG/1
1 TABLET ORAL
Qty: 60 TAB | Refills: 0 | OUTPATIENT
Start: 2019-06-27 | End: 2019-08-05 | Stop reason: SDUPTHER

## 2019-08-05 DIAGNOSIS — F41.9 ANXIETY: ICD-10-CM

## 2019-08-05 RX ORDER — LORAZEPAM 1 MG/1
1 TABLET ORAL
Qty: 60 TAB | Refills: 0 | OUTPATIENT
Start: 2019-08-05 | End: 2019-09-17 | Stop reason: SDUPTHER

## 2019-08-05 NOTE — TELEPHONE ENCOUNTER
LAST OV: 4/2/19 LAST FILL: 6/28/19 Reviewed report generated by the [employer]. Does not demonstrate aberrancies or inconsistencies with regard to the prescribing of controlled medications to this patient by other providers. No UDS/Controlled agreement on file

## 2019-08-09 ENCOUNTER — HOSPITAL ENCOUNTER (OUTPATIENT)
Dept: LAB | Age: 79
Discharge: HOME | End: 2019-08-09
Payer: MEDICARE

## 2019-08-09 ENCOUNTER — APPOINTMENT (OUTPATIENT)
Dept: INTERNAL MEDICINE CLINIC | Age: 79
End: 2019-08-09

## 2019-08-09 PROCEDURE — 80307 DRUG TEST PRSMV CHEM ANLYZR: CPT

## 2019-08-09 NOTE — TELEPHONE ENCOUNTER
Patient called to find out why [facility] did not have prescription if it was approved by the provider on 8/5. I explained to the patient that this medication usually gets called into the pharmacy manually by the nurses, but that prior to doing that he would need to come in to leave a urine sample for drug screen and sign a controlled substance agreement. Patient verbalized understanding and will come in today before 4PM or any other day next week between 8AM and 4PM.

## 2019-08-12 LAB
AMPHETAMINES UR QL SCN: NEGATIVE NG/ML
BARBITURATES UR QL SCN: NEGATIVE NG/ML
BENZODIAZ UR QL SCN: NEGATIVE NG/ML
BZE UR QL SCN: NEGATIVE NG/ML
CANNABINOIDS UR QL SCN: NEGATIVE NG/ML
CREAT UR-MCNC: 170.5 MG/DL (ref 20–300)
FENTANYL+NORFENTANYL UR QL SCN: NEGATIVE PG/ML
MEPERIDINE UR QL: NEGATIVE NG/ML
METHADONE UR QL SCN: NEGATIVE NG/ML
OPIATES UR QL SCN: NEGATIVE NG/ML
OXYCODONE+OXYMORPHONE UR QL SCN: NEGATIVE NG/ML
PCP UR QL: NEGATIVE NG/ML
PH UR: 7 [PH] (ref 4.5–8.9)
PLEASE NOTE:, 733157: NORMAL
PROPOXYPH UR QL SCN: NEGATIVE NG/ML
SP GR UR: 1.01
TRAMADOL UR QL SCN: NEGATIVE NG/ML

## 2019-08-20 ENCOUNTER — OFFICE VISIT (OUTPATIENT)
Dept: INTERNAL MEDICINE CLINIC | Age: 79
End: 2019-08-20

## 2019-08-20 VITALS
HEART RATE: 85 BPM | HEIGHT: 70 IN | SYSTOLIC BLOOD PRESSURE: 136 MMHG | RESPIRATION RATE: 14 BRPM | TEMPERATURE: 98.6 F | WEIGHT: 177 LBS | DIASTOLIC BLOOD PRESSURE: 82 MMHG | BODY MASS INDEX: 25.34 KG/M2 | OXYGEN SATURATION: 97 %

## 2019-08-20 DIAGNOSIS — J01.01 ACUTE RECURRENT MAXILLARY SINUSITIS: Primary | ICD-10-CM

## 2019-08-20 RX ORDER — AMOXICILLIN AND CLAVULANATE POTASSIUM 875; 125 MG/1; MG/1
1 TABLET, FILM COATED ORAL 2 TIMES DAILY
Qty: 20 TAB | Refills: 0 | Status: SHIPPED | OUTPATIENT
Start: 2019-08-20 | End: 2019-08-30

## 2019-08-20 NOTE — PROGRESS NOTES
Robby Reza [facility] 1940, is a 77 y.o. male, who is seen today for a one-month history of apparent sinusitis. He tells me that he has a deviated septum and whenever he gets sinus congestion or infection is mainly on the left and in the maxillary region. Over a month ago he started getting congested and nearly a month ago the mucus started turning green. He has been using a [place] pot that helps decongest somewhat but he has had continued congestion but has gotten a little worse in terms of pressure in the maxillary region especially on the left and somewhat into the ear. No sore throat and no chills or sweats and he does not think he has had a fever. Occasional slight cough that seems to be just from drainage. He tells me that in the past when he has had this it seems to stem from [place] is a lot of wildlife. Past Medical History:   Diagnosis Date    Anxiety     AV block     intermittent, high-grade    Bilateral inguinal hernia     Cardiac echocardiogram 06/25/2013    EF 50-55%. No WMA. Mild LVH. Gr 1 DDfx. RVE.  AKANKSHA. Mild TR. No significant chg from study of 6/8/11.  Cardiac nuclear imaging test 10/07/2009    Likely inferior & anterior septal artifact. EF 55%. Mild septal WMA could be related to pacemaker.  Cardiomyopathy ([address]     Diverticulosis of colon 11/10/2015    Dysphagia     Multiple esophageal dilations.  Essential hypertension with goal blood pressure less than 140/90     HLD (hyperlipidemia)     Hx of colonoscopy 11/10/2015    Dr. Osiel Velez; tubulovillous adenoma.  Pacemaker 2007    Medtronic dual-chamber device.  Paroxysmal atrial fibrillation (HCC)     single episode noted on routine device check     Current Outpatient Medications   Medication Sig Dispense Refill    amoxicillin-clavulanate (AUGMENTIN) 875-125 mg per tablet Take 1 Tab by mouth two (2) times a day for 10 days.  20 Tab 0    LORazepam (ATIVAN) 1 mg tablet Take 1 Tab by mouth every eight (8) hours as needed for Anxiety. Max Daily Amount: 3 mg. 60 Tab 0    butalbital-acetaminophen-caffeine (FIORICET, ESGIC) -40 mg per tablet Take 1 Tab by mouth every six (6) hours as needed for Pain. 30 Tab 0    lisinopril (PRINIVIL, ZESTRIL) 2.5 mg tablet TAKE 3 TABLETS BY MOUTH ONCE DAILY 270 Tab 2    mometasone (NASONEX) 50 mcg/actuation nasal spray USE TWO SPRAYS IN EACH NOSTRIL DAILY 1 Container 5    VIT C/E/ZN/COPPR/LUTEIN/ZEAXAN (PRESERVISION AREDS 2 PO) Take  by mouth.  omeprazole (PRILOSEC) 20 mg capsule Take 20 mg by mouth daily as needed.  timolol (TIMOPTIC) 0.25 % ophthalmic solution Administer 1 Drop to both eyes two (2) times a day.  cyclobenzaprine (FLEXERIL) 5 mg tablet TAKE 1-2 TABLETS BY MOUTH EVERY 8 HOURS AS NEEDED FOR MUSCLE SPASM 25 Tab 0    MULTIVITAMINS W-MINERALS/LUT (CENTRUM SILVER PO) Take  by mouth daily.  OMEGA-3 FATTY ACIDS (OMEGA 3 PO) Take 2,400 mg by mouth two (2) times a day.  coenzyme q10 100 mg Cap Take  by mouth daily.  metronidazole (METROGEL) 1 % topical gel Apply  to affected area daily. Use a thin layer to affected areas after washing       zinc 50 mg capsule Take  by mouth daily.  magnesium 250 mg Tab Take  by mouth daily.  selenium 100 mcg Cap Take  by mouth daily.  ascorbic acid (VITAMIN C) 500 mg tablet Take  by mouth two (2) times a day.  Ginkgo Biloba Leaf Extract 30 mg Cap Take  by mouth daily. Visit Vitals  /82 (BP 1 Location: Left arm, BP Patient Position: Sitting)   Pulse 85   Temp 98.6 °F (37 °C) (Oral)   Resp 14   Ht 5' 10\" (1.778 m)   Wt 177 lb (80.3 kg)   SpO2 97%   BMI 25.40 kg/m²     Ear canals and tympanic membranes appear normal with good light reflex bilaterally. Oral cavity reveals no redness exudate or drainage. Neck reveals no adenopathy or tenderness.   Nasal passages are clear on the right and there is a moderate amount of thick moist light green mucus on the left. Minimal tenderness in the left maxillary sinus. No frontal tenderness. Lungs are clear to percussion. Good breath sounds with no wheezing or crackles. Assessment: Maxillary sinusitis, will treat with Augmentin, he has had that before and it is 1 of the recommended treatments along with amoxicillin. He has not used either for quite a while. I have recommended that he continue using the [place] pot now and when he is congested in the future. He probably has allergic rhinitis and is probably the cutting of grass the triggers the mucus production rather than animal feces as he suggests. Tab Canada MD FACP    Please note: This document has been produced using voice recognition software. Unrecognized errors in transcription may be present. This visit lasted 25 minutes, greater than 50% of the time spent counseling on prophylaxis and treatment of recurrent maxillary sinusitis.

## 2019-08-28 ENCOUNTER — OFFICE VISIT (OUTPATIENT)
Dept: CARDIOLOGY CLINIC | Age: 79
End: 2019-08-28

## 2019-08-28 VITALS
WEIGHT: 177 LBS | HEART RATE: 70 BPM | DIASTOLIC BLOOD PRESSURE: 80 MMHG | BODY MASS INDEX: 25.34 KG/M2 | OXYGEN SATURATION: 97 % | SYSTOLIC BLOOD PRESSURE: 150 MMHG | HEIGHT: 70 IN

## 2019-08-28 DIAGNOSIS — Z95.0 PACEMAKER: ICD-10-CM

## 2019-08-28 DIAGNOSIS — I10 ESSENTIAL HYPERTENSION: ICD-10-CM

## 2019-08-28 DIAGNOSIS — I48.0 PAROXYSMAL ATRIAL FIBRILLATION (HCC): ICD-10-CM

## 2019-08-28 DIAGNOSIS — I42.9 CARDIOMYOPATHY, UNSPECIFIED TYPE (HCC): Primary | ICD-10-CM

## 2019-08-28 DIAGNOSIS — I44.30 AV BLOCK: ICD-10-CM

## 2019-08-28 NOTE — PROGRESS NOTES
Hollis Centermary Ochoalong presents today for   Chief Complaint   Patient presents with    Cardiomyopathy     6 month follow up - no cardiac complaints        Reginald Tovar preferred language for health care discussion is english/other. Is someone accompanying this pt? no    Is the patient using any DME equipment during [address]? no    Depression Screening:  3 most recent PHQ Screens 8/28/2019   Little interest or pleasure in doing things Not at all   Feeling down, depressed, irritable, or hopeless Not at all   Total Score PHQ 2 0       Learning Assessment:  Learning Assessment 8/28/2019   PRIMARY LEARNER Patient   HIGHEST LEVEL OF EDUCATION - PRIMARY LEARNER  -   BARRIERS PRIMARY LEARNER -   CO-LEARNER CAREGIVER -   PRIMARY LANGUAGE ENGLISH   LEARNER PREFERENCE PRIMARY DEMONSTRATION   ANSWERED BY Patient   RELATIONSHIP SELF       Abuse Screening:  Abuse Screening Questionnaire 8/28/2019   Do you ever feel afraid of your partner? N   Are you in a relationship with someone who physically or mentally threatens you? N   Is it safe for you to go home? Y       Fall Risk  Fall Risk Assessment, last 12 mths 8/28/2019   Able to walk? Yes   Fall in past 12 months? No   Number of falls in past 12 months -       Pt currently taking Anticoagulant therapy? no    Coordination of Care:  1. Have you been to the ER, urgent care clinic since your last visit? Hospitalized since your last visit? no    2. Have you seen or consulted any other health care providers outside of the [address] [place] [address] since your last visit? Include any pap smears or colon screening.  no

## 2019-08-28 NOTE — PROGRESS NOTES
HISTORY OF PRESENT ILLNESS  Camille Marquez is a 77 y.o. male. Follow-up   Pertinent negatives include no chest pain, no abdominal pain, no headaches and no shortness of breath. Cardiomyopathy   Pertinent negatives include no chest pain, no abdominal pain, no headaches and no shortness of breath. Patient presents for a scheduled followup visit. He has a history of intermittent high-grade AV block, status post to dual-chamber permanent pacemaker in 2007. He also has a history of hypertension, Brief episodes approximately atrial fibrillation, and a mildly depressed LV systolic function,  Which returned to low normal on echocardiogram in June 2013 showing an ejection fraction of 50-55%. The patient underwent a pacemaker generator exchange in May 2016 with a 3seventytronic MRI safe device after his device has reached elective replacement indicator. The patient was last seen in the office 6 months ago. Since last visit he has been feeling well. He denies any major change in his activity level. No new heart palpitations, dizziness or syncope. No chest pain at rest or with exertion. No orthopnea, PND shortness of breath or leg swelling. Past Medical History:   Diagnosis Date    Anxiety     AV block     intermittent, high-grade    Bilateral inguinal hernia     Cardiac echocardiogram 06/25/2013    EF 50-55%. No WMA. Mild LVH. Gr 1 DDfx. RVE.  AKANKSHA. Mild TR. No significant chg from study of 6/8/11.  Cardiac nuclear imaging test 10/07/2009    Likely inferior & anterior septal artifact. EF 55%. Mild septal WMA could be related to pacemaker.  Cardiomyopathy ([address]     Diverticulosis of colon 11/10/2015    Dysphagia     Multiple esophageal dilations.  Essential hypertension with goal blood pressure less than 140/90     HLD (hyperlipidemia)     Hx of colonoscopy 11/10/2015    Dr. Sandra King; tubulovillous adenoma.  Pacemaker 2007    Medtronic dual-chamber device.     Paroxysmal atrial fibrillation (HCC)     single episode noted on routine device check      Current Outpatient Medications   Medication Sig Dispense Refill    amoxicillin-clavulanate (AUGMENTIN) 875-125 mg per tablet Take 1 Tab by mouth two (2) times a day for 10 days. 20 Tab 0    LORazepam (ATIVAN) 1 mg tablet Take 1 Tab by mouth every eight (8) hours as needed for Anxiety. Max Daily Amount: 3 mg. 60 Tab 0    butalbital-acetaminophen-caffeine (FIORICET, ESGIC) -40 mg per tablet Take 1 Tab by mouth every six (6) hours as needed for Pain. 30 Tab 0    lisinopril (PRINIVIL, ZESTRIL) 2.5 mg tablet TAKE 3 TABLETS BY MOUTH ONCE DAILY 270 Tab 2    mometasone (NASONEX) 50 mcg/actuation nasal spray USE TWO SPRAYS IN EACH NOSTRIL DAILY 1 Container 5    VIT C/E/ZN/COPPR/LUTEIN/ZEAXAN (PRESERVISION AREDS 2 PO) Take  by mouth.  omeprazole (PRILOSEC) 20 mg capsule Take 20 mg by mouth daily as needed.  timolol (TIMOPTIC) 0.25 % ophthalmic solution Administer 1 Drop to both eyes two (2) times a day.  cyclobenzaprine (FLEXERIL) 5 mg tablet TAKE 1-2 TABLETS BY MOUTH EVERY 8 HOURS AS NEEDED FOR MUSCLE SPASM 25 Tab 0    MULTIVITAMINS W-MINERALS/LUT (CENTRUM SILVER PO) Take  by mouth daily.  OMEGA-3 FATTY ACIDS (OMEGA 3 PO) Take 2,400 mg by mouth two (2) times a day.  coenzyme q10 100 mg Cap Take  by mouth daily.  metronidazole (METROGEL) 1 % topical gel Apply  to affected area daily. Use a thin layer to affected areas after washing       zinc 50 mg capsule Take  by mouth daily.  magnesium 250 mg Tab Take  by mouth daily.  selenium 100 mcg Cap Take  by mouth daily.  ascorbic acid (VITAMIN C) 500 mg tablet Take  by mouth two (2) times a day.  Ginkgo Biloba Leaf Extract 30 mg Cap Take  by mouth daily.          No Known Allergies     Social History     Tobacco Use    Smoking status: Former Smoker     Last attempt to quit: 1966     Years since quittin.1    Smokeless tobacco: Never Used   Substance Use Topics    Alcohol use: No     Comment: 03/16/1998 last time he had alcohol.  Drug use: No         Review of Systems   Constitutional: Negative for chills, fever and weight loss. HENT: Negative for nosebleeds. Eyes: Negative for blurred vision and double vision. Respiratory: Negative for cough, shortness of breath and wheezing. Cardiovascular: Negative for chest pain, palpitations, orthopnea, claudication, leg swelling and PND. Gastrointestinal: Negative for abdominal pain, heartburn, nausea and vomiting. Genitourinary: Negative for dysuria and hematuria. Musculoskeletal: Negative for falls and myalgias. Skin: Negative for rash. Neurological: Negative for dizziness, focal weakness and headaches. Endo/Heme/Allergies: Does not bruise/bleed easily. Psychiatric/Behavioral: Negative for substance abuse. Visit Vitals  /80 (BP 1 Location: Left arm, BP Patient Position: Sitting)   Pulse 70   Ht 5' 10\" (1.778 m)   Wt 80.3 kg (177 lb)   SpO2 97%   BMI 25.40 kg/m²      Physical Exam   Constitutional: He is oriented to person, place, and time. He appears well-developed and well-nourished. HENT:   Head: Normocephalic and atraumatic. Eyes: Conjunctivae are normal.   Neck: Neck supple. No JVD present. Carotid bruit is not present. Cardiovascular: Normal rate, regular rhythm, S1 normal, S2 normal and normal pulses. Exam reveals no gallop and no S3. No murmur heard. Pulmonary/Chest: Breath sounds normal. He has no wheezes. He has no rales. Abdominal: Soft. Bowel sounds are normal. There is no tenderness. Musculoskeletal: He exhibits no edema. Neurological: He is alert and oriented to person, place, and time. Skin: Skin is warm and dry. Pacemaker interrogation: Pacemaker generator at beginning of life. Estimated longevity 6 years. No significant arrhythmias since last device check. Pacing in the atrium 14 % in the ventricle 100%.   Stable activity level.  Scanned document for details. ASSESSMENT and PLAN    Paroxysmal atrial fibrillation. Patient had a single episode of atrial fibrillation in November 2017 lasting for little less than 2 hours. No recurrent episodes since that time. He was asymptomatic to the arrhythmia. No need for anticoagulation at this time. This will be continued to be monitored every 3 months with routine device checks. High-grade AV block: Status post dual-chamber permanent pacemaker with recent generator exchange in May 2016. Patient has an MRI compatible device. Normal device function on interrogation today. He is now pacemaker dependent in the ventricle. His battery status 6 years left. Hypertension: Patient's blood pressure is mildly elevated today in the office, however, is usually under better control. Asked him to start checking this regularly at home. I will defer any adjustment of his ACE inhibitor to his PCP. Karen Huston Dyslipidemia. Patient has been managing this with diet control and takes a fish oil supplement. [facility] device check in 3 months. Followup in 6 months, sooner if needed.

## 2019-09-17 DIAGNOSIS — F41.9 ANXIETY: ICD-10-CM

## 2019-09-17 RX ORDER — LORAZEPAM 1 MG/1
1 TABLET ORAL
Qty: 60 TAB | Refills: 0 | OUTPATIENT
Start: 2019-09-17 | End: 2019-10-08 | Stop reason: SDUPTHER

## 2019-09-17 NOTE — TELEPHONE ENCOUNTER
LAST OV: 8/20/19  LAST FILL: 8/9/19 per   Reviewed report generated by the [facility]. Does not demonstrate aberrancies or inconsistencies with regard to the prescribing of controlled medications to this patient by other providers.       Last UDS done 8/9/19 [facility] on file

## 2019-10-01 ENCOUNTER — HOSPITAL ENCOUNTER (OUTPATIENT)
Dept: LAB | Age: 79
Discharge: HOME | End: 2019-10-01
Payer: MEDICARE

## 2019-10-01 ENCOUNTER — APPOINTMENT (OUTPATIENT)
Dept: INTERNAL MEDICINE CLINIC | Age: 79
End: 2019-10-01

## 2019-10-01 DIAGNOSIS — R42 POSTURAL DIZZINESS WITH PRESYNCOPE: ICD-10-CM

## 2019-10-01 DIAGNOSIS — E78.5 HYPERLIPIDEMIA, UNSPECIFIED HYPERLIPIDEMIA TYPE: ICD-10-CM

## 2019-10-01 DIAGNOSIS — I10 ESSENTIAL HYPERTENSION: ICD-10-CM

## 2019-10-01 DIAGNOSIS — I42.9 CARDIOMYOPATHY, UNSPECIFIED TYPE (HCC): ICD-10-CM

## 2019-10-01 DIAGNOSIS — I48.0 PAROXYSMAL ATRIAL FIBRILLATION (HCC): ICD-10-CM

## 2019-10-01 DIAGNOSIS — I44.30 AV BLOCK: ICD-10-CM

## 2019-10-01 DIAGNOSIS — Z12.5 ENCOUNTER FOR SCREENING FOR MALIGNANT NEOPLASM OF PROSTATE: ICD-10-CM

## 2019-10-01 DIAGNOSIS — R55 POSTURAL DIZZINESS WITH PRESYNCOPE: ICD-10-CM

## 2019-10-01 DIAGNOSIS — K21.9 GASTROESOPHAGEAL REFLUX DISEASE WITHOUT ESOPHAGITIS: ICD-10-CM

## 2019-10-01 DIAGNOSIS — Z95.0 PACEMAKER: ICD-10-CM

## 2019-10-01 DIAGNOSIS — D12.2 ADENOMATOUS POLYP OF ASCENDING COLON: ICD-10-CM

## 2019-10-01 DIAGNOSIS — F41.9 ANXIETY: ICD-10-CM

## 2019-10-01 DIAGNOSIS — K44.9 HIATAL HERNIA: ICD-10-CM

## 2019-10-01 LAB
ALBUMIN SERPL-MCNC: 3.7 G/DL (ref 3.4–5)
ALBUMIN/GLOB SERPL: 1.2 {RATIO} (ref 0.8–1.7)
ALP SERPL-CCNC: 85 U/L (ref 45–117)
ALT SERPL-CCNC: 21 U/L (ref 16–61)
ANION GAP SERPL CALC-SCNC: 9 MMOL/L (ref 3–18)
APPEARANCE UR: CLEAR
AST SERPL-CCNC: 17 U/L (ref 10–38)
BACTERIA URNS QL MICRO: NEGATIVE /HPF
BASOPHILS # BLD: 0 K/UL (ref 0–0.1)
BASOPHILS NFR BLD: 0 % (ref 0–2)
BILIRUB SERPL-MCNC: 0.7 MG/DL (ref 0.2–1)
BILIRUB UR QL: NEGATIVE
BUN SERPL-MCNC: 10 MG/DL (ref 7–18)
BUN/CREAT SERPL: 14 (ref 12–20)
CALCIUM SERPL-MCNC: 8.6 MG/DL (ref 8.5–10.1)
CHLORIDE SERPL-SCNC: 101 MMOL/L (ref 100–111)
CO2 SERPL-SCNC: 26 MMOL/L (ref 21–32)
COLOR UR: YELLOW
CREAT SERPL-MCNC: 0.72 MG/DL (ref 0.6–1.3)
DIFFERENTIAL METHOD BLD: ABNORMAL
EOSINOPHIL # BLD: 0.2 K/UL (ref 0–0.4)
EOSINOPHIL NFR BLD: 2 % (ref 0–5)
EPITH CASTS URNS QL MICRO: ABNORMAL /LPF (ref 0–5)
ERYTHROCYTE [DISTWIDTH] IN BLOOD BY AUTOMATED COUNT: 13.9 % (ref 11.6–14.5)
GLOBULIN SER CALC-MCNC: 3.1 G/DL (ref 2–4)
GLUCOSE SERPL-MCNC: 80 MG/DL (ref 74–99)
GLUCOSE UR STRIP.AUTO-MCNC: NEGATIVE MG/DL
HCT VFR BLD AUTO: 39.9 % (ref 36–48)
HGB BLD-MCNC: 12.7 G/DL (ref 13–16)
HGB UR QL STRIP: NEGATIVE
KETONES UR QL STRIP.AUTO: 15 MG/DL
LEUKOCYTE ESTERASE UR QL STRIP.AUTO: NEGATIVE
LYMPHOCYTES # BLD: 2.2 K/UL (ref 0.9–3.6)
LYMPHOCYTES NFR BLD: 25 % (ref 21–52)
MAGNESIUM SERPL-MCNC: 2.1 MG/DL (ref 1.6–2.6)
MCH RBC QN AUTO: 29.6 PG (ref 24–34)
MCHC RBC AUTO-ENTMCNC: 31.8 G/DL (ref 31–37)
MCV RBC AUTO: 93 FL (ref 74–97)
MONOCYTES # BLD: 1.2 K/UL (ref 0.05–1.2)
MONOCYTES NFR BLD: 13 % (ref 3–10)
NEUTS SEG # BLD: 5.3 K/UL (ref 1.8–8)
NEUTS SEG NFR BLD: 60 % (ref 40–73)
NITRITE UR QL STRIP.AUTO: NEGATIVE
PH UR STRIP: 7.5 [PH] (ref 5–8)
PLATELET # BLD AUTO: 252 K/UL (ref 135–420)
PMV BLD AUTO: 8.8 FL (ref 9.2–11.8)
POTASSIUM SERPL-SCNC: 4.4 MMOL/L (ref 3.5–5.5)
PROT SERPL-MCNC: 6.8 G/DL (ref 6.4–8.2)
PROT UR STRIP-MCNC: NEGATIVE MG/DL
RBC # BLD AUTO: 4.29 M/UL (ref 4.7–5.5)
RBC #/AREA URNS HPF: 0 /HPF (ref 0–5)
SODIUM SERPL-SCNC: 136 MMOL/L (ref 136–145)
SP GR UR REFRACTOMETRY: 1.01 (ref 1–1.03)
TSH SERPL DL<=0.05 MIU/L-ACNC: 1.61 UIU/ML (ref 0.36–3.74)
UROBILINOGEN UR QL STRIP.AUTO: 0.2 EU/DL (ref 0.2–1)
WBC # BLD AUTO: 9 K/UL (ref 4.6–13.2)
WBC URNS QL MICRO: ABNORMAL /HPF (ref 0–4)

## 2019-10-01 PROCEDURE — 80061 LIPID PANEL: CPT

## 2019-10-01 PROCEDURE — 80053 COMPREHEN METABOLIC PANEL: CPT

## 2019-10-01 PROCEDURE — 36415 COLL VENOUS BLD VENIPUNCTURE: CPT

## 2019-10-01 PROCEDURE — 85025 COMPLETE CBC W/AUTO DIFF WBC: CPT

## 2019-10-01 PROCEDURE — 82306 VITAMIN D 25 HYDROXY: CPT

## 2019-10-01 PROCEDURE — 84443 ASSAY THYROID STIM HORMONE: CPT

## 2019-10-01 PROCEDURE — 81001 URINALYSIS AUTO W/SCOPE: CPT

## 2019-10-01 PROCEDURE — 84153 ASSAY OF PSA TOTAL: CPT

## 2019-10-01 PROCEDURE — 83735 ASSAY OF MAGNESIUM: CPT

## 2019-10-02 LAB
25(OH)D3 SERPL-MCNC: 45 NG/ML (ref 30–100)
CHOLEST SERPL-MCNC: 151 MG/DL
HDLC SERPL-MCNC: 39 MG/DL (ref 40–60)
HDLC SERPL: 3.9 {RATIO} (ref 0–5)
LDLC SERPL CALC-MCNC: 97 MG/DL (ref 0–100)
LIPID PROFILE,FLP: ABNORMAL
PSA SERPL-MCNC: 2.6 NG/ML (ref 0–4)
TRIGL SERPL-MCNC: 75 MG/DL (ref ?–150)
VLDLC SERPL CALC-MCNC: 15 MG/DL

## 2019-10-08 ENCOUNTER — HOSPITAL ENCOUNTER (OUTPATIENT)
Dept: LAB | Age: 79
Discharge: HOME | End: 2019-10-08
Payer: MEDICARE

## 2019-10-08 ENCOUNTER — OFFICE VISIT (OUTPATIENT)
Dept: INTERNAL MEDICINE CLINIC | Age: 79
End: 2019-10-08

## 2019-10-08 ENCOUNTER — TELEPHONE (OUTPATIENT)
Dept: INTERNAL MEDICINE CLINIC | Age: 79
End: 2019-10-08

## 2019-10-08 VITALS
HEART RATE: 74 BPM | BODY MASS INDEX: 24.48 KG/M2 | SYSTOLIC BLOOD PRESSURE: 112 MMHG | DIASTOLIC BLOOD PRESSURE: 60 MMHG | WEIGHT: 171 LBS | OXYGEN SATURATION: 99 % | RESPIRATION RATE: 14 BRPM | HEIGHT: 70 IN | TEMPERATURE: 98.6 F

## 2019-10-08 DIAGNOSIS — R97.20 ELEVATED PSA: ICD-10-CM

## 2019-10-08 DIAGNOSIS — E78.5 HYPERLIPIDEMIA, UNSPECIFIED HYPERLIPIDEMIA TYPE: ICD-10-CM

## 2019-10-08 DIAGNOSIS — R42 POSTURAL DIZZINESS WITH PRESYNCOPE: ICD-10-CM

## 2019-10-08 DIAGNOSIS — R68.89 OTHER GENERAL SYMPTOMS AND SIGNS: ICD-10-CM

## 2019-10-08 DIAGNOSIS — I44.30 AV BLOCK: ICD-10-CM

## 2019-10-08 DIAGNOSIS — Z95.0 PACEMAKER: ICD-10-CM

## 2019-10-08 DIAGNOSIS — I42.9 CARDIOMYOPATHY, UNSPECIFIED TYPE (HCC): ICD-10-CM

## 2019-10-08 DIAGNOSIS — D12.2 ADENOMATOUS POLYP OF ASCENDING COLON: ICD-10-CM

## 2019-10-08 DIAGNOSIS — K21.9 GASTROESOPHAGEAL REFLUX DISEASE WITHOUT ESOPHAGITIS: ICD-10-CM

## 2019-10-08 DIAGNOSIS — D64.9 ANEMIA, UNSPECIFIED TYPE: ICD-10-CM

## 2019-10-08 DIAGNOSIS — I10 ESSENTIAL HYPERTENSION: Primary | ICD-10-CM

## 2019-10-08 DIAGNOSIS — G44.219 EPISODIC TENSION-TYPE HEADACHE, NOT INTRACTABLE: ICD-10-CM

## 2019-10-08 DIAGNOSIS — Z23 ENCOUNTER FOR IMMUNIZATION: ICD-10-CM

## 2019-10-08 DIAGNOSIS — Z80.42 FH: PROSTATE CANCER: ICD-10-CM

## 2019-10-08 DIAGNOSIS — R55 POSTURAL DIZZINESS WITH PRESYNCOPE: ICD-10-CM

## 2019-10-08 DIAGNOSIS — I48.0 PAROXYSMAL ATRIAL FIBRILLATION (HCC): ICD-10-CM

## 2019-10-08 DIAGNOSIS — F41.9 ANXIETY: ICD-10-CM

## 2019-10-08 LAB
BASOPHILS # BLD: 0.1 K/UL (ref 0–0.1)
BASOPHILS NFR BLD: 1 % (ref 0–2)
DIFFERENTIAL METHOD BLD: ABNORMAL
EOSINOPHIL # BLD: 0.3 K/UL (ref 0–0.4)
EOSINOPHIL NFR BLD: 3 % (ref 0–5)
ERYTHROCYTE [DISTWIDTH] IN BLOOD BY AUTOMATED COUNT: 13.4 % (ref 11.6–14.5)
HCT VFR BLD AUTO: 38 % (ref 36–48)
HGB BLD-MCNC: 12.5 G/DL (ref 13–16)
LYMPHOCYTES # BLD: 2.5 K/UL (ref 0.9–3.6)
LYMPHOCYTES NFR BLD: 24 % (ref 21–52)
MCH RBC QN AUTO: 30 PG (ref 24–34)
MCHC RBC AUTO-ENTMCNC: 32.9 G/DL (ref 31–37)
MCV RBC AUTO: 91.1 FL (ref 74–97)
MONOCYTES # BLD: 0.9 K/UL (ref 0.05–1.2)
MONOCYTES NFR BLD: 9 % (ref 3–10)
NEUTS SEG # BLD: 6.5 K/UL (ref 1.8–8)
NEUTS SEG NFR BLD: 63 % (ref 40–73)
PLATELET # BLD AUTO: 313 K/UL (ref 135–420)
PMV BLD AUTO: 9 FL (ref 9.2–11.8)
RBC # BLD AUTO: 4.17 M/UL (ref 4.7–5.5)
WBC # BLD AUTO: 10.3 K/UL (ref 4.6–13.2)

## 2019-10-08 PROCEDURE — 83540 ASSAY OF IRON: CPT

## 2019-10-08 PROCEDURE — 84153 ASSAY OF PSA TOTAL: CPT

## 2019-10-08 PROCEDURE — 85025 COMPLETE CBC W/AUTO DIFF WBC: CPT

## 2019-10-08 PROCEDURE — 82728 ASSAY OF FERRITIN: CPT

## 2019-10-08 PROCEDURE — 82607 VITAMIN B-12: CPT

## 2019-10-08 PROCEDURE — 82784 ASSAY IGA/IGD/IGG/IGM EACH: CPT

## 2019-10-08 RX ORDER — BUTALBITAL, ACETAMINOPHEN AND CAFFEINE 50; 325; 40 MG/1; MG/1; MG/1
1 TABLET ORAL
Qty: 30 TAB | Refills: 0 | Status: SHIPPED | OUTPATIENT
Start: 2019-10-08 | End: 2020-06-30 | Stop reason: SDUPTHER

## 2019-10-08 RX ORDER — AMOXICILLIN AND CLAVULANATE POTASSIUM 875; 125 MG/1; MG/1
1 TABLET, FILM COATED ORAL EVERY 12 HOURS
Qty: 20 TAB | Refills: 0 | Status: SHIPPED | OUTPATIENT
Start: 2019-10-08 | End: 2019-10-18

## 2019-10-08 RX ORDER — LORAZEPAM 1 MG/1
1 TABLET ORAL
Qty: 60 TAB | Refills: 0 | Status: SHIPPED | OUTPATIENT
Start: 2019-10-08 | End: 2019-11-26 | Stop reason: SDUPTHER

## 2019-10-08 NOTE — PROGRESS NOTES
Chief Complaint   Patient presents with    Hypertension     6 month follow up with labs. Health Maintenance Due   Topic Date Due    Shingrix Vaccine Age 49> (2 of 2) 07/10/2019    Influenza Age 5 to Adult  08/01/2019     Patient given influnenza vaccine, FLUAD, in left deltoid, per verbal order from Dr. Lanette Henriquez with read back. Instructed patient to sit and wait 10-20 minutes before leaving the premises so that we can watch for any complications or adverse reactions. Patient given vaccine information statement handout before vaccine was given. Patient tolerated well without adverse reactions or complications. 1. Have you been to the ER, urgent care clinic or hospitalized since your last visit? NO.     2. Have you seen or consulted any other health care providers outside of the [address] [place] [address] since your last visit (Include any pap smears or colon screening)? YES, last got an eye exam August 2019 by Dr. Agus Zaragoza. Learning Assessment 8/28/2019   PRIMARY LEARNER Patient   HIGHEST LEVEL OF EDUCATION - PRIMARY LEARNER  -   BARRIERS PRIMARY LEARNER -   CO-LEARNER CAREGIVER -   PRIMARY LANGUAGE ENGLISH   LEARNER PREFERENCE PRIMARY DEMONSTRATION   ANSWERED BY Patient   RELATIONSHIP SELF     Abuse Screening Questionnaire 10/8/2019   Do you ever feel afraid of your partner? N   Are you in a relationship with someone who physically or mentally threatens you? N   Is it safe for you to go home?  Pauline Means

## 2019-10-08 NOTE — PATIENT INSTRUCTIONS
DASH Diet: Care Instructions Your Care Instructions The DASH diet is an eating plan that can help lower your blood pressure. DASH stands for Dietary Approaches to Stop Hypertension. Hypertension is high blood pressure. The DASH diet focuses on eating foods that are high in calcium, potassium, and magnesium. These nutrients can lower blood pressure. The foods that are highest in these nutrients are fruits, vegetables, low-fat dairy products, nuts, seeds, and legumes. But taking calcium, potassium, and magnesium supplements instead of eating foods that are high in those nutrients does not have the same effect. The DASH diet also includes whole grains, fish, and poultry. The DASH diet is one of several lifestyle changes your doctor may recommend to lower your high blood pressure. Your doctor may also want you to decrease the amount of sodium in your diet. Lowering sodium while following the DASH diet can lower blood pressure even further than just the DASH diet alone. Follow-up care is a key part of your treatment and safety. Be sure to make and go to all appointments, and call your doctor if you are having problems. It's also a good idea to know your test results and keep a list of the medicines you take. How can you care for yourself at home? Following the DASH diet · Eat 4 to 5 servings of fruit each day. A serving is 1 medium-sized piece of fruit, ½ cup chopped or canned fruit, 1/4 cup dried fruit, or 4 ounces (½ cup) of fruit juice. Choose fruit more often than fruit juice. · Eat 4 to 5 servings of vegetables each day. A serving is 1 cup of lettuce or raw leafy vegetables, ½ cup of chopped or cooked vegetables, or 4 ounces (½ cup) of vegetable juice. Choose vegetables more often than vegetable juice. · Get 2 to 3 servings of low-fat and fat-free dairy each day. A serving is 8 ounces of milk, 1 cup of yogurt, or 1 ½ ounces of cheese. · Eat 6 to 8 servings of grains each day. A serving is 1 slice of bread, 1 ounce of dry cereal, or ½ cup of cooked rice, pasta, or cooked cereal. Try to choose whole-grain products as much as possible. · Limit lean meat, poultry, and fish to 2 servings each day. A serving is 3 ounces, about the size of a deck of cards. · Eat 4 to 5 servings of nuts, seeds, and legumes (cooked dried beans, lentils, and split peas) each week. A serving is 1/3 cup of nuts, 2 tablespoons of seeds, or ½ cup of cooked beans or peas. · Limit fats and oils to 2 to 3 servings each day. A serving is 1 teaspoon of vegetable oil or 2 tablespoons of salad dressing. · Limit sweets and added sugars to 5 servings or less a week. A serving is 1 tablespoon jelly or jam, ½ cup sorbet, or 1 cup of lemonade. · Eat less than 2,300 milligrams (mg) of sodium a day. If you limit your sodium to 1,500 mg a day, you can lower your blood pressure even more. Tips for success · Start small. Do not try to make dramatic changes to your diet all at once. You might feel that you are missing out on your favorite foods and then be more likely to not follow the plan. Make small changes, and stick with them. Once those changes become habit, add a few more changes. · Try some of the following: ? Make it a goal to eat a fruit or vegetable at every meal and at snacks. This will make it easy to get the recommended amount of fruits and vegetables each day. ? Try yogurt topped with fruit and nuts for a snack or healthy dessert. ? Add lettuce, tomato, cucumber, and onion to sandwiches. ? Combine a ready-made pizza crust with low-fat mozzarella cheese and lots of vegetable toppings. Try using tomatoes, squash, spinach, broccoli, carrots, cauliflower, and onions. ? Have a variety of cut-up vegetables with a low-fat dip as an appetizer instead of chips and dip. ? Sprinkle sunflower seeds or chopped almonds over salads.  Or try adding chopped walnuts or almonds to cooked vegetables. ? Try some vegetarian meals using beans and peas. Add garbanzo or kidney beans to salads. Make burritos and tacos with mashed farrell beans or black beans. Where can you learn more? Go to http://hector-scott.info/. Enter Y940 in the search box to learn more about \"DASH Diet: Care Instructions. \" Current as of: April 9, 2019 Content Version: 12.2 © 7201-5475 [employer]. Care instructions adapted under license by [employer] (which disclaims liability or warranty for this information). If you have questions about a medical condition or this instruction, always ask your healthcare professional. [address] any warranty or liability for your use of this information.

## 2019-10-09 LAB
FERRITIN SERPL-MCNC: 104 NG/ML (ref 8–388)
FOLATE SERPL-MCNC: >20 NG/ML (ref 3.1–17.5)
IRON SATN MFR SERPL: 31 %
IRON SERPL-MCNC: 71 UG/DL (ref 50–175)
PERIPHERAL SMEAR,PSM: NORMAL
PSA SERPL-MCNC: 1.9 NG/ML (ref 0–4)
TIBC SERPL-MCNC: 232 UG/DL (ref 250–450)
VIT B12 SERPL-MCNC: 632 PG/ML (ref 211–911)

## 2019-10-09 NOTE — TELEPHONE ENCOUNTER
Spoke to Jessica from [facility] and she states they do not have the labs their but we can send over the request to add them. Faxed labs to [phone]

## 2019-10-09 NOTE — TELEPHONE ENCOUNTER
Patient had labs drawn in the office at his visit yesterday. They are only running half of them. Please make sure that ferritin, gammopathy panel, iron profile and B12/folate are done. Thanks.

## 2019-10-09 NOTE — TELEPHONE ENCOUNTER
Called [facility] and spoke to [facility] and she states that she will see what happened to the labs that were not processed.

## 2019-10-12 PROBLEM — D64.9 ANEMIA: Status: ACTIVE | Noted: 2019-10-12

## 2019-10-12 PROBLEM — G44.219 HEADACHE, TENSION TYPE, EPISODIC: Status: ACTIVE | Noted: 2019-10-12

## 2019-10-12 PROBLEM — R97.20 ELEVATED PSA: Status: ACTIVE | Noted: 2019-10-12

## 2019-10-12 NOTE — PROGRESS NOTES
HPI:   Tosin Randhawa is a 77y.o. year old male who presents today for a physical exam and for evaluation of hypertension, hyperlipidemia, AV block s/p pacemaker, paroxysmal atrial fibrillation, and anxiety. He reports that on 8/9/2019, he was evaluated by Dr. Cherrie Bishoper for nasal congestion, green drainage, facial pain over the left maxillary region, and mild cough. He denied any fever or chills. He stated that his symptoms were present for approximately one month, and he had been using a Neti pot with some improvement. He was diagnose with the presumed bacterial sinusitis, and treated with Augmentin. He presents today and states that his symptoms improved initially, although over the last several weeks he has noticed recurrence of the green drainage from the left side. He also has recurrence of facial pain, but denies any fever or chills. He states that he has been quite busy working on his property in [place], and has been clearing trees and debris. He states that the day before his lab appointment, he worked all day and did not eat much. He denies any palpitations, and is otherwise without new complaints. He has a history of hypertension, treated with lisinopril. He does monitor his blood pressure regularly, and is currently taking 3.25 mg in the morning and evening. He reports that his blood pressure in mainly 120-130/ 70-80 at home. He has a history of intermittent high grade AV block, and underwent placement of a dual chamber Medtronic pacemaker in 2007. He underwent pacemaker generator exchange in 5/2016 after his device reached elective replacement indicator. A Medtronic MRI safe device was installed. He has a history of a single episode of atrial fibrillation noted on device check several years ago.  He underwent follow-up appointment with Dr. Dakota Ruvalcaba on 7/30/2018, and interrogation of his pacemaker during the visit revealed a single episode of atrial fibrillation in November 2017 lasting for less than 2 hours. There were no recurrent episodes since that time, and he was asymptomatic during the episode. He was noted in the past to have mildly decreased LV function, but a repeat echocardiogram in 6/2013 showed normal size and lower limits of normal function with EF 50-55%; no wall motion abnormalities, and grade 1 diastolic dysfunction. He is followed by Dr. Francoise Machado. 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 He also was walking 2.5 miles each day. He denies any chest pain, shortness of breath at rest or with exertion, palpitations, lightheadedness, or edema. He has a history of dyslipidemia, managed by diet in the past.    In 4/2017 he developed lightheadedness after painting outside for several hours. He took an Ativan because he thought he was experiencing anxiety. However, his symptoms persisted and he began to feel like he was going to pass out so he called 911 for help. When EMS arrived, his symptoms had resolved but he was noted to have a low blood pressure of approximately 112/67. He was taken to [facility] for evaluation and was asymptomatic upon arrival. He admitted to not eating or drinking anything for the four hour period that he was outside, and acknowledged that the day was quite warm. Evaluation in the ED included an EKG which showed a paced rhythm, serial troponins which were negative, and blood work showing WBC 14.2, Hb 14.5, Hct 42.7, creatinine 1.3/ eGFR 51.7. Chest x-ray showed no acute process, but did note an incidental pulmonary nodule over the left lower lobe. A chest CT scan was obtained (5/2017) showing mild subsegmental atelectasis or scarring at the left lung base without focal pulmonary nodule. He has a history of a GI bleeding in 1997 secondary to NSAID use for headaches. He also has a history of GERD and dysphagia and has undergone multiple esophageal dilatations in the past by Dr. Jonnathan Salcedo.  In 11/2015, he had an upper endoscopy which showed a small hiatal hernia in the cardia, a Schatzki's ring at the gastroesophageal junction, which was dilated, and otherwise normal stomach and duodenum. He also had a screening colonoscopy which revealed mild colonic diverticulosis and a 7 mm polyp in the proximal ascending colon (pathology: tubulovillous adenoma). He had a follow-up colonoscopy in 3/2019 by Dr. Nicole Burgess which showed two sessile 4 mm polyps in the cecum and ascending colon (pathology: tubular adenomas). He also simultaneously had an upper endoscopy showing normal stomach/duodenum, a small hiatal hernia in the cardia, and a Schatzki's ring (dilated). He denies any abdominal pain, nausea, vomiting, melena, hematochezia, or change in bowel movements. He has a history of anxiety, and uses lorazepam occasionally. He also reports he uses cyclobenzaprine occasionally for back spasms which occur at times due to straining his back with excessive physical work. He also uses Fioricet occasionally for tension type headaches. He states that he will only take one fourth of a pill when needed. Past Medical History:   Diagnosis Date    Anxiety     AV block     intermittent, high-grade    Bilateral inguinal hernia     Cardiac echocardiogram 06/25/2013    EF 50-55%. No WMA. Mild LVH. Gr 1 DDfx. RVE.  AKANKSHA. Mild TR. No significant chg from study of 6/8/11.  Cardiac nuclear imaging test 10/07/2009    Likely inferior & anterior septal artifact. EF 55%. Mild septal WMA could be related to pacemaker.  Cardiomyopathy ([address]     Diverticulosis of colon 11/10/2015    Dysphagia     Multiple esophageal dilations.  Essential hypertension with goal blood pressure less than 140/90     HLD (hyperlipidemia)     Hx of colonoscopy 11/10/2015    Dr. Nicole Burgess; tubulovillous adenoma.  Pacemaker 2007    Medtronic dual-chamber device.     Paroxysmal atrial fibrillation (HCC)     single episode noted on routine device check     Past Surgical History:   Procedure Laterality Date    CARDIAC SURG PROCEDURE UNLIST      COLONOSCOPY N/A 3/29/2019    COLONOSCOPY with polypectomy performed by Lynne Pham MD at [facility] ENDOSCOPY    ENDOSCOPY, COLON, DIAGNOSTIC      HX CATARACT REMOVAL      HX PACEMAKER  05/29/07    Medtronic Versa dual-chamber pacemaker for intermittent high-grade AV block and symptomatic bradycardia.  HX PACEMAKER  5/3/16     Current Outpatient Medications   Medication Sig    LORazepam (ATIVAN) 1 mg tablet Take 1 Tab by mouth every eight (8) hours as needed for Anxiety. Max Daily Amount: 3 mg.  butalbital-acetaminophen-caffeine (FIORICET, ESGIC) -40 mg per tablet Take 1 Tab by mouth every six (6) hours as needed for Pain.  amoxicillin-clavulanate (AUGMENTIN) 875-125 mg per tablet Take 1 Tab by mouth every twelve (12) hours for 10 days. Indications: Acute Bacterial Infection of the Sinuses    lisinopril (PRINIVIL, ZESTRIL) 2.5 mg tablet TAKE 3 TABLETS BY MOUTH ONCE DAILY    VIT C/E/ZN/COPPR/LUTEIN/ZEAXAN (PRESERVISION AREDS 2 PO) Take  by mouth.  omeprazole (PRILOSEC) 20 mg capsule Take 20 mg by mouth daily as needed.  timolol (TIMOPTIC) 0.25 % ophthalmic solution Administer 1 Drop to both eyes two (2) times a day.  MULTIVITAMINS W-MINERALS/LUT (CENTRUM SILVER PO) Take  by mouth daily.  OMEGA-3 FATTY ACIDS (OMEGA 3 PO) Take 2,400 mg by mouth two (2) times a day.  coenzyme q10 100 mg Cap Take  by mouth daily.  metronidazole (METROGEL) 1 % topical gel Apply  to affected area daily. Use a thin layer to affected areas after washing     zinc 50 mg capsule Take  by mouth daily.  magnesium 250 mg Tab Take  by mouth daily.  selenium 100 mcg Cap Take  by mouth daily.  ascorbic acid (VITAMIN C) 500 mg tablet Take  by mouth two (2) times a day.  Ginkgo Biloba Leaf Extract 30 mg Cap Take  by mouth daily.     mometasone (NASONEX) 50 mcg/actuation nasal spray USE TWO SPRAYS IN EACH NOSTRIL DAILY    cyclobenzaprine (FLEXERIL) 5 mg tablet TAKE 1-2 TABLETS BY MOUTH EVERY 8 HOURS AS NEEDED FOR MUSCLE SPASM     No current facility-administered medications for this visit. Allergies and Intolerances:   No Known Allergies     Family History: His brother  from metastatic prostate cancer. His mother lived until the age of 80, although she had CAD. His father  at the age of 59 from a CVA. He was an alcoholic. He has five sisters that are all healthy. Family History   Problem Relation Age of Onset    Heart Surgery Mother     Stroke Father     Alcohol abuse Father      Social History:   He  reports that he quit smoking about 53 years ago. He has never used smokeless tobacco. He smoked 1 ppd for 2 years, and then quit (). He is retired  and worked for the [employer]. He lives with his girlfriend and has one son. Social History     Substance and Sexual Activity   Alcohol Use No    Comment: 1998 last time he had alcohol. Immunization History:  Immunization History   Administered Date(s) Administered    (RETIRED) Pneumococcal Vaccine (Unspecified Type) 2007    Influenza High Dose Vaccine PF 01/10/2013, 2015, 2016, 10/02/2017    Influenza Vaccine 2013, 2014    Influenza Vaccine (Tri) Adjuvanted 2018, 10/08/2019    Pneumococcal Conjugate (PCV-13) 2015    Pneumococcal Polysaccharide (PPSV-23) 2007    TDAP Vaccine 2012    Zoster 2007    Zoster Recombinant 05/15/2019, 2019       Review of Systems:   As above included in HPI. Otherwise 11 point review of systems negative including constitutional, skin, HENT, eyes, respiratory, cardiovascular, gastrointestinal, genitourinary, musculoskeletal, endocrine, hematologic, allergy, and neurologic.     Physical:   Vitals:   BP: 112/60  HR: 74  WT: 171 lb (77.6 kg)  BMI:  24.54 kg/m2    Exam:   Patient appears in no apparent distress. Affect is appropriate. HEENT --Anicteric sclerae, tympanic membranes normal,  ear canals normal.  PERRL, EOMI, conjunctiva and lids normal.   Sinuses were nontender, turbinates normal, hearing normal.  Oropharynx without  erythema, normal tongue, oral mucosa and tonsils. No cervical lymphadenopathy. No thyromegaly, JVD, or bruits. Carotid pulses 2+ with normal upstroke. Lungs --Clear to auscultation. No wheezing or rales. Heart --Regular rate and rhythm, no murmurs, rubs, gallops, or clicks. Chest wall --Nontender to palpation. PMI normal.  Abdomen -- Soft and nontender, no hepatosplenomegaly or masses. Extremities -- Without cyanosis, clubbing, edema. 2+ pulses equally and bilaterally. Normal looking digits, ROM intact  Neuro -- CN 2-12 intact, strength 5/5 with intact soft touch in all extremities  Derm  no obvious abnormalities noted, no rash    Review of Data:  Labs:  Hospital Outpatient Visit on 10/01/2019   Component Date Value Ref Range Status    WBC 10/01/2019 9.0  4.6 - 13.2 K/uL Final    RBC 10/01/2019 4.29* 4.70 - 5.50 M/uL Final    HGB 10/01/2019 12.7* 13.0 - 16.0 g/dL Final    HCT 10/01/2019 39.9  36.0 - 48.0 % Final    MCV 10/01/2019 93.0  74.0 - 97.0 FL Final    MCH 10/01/2019 29.6  24.0 - 34.0 PG Final    MCHC 10/01/2019 31.8  31.0 - 37.0 g/dL Final    RDW 10/01/2019 13.9  11.6 - 14.5 % Final    PLATELET 59/83/8485 913  135 - 420 K/uL Final    MPV 10/01/2019 8.8* 9.2 - 11.8 FL Final    NEUTROPHILS 10/01/2019 60  40 - 73 % Final    LYMPHOCYTES 10/01/2019 25  21 - 52 % Final    MONOCYTES 10/01/2019 13* 3 - 10 % Final    EOSINOPHILS 10/01/2019 2  0 - 5 % Final    BASOPHILS 10/01/2019 0  0 - 2 % Final    ABS. NEUTROPHILS 10/01/2019 5.3  1.8 - 8.0 K/UL Final    ABS. LYMPHOCYTES 10/01/2019 2.2  0.9 - 3.6 K/UL Final    ABS. MONOCYTES 10/01/2019 1.2  0.05 - 1.2 K/UL Final    ABS. EOSINOPHILS 10/01/2019 0.2  0.0 - 0.4 K/UL Final    ABS.  BASOPHILS 10/01/2019 0.0 0.0 - 0.1 K/UL Final    DF 10/01/2019 AUTOMATED    Final    LIPID PROFILE 10/01/2019        Final    Cholesterol, total 10/01/2019 151  <200 MG/DL Final    Triglyceride 10/01/2019 75  <150 MG/DL Final    HDL Cholesterol 10/01/2019 39* 40 - 60 MG/DL Final    LDL, calculated 10/01/2019 97  0 - 100 MG/DL Final    VLDL, calculated 10/01/2019 15  MG/DL Final    CHOL/HDL Ratio 10/01/2019 3.9  0 - 5.0   Final    Sodium 10/01/2019 136  136 - 145 mmol/L Final    Potassium 10/01/2019 4.4  3.5 - 5.5 mmol/L Final    Chloride 10/01/2019 101  100 - 111 mmol/L Final    CO2 10/01/2019 26  21 - 32 mmol/L Final    Anion gap 10/01/2019 9  3.0 - 18 mmol/L Final    Glucose 10/01/2019 80  74 - 99 mg/dL Final    BUN 10/01/2019 10  7.0 - 18 MG/DL Final    Creatinine 10/01/2019 0.72  0.6 - 1.3 MG/DL Final    BUN/Creatinine ratio 10/01/2019 14  12 - 20   Final    GFR est AA 10/01/2019 >60  >60 ml/min/1.73m2 Final    GFR est non-AA 10/01/2019 >60  >60 ml/min/1.73m2 Final    Calcium 10/01/2019 8.6  8.5 - 10.1 MG/DL Final    Bilirubin, total 10/01/2019 0.7  0.2 - 1.0 MG/DL Final    ALT (SGPT) 10/01/2019 21  16 - 61 U/L Final    AST (SGOT) 10/01/2019 17  10 - 38 U/L Final    Alk.  phosphatase 10/01/2019 85  45 - 117 U/L Final    Protein, total 10/01/2019 6.8  6.4 - 8.2 g/dL Final    Albumin 10/01/2019 3.7  3.4 - 5.0 g/dL Final    Globulin 10/01/2019 3.1  2.0 - 4.0 g/dL Final    A-G Ratio 10/01/2019 1.2  0.8 - 1.7   Final    Prostate Specific Ag 10/01/2019 2.6  0.0 - 4.0 ng/mL Final    TSH 10/01/2019 1.61  0.36 - 3.74 uIU/mL Final    Color 10/01/2019 YELLOW    Final    Appearance 10/01/2019 CLEAR    Final    Specific gravity 10/01/2019 1.012  1.005 - 1.030   Final    pH (UA) 10/01/2019 7.5  5.0 - 8.0   Final    Protein 10/01/2019 NEGATIVE   NEG mg/dL Final    Glucose 10/01/2019 NEGATIVE   NEG mg/dL Final    Ketone 10/01/2019 15* NEG mg/dL Final    Bilirubin 10/01/2019 NEGATIVE   NEG   Final    Blood 10/01/2019 NEGATIVE   NEG   Final    Urobilinogen 10/01/2019 0.2  0.2 - 1.0 EU/dL Final    Nitrites 10/01/2019 NEGATIVE   NEG   Final    Leukocyte Esterase 10/01/2019 NEGATIVE   NEG   Final    WBC 10/01/2019 0 to 1  0 - 4 /hpf Final    RBC 10/01/2019 0  0 - 5 /hpf Final    Epithelial cells 10/01/2019 FEW  0 - 5 /lpf Final    Bacteria 10/01/2019 NEGATIVE   NEG /hpf Final    Vitamin D 25-Hydroxy 10/01/2019 45.0  30 - 100 ng/mL Final    Magnesium 10/01/2019 2.1  1.6 - 2.6 mg/dL Final     Health Maintenance:  Screening:    Colorectal: colonoscopy (3/2019) tubular adenomas. Dr. Monet Felipe. No further screening needed. Depression: none   DM (HbA1c/FPG): FPG 80 (10/2019)   Hepatitis C: N/A   Falls: none   DEXA: N/A   PSA/WEN: PSA 2.6 (10/2019)   Glaucoma: regular eye exams with Dr. Jose Roberto Stanley for glaucoma q4 months. Smoking: none   Vitamin D: 45.0 (10/2019)   Medicare Wellness: 4/2/2019    Impression:  Patient Active Problem List   Diagnosis Code    AV block I44.30    Pacemaker Z95.0    Cardiomyopathy ([address] I42.9    Paroxysmal atrial fibrillation ([address] I48.0    HLD (hyperlipidemia) E78.5    FH: prostate cancer Z80.42    Bilateral inguinal hernia K40.20    Anxiety F41.9    Essential hypertension I10    Adenomatous polyp of ascending colon D12.2    Gastroesophageal reflux disease without esophagitis K21.9    Postural dizziness with presyncope R42, R55    Hiatal hernia K44.9    Anemia D64.9    Elevated PSA R97.20    Headache, tension type, episodic G44.219       Plan:  1. Hypertension. Blood pressure remains well controlled today on lisinopril 3.75 mg bid. He continues to monitor his blood pressure carefully at home. Renal function remains normal with creatinine 0.72/ eGFR >60. Continue to follow. 2. AV block, intermittent high grade. S/P Medtronic dual chamber pacemaker with generator change in 5/2016 with an MRI safe device installed.  Interrogated and followed every 3 months by Dr. Dennie Gift, last 2019. Repeat echocardiogram with low normal LV function. 3. Paroxysmal atrial fibrillation. Single episode noted on device check several years ago. Had recurrence noted on device check in 2017, lasting approximately 2 hours, and he was asymptomatic. No recurrence since that time. Anti-coagulation felt not to be needed unless develops more frequent or prolonged episodes. Monitored closely every three months with routine device checks. Continue to follow. 4. Hyperlipidemia. His age > 76 does not place him in one of the four statin benefit groups as per new AHA/ACC guidelines. Also, with no evidence of ASCVD. LDL improved to 97 and HDL 39, which would essentially be at goal. Patient not wishing to begin therapy with a statin. Emphasized importance of continued lifestyle modifications, including diet and exercise. Continue to follow and repeat lipid panel at next visit. 5. Presyncope. Related to dehydration secondary to physical exertion in the heat without adequate hydration. Reports no further symptoms. Monitors his blood pressure daily and has been controlled at his baseline. Encouraged to continue drinking plenty of fluids. Follow. 6. Sinusitis. Patient with symptoms of sinusitis, describing left facial pain and green drainage. He had been treated with Augmentin x 10 days in 2019 with some improvement, but feels that symptoms never completely resolved. No fever or chills, but given chronicity of symptoms, will treat with another course of Augmentin. If symptoms remain after completion, will refer to ENT. 7. GERD/dysphagia. Repeat upper endoscopy (3/2019) with esophageal dilatation for Schatzki's ring. Small hiatal hernia. Symptoms well controlled with omeprazole. Follow. 8. Family history of prostate cancer. Patient quite concerned about his own risk given that his brother  quite rapidly from metastatic disease.  PSA level had remained quite low in the 0.6-0.8 range, but increased acutely to 2.6 on measurement today. He is quite concerned, but blames it on the strenuous activity that he performed on the day prior to his lab appointment. He requests to have it repeated today. He has expressed previously that he wishes to continue to have it checked despite his age. Had discussed the controversy of continuing prostatic cancer screening after age 75, but patient still requesting to continue PSA testing. Will repeat today. 9. Anemia. New onset mild normocytic anemia. Will check iron studies, B12, folate, peripheral smear and gammopathy panel. Further recommendations pending results. Follow. 10. Anxiety. Patient is quite anxious about his health. Reassured that he is living a healthy lifestyle and doing well. Continue lorazepam as needed. 111. Health maintenance. Will give influenza vaccine today. Completed 2/2 doses of Shingrix vaccine. Other immunizations up to date. Colonoscopy completed. Continue regular eye exams with Dr. Torres Campos. Vitamin D level normal.  Medicare wellness visit up to date. Patient understands recommendations and agrees with plan. Follow-up in 6 months.      [facility] Outpatient Visit on 10/08/2019   Component Date Value Ref Range Status    Prostate Specific Ag 10/08/2019 1.9  0.0 - 4.0 ng/mL Final    WBC 10/08/2019 10.3  4.6 - 13.2 K/uL Final    RBC 10/08/2019 4.17* 4.70 - 5.50 M/uL Final    HGB 10/08/2019 12.5* 13.0 - 16.0 g/dL Final    HCT 10/08/2019 38.0  36.0 - 48.0 % Final    MCV 10/08/2019 91.1  74.0 - 97.0 FL Final    MCH 10/08/2019 30.0  24.0 - 34.0 PG Final    MCHC 10/08/2019 32.9  31.0 - 37.0 g/dL Final    RDW 10/08/2019 13.4  11.6 - 14.5 % Final    PLATELET 83/80/2379 954  135 - 420 K/uL Final    MPV 10/08/2019 9.0* 9.2 - 11.8 FL Final    NEUTROPHILS 10/08/2019 63  40 - 73 % Final    LYMPHOCYTES 10/08/2019 24  21 - 52 % Final    MONOCYTES 10/08/2019 9  3 - 10 % Final    EOSINOPHILS 10/08/2019 3  0 - 5 % Final    BASOPHILS 10/08/2019 1  0 - 2 % Final    ABS. NEUTROPHILS 10/08/2019 6.5  1.8 - 8.0 K/UL Final    ABS. LYMPHOCYTES 10/08/2019 2.5  0.9 - 3.6 K/UL Final    ABS. MONOCYTES 10/08/2019 0.9  0.05 - 1.2 K/UL Final    ABS. EOSINOPHILS 10/08/2019 0.3  0.0 - 0.4 K/UL Final    ABS. BASOPHILS 10/08/2019 0.1  0.0 - 0.1 K/UL Final    DF 10/08/2019 AUTOMATED    Final    PERIPHERAL SMEAR 10/08/2019 NO SIGNIFICANT MORPHOLOGIC ABNORMALITY IDENTIFIED   Final    Vitamin B12 10/08/2019 632  211 - 911 pg/mL Final    Folate 10/08/2019 >20.0* 3.10 - 17.50 ng/mL Final    Ferritin 10/08/2019 104  8 - 388 NG/ML Final    Iron 10/08/2019 71  50 - 175 ug/dL Final    TIBC 10/08/2019 232* 250 - 450 ug/dL Final    Iron % saturation 10/08/2019 31  % Final     Reviewed labs from visit. CBC with evidence of mild anemia. Iron studies consistent with anemia of chronic disease, and B12 and folate levels are normal. Peripheral smear is normal. Await results of gammopathy panel. PSA at 1.9, which is improved although remains elevated compared to baseline. Will recommend continuing to monitor with repeat in 6 months.

## 2019-10-14 ENCOUNTER — TELEPHONE (OUTPATIENT)
Dept: INTERNAL MEDICINE CLINIC | Age: 79
End: 2019-10-14

## 2019-10-14 LAB
ALBUMIN SERPL ELPH-MCNC: 3.5 G/DL (ref 2.9–4.4)
ALBUMIN/GLOB SERPL: 1.2 {RATIO} (ref 0.7–1.7)
ALPHA1 GLOB SERPL ELPH-MCNC: 0.3 G/DL (ref 0–0.4)
ALPHA2 GLOB SERPL ELPH-MCNC: 0.7 G/DL (ref 0.4–1)
B-GLOBULIN SERPL ELPH-MCNC: 0.9 G/DL (ref 0.7–1.3)
GAMMA GLOB SERPL ELPH-MCNC: 1.1 G/DL (ref 0.4–1.8)
GLOBULIN SER-MCNC: 3 G/DL (ref 2.2–3.9)
IGA SERPL-MCNC: 146 MG/DL (ref 61–437)
IGG SERPL-MCNC: 1194 MG/DL (ref 700–1600)
IGM SERPL-MCNC: 159 MG/DL (ref 15–143)
INTERPRETATION SERPL IEP-IMP: ABNORMAL
KAPPA LC FREE SER-MCNC: 19.3 MG/L (ref 3.3–19.4)
KAPPA LC FREE/LAMBDA FREE SER: 1.39 {RATIO} (ref 0.26–1.65)
LAMBDA LC FREE SERPL-MCNC: 13.9 MG/L (ref 5.7–26.3)
M PROTEIN SERPL ELPH-MCNC: ABNORMAL G/DL
PROT SERPL-MCNC: 6.5 G/DL (ref 6–8.5)

## 2019-10-14 NOTE — TELEPHONE ENCOUNTER
No visits with results within 2 Day(s) from this visit. Latest known visit with results is:  
Hospital Outpatient Visit on 10/08/2019 Component Date Value Ref Range Status  Prostate Specific Ag 10/08/2019 1.9  0.0 - 4.0 ng/mL Final  
 WBC 10/08/2019 10.3  4.6 - 13.2 K/uL Final  
 RBC 10/08/2019 4.17* 4.70 - 5.50 M/uL Final  
 HGB 10/08/2019 12.5* 13.0 - 16.0 g/dL Final  
 HCT 10/08/2019 38.0  36.0 - 48.0 % Final  
 MCV 10/08/2019 91.1  74.0 - 97.0 FL Final  
 MCH 10/08/2019 30.0  24.0 - 34.0 PG Final  
 MCHC 10/08/2019 32.9  31.0 - 37.0 g/dL Final  
 RDW 10/08/2019 13.4  11.6 - 14.5 % Final  
 PLATELET 84/02/5265 596  135 - 420 K/uL Final  
 MPV 10/08/2019 9.0* 9.2 - 11.8 FL Final  
 NEUTROPHILS 10/08/2019 63  40 - 73 % Final  
 LYMPHOCYTES 10/08/2019 24  21 - 52 % Final  
 MONOCYTES 10/08/2019 9  3 - 10 % Final  
 EOSINOPHILS 10/08/2019 3  0 - 5 % Final  
 BASOPHILS 10/08/2019 1  0 - 2 % Final  
 ABS. NEUTROPHILS 10/08/2019 6.5  1.8 - 8.0 K/UL Final  
 ABS. LYMPHOCYTES 10/08/2019 2.5  0.9 - 3.6 K/UL Final  
 ABS. MONOCYTES 10/08/2019 0.9  0.05 - 1.2 K/UL Final  
 ABS. EOSINOPHILS 10/08/2019 0.3  0.0 - 0.4 K/UL Final  
 ABS.  BASOPHILS 10/08/2019 0.1  0.0 - 0.1 K/UL Final  
 DF 10/08/2019 AUTOMATED    Final  
 PERIPHERAL SMEAR 10/08/2019 NO SIGNIFICANT MORPHOLOGIC ABNORMALITY IDENTIFIED   Final  
 Vitamin B12 10/08/2019 632  211 - 911 pg/mL Final  
 Folate 10/08/2019 >20.0* 3.10 - 17.50 ng/mL Final  
 Ferritin 10/08/2019 104  8 - 388 NG/ML Final  
 Iron 10/08/2019 71  50 - 175 ug/dL Final  
 TIBC 10/08/2019 232* 250 - 450 ug/dL Final  
 Iron % saturation 10/08/2019 31  % Final  
 Immunoglobulin G, Qt. 10/08/2019 1,194  700 - 1,600 mg/dL Final  
 Immunoglobulin A, Qt. 10/08/2019 146  61 - 437 mg/dL Final  
 Immunoglobulin M, Qt. 10/08/2019 159* 15 - 143 mg/dL Final  
 Protein, total 10/08/2019 6.5  6.0 - 8.5 g/dL Final  
 Albumin 10/08/2019 3.5  2.9 - 4.4 g/dL Final  
  Alpha-1-Globulin 10/08/2019 0.3  0.0 - 0.4 g/dL Final  
 Alpha-2-Globulin 10/08/2019 0.7  0.4 - 1.0 g/dL Final  
 Beta Globulin 10/08/2019 0.9  0.7 - 1.3 g/dL Final  
 Gamma Globulin 10/08/2019 1.1  0.4 - 1.8 g/dL Final  
 M-Arthur 10/08/2019 Not Observed  Not Observed g/dL Final  
 Globulin, total 10/08/2019 3.0  2.2 - 3.9 g/dL Final  
 A/G ratio 10/08/2019 1.2  0.7 - 1.7   Final  
 Immunofixation Result 10/08/2019 Comment    Final  
 Free Kappa Lt Chains, serum 10/08/2019 19.3  3.3 - 19.4 mg/L Final  
 Free Lambda Lt Chains, serum 10/08/2019 13.9  5.7 - 26.3 mg/L Final  
 Kappa/Lambda ratio, serum 10/08/2019 1.39  0.26 - 1.65   Final  
 
 
 
Reviewed labs from visit. Please let the patient know the following: CBC confirms evidence of very mild anemia. However, no cause was found. There is no evidence of iron, B12, or folate deficiency. Gammopathy panel is also normal and peripheral smear showed normal appearing blood cells. Will continue to monitor and repeat next visit. PSA is improved to 1.9. Is higher than last year, but remains improved from level of 2.6 and is still in the normal range. Will repeat with blood work prior to next visit.

## 2019-10-15 ENCOUNTER — TELEPHONE (OUTPATIENT)
Dept: INTERNAL MEDICINE CLINIC | Age: 79
End: 2019-10-15

## 2019-10-15 NOTE — TELEPHONE ENCOUNTER
Pt aware of message below and verbalized understanding. No further questions or concerns from pt at this time.   
 
[employer] message sent with the message per patient request.

## 2019-11-26 DIAGNOSIS — F41.9 ANXIETY: ICD-10-CM

## 2019-11-26 RX ORDER — LORAZEPAM 1 MG/1
1 TABLET ORAL
Qty: 60 TAB | Refills: 0 | Status: SHIPPED | OUTPATIENT
Start: 2019-11-26 | End: 2019-12-26 | Stop reason: SDUPTHER

## 2019-12-04 RX ORDER — LISINOPRIL 2.5 MG/1
TABLET ORAL
Qty: 270 TAB | Refills: 2 | Status: SHIPPED | OUTPATIENT
Start: 2019-12-04 | End: 2020-09-04

## 2019-12-26 ENCOUNTER — HOSPITAL ENCOUNTER (OUTPATIENT)
Dept: GENERAL RADIOLOGY | Age: 79
Discharge: HOME | End: 2019-12-26
Payer: MEDICARE

## 2019-12-26 ENCOUNTER — OFFICE VISIT (OUTPATIENT)
Dept: INTERNAL MEDICINE CLINIC | Age: 79
End: 2019-12-26

## 2019-12-26 VITALS
BODY MASS INDEX: 25.77 KG/M2 | DIASTOLIC BLOOD PRESSURE: 84 MMHG | TEMPERATURE: 97.9 F | HEIGHT: 70 IN | RESPIRATION RATE: 14 BRPM | WEIGHT: 180 LBS | HEART RATE: 81 BPM | SYSTOLIC BLOOD PRESSURE: 150 MMHG | OXYGEN SATURATION: 97 %

## 2019-12-26 DIAGNOSIS — S22.41XA CLOSED FRACTURE OF MULTIPLE RIBS OF RIGHT SIDE, INITIAL ENCOUNTER: ICD-10-CM

## 2019-12-26 DIAGNOSIS — R07.81 RIB PAIN ON RIGHT SIDE: Primary | ICD-10-CM

## 2019-12-26 DIAGNOSIS — F41.9 ANXIETY: ICD-10-CM

## 2019-12-26 DIAGNOSIS — I10 ESSENTIAL HYPERTENSION: ICD-10-CM

## 2019-12-26 DIAGNOSIS — R97.20 ELEVATED PSA: ICD-10-CM

## 2019-12-26 DIAGNOSIS — I48.0 PAROXYSMAL ATRIAL FIBRILLATION (HCC): ICD-10-CM

## 2019-12-26 DIAGNOSIS — E78.5 HYPERLIPIDEMIA, UNSPECIFIED HYPERLIPIDEMIA TYPE: ICD-10-CM

## 2019-12-26 DIAGNOSIS — R07.81 RIB PAIN ON RIGHT SIDE: ICD-10-CM

## 2019-12-26 DIAGNOSIS — I42.9 CARDIOMYOPATHY, UNSPECIFIED TYPE (HCC): ICD-10-CM

## 2019-12-26 PROCEDURE — 71100 X-RAY EXAM RIBS UNI 2 VIEWS: CPT

## 2019-12-26 RX ORDER — CYCLOBENZAPRINE HCL 5 MG
TABLET ORAL
Qty: 25 TAB | Refills: 0 | Status: SHIPPED | OUTPATIENT
Start: 2019-12-26 | End: 2020-06-30 | Stop reason: SDUPTHER

## 2019-12-26 RX ORDER — LORAZEPAM 1 MG/1
1 TABLET ORAL
Qty: 60 TAB | Refills: 0 | Status: SHIPPED | OUTPATIENT
Start: 2019-12-26 | End: 2020-02-03 | Stop reason: SDUPTHER

## 2019-12-26 NOTE — PROGRESS NOTES
Called and notified patient of results. Clinical symptoms consistent with location of probable fractures. Pain well controlled without medication.

## 2019-12-26 NOTE — PROGRESS NOTES
Elaina Richmond presents today for   Chief Complaint   Patient presents with    Side Pain     Patient here for right sided pain. Patient reports pain to abdomin right below ribs. Patient report swelling and tender to touch. x 10 weeks              Depression Screening:  3 most recent PHQ Screens 10/8/2019   Little interest or pleasure in doing things Not at all   Feeling down, depressed, irritable, or hopeless Not at all   Total Score PHQ 2 0       Learning Assessment:  Learning Assessment 8/28/2019   PRIMARY LEARNER Patient   HIGHEST LEVEL OF EDUCATION - PRIMARY LEARNER  -   BARRIERS PRIMARY LEARNER -   CO-LEARNER CAREGIVER -   PRIMARY LANGUAGE ENGLISH   LEARNER PREFERENCE PRIMARY DEMONSTRATION   ANSWERED BY Patient   RELATIONSHIP SELF       Abuse Screening:  Abuse Screening Questionnaire 10/8/2019   Do you ever feel afraid of your partner? N   Are you in a relationship with someone who physically or mentally threatens you? N   Is it safe for you to go home? Y       Fall Risk  Fall Risk Assessment, last 12 mths 10/8/2019   Able to walk? Yes   Fall in past 12 months? No   Number of falls in past 12 months -           Coordination of Care:  1. Have you been to the ER, urgent care clinic since your last visit? Hospitalized since your last visit? NO    2. Have you seen or consulted any other health care providers outside of the [address] [place] [address] since your last visit? Include any pap smears or colon screening. NO      Advance Directive:  1. Do you have an advance directive in place?  Patient Reply:YES      2.  Per patient no changes to their ACP contact NO.

## 2019-12-26 NOTE — PROGRESS NOTES
Last fill for Ativan per  11/26/19  Reviewed report generated by the [facility]. Does not demonstrate aberrancies or inconsistencies with regard to the prescribing of controlled medications to this patient by other providers.

## 2019-12-29 PROBLEM — S22.41XA CLOSED FRACTURE OF MULTIPLE RIBS OF RIGHT SIDE: Status: ACTIVE | Noted: 2019-12-29

## 2019-12-30 NOTE — PROGRESS NOTES
HPI:   Daniel Rivera is a [de-identified]y.o. year old male who presents today for an acute visit. He has a history of hypertension, hyperlipidemia, AV block s/p pacemaker, paroxysmal atrial fibrillation, and anxiety. He reports that in early 10/2019, he was clearing trees from his property when he fell on a tree stump and landed on his right chest. He states that he did not notice any difficulty until 10/12/2019 when he began to experience pain over the anterior right ower ribs. He states that he has noticed pain in this area since then, and even noted some mild swelling and tenderness over this area of his chest wall. He states that the pain has persisted, and although it remains minimal during the day, he is finding persistent difficulty in sleeping at night. He states that he has stopped doing his daily abdominal exercises due to the discomfort, which seemed to make it worse. He denies any cough, hemoptysis, or shortness of breath. He also describes some difficulty with left flank pain for 3 days during this time, but denies any hematuria or dysuria. He states that it improved after he began cranberry juice and stopped drinking black tea. He is otherwise without new complaints. He has a history of hypertension, treated with lisinopril. He does monitor his blood pressure regularly, and is currently taking 3.25 mg in the morning and evening. He reports that his blood pressure in mainly 120-130/ 70-80 at home. He has a history of intermittent high grade AV block, and underwent placement of a dual chamber Medtronic pacemaker in 2007. He underwent pacemaker generator exchange in 5/2016 after his device reached elective replacement indicator. A Medtronic MRI safe device was installed. He has a history of a single episode of atrial fibrillation noted on device check several years ago.  He underwent follow-up appointment with Dr. Caty Bynum on 7/30/2018, and interrogation of his pacemaker during the visit revealed a single episode of atrial fibrillation in November 2017 lasting for less than 2 hours. There were no recurrent episodes since that time, and he was asymptomatic during the episode. He was noted in the past to have mildly decreased LV function, but a repeat echocardiogram in 6/2013 showed normal size and lower limits of normal function with EF 50-55%; no wall motion abnormalities, and grade 1 diastolic dysfunction. He is followed by Dr. Monica Garcia. 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 He also was walking 2.5 miles each day. He denies any chest pain, shortness of breath at rest or with exertion, palpitations, lightheadedness, or edema. He has a history of dyslipidemia, managed by diet in the past.    In 4/2017 he developed lightheadedness after painting outside for several hours. He took an Ativan because he thought he was experiencing anxiety. However, his symptoms persisted and he began to feel like he was going to pass out so he called 911 for help. When EMS arrived, his symptoms had resolved but he was noted to have a low blood pressure of approximately 112/67. He was taken to [facility] for evaluation and was asymptomatic upon arrival. He admitted to not eating or drinking anything for the four hour period that he was outside, and acknowledged that the day was quite warm. Evaluation in the ED included an EKG which showed a paced rhythm, serial troponins which were negative, and blood work showing WBC 14.2, Hb 14.5, Hct 42.7, creatinine 1.3/ eGFR 51.7. Chest x-ray showed no acute process, but did note an incidental pulmonary nodule over the left lower lobe. A chest CT scan was obtained (5/2017) showing mild subsegmental atelectasis or scarring at the left lung base without focal pulmonary nodule. He has a history of a GI bleeding in 1997 secondary to NSAID use for headaches.  He also has a history of GERD and dysphagia and has undergone multiple esophageal dilatations in the past by Dr. Chacho Flynn. In 11/2015, he had an upper endoscopy which showed a small hiatal hernia in the cardia, a Schatzki's ring at the gastroesophageal junction, which was dilated, and otherwise normal stomach and duodenum. He also had a screening colonoscopy which revealed mild colonic diverticulosis and a 7 mm polyp in the proximal ascending colon (pathology: tubulovillous adenoma). He had a follow-up colonoscopy in 3/2019 by Dr. Chacho Flynn which showed two sessile 4 mm polyps in the cecum and ascending colon (pathology: tubular adenomas). He also simultaneously had an upper endoscopy showing normal stomach/duodenum, a small hiatal hernia in the cardia, and a Schatzki's ring (dilated). He denies any abdominal pain, nausea, vomiting, melena, hematochezia, or change in bowel movements. He has a history of anxiety, and uses lorazepam occasionally. He also reports he uses cyclobenzaprine occasionally for back spasms which occur at times due to straining his back with excessive physical work. He also uses Fioricet occasionally for tension type headaches. He states that he will only take one fourth of a pill when needed. Past Medical History:   Diagnosis Date    Anxiety     AV block     intermittent, high-grade    Bilateral inguinal hernia     Cardiac echocardiogram 06/25/2013    EF 50-55%. No WMA. Mild LVH. Gr 1 DDfx. RVE.  AKANKSHA. Mild TR. No significant chg from study of 6/8/11.  Cardiac nuclear imaging test 10/07/2009    Likely inferior & anterior septal artifact. EF 55%. Mild septal WMA could be related to pacemaker.  Cardiomyopathy ([address]     Diverticulosis of colon 11/10/2015    Dysphagia     Multiple esophageal dilations.  Essential hypertension with goal blood pressure less than 140/90     HLD (hyperlipidemia)     Hx of colonoscopy 11/10/2015    Dr. Chacho Flynn; tubulovillous adenoma.  Pacemaker 2007    Medtronic dual-chamber device.     Paroxysmal atrial fibrillation ([address]     single episode noted on routine device check     Past Surgical History:   Procedure Laterality Date    CARDIAC SURG PROCEDURE UNLIST      COLONOSCOPY N/A 3/29/2019    COLONOSCOPY with polypectomy performed by Fabiana Arreola MD at [facility] ENDOSCOPY    ENDOSCOPY, COLON, DIAGNOSTIC      HX CATARACT REMOVAL      HX PACEMAKER  05/29/07    Medtronic Versa dual-chamber pacemaker for intermittent high-grade AV block and symptomatic bradycardia.  HX PACEMAKER  5/3/16     Current Outpatient Medications   Medication Sig    cyclobenzaprine (FLEXERIL) 5 mg tablet TAKE 1-2 TABLETS BY MOUTH EVERY 8 HOURS AS NEEDED FOR MUSCLE SPASM    LORazepam (ATIVAN) 1 mg tablet Take 1 Tab by mouth every eight (8) hours as needed for Anxiety. Max Daily Amount: 3 mg.  lisinopril (PRINIVIL, ZESTRIL) 2.5 mg tablet TAKE 3 TABLETS BY MOUTH ONCE DAILY    butalbital-acetaminophen-caffeine (FIORICET, ESGIC) -40 mg per tablet Take 1 Tab by mouth every six (6) hours as needed for Pain.  mometasone (NASONEX) 50 mcg/actuation nasal spray USE TWO SPRAYS IN EACH NOSTRIL DAILY    VIT C/E/ZN/COPPR/LUTEIN/ZEAXAN (PRESERVISION AREDS 2 PO) Take  by mouth.  omeprazole (PRILOSEC) 20 mg capsule Take 20 mg by mouth daily as needed.  timolol (TIMOPTIC) 0.25 % ophthalmic solution Administer 1 Drop to both eyes two (2) times a day.  MULTIVITAMINS W-MINERALS/LUT (CENTRUM SILVER PO) Take  by mouth daily.  OMEGA-3 FATTY ACIDS (OMEGA 3 PO) Take 2,400 mg by mouth two (2) times a day.  coenzyme q10 100 mg Cap Take  by mouth daily.  metronidazole (METROGEL) 1 % topical gel Apply  to affected area daily. Use a thin layer to affected areas after washing     zinc 50 mg capsule Take  by mouth daily.  magnesium 250 mg Tab Take  by mouth daily.  selenium 100 mcg Cap Take  by mouth daily.  ascorbic acid (VITAMIN C) 500 mg tablet Take  by mouth two (2) times a day.     Ginkgo Biloba Leaf Extract 30 mg Cap Take by mouth daily. No current facility-administered medications for this visit. Allergies and Intolerances:   No Known Allergies     Family History: His brother  from metastatic prostate cancer. His mother lived until the age of 80, although she had CAD. His father  at the age of 59 from a CVA. He was an alcoholic. He has five sisters that are all healthy. Family History   Problem Relation Age of Onset    Heart Surgery Mother     Stroke Father     Alcohol abuse Father      Social History:   He  reports that he quit smoking about 53 years ago. He has never used smokeless tobacco. He smoked 1 ppd for 2 years, and then quit (). He is retired  and worked for the [employer]. He lives with his girlfriend and has one son. Social History     Substance and Sexual Activity   Alcohol Use No    Comment: 1998 last time he had alcohol. Immunization History:  Immunization History   Administered Date(s) Administered    (RETIRED) Pneumococcal Vaccine (Unspecified Type) 2007    Influenza High Dose Vaccine PF 01/10/2013, 2015, 2016, 10/02/2017    Influenza Vaccine 2013, 2014    Influenza Vaccine (Tri) Adjuvanted 2018, 10/08/2019    Pneumococcal Conjugate (PCV-13) 2015    Pneumococcal Polysaccharide (PPSV-23) 2007    TDAP Vaccine 2012    Zoster 2007    Zoster Recombinant 05/15/2019, 2019       Review of Systems:   As above included in HPI. Otherwise 11 point review of systems negative including constitutional, skin, HENT, eyes, respiratory, cardiovascular, gastrointestinal, genitourinary, musculoskeletal, endocrine, hematologic, allergy, and neurologic. Physical:   Vitals:   BP: 150/84  HR: 81  WT: 180 lb (81.6 kg)  BMI:  25.83 kg/m2    Exam:   Patient appears in no apparent distress. Affect is appropriate.     HEENT: PERRLA, anicteric, oropharynx clear, no JVD, adenopathy or thyromegaly. No carotid bruits or radiated murmur. Lungs: clear to auscultation, no wheezes, rhonchi, or rales. Heart: regular rate and rhythm. No murmur, rubs, gallops  Chest wall: tenderness to palpation over right anterior lower ribs; no obvious rash or lesions  Abdomen: soft, nontender, nondistended, normal bowel sounds, no hepatosplenomegaly or masses. No flank tenderness  Extremities: without edema. Pulses 1-2+ bilaterally. Review of Data:  Labs:  No visits with results within 1 Month(s) from this visit. Latest known visit with results is:   Hospital Outpatient Visit on 10/08/2019   Component Date Value Ref Range Status    Prostate Specific Ag 10/08/2019 1.9  0.0 - 4.0 ng/mL Final    WBC 10/08/2019 10.3  4.6 - 13.2 K/uL Final    RBC 10/08/2019 4.17* 4.70 - 5.50 M/uL Final    HGB 10/08/2019 12.5* 13.0 - 16.0 g/dL Final    HCT 10/08/2019 38.0  36.0 - 48.0 % Final    MCV 10/08/2019 91.1  74.0 - 97.0 FL Final    MCH 10/08/2019 30.0  24.0 - 34.0 PG Final    MCHC 10/08/2019 32.9  31.0 - 37.0 g/dL Final    RDW 10/08/2019 13.4  11.6 - 14.5 % Final    PLATELET 49/14/2634 962  135 - 420 K/uL Final    MPV 10/08/2019 9.0* 9.2 - 11.8 FL Final    NEUTROPHILS 10/08/2019 63  40 - 73 % Final    LYMPHOCYTES 10/08/2019 24  21 - 52 % Final    MONOCYTES 10/08/2019 9  3 - 10 % Final    EOSINOPHILS 10/08/2019 3  0 - 5 % Final    BASOPHILS 10/08/2019 1  0 - 2 % Final    ABS. NEUTROPHILS 10/08/2019 6.5  1.8 - 8.0 K/UL Final    ABS. LYMPHOCYTES 10/08/2019 2.5  0.9 - 3.6 K/UL Final    ABS. MONOCYTES 10/08/2019 0.9  0.05 - 1.2 K/UL Final    ABS. EOSINOPHILS 10/08/2019 0.3  0.0 - 0.4 K/UL Final    ABS.  BASOPHILS 10/08/2019 0.1  0.0 - 0.1 K/UL Final    DF 10/08/2019 AUTOMATED    Final    PERIPHERAL SMEAR 10/08/2019 NO SIGNIFICANT MORPHOLOGIC ABNORMALITY IDENTIFIED   Final    Vitamin B12 10/08/2019 632  211 - 911 pg/mL Final    Folate 10/08/2019 >20.0* 3.10 - 17.50 ng/mL Final    Ferritin 10/08/2019 104 8 - 388 NG/ML Final    Iron 10/08/2019 71  50 - 175 ug/dL Final    TIBC 10/08/2019 232* 250 - 450 ug/dL Final    Iron % saturation 10/08/2019 31  % Final    Immunoglobulin G, Qt. 10/08/2019 1,194  700 - 1,600 mg/dL Final    Immunoglobulin A, Qt. 10/08/2019 146  61 - 437 mg/dL Final    Immunoglobulin M, Qt. 10/08/2019 159* 15 - 143 mg/dL Final    Protein, total 10/08/2019 6.5  6.0 - 8.5 g/dL Final    Albumin 10/08/2019 3.5  2.9 - 4.4 g/dL Final    Alpha-1-Globulin 10/08/2019 0.3  0.0 - 0.4 g/dL Final    Alpha-2-Globulin 10/08/2019 0.7  0.4 - 1.0 g/dL Final    Beta Globulin 10/08/2019 0.9  0.7 - 1.3 g/dL Final    Gamma Globulin 10/08/2019 1.1  0.4 - 1.8 g/dL Final    M-Arthur 10/08/2019 Not Observed  Not Observed g/dL Final    Globulin, total 10/08/2019 3.0  2.2 - 3.9 g/dL Final    A/G ratio 10/08/2019 1.2  0.7 - 1.7   Final    Immunofixation Result 10/08/2019 Comment    Final    Free Kappa Lt Chains, serum 10/08/2019 19.3  3.3 - 19.4 mg/L Final    Free Lambda Lt Chains, serum 10/08/2019 13.9  5.7 - 26.3 mg/L Final    Kappa/Lambda ratio, serum 10/08/2019 1.39  0.26 - 1.65   Final     Health Maintenance:  Screening:    Colorectal: colonoscopy (3/2019) tubular adenomas. Dr. Johny Richardson. No further screening needed. Depression: none   DM (HbA1c/FPG): FPG 80 (10/2019)   Hepatitis C: N/A   Falls: none   DEXA: N/A   PSA/WEN: PSA 1.9 (10/2019)   Glaucoma: regular eye exams with Dr. Ayesha Howell for glaucoma q4 months.    Smoking: none   Vitamin D: 45.0 (10/2019)   Medicare Wellness: 4/2/2019    Impression:  Patient Active Problem List   Diagnosis Code    AV block I44.30    Pacemaker Z95.0    Cardiomyopathy ([address] I42.9    Paroxysmal atrial fibrillation (HCC) I48.0    HLD (hyperlipidemia) E78.5    FH: prostate cancer Z80.42    Bilateral inguinal hernia K40.20    Anxiety F41.9    Essential hypertension I10    Adenomatous polyp of ascending colon D12.2    Gastroesophageal reflux disease without esophagitis K21.9    Postural dizziness with presyncope R42, R55    Hiatal hernia K44.9    Anemia D64.9    Elevated PSA R97.20    Headache, tension type, episodic G44.219       Plan:  Right chest wall pain. Patient fell on a tree stump while cutting down trees. Sustained injury to his right anterior chest wall with persistent pain over the last 10 weeks. Most difficulty with sleeping at night with only a dull ache during the day. No shortness of breath, cough or hemoptysis. Will proceed with right anterior rib series to rule out fracture. Further recommendations after review. Other issues:  1. Hypertension. Blood pressure remains well controlled today on lisinopril 3.75 mg bid. He continues to monitor his blood pressure carefully at home. Renal function remains normal with creatinine 0.72/ eGFR >60. Continue to follow. 2. AV block, intermittent high grade. S/P Medtronic dual chamber pacemaker with generator change in 5/2016 with an MRI safe device installed. Interrogated and followed every 3 months by Dr. Mathew Bowen, last 8/2019. Repeat echocardiogram with low normal LV function. 3. Paroxysmal atrial fibrillation. Single episode noted on device check several years ago. Had recurrence noted on device check in 11/2017, lasting approximately 2 hours, and he was asymptomatic. No recurrence since that time. Anti-coagulation felt not to be needed unless develops more frequent or prolonged episodes. Monitored closely every three months with routine device checks. Continue to follow. 4. Hyperlipidemia. His age > 76 does not place him in one of the four statin benefit groups as per new AHA/ACC guidelines. Also, with no evidence of ASCVD. LDL improved to 97 and HDL 39, which would essentially be at goal. Patient not wishing to begin therapy with a statin. Emphasized importance of continued lifestyle modifications, including diet and exercise. Continue to follow and repeat lipid panel at next visit. 5. Presyncope.  Related to dehydration secondary to physical exertion in the heat without adequate hydration. Reports no further symptoms. Monitors his blood pressure daily and has been controlled at his baseline. Encouraged to continue drinking plenty of fluids. Follow. 6. Sinusitis. Patient with symptoms of sinusitis, describing left facial pain and green drainage. He had been treated with Augmentin x 10 days in 2019 with some improvement, but feels that symptoms never completely resolved. No fever or chills, but given chronicity of symptoms, will treat with another course of Augmentin. If symptoms remain after completion, will refer to ENT. 7. GERD/dysphagia. Repeat upper endoscopy (3/2019) with esophageal dilatation for Schatzki's ring. Small hiatal hernia. Symptoms well controlled with omeprazole. Follow. 8. Family history of prostate cancer. Patient quite concerned about his own risk given that his brother  quite rapidly from metastatic disease. PSA level had remained quite low in the 0.6-0.8 range, but increased acutely to 2.6 on measurement today. He is quite concerned, but blames it on the strenuous activity that he performed on the day prior to his lab appointment. He requests to have it repeated today. He has expressed previously that he wishes to continue to have it checked despite his age. Had discussed the controversy of continuing prostatic cancer screening after age 75, but patient still requesting to continue PSA testing. Will repeat today. 9. Anemia. New onset mild normocytic anemia. Will check iron studies, B12, folate, peripheral smear and gammopathy panel. Further recommendations pending results. Follow. 10. Anxiety. Patient is quite anxious about his health. Reassured that he is living a healthy lifestyle and doing well. Continue lorazepam as needed. 111. Health maintenance. Already received influenza vaccine. Completed 2/2 doses of Shingrix vaccine. Other immunizations up to date. Colonoscopy completed. Continue regular eye exams with Dr. J Luis Johnston. Vitamin D level normal.  Medicare wellness visit up to date. Patient understands recommendations and agrees with plan. Follow-up as previously scheduled      Addendum    XR Results (most recent):  Results from Hospital Encounter encounter on 12/26/19   XR RIBS RT UNI 2 V    Narrative Right RIBS    HISTORY: 8 weeks of lateral right rib pain after fall    COMPARISON: Chest radiograph 5/30/2007    FINDINGS: Multiple views of the right ribs. Angulation of posterior lateral  right rib 7 without cortical discontinuity may represent subacute or chronic  fractures. Angulation anterolateral right rib 8. There is slight cortical  angulation of anterior right rib 10. No effusion. No pneumothorax. Left-sided  dual-lead pacemaker. Impression IMPRESSION:    Angulation anterior right rib 8 and 10. Correlate for point tenderness in this  area for potential nondisplaced acute fractures. Angulation of posterior lateral right rib 7 suggestive of a more chronic  fracture. No effusion or pneumothorax       Reviewed rib series. Evidence of probable nondisplaced acute fractures of anterior ribs 8 and 10. No effusion or pneumothorax to suggest underlying lung injury. Correlates with findings on physical exam. Pain not requiring any medication to control as relatively mild. Will continue to follow.

## 2020-02-03 DIAGNOSIS — F41.9 ANXIETY: ICD-10-CM

## 2020-02-03 RX ORDER — LORAZEPAM 1 MG/1
1 TABLET ORAL
Qty: 60 TAB | Refills: 0 | Status: SHIPPED | OUTPATIENT
Start: 2020-02-03 | End: 2020-03-11 | Stop reason: SDUPTHER

## 2020-02-03 NOTE — TELEPHONE ENCOUNTER
[facility]  reports the last [facility] date for Ativan as 12/26/19 for a 20 d/s. UDS done 8/9/19 CSA signed 8/13/19

## 2020-03-11 ENCOUNTER — OFFICE VISIT (OUTPATIENT)
Dept: CARDIOLOGY CLINIC | Age: 80
End: 2020-03-11

## 2020-03-11 VITALS
SYSTOLIC BLOOD PRESSURE: 140 MMHG | OXYGEN SATURATION: 98 % | HEART RATE: 74 BPM | WEIGHT: 177 LBS | BODY MASS INDEX: 25.34 KG/M2 | HEIGHT: 70 IN | DIASTOLIC BLOOD PRESSURE: 82 MMHG

## 2020-03-11 DIAGNOSIS — I10 ESSENTIAL HYPERTENSION: ICD-10-CM

## 2020-03-11 DIAGNOSIS — I48.0 PAROXYSMAL ATRIAL FIBRILLATION (HCC): Primary | ICD-10-CM

## 2020-03-11 DIAGNOSIS — F41.9 ANXIETY: ICD-10-CM

## 2020-03-11 DIAGNOSIS — E78.5 HYPERLIPIDEMIA, UNSPECIFIED HYPERLIPIDEMIA TYPE: ICD-10-CM

## 2020-03-11 DIAGNOSIS — I44.30 AV BLOCK: ICD-10-CM

## 2020-03-11 RX ORDER — LORAZEPAM 1 MG/1
1 TABLET ORAL
Qty: 60 TAB | Refills: 0 | Status: SHIPPED | OUTPATIENT
Start: 2020-03-11 | End: 2020-04-14 | Stop reason: SDUPTHER

## 2020-03-11 NOTE — PROGRESS NOTES
Jerad Johnson presents today for   Chief Complaint   Patient presents with    Cardiomyopathy     6 MONTH F/U - no cardiac complaints       Jerad Johnson preferred language for health care discussion is english/other. Is someone accompanying this pt? no    Is the patient using any DME equipment during [address]? no    Depression Screening:  3 most recent PHQ Screens 12/26/2019   Little interest or pleasure in doing things Not at all   Feeling down, depressed, irritable, or hopeless Not at all   Total Score PHQ 2 0       Learning Assessment:  Learning Assessment 8/28/2019   PRIMARY LEARNER Patient   HIGHEST LEVEL OF EDUCATION - PRIMARY LEARNER  -   BARRIERS PRIMARY LEARNER -   CO-LEARNER CAREGIVER -   PRIMARY LANGUAGE ENGLISH   LEARNER PREFERENCE PRIMARY DEMONSTRATION   ANSWERED BY Patient   RELATIONSHIP SELF       Abuse Screening:  Abuse Screening Questionnaire 10/8/2019   Do you ever feel afraid of your partner? N   Are you in a relationship with someone who physically or mentally threatens you? N   Is it safe for you to go home? Y       Fall Risk  Fall Risk Assessment, last 12 mths 12/26/2019   Able to walk? Yes   Fall in past 12 months? Yes   Fall with injury? Yes   Number of falls in past 12 months 1   Fall Risk Score 2       Pt currently taking Anticoagulant therapy? no    Coordination of Care:  1. Have you been to the ER, urgent care clinic since your last visit? Hospitalized since your last visit? no    2. Have you seen or consulted any other health care providers outside of the [address] [place] [address] since your last visit? Include any pap smears or colon screening.  no

## 2020-03-11 NOTE — PROGRESS NOTES
HISTORY OF PRESENT ILLNESS  Kelly Riley is a [de-identified] y.o. male. Follow-up   Pertinent negatives include no chest pain, no abdominal pain, no headaches and no shortness of breath. Patient presents for a scheduled followup visit. He has a history of intermittent high-grade AV block, status post to dual-chamber permanent pacemaker in 2007. He also has a history of hypertension, Brief episodes approximately atrial fibrillation, and a mildly depressed LV systolic function,  Which returned to low normal on echocardiogram in June 2013 showing an ejection fraction of 50-55%. The patient underwent a pacemaker generator exchange in May 2016 with a Medtronic MRI safe device after his device has reached elective replacement indicator. The patient was last seen in the office 7 months ago. Since last visit he has been feeling well. He denies any major change in his activity level. No new heart palpitations, dizziness or syncope. No chest pain at rest or with exertion. No orthopnea, PND shortness of breath or leg swelling. He did fall several months ago landing on his stump and breaking 2 ribs. He has recovered from that fully now. Past Medical History:   Diagnosis Date    Anxiety     AV block     intermittent, high-grade    Bilateral inguinal hernia     Cardiac echocardiogram 06/25/2013    EF 50-55%. No WMA. Mild LVH. Gr 1 DDfx. RVE.  AKANKSHA. Mild TR. No significant chg from study of 6/8/11.  Cardiac nuclear imaging test 10/07/2009    Likely inferior & anterior septal artifact. EF 55%. Mild septal WMA could be related to pacemaker.  Cardiomyopathy ([address]     Diverticulosis of colon 11/10/2015    Dysphagia     Multiple esophageal dilations.  Essential hypertension with goal blood pressure less than 140/90     HLD (hyperlipidemia)     Hx of colonoscopy 11/10/2015    Dr. Tova Phillips; tubulovillous adenoma.  Pacemaker 2007    Medtronic dual-chamber device.     Paroxysmal atrial fibrillation ([address]     single episode noted on routine device check      Current Outpatient Medications   Medication Sig Dispense Refill    LORazepam (ATIVAN) 1 mg tablet Take 1 Tab by mouth every eight (8) hours as needed for Anxiety. Max Daily Amount: 3 mg. 60 Tab 0    cyclobenzaprine (FLEXERIL) 5 mg tablet TAKE 1-2 TABLETS BY MOUTH EVERY 8 HOURS AS NEEDED FOR MUSCLE SPASM 25 Tab 0    lisinopril (PRINIVIL, ZESTRIL) 2.5 mg tablet TAKE 3 TABLETS BY MOUTH ONCE DAILY 270 Tab 2    butalbital-acetaminophen-caffeine (FIORICET, ESGIC) -40 mg per tablet Take 1 Tab by mouth every six (6) hours as needed for Pain. 30 Tab 0    mometasone (NASONEX) 50 mcg/actuation nasal spray USE TWO SPRAYS IN EACH NOSTRIL DAILY 1 Container 5    VIT C/E/ZN/COPPR/LUTEIN/ZEAXAN (PRESERVISION AREDS 2 PO) Take  by mouth.  omeprazole (PRILOSEC) 20 mg capsule Take 20 mg by mouth daily as needed.  timolol (TIMOPTIC) 0.25 % ophthalmic solution Administer 1 Drop to both eyes two (2) times a day.  MULTIVITAMINS W-MINERALS/LUT (CENTRUM SILVER PO) Take  by mouth daily.  OMEGA-3 FATTY ACIDS (OMEGA 3 PO) Take 2,400 mg by mouth two (2) times a day.  coenzyme q10 100 mg Cap Take  by mouth daily.  metronidazole (METROGEL) 1 % topical gel Apply  to affected area daily. Use a thin layer to affected areas after washing       zinc 50 mg capsule Take  by mouth daily.  magnesium 250 mg Tab Take  by mouth daily.  selenium 100 mcg Cap Take  by mouth daily.  ascorbic acid (VITAMIN C) 500 mg tablet Take  by mouth two (2) times a day.  Ginkgo Biloba Leaf Extract 30 mg Cap Take  by mouth daily. No Known Allergies     Social History     Tobacco Use    Smoking status: Former Smoker     Last attempt to quit: 1966     Years since quittin.6    Smokeless tobacco: Never Used   Substance Use Topics    Alcohol use: No     Comment: 1998 last time he had alcohol.  Drug use:  No Review of Systems   Constitutional: Negative for chills, fever and weight loss. HENT: Negative for nosebleeds. Eyes: Negative for blurred vision and double vision. Respiratory: Negative for cough, shortness of breath and wheezing. Cardiovascular: Negative for chest pain, palpitations, orthopnea, claudication, leg swelling and PND. Gastrointestinal: Negative for abdominal pain, heartburn, nausea and vomiting. Genitourinary: Negative for dysuria and hematuria. Musculoskeletal: Negative for falls and myalgias. Skin: Negative for rash. Neurological: Negative for dizziness, focal weakness and headaches. Endo/Heme/Allergies: Does not bruise/bleed easily. Psychiatric/Behavioral: Negative for substance abuse. Visit Vitals  /82   Pulse 74   Ht 5' 10\" (1.778 m)   Wt 80.3 kg (177 lb)   SpO2 98%   BMI 25.40 kg/m²      Physical Exam   Constitutional: He is oriented to person, place, and time. He appears well-developed and well-nourished. HENT:   Head: Normocephalic and atraumatic. Eyes: Conjunctivae are normal.   Neck: Neck supple. No JVD present. Carotid bruit is not present. Cardiovascular: Normal rate, regular rhythm, S1 normal, S2 normal and normal pulses. Exam reveals no gallop and no S3. No murmur heard. Pulmonary/Chest: Breath sounds normal. He has no wheezes. He has no rales. Abdominal: Soft. Bowel sounds are normal. There is no abdominal tenderness. Musculoskeletal:         General: No edema. Neurological: He is alert and oriented to person, place, and time. Skin: Skin is warm and dry. Pacemaker interrogation: Pacemaker generator at middle of life. Estimated longevity 5.5 years. One episode of atrial tachycardia lasting for only 3 minutes earlier this year. .  Pacing in the atrium 7 % in the ventricle 100%. Stable activity level. Scanned document for details. ASSESSMENT and PLAN    Paroxysmal atrial fibrillation.   Patient had a single episode of atrial fibrillation in November 2017 lasting for little less than 2 hours. No recurrent episodes since that time. He was asymptomatic to the arrhythmia. No need for anticoagulation at this time. This will be continued to be monitored every 3 months with routine device checks. He did have a very short episode of what appears to be atrial tachycardia last month but only lasting for 3 minutes. High-grade AV block: Status post dual-chamber permanent pacemaker with recent generator exchange in May 2016. Patient has an MRI compatible device. Normal device function on interrogation today. He is now pacemaker dependent in the ventricle. His battery life is estimated at 5.5 years. Hypertension: Patient's blood pressure appears to be reasonably well controlled on his current medical regimen. Dyslipidemia. Patient has been managing this with diet control and takes a fish oil supplement. [facility] device check in 3 months. Followup in 6 months, sooner if needed.

## 2020-03-11 NOTE — TELEPHONE ENCOUNTER
LAST OV: 12/26/2019 LAST FILL per : 2/3/20 Reviewed report generated by the [facility]. Does not demonstrate aberrancies or inconsistencies with regard to the prescribing of controlled medications to this patient by other providers. LAST UDS: 8/9/19

## 2020-04-14 DIAGNOSIS — F41.9 ANXIETY: ICD-10-CM

## 2020-04-14 RX ORDER — LORAZEPAM 1 MG/1
1 TABLET ORAL
Qty: 60 TAB | Refills: 0 | Status: SHIPPED | OUTPATIENT
Start: 2020-04-14 | End: 2020-05-18 | Stop reason: SDUPTHER

## 2020-05-18 DIAGNOSIS — F41.9 ANXIETY: ICD-10-CM

## 2020-05-18 RX ORDER — LORAZEPAM 1 MG/1
1 TABLET ORAL
Qty: 60 TAB | Refills: 0 | Status: SHIPPED | OUTPATIENT
Start: 2020-05-18 | End: 2020-06-22 | Stop reason: SDUPTHER

## 2020-06-10 ENCOUNTER — OFFICE VISIT (OUTPATIENT)
Dept: CARDIOLOGY CLINIC | Age: 80
End: 2020-06-10

## 2020-06-10 DIAGNOSIS — Z95.0 PACEMAKER: ICD-10-CM

## 2020-06-10 DIAGNOSIS — I44.30 AV BLOCK: Primary | ICD-10-CM

## 2020-06-10 DIAGNOSIS — I48.0 PAROXYSMAL ATRIAL FIBRILLATION (HCC): ICD-10-CM

## 2020-06-22 DIAGNOSIS — F41.9 ANXIETY: ICD-10-CM

## 2020-06-22 RX ORDER — LORAZEPAM 1 MG/1
1 TABLET ORAL
Qty: 60 TAB | Refills: 0 | Status: SHIPPED | OUTPATIENT
Start: 2020-06-22 | End: 2020-07-31 | Stop reason: SDUPTHER

## 2020-06-23 ENCOUNTER — APPOINTMENT (OUTPATIENT)
Dept: INTERNAL MEDICINE CLINIC | Age: 80
End: 2020-06-23

## 2020-06-23 ENCOUNTER — HOSPITAL ENCOUNTER (OUTPATIENT)
Dept: LAB | Age: 80
Discharge: HOME | End: 2020-06-23
Payer: MEDICARE

## 2020-06-23 DIAGNOSIS — Z80.42 FH: PROSTATE CANCER: ICD-10-CM

## 2020-06-23 DIAGNOSIS — I42.9 CARDIOMYOPATHY, UNSPECIFIED TYPE (HCC): ICD-10-CM

## 2020-06-23 DIAGNOSIS — D12.2 ADENOMATOUS POLYP OF ASCENDING COLON: ICD-10-CM

## 2020-06-23 DIAGNOSIS — Z23 ENCOUNTER FOR IMMUNIZATION: ICD-10-CM

## 2020-06-23 DIAGNOSIS — E78.5 HYPERLIPIDEMIA, UNSPECIFIED HYPERLIPIDEMIA TYPE: ICD-10-CM

## 2020-06-23 DIAGNOSIS — R42 POSTURAL DIZZINESS WITH PRESYNCOPE: ICD-10-CM

## 2020-06-23 DIAGNOSIS — F41.9 ANXIETY: ICD-10-CM

## 2020-06-23 DIAGNOSIS — K21.9 GASTROESOPHAGEAL REFLUX DISEASE WITHOUT ESOPHAGITIS: ICD-10-CM

## 2020-06-23 DIAGNOSIS — R68.89 OTHER GENERAL SYMPTOMS AND SIGNS: ICD-10-CM

## 2020-06-23 DIAGNOSIS — I10 ESSENTIAL HYPERTENSION: ICD-10-CM

## 2020-06-23 DIAGNOSIS — D64.9 ANEMIA, UNSPECIFIED TYPE: ICD-10-CM

## 2020-06-23 DIAGNOSIS — R97.20 ELEVATED PSA: ICD-10-CM

## 2020-06-23 DIAGNOSIS — G44.219 EPISODIC TENSION-TYPE HEADACHE, NOT INTRACTABLE: ICD-10-CM

## 2020-06-23 DIAGNOSIS — Z95.0 PACEMAKER: ICD-10-CM

## 2020-06-23 DIAGNOSIS — R55 POSTURAL DIZZINESS WITH PRESYNCOPE: ICD-10-CM

## 2020-06-23 DIAGNOSIS — I48.0 PAROXYSMAL ATRIAL FIBRILLATION (HCC): ICD-10-CM

## 2020-06-23 DIAGNOSIS — I44.30 AV BLOCK: ICD-10-CM

## 2020-06-23 LAB
ALBUMIN SERPL-MCNC: 4.2 G/DL (ref 3.4–5)
ALBUMIN/GLOB SERPL: 1.3 {RATIO} (ref 0.8–1.7)
ALP SERPL-CCNC: 79 U/L (ref 45–117)
ALT SERPL-CCNC: 20 U/L (ref 16–61)
ANION GAP SERPL CALC-SCNC: 7 MMOL/L (ref 3–18)
AST SERPL-CCNC: 15 U/L (ref 10–38)
BASOPHILS # BLD: 0.1 K/UL (ref 0–0.1)
BASOPHILS NFR BLD: 1 % (ref 0–2)
BILIRUB SERPL-MCNC: 0.5 MG/DL (ref 0.2–1)
BUN SERPL-MCNC: 15 MG/DL (ref 7–18)
BUN/CREAT SERPL: 18 (ref 12–20)
CALCIUM SERPL-MCNC: 9.2 MG/DL (ref 8.5–10.1)
CHLORIDE SERPL-SCNC: 101 MMOL/L (ref 100–111)
CHOLEST SERPL-MCNC: 203 MG/DL
CO2 SERPL-SCNC: 28 MMOL/L (ref 21–32)
CREAT SERPL-MCNC: 0.84 MG/DL (ref 0.6–1.3)
DIFFERENTIAL METHOD BLD: ABNORMAL
EOSINOPHIL # BLD: 0.6 K/UL (ref 0–0.4)
EOSINOPHIL NFR BLD: 7 % (ref 0–5)
ERYTHROCYTE [DISTWIDTH] IN BLOOD BY AUTOMATED COUNT: 12.9 % (ref 11.6–14.5)
GLOBULIN SER CALC-MCNC: 3.2 G/DL (ref 2–4)
GLUCOSE SERPL-MCNC: 94 MG/DL (ref 74–99)
HCT VFR BLD AUTO: 44.5 % (ref 36–48)
HDLC SERPL-MCNC: 52 MG/DL (ref 40–60)
HDLC SERPL: 3.9 {RATIO} (ref 0–5)
HGB BLD-MCNC: 15.1 G/DL (ref 13–16)
LDLC SERPL CALC-MCNC: 135.6 MG/DL (ref 0–100)
LIPID PROFILE,FLP: ABNORMAL
LYMPHOCYTES # BLD: 3.1 K/UL (ref 0.9–3.6)
LYMPHOCYTES NFR BLD: 35 % (ref 21–52)
MCH RBC QN AUTO: 30.9 PG (ref 24–34)
MCHC RBC AUTO-ENTMCNC: 33.9 G/DL (ref 31–37)
MCV RBC AUTO: 91 FL (ref 74–97)
MONOCYTES # BLD: 0.8 K/UL (ref 0.05–1.2)
MONOCYTES NFR BLD: 9 % (ref 3–10)
NEUTS SEG # BLD: 4.2 K/UL (ref 1.8–8)
NEUTS SEG NFR BLD: 48 % (ref 40–73)
PLATELET # BLD AUTO: 238 K/UL (ref 135–420)
PMV BLD AUTO: 9.3 FL (ref 9.2–11.8)
POTASSIUM SERPL-SCNC: 4.3 MMOL/L (ref 3.5–5.5)
PROT SERPL-MCNC: 7.4 G/DL (ref 6.4–8.2)
PSA SERPL-MCNC: 1 NG/ML (ref 0–4)
RBC # BLD AUTO: 4.89 M/UL (ref 4.7–5.5)
SODIUM SERPL-SCNC: 136 MMOL/L (ref 136–145)
TRIGL SERPL-MCNC: 77 MG/DL (ref ?–150)
VLDLC SERPL CALC-MCNC: 15.4 MG/DL
WBC # BLD AUTO: 8.7 K/UL (ref 4.6–13.2)

## 2020-06-23 PROCEDURE — 85025 COMPLETE CBC W/AUTO DIFF WBC: CPT

## 2020-06-23 PROCEDURE — 80053 COMPREHEN METABOLIC PANEL: CPT

## 2020-06-23 PROCEDURE — 80061 LIPID PANEL: CPT

## 2020-06-23 PROCEDURE — 84153 ASSAY OF PSA TOTAL: CPT

## 2020-06-23 PROCEDURE — 36415 COLL VENOUS BLD VENIPUNCTURE: CPT

## 2020-06-30 ENCOUNTER — VIRTUAL VISIT (OUTPATIENT)
Dept: INTERNAL MEDICINE CLINIC | Age: 80
End: 2020-06-30

## 2020-06-30 DIAGNOSIS — I44.30 AV BLOCK: ICD-10-CM

## 2020-06-30 DIAGNOSIS — M25.511 ACUTE PAIN OF RIGHT SHOULDER: ICD-10-CM

## 2020-06-30 DIAGNOSIS — K21.9 GASTROESOPHAGEAL REFLUX DISEASE WITHOUT ESOPHAGITIS: ICD-10-CM

## 2020-06-30 DIAGNOSIS — F41.9 ANXIETY: ICD-10-CM

## 2020-06-30 DIAGNOSIS — Z00.00 MEDICARE ANNUAL WELLNESS VISIT, SUBSEQUENT: ICD-10-CM

## 2020-06-30 DIAGNOSIS — Z80.42 FH: PROSTATE CANCER: ICD-10-CM

## 2020-06-30 DIAGNOSIS — Z71.89 ACP (ADVANCE CARE PLANNING): ICD-10-CM

## 2020-06-30 DIAGNOSIS — E78.5 HYPERLIPIDEMIA, UNSPECIFIED HYPERLIPIDEMIA TYPE: ICD-10-CM

## 2020-06-30 DIAGNOSIS — D12.2 ADENOMATOUS POLYP OF ASCENDING COLON: ICD-10-CM

## 2020-06-30 DIAGNOSIS — I10 ESSENTIAL HYPERTENSION: Primary | ICD-10-CM

## 2020-06-30 DIAGNOSIS — Z95.0 PACEMAKER: ICD-10-CM

## 2020-06-30 DIAGNOSIS — I48.0 PAROXYSMAL ATRIAL FIBRILLATION (HCC): ICD-10-CM

## 2020-06-30 DIAGNOSIS — G44.219 EPISODIC TENSION-TYPE HEADACHE, NOT INTRACTABLE: ICD-10-CM

## 2020-06-30 DIAGNOSIS — I42.9 CARDIOMYOPATHY, UNSPECIFIED TYPE (HCC): ICD-10-CM

## 2020-06-30 DIAGNOSIS — R97.20 ELEVATED PSA: ICD-10-CM

## 2020-06-30 DIAGNOSIS — S22.41XS CLOSED FRACTURE OF MULTIPLE RIBS OF RIGHT SIDE, SEQUELA: ICD-10-CM

## 2020-06-30 RX ORDER — CYCLOBENZAPRINE HCL 5 MG
TABLET ORAL
Qty: 25 TAB | Refills: 0 | Status: SHIPPED | OUTPATIENT
Start: 2020-06-30 | End: 2021-01-19 | Stop reason: SDUPTHER

## 2020-06-30 RX ORDER — BUTALBITAL, ACETAMINOPHEN AND CAFFEINE 50; 325; 40 MG/1; MG/1; MG/1
1 TABLET ORAL
Qty: 30 TAB | Refills: 0 | Status: SHIPPED | OUTPATIENT
Start: 2020-06-30 | End: 2021-01-19 | Stop reason: SDUPTHER

## 2020-06-30 NOTE — ACP (ADVANCE CARE PLANNING)
Advance Care Planning       Advance Care Planning (ACP) Physician/NP/PA (Provider) Conversation        Date of ACP Conversation: 6/30/2020    Conversation Conducted with:   Patient with Decision Making Wilder Rosario Maker:    Current Designated Health Care Decision Maker:   (If there is a valid [place] named in the [address]" box in the ACP activity, but it is not visible above, be sure to open that field and then select the health care decision maker relationship (ie \"primary\") in the blank space to the right of the name.)    Note: Assess and validate information in current ACP documents, as indicated. If no Authorized Decision Maker has previously been identified, then patient chooses [place]:  \"Who would you like to name as your primary health care decision-maker? \"    Name: Danna Vern   Relationship:  and executor Phone number: [phone]  Ling Pollo this person be reached easily? \" YES    Note: If the relationship of these Decision-Makers to the patient does NOT follow your state's Next of Kin hierarchy, recommend that patient complete ACP document that meets state-specific requirements to allow them to act on the patient's behalf when appropriate. Care Preferences:    Hospitalization: \"If your health worsens and it becomes clear that your chance of recovery is unlikely, what would your preference be regarding hospitalization? \"  If the patient would want hospitalization, answer \"yes\". If the patient would prefer comfort-focused treatment without hospitalization, answer \"no\". yes      Ventilation: \"If you were in your present state of health and suddenly became very ill and were unable to breathe on your own, what would your preference be about the use of a ventilator (breathing machine) if it was available to you? \"    If patient would desire the use of a ventilator (breathing machine), answer \"yes\", if not answer \"no\":yes    \"If your health worsens and it becomes clear that your chance of recovery is unlikely, what would your preference be about the use of a ventilator (breathing machine) if it was available to you? \"   no      Resuscitation:  \"CPR works best to restart the heart when there is a sudden event, like a heart attack, in someone who is otherwise healthy. Unfortunately, CPR does not typically restart the heart for people who have serious health conditions or who are very sick. \"    \"In the event your heart stopped as a result of an underlying serious health condition, would you want attempts to be made to restart your heart (answer \"yes\" for attempt to resuscitate) or would you prefer a natural death (answer \"no\" for do not attempt to resuscitate)? \"   no    NOTE: If the patient has a valid advance directive AND provides care preference(s) that are inconsistent with that prior directive, advise the patient to consider either: creating a new advance directive that complies with state-specific requirements; or, if that is not possible, orally revoking that prior directive in accordance with state-specific requirements, which must be documented in the EHR. Conversation Outcomes / Follow-Up Plan: Patient has an existing advance directive on file, signed 2/22/2007. It designates his   as his healthcare agents and expresses that he does not wish life prolonging procedures for end of life care. It was reviewed with him and still reflects his wishes. However, with regard to amrit COVID-19, he states that he would wish the interventions as outlined above.                Length of Voluntary ACP Conversation in minutes:  16 minutes      Kadi Biswas MD

## 2020-06-30 NOTE — PROGRESS NOTES
This is the Subsequent Medicare Annual Wellness Exam, performed 12 months or more after the Initial AWV or the last Subsequent AWV    I have reviewed the patient's medical history in detail and updated the computerized patient record. History     Patient Active Problem List   Diagnosis Code    AV block I44.30    Pacemaker Z95.0    Cardiomyopathy ([address] I42.9    Paroxysmal atrial fibrillation ([address] I48.0    HLD (hyperlipidemia) E78.5    FH: prostate cancer Z80.42    Bilateral inguinal hernia K40.20    Anxiety F41.9    Essential hypertension I10    Adenomatous polyp of ascending colon D12.2    Gastroesophageal reflux disease without esophagitis K21.9    Hiatal hernia K44.9    Elevated PSA R97.20    Headache, tension type, episodic G44.219    Closed fracture of multiple ribs of right side S22.41XA    Acute pain of right shoulder M25.511     Past Medical History:   Diagnosis Date    Anxiety     AV block     intermittent, high-grade    Bilateral inguinal hernia     Cardiac echocardiogram 06/25/2013    EF 50-55%. No WMA. Mild LVH. Gr 1 DDfx. RVE.  AKANKSHA. Mild TR. No significant chg from study of 6/8/11.  Cardiac nuclear imaging test 10/07/2009    Likely inferior & anterior septal artifact. EF 55%. Mild septal WMA could be related to pacemaker.  Cardiomyopathy ([address]     Diverticulosis of colon 11/10/2015    Dysphagia     Multiple esophageal dilations.  Essential hypertension with goal blood pressure less than 140/90     HLD (hyperlipidemia)     Hx of colonoscopy 11/10/2015    Dr. Vince Barton; tubulovillous adenoma.  Pacemaker 2007    Medtronic dual-chamber device.     Paroxysmal atrial fibrillation (HCC)     single episode noted on routine device check      Past Surgical History:   Procedure Laterality Date    CARDIAC SURG PROCEDURE UNLIST      COLONOSCOPY N/A 3/29/2019    COLONOSCOPY with polypectomy performed by Anthony Llanos MD at [address] [place] [address], DIAGNOSTIC      HX CATARACT REMOVAL      HX PACEMAKER  05/29/07    Medtronic Versa dual-chamber pacemaker for intermittent high-grade AV block and symptomatic bradycardia.  HX PACEMAKER  5/3/16     Current Outpatient Medications   Medication Sig Dispense Refill    butalbital-acetaminophen-caffeine (FIORICET, ESGIC) -40 mg per tablet Take 1 Tab by mouth every six (6) hours as needed for Headache. 30 Tab 0    cyclobenzaprine (FLEXERIL) 5 mg tablet TAKE 1-2 TABLETS BY MOUTH EVERY 8 HOURS AS NEEDED FOR MUSCLE SPASM 25 Tab 0    LORazepam (ATIVAN) 1 mg tablet Take 1 Tab by mouth every eight (8) hours as needed for Anxiety. Max Daily Amount: 3 mg. 60 Tab 0    lisinopril (PRINIVIL, ZESTRIL) 2.5 mg tablet TAKE 3 TABLETS BY MOUTH ONCE DAILY 270 Tab 2    mometasone (NASONEX) 50 mcg/actuation nasal spray USE TWO SPRAYS IN EACH NOSTRIL DAILY 1 Container 5    VIT C/E/ZN/COPPR/LUTEIN/ZEAXAN (PRESERVISION AREDS 2 PO) Take  by mouth.  omeprazole (PRILOSEC) 20 mg capsule Take 20 mg by mouth daily as needed.  timolol (TIMOPTIC) 0.25 % ophthalmic solution Administer 1 Drop to both eyes two (2) times a day.  MULTIVITAMINS W-MINERALS/LUT (CENTRUM SILVER PO) Take  by mouth daily.  OMEGA-3 FATTY ACIDS (OMEGA 3 PO) Take 2,400 mg by mouth two (2) times a day.  coenzyme q10 100 mg Cap Take  by mouth daily.  metronidazole (METROGEL) 1 % topical gel Apply  to affected area daily. Use a thin layer to affected areas after washing       zinc 50 mg capsule Take  by mouth daily.  magnesium 250 mg Tab Take  by mouth daily.  selenium 100 mcg Cap Take  by mouth daily.  ascorbic acid (VITAMIN C) 500 mg tablet Take  by mouth two (2) times a day.  Ginkgo Biloba Leaf Extract 30 mg Cap Take  by mouth daily.        No Known Allergies    Family History   Problem Relation Age of Onset    Heart Surgery Mother     Stroke Father     Alcohol abuse Father      Social History     Tobacco Use    Smoking status: Former Smoker     Last attempt to quit: 1966     Years since quittin.9    Smokeless tobacco: Never Used   Substance Use Topics    Alcohol use: No     Comment: 1998 last time he had alcohol. Depression Risk Factor Screening:     3 most recent PHQ Screens 2020   Little interest or pleasure in doing things Several days   Feeling down, depressed, irritable, or hopeless Several days   Total Score PHQ 2 2       Alcohol Risk Factor Screening (MALE > 65): Do you average more 1 drink per night or more than 7 drinks a week: No    In the past three months have you have had more than 4 drinks containing alcohol on one occasion: No      Functional Ability and Level of Safety:   Hearing: Hearing is good. Activities of Daily Living: The home contains: no safety equipment. Patient does total self care     Ambulation: with no difficulty     Fall Risk:  Fall Risk Assessment, last 12 mths 2020   Able to walk? Yes   Fall in past 12 months?  No   Fall with injury? -   Number of falls in past 12 months -   Fall Risk Score -     Abuse Screen:  Patient is not abused       Cognitive Screening   Has your family/caregiver stated any concerns about your memory: no    Cognitive Screening: none performed    Patient Care Team   Patient Care Team:  Carly Almonte MD as PCP - General (Internal Medicine)  Carly Almonte MD as PCP - [facility]  Wolfgang Haile MD (Cardiology)  Dedra Marshall MD (Gastroenterology)  Lisha Mukherjee MD (Ophthalmology)  Kady Madsen MD (Dermatology)    Assessment/Plan   Education and counseling provided:  Are appropriate based on today's review and evaluation  End-of-Life planning (with patient's consent)  Influenza Vaccine  Prostate cancer screening tests (PSA, covered annually)  Colorectal cancer screening tests  Cardiovascular screening blood test  Screening for glaucoma  Diabetes screening test    Diagnoses and all orders for this visit:    1. Essential hypertension  -     CBC WITH AUTOMATED DIFF; Future  -     LIPID PANEL; Future  -     MAGNESIUM; Future  -     METABOLIC PANEL, COMPREHENSIVE; Future  -     TSH 3RD GENERATION; Future  -     URINALYSIS W/MICROSCOPIC; Future  -     PSA, DIAGNOSTIC (PROSTATE SPECIFIC AG); Future    2. Paroxysmal atrial fibrillation (HCC)  -     CBC WITH AUTOMATED DIFF; Future  -     LIPID PANEL; Future  -     MAGNESIUM; Future  -     METABOLIC PANEL, COMPREHENSIVE; Future  -     TSH 3RD GENERATION; Future  -     URINALYSIS W/MICROSCOPIC; Future  -     PSA, DIAGNOSTIC (PROSTATE SPECIFIC AG); Future    3. Cardiomyopathy, unspecified type ([address]  -     CBC WITH AUTOMATED DIFF; Future  -     LIPID PANEL; Future  -     MAGNESIUM; Future  -     METABOLIC PANEL, COMPREHENSIVE; Future  -     TSH 3RD GENERATION; Future  -     URINALYSIS W/MICROSCOPIC; Future  -     PSA, DIAGNOSTIC (PROSTATE SPECIFIC AG); Future    4. AV block  -     CBC WITH AUTOMATED DIFF; Future  -     LIPID PANEL; Future  -     MAGNESIUM; Future  -     METABOLIC PANEL, COMPREHENSIVE; Future  -     TSH 3RD GENERATION; Future  -     URINALYSIS W/MICROSCOPIC; Future  -     PSA, DIAGNOSTIC (PROSTATE SPECIFIC AG); Future    5. Hyperlipidemia, unspecified hyperlipidemia type  -     CBC WITH AUTOMATED DIFF; Future  -     LIPID PANEL; Future  -     MAGNESIUM; Future  -     METABOLIC PANEL, COMPREHENSIVE; Future  -     TSH 3RD GENERATION; Future  -     URINALYSIS W/MICROSCOPIC; Future  -     PSA, DIAGNOSTIC (PROSTATE SPECIFIC AG); Future    6. Anxiety  -     CBC WITH AUTOMATED DIFF; Future  -     LIPID PANEL; Future  -     MAGNESIUM; Future  -     METABOLIC PANEL, COMPREHENSIVE; Future  -     TSH 3RD GENERATION; Future  -     URINALYSIS W/MICROSCOPIC; Future  -     PSA, DIAGNOSTIC (PROSTATE SPECIFIC AG); Future    7. Pacemaker  -     CBC WITH AUTOMATED DIFF; Future  -     LIPID PANEL; Future  -     MAGNESIUM;  Future  -     METABOLIC PANEL, COMPREHENSIVE; Future  -     TSH 3RD GENERATION; Future  -     URINALYSIS W/MICROSCOPIC; Future  -     PSA, DIAGNOSTIC (PROSTATE SPECIFIC AG); Future    8. Closed fracture of multiple ribs of right side, sequela  -     CBC WITH AUTOMATED DIFF; Future  -     LIPID PANEL; Future  -     MAGNESIUM; Future  -     METABOLIC PANEL, COMPREHENSIVE; Future  -     TSH 3RD GENERATION; Future  -     URINALYSIS W/MICROSCOPIC; Future  -     PSA, DIAGNOSTIC (PROSTATE SPECIFIC AG); Future    9. Episodic tension-type headache, not intractable  -     CBC WITH AUTOMATED DIFF; Future  -     LIPID PANEL; Future  -     MAGNESIUM; Future  -     METABOLIC PANEL, COMPREHENSIVE; Future  -     TSH 3RD GENERATION; Future  -     URINALYSIS W/MICROSCOPIC; Future  -     PSA, DIAGNOSTIC (PROSTATE SPECIFIC AG); Future    10. Gastroesophageal reflux disease without esophagitis  -     CBC WITH AUTOMATED DIFF; Future  -     LIPID PANEL; Future  -     MAGNESIUM; Future  -     METABOLIC PANEL, COMPREHENSIVE; Future  -     TSH 3RD GENERATION; Future  -     URINALYSIS W/MICROSCOPIC; Future  -     PSA, DIAGNOSTIC (PROSTATE SPECIFIC AG); Future    11. Adenomatous polyp of ascending colon  -     CBC WITH AUTOMATED DIFF; Future  -     LIPID PANEL; Future  -     MAGNESIUM; Future  -     METABOLIC PANEL, COMPREHENSIVE; Future  -     TSH 3RD GENERATION; Future  -     URINALYSIS W/MICROSCOPIC; Future  -     PSA, DIAGNOSTIC (PROSTATE SPECIFIC AG); Future    12. FH: prostate cancer  -     CBC WITH AUTOMATED DIFF; Future  -     LIPID PANEL; Future  -     MAGNESIUM; Future  -     METABOLIC PANEL, COMPREHENSIVE; Future  -     TSH 3RD GENERATION; Future  -     URINALYSIS W/MICROSCOPIC; Future  -     PSA, DIAGNOSTIC (PROSTATE SPECIFIC AG); Future    13. Acute pain of right shoulder  -     CBC WITH AUTOMATED DIFF; Future  -     LIPID PANEL; Future  -     MAGNESIUM; Future  -     METABOLIC PANEL, COMPREHENSIVE; Future  -     TSH 3RD GENERATION;  Future  -     URINALYSIS W/MICROSCOPIC; Future  -     PSA, DIAGNOSTIC (PROSTATE SPECIFIC AG); Future    14. Elevated PSA  -     PSA, DIAGNOSTIC (PROSTATE SPECIFIC AG); Future    15. Medicare annual wellness visit, subsequent    16. ACP (advance care planning)        There are no preventive care reminders to display for this patient. Caleb Diamond, who was evaluated through a synchronous (real-time) audio only encounter, and/or his healthcare decision maker, is aware that it is a billable service, with coverage as determined by his insurance carrier. He provided verbal consent to proceed: Yes, and patient identification was verified. It was conducted pursuant to the emergency declaration under the [address] [place] [address], [address] [place] [address] authority and the [employer] Act. A caregiver was present when appropriate. Ability to conduct physical exam was limited. I was in the office. The patient was at home.     Tricia Ruff MD

## 2020-07-03 ENCOUNTER — TELEPHONE (OUTPATIENT)
Dept: INTERNAL MEDICINE CLINIC | Age: 80
End: 2020-07-03

## 2020-07-03 PROBLEM — R55 POSTURAL DIZZINESS WITH PRESYNCOPE: Status: RESOLVED | Noted: 2017-04-28 | Resolved: 2020-07-03

## 2020-07-03 PROBLEM — R42 POSTURAL DIZZINESS WITH PRESYNCOPE: Status: RESOLVED | Noted: 2017-04-28 | Resolved: 2020-07-03

## 2020-07-03 PROBLEM — M25.511 ACUTE PAIN OF RIGHT SHOULDER: Status: ACTIVE | Noted: 2020-07-03

## 2020-07-03 PROBLEM — D64.9 ANEMIA: Status: RESOLVED | Noted: 2019-10-12 | Resolved: 2020-07-03

## 2020-07-03 NOTE — PROGRESS NOTES
Zoraida Beard is a [de-identified] y.o. male, evaluated via audio-only technology on 6/30/2020. HPI:   Zoraida Beard is a [de-identified]y.o. year old male who has a history of hypertension, hyperlipidemia, AV block s/p pacemaker, paroxysmal atrial fibrillation, and anxiety. At his last visit, he had reported right chest wall pain after a tree stump fell on his chest in 10/2019. Right rib series were obtained (12/2019) showing nondisplaced acute fractures of the anterior ribs 8 and 10. He reports improvement in the pain today. He does report developing right shoulder pain after using a chain saw to cut down trees in 2/2020. He was evaluated by Dr. Jj Osman in 3/2020, and received a cortisone injection with some improvement. He does report strictly following social distancing guidelines, and admits to feeling isolated from his family and grandchildren. He states that three of his children are employed as nurses and one works in [employer]. He recognizes that they are in high risk positions and it would not be prudent for him to see them during the pandemic. He continues to work to maintain his [employer] in [place]. He is otherwise without new complaints. He has a history of hypertension, treated with lisinopril. He does monitor his blood pressure regularly, and is currently taking 3.25 mg in the morning and evening. He reports that his blood pressure in mainly 120-130/ 70-80 at home. He has a history of intermittent high grade AV block, and underwent placement of a dual chamber Medtronic pacemaker in 2007. He underwent pacemaker generator exchange in 5/2016 after his device reached elective replacement indicator. A Medtronic MRI safe device was installed. He has a history of a single episode of atrial fibrillation noted on device check several years ago.  He underwent follow-up appointment with Dr. Tatyana Arevalo on 7/30/2018, and interrogation of his pacemaker during the visit revealed a single episode of atrial fibrillation in November 2017 lasting for less than 2 hours. There were no recurrent episodes since that time, and he was asymptomatic during the episode. He was noted in the past to have mildly decreased LV function, but a repeat echocardiogram in 6/2013 showed normal size and lower limits of normal function with EF 50-55%; no wall motion abnormalities, and grade 1 diastolic dysfunction. He is followed by Dr. David Samson. 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 He also was walking 2.5 miles each day. He denies any chest pain, shortness of breath at rest or with exertion, palpitations, lightheadedness, or edema. He has a history of dyslipidemia, managed by diet in the past.    In 4/2017 he developed lightheadedness after painting outside for several hours. He took an Ativan because he thought he was experiencing anxiety. However, his symptoms persisted and he began to feel like he was going to pass out so he called 911 for help. When EMS arrived, his symptoms had resolved but he was noted to have a low blood pressure of approximately 112/67. He was taken to [facility] for evaluation and was asymptomatic upon arrival. He admitted to not eating or drinking anything for the four hour period that he was outside, and acknowledged that the day was quite warm. Evaluation in the ED included an EKG which showed a paced rhythm, serial troponins which were negative, and blood work showing WBC 14.2, Hb 14.5, Hct 42.7, creatinine 1.3/ eGFR 51.7. Chest x-ray showed no acute process, but did note an incidental pulmonary nodule over the left lower lobe. A chest CT scan was obtained (5/2017) showing mild subsegmental atelectasis or scarring at the left lung base without focal pulmonary nodule. He has a history of a GI bleeding in 1997 secondary to NSAID use for headaches.  He also has a history of GERD and dysphagia and has undergone multiple esophageal dilatations in the past by Dr. Ceasar Torres. In 11/2015, he had an upper endoscopy which showed a small hiatal hernia in the cardia, a Schatzki's ring at the gastroesophageal junction, which was dilated, and otherwise normal stomach and duodenum. He also had a screening colonoscopy which revealed mild colonic diverticulosis and a 7 mm polyp in the proximal ascending colon (pathology: tubulovillous adenoma). He had a follow-up colonoscopy in 3/2019 by Dr. Ceasar Torres which showed two sessile 4 mm polyps in the cecum and ascending colon (pathology: tubular adenomas). He also simultaneously had an upper endoscopy showing normal stomach/duodenum, a small hiatal hernia in the cardia, and a Schatzki's ring (dilated). He denies any abdominal pain, nausea, vomiting, melena, hematochezia, or change in bowel movements. He has a history of anxiety, and uses lorazepam occasionally. He also reports he uses cyclobenzaprine occasionally for back spasms which occur at times due to straining his back with excessive physical work. He also uses Fioricet occasionally for tension type headaches. He states that he will only take one fourth of a pill when needed. Past Medical History:   Diagnosis Date    Anxiety     AV block     intermittent, high-grade    Bilateral inguinal hernia     Cardiac echocardiogram 06/25/2013    EF 50-55%. No WMA. Mild LVH. Gr 1 DDfx. RVE.  AKANKSHA. Mild TR. No significant chg from study of 6/8/11.  Cardiac nuclear imaging test 10/07/2009    Likely inferior & anterior septal artifact. EF 55%. Mild septal WMA could be related to pacemaker.  Cardiomyopathy ([address]     Diverticulosis of colon 11/10/2015    Dysphagia     Multiple esophageal dilations.  Essential hypertension with goal blood pressure less than 140/90     HLD (hyperlipidemia)     Hx of colonoscopy 11/10/2015    Dr. Ceasar Torres; tubulovillous adenoma.  Pacemaker 2007    Medtronic dual-chamber device.     Paroxysmal atrial fibrillation ([address]     single episode noted on routine device check     Past Surgical History:   Procedure Laterality Date    CARDIAC SURG PROCEDURE UNLIST      COLONOSCOPY N/A 3/29/2019    COLONOSCOPY with polypectomy performed by Brittney Salinas MD at [facility] ENDOSCOPY    ENDOSCOPY, COLON, DIAGNOSTIC      HX CATARACT REMOVAL      HX PACEMAKER  05/29/07    Medtronic Versa dual-chamber pacemaker for intermittent high-grade AV block and symptomatic bradycardia.  HX PACEMAKER  5/3/16     Current Outpatient Medications   Medication Sig    butalbital-acetaminophen-caffeine (FIORICET, ESGIC) -40 mg per tablet Take 1 Tab by mouth every six (6) hours as needed for Headache.  cyclobenzaprine (FLEXERIL) 5 mg tablet TAKE 1-2 TABLETS BY MOUTH EVERY 8 HOURS AS NEEDED FOR MUSCLE SPASM    LORazepam (ATIVAN) 1 mg tablet Take 1 Tab by mouth every eight (8) hours as needed for Anxiety. Max Daily Amount: 3 mg.  lisinopril (PRINIVIL, ZESTRIL) 2.5 mg tablet TAKE 3 TABLETS BY MOUTH ONCE DAILY    mometasone (NASONEX) 50 mcg/actuation nasal spray USE TWO SPRAYS IN EACH NOSTRIL DAILY    VIT C/E/ZN/COPPR/LUTEIN/ZEAXAN (PRESERVISION AREDS 2 PO) Take  by mouth.  omeprazole (PRILOSEC) 20 mg capsule Take 20 mg by mouth daily as needed.  timolol (TIMOPTIC) 0.25 % ophthalmic solution Administer 1 Drop to both eyes two (2) times a day.  MULTIVITAMINS W-MINERALS/LUT (CENTRUM SILVER PO) Take  by mouth daily.  OMEGA-3 FATTY ACIDS (OMEGA 3 PO) Take 2,400 mg by mouth two (2) times a day.  coenzyme q10 100 mg Cap Take  by mouth daily.  metronidazole (METROGEL) 1 % topical gel Apply  to affected area daily. Use a thin layer to affected areas after washing     zinc 50 mg capsule Take  by mouth daily.  magnesium 250 mg Tab Take  by mouth daily.  selenium 100 mcg Cap Take  by mouth daily.  ascorbic acid (VITAMIN C) 500 mg tablet Take  by mouth two (2) times a day.     Ginkgo Biloba Leaf Extract 30 mg Cap Take  by mouth daily. No current facility-administered medications for this visit. Allergies and Intolerances:   No Known Allergies     Family History: His brother  from metastatic prostate cancer. His mother lived until the age of 80, although she had CAD. His father  at the age of 59 from a CVA. He was an alcoholic. He has five sisters that are all healthy. Family History   Problem Relation Age of Onset    Heart Surgery Mother     Stroke Father     Alcohol abuse Father      Social History:   He  reports that he quit smoking about 53 years ago. He has never used smokeless tobacco. He smoked 1 ppd for 2 years, and then quit (). He is retired  and worked for the [employer]. He lives with his girlfriend and has one son. Social History     Substance and Sexual Activity   Alcohol Use No    Comment: 1998 last time he had alcohol. Immunization History:  Immunization History   Administered Date(s) Administered    (RETIRED) Pneumococcal Vaccine (Unspecified Type) 2007    Influenza High Dose Vaccine PF 01/10/2013, 2015, 2016, 10/02/2017    Influenza Vaccine 2013, 2014    Influenza Vaccine (Tri) Adjuvanted 2018, 10/08/2019    Pneumococcal Conjugate (PCV-13) 2015    Pneumococcal Polysaccharide (PPSV-23) 2007    TDAP Vaccine 2012    Zoster 2007    Zoster Recombinant 05/15/2019, 2019       Review of Systems:   As above included in HPI. Otherwise 11 point review of systems negative including constitutional, skin, HENT, eyes, respiratory, cardiovascular, gastrointestinal, genitourinary, musculoskeletal, endocrine, hematologic, allergy, and neurologic.     Physical:   Vitals:   BP:    HR:    WT:    BMI:  kg/m2    Patient-Reported Vitals 2020   Patient-Reported Weight 174   Patient-Reported Height 5 foot 10   Patient-Reported Pulse 79   Patient-Reported Temperature 98 Patient-Reported SpO2 98   Patient-Reported Systolic  267   Patient-Reported Diastolic 82      Exam:   None performed due to audio only visit. Review of Data:  Labs:  Hospital Outpatient Visit on 06/23/2020   Component Date Value Ref Range Status    WBC 06/23/2020 8.7  4.6 - 13.2 K/uL Final    RBC 06/23/2020 4.89  4.70 - 5.50 M/uL Final    HGB 06/23/2020 15.1  13.0 - 16.0 g/dL Final    HCT 06/23/2020 44.5  36.0 - 48.0 % Final    MCV 06/23/2020 91.0  74.0 - 97.0 FL Final    MCH 06/23/2020 30.9  24.0 - 34.0 PG Final    MCHC 06/23/2020 33.9  31.0 - 37.0 g/dL Final    RDW 06/23/2020 12.9  11.6 - 14.5 % Final    PLATELET 66/96/3744 270  135 - 420 K/uL Final    MPV 06/23/2020 9.3  9.2 - 11.8 FL Final    NEUTROPHILS 06/23/2020 48  40 - 73 % Final    LYMPHOCYTES 06/23/2020 35  21 - 52 % Final    MONOCYTES 06/23/2020 9  3 - 10 % Final    EOSINOPHILS 06/23/2020 7* 0 - 5 % Final    BASOPHILS 06/23/2020 1  0 - 2 % Final    ABS. NEUTROPHILS 06/23/2020 4.2  1.8 - 8.0 K/UL Final    ABS. LYMPHOCYTES 06/23/2020 3.1  0.9 - 3.6 K/UL Final    ABS. MONOCYTES 06/23/2020 0.8  0.05 - 1.2 K/UL Final    ABS. EOSINOPHILS 06/23/2020 0.6* 0.0 - 0.4 K/UL Final    ABS.  BASOPHILS 06/23/2020 0.1  0.0 - 0.1 K/UL Final    DF 06/23/2020 AUTOMATED    Final    LIPID PROFILE 06/23/2020        Final    Cholesterol, total 06/23/2020 203* <200 MG/DL Final    Triglyceride 06/23/2020 77  <150 MG/DL Final    HDL Cholesterol 06/23/2020 52  40 - 60 MG/DL Final    LDL, calculated 06/23/2020 135.6* 0 - 100 MG/DL Final    VLDL, calculated 06/23/2020 15.4  MG/DL Final    CHOL/HDL Ratio 06/23/2020 3.9  0 - 5.0   Final    Sodium 06/23/2020 136  136 - 145 mmol/L Final    Potassium 06/23/2020 4.3  3.5 - 5.5 mmol/L Final    Chloride 06/23/2020 101  100 - 111 mmol/L Final    CO2 06/23/2020 28  21 - 32 mmol/L Final    Anion gap 06/23/2020 7  3.0 - 18 mmol/L Final    Glucose 06/23/2020 94  74 - 99 mg/dL Final    BUN 06/23/2020 15  7.0 - 18 MG/DL Final    Creatinine 06/23/2020 0.84  0.6 - 1.3 MG/DL Final    BUN/Creatinine ratio 06/23/2020 18  12 - 20   Final    GFR est AA 06/23/2020 >60  >60 ml/min/1.73m2 Final    GFR est non-AA 06/23/2020 >60  >60 ml/min/1.73m2 Final    Calcium 06/23/2020 9.2  8.5 - 10.1 MG/DL Final    Bilirubin, total 06/23/2020 0.5  0.2 - 1.0 MG/DL Final    ALT (SGPT) 06/23/2020 20  16 - 61 U/L Final    AST (SGOT) 06/23/2020 15  10 - 38 U/L Final    Alk. phosphatase 06/23/2020 79  45 - 117 U/L Final    Protein, total 06/23/2020 7.4  6.4 - 8.2 g/dL Final    Albumin 06/23/2020 4.2  3.4 - 5.0 g/dL Final    Globulin 06/23/2020 3.2  2.0 - 4.0 g/dL Final    A-G Ratio 06/23/2020 1.3  0.8 - 1.7   Final    Prostate Specific Ag 06/23/2020 1.0  0.0 - 4.0 ng/mL Final     Health Maintenance:  Screening:    Colorectal: colonoscopy (3/2019) tubular adenomas. Dr. Nam Mayo. No further screening needed. Depression: none   DM (HbA1c/FPG): FPG 94 (6/2020)   Hepatitis C: N/A   Falls: none   DEXA: N/A   PSA/WEN: PSA 1.0 (6/2020)   Glaucoma: regular eye exams with Dr. Dennis Melendez for glaucoma q4 months. Smoking: none   Vitamin D: 45.0 (10/2019)   Medicare Wellness: today    Impression:  Patient Active Problem List   Diagnosis Code    AV block I44.30    Pacemaker Z95.0    Cardiomyopathy ([address] I42.9    Paroxysmal atrial fibrillation (HCC) I48.0    HLD (hyperlipidemia) E78.5    FH: prostate cancer Z80.42    Bilateral inguinal hernia K40.20    Anxiety F41.9    Essential hypertension I10    Adenomatous polyp of ascending colon D12.2    Gastroesophageal reflux disease without esophagitis K21.9    Postural dizziness with presyncope R42, R55    Hiatal hernia K44.9    Anemia D64.9    Elevated PSA R97.20    Headache, tension type, episodic G44.219    Closed fracture of multiple ribs of right side S22.41XA       Plan:  1. Hypertension. Blood pressure remains well controlled today on lisinopril 3.75 mg bid.  He continues to monitor his blood pressure carefully at home. Renal function remains normal with creatinine 0.84/ eGFR >60. Continue to follow. 2. AV block, intermittent high grade. S/P Medtronic dual chamber pacemaker with generator change in 5/2016 with an MRI safe device installed. Interrogated and followed every 3 months by Dr. Dakota Ruvalcaba, last 6/2020. Echocardiogram with low normal LV function in 2013. 3. Paroxysmal atrial fibrillation. Single episode noted on device check several years ago. Had recurrence noted on device check in 11/2017, lasting approximately 2 hours, and he was asymptomatic. No recurrence since that time. Anti-coagulation felt not to be needed unless develops more frequent or prolonged episodes. Monitored closely every three months with routine device checks. Continue to follow. 4. Hyperlipidemia. His age > 76 does not place him in one of the four statin benefit groups as per new AHA/ACC guidelines. Also, with no evidence of ASCVD. LDL worsened today to 135 and HDL 52. Attributes worsening to inactivity due to pandemic and recent shoulder injury. Patient not wishing to begin therapy with a statin. Emphasized importance of continued lifestyle modifications, including diet and exercise. Continue to follow and repeat lipid panel at next visit. 5. Presyncope. Related to dehydration secondary to physical exertion in the heat without adequate hydration. Reports no further symptoms. Monitors his blood pressure daily and has been controlled at his baseline. Encouraged to continue drinking plenty of fluids. Follow. 6. Right chest wall pain. Patient had fallen on a tree stump in 9/2019 while cutting down trees. Sustained injury to his right anterior chest wall with persistent pain for 10 weeks. Right anterior rib series showed nondisplaced acute fractures of anterior ribs 8 and 10, but no effusion or pneumothorax to suggest underlying lung injury. Reports improvement in pain today. 7. GERD/dysphagia.  Repeat upper endoscopy (3/2019) with esophageal dilatation for Schatzki's ring. Small hiatal hernia. Symptoms well controlled with omeprazole. Follow. 8. Family history of prostate cancer. Patient quite concerned about his own risk given that his brother  quite rapidly from metastatic disease. PSA level had remained quite low in the 0.6-0.8 range, but increased acutely to 2.6 on measurement today. He is quite concerned, but blames it on the strenuous activity that he performed on the day prior to his lab appointment. He requests to have it repeated today. He has expressed previously that he wishes to continue to have it checked despite his age. Had discussed the controversy of continuing prostatic cancer screening after age 75, but patient still requesting to continue PSA testing. Remains normal today. 9. Anemia. Normalized today. Iron studies, B12, folate, peripheral smear and gammopathy panel in 10/2019 normal.  Follow. 10. Anxiety. Patient is quite anxious about his health. Reassured that he is living a healthy lifestyle and doing well. Continue lorazepam as needed. 11. Right shoulder pain. Reports developed pain in right shoulder in 2020 after using chain saw to cut down trees. Evaluated by Dr. Duncan Porras and received cortisone injection with improvement. 12. Health maintenance. Already received influenza vaccine. Completed 2/2 doses of Shingrix vaccine. Other immunizations up to date. Colonoscopy completed. Continue regular eye exams with Dr. Pj Briones. Vitamin D level normal.  Medicare wellness visit up to date. Patient understands recommendations and agrees with plan. Follow-up in 6 months. Elizabeth Bai, who was evaluated through a patient-initiated, synchronous (real-time) audio only encounter, and/or her healthcare decision maker, is aware that it is a billable service, with coverage as determined by his insurance carrier. He provided verbal consent to proceed: Yes.  He has not had a related appointment within my department in the past 7 days or scheduled within the next 24 hours.       Total Time: minutes: 21-30 minutes    Sabino Bee MD

## 2020-07-31 DIAGNOSIS — F41.9 ANXIETY: ICD-10-CM

## 2020-07-31 RX ORDER — LORAZEPAM 1 MG/1
1 TABLET ORAL
Qty: 60 TAB | Refills: 0 | Status: SHIPPED | OUTPATIENT
Start: 2020-07-31 | End: 2020-09-03 | Stop reason: SDUPTHER

## 2020-09-03 DIAGNOSIS — F41.9 ANXIETY: ICD-10-CM

## 2020-09-03 RX ORDER — LORAZEPAM 1 MG/1
1 TABLET ORAL
Qty: 60 TAB | Refills: 0 | Status: SHIPPED | OUTPATIENT
Start: 2020-09-03 | End: 2020-10-08 | Stop reason: SDUPTHER

## 2020-09-03 NOTE — TELEPHONE ENCOUNTER
PCP: Mary Jarrett MD 
 
Last appt: 6/30/2020 Future Appointments Date Time Provider Lizzy Benson 9/21/2020  1:40 PM Luis Eduardo Walters MD [address] [place] [address]  
12/16/2020  2:00 PM [facility], PACER [facility] [address] [place] [address]  
1/12/2021  8:05 AM [facility] NURSE VISIT [facility] ABHIJIT MCKEON  
1/19/2021  1:00 PM Mary Jarrett MD [address] Requested Prescriptions Pending Prescriptions Disp Refills  LORazepam (ATIVAN) 1 mg tablet 60 Tab 0 Sig: Take 1 Tab by mouth every eight (8) hours as needed for Anxiety. Max Daily Amount: 3 mg. UDS 8-9-19 CSA ON FILE 
 REVIEWED: Last fill 7-31-20 qty 60 for 20 d/s

## 2020-09-14 ENCOUNTER — CLINICAL SUPPORT (OUTPATIENT)
Dept: INTERNAL MEDICINE CLINIC | Age: 80
End: 2020-09-14

## 2020-09-14 DIAGNOSIS — Z23 NEEDS FLU SHOT: Primary | ICD-10-CM

## 2020-09-14 NOTE — PROGRESS NOTES
Matthew Cid 1940 male who presents for routine immunizations. Patient denies any symptoms , reactions or allergies that would exclude them from being immunized today. Risks and adverse reactions were discussed and the VIS was given to them. All questions were addressed. Order placed for High Dose Influneza,  per Verbal Order from Dr. Heladio Rosa with read back. Patient was advised to wait 15 min post injection. There were no reactions observed.     Paula Caruso LPN

## 2020-09-21 ENCOUNTER — OFFICE VISIT (OUTPATIENT)
Dept: CARDIOLOGY CLINIC | Age: 80
End: 2020-09-21

## 2020-09-21 VITALS
OXYGEN SATURATION: 97 % | WEIGHT: 178 LBS | SYSTOLIC BLOOD PRESSURE: 134 MMHG | HEIGHT: 70 IN | DIASTOLIC BLOOD PRESSURE: 86 MMHG | BODY MASS INDEX: 25.48 KG/M2 | HEART RATE: 78 BPM

## 2020-09-21 DIAGNOSIS — I44.30 AV BLOCK: ICD-10-CM

## 2020-09-21 DIAGNOSIS — I10 ESSENTIAL HYPERTENSION: Primary | ICD-10-CM

## 2020-09-21 DIAGNOSIS — E78.5 HYPERLIPIDEMIA, UNSPECIFIED HYPERLIPIDEMIA TYPE: ICD-10-CM

## 2020-09-21 DIAGNOSIS — I48.0 PAROXYSMAL ATRIAL FIBRILLATION (HCC): ICD-10-CM

## 2020-09-21 NOTE — PROGRESS NOTES
Elizabeth Bai presents today for   Chief Complaint   Patient presents with    Follow-up     6 month follow up   [facility] preferred language for health care discussion is english/other. Is someone accompanying this pt? no    Is the patient using any DME equipment during [address]? no    Depression Screening:  3 most recent PHQ Screens 9/21/2020   Little interest or pleasure in doing things Not at all   Feeling down, depressed, irritable, or hopeless Not at all   Total Score PHQ 2 0       Learning Assessment:  Learning Assessment 8/28/2019   PRIMARY LEARNER Patient   HIGHEST LEVEL OF EDUCATION - PRIMARY LEARNER  -   BARRIERS PRIMARY LEARNER -   CO-LEARNER CAREGIVER -   PRIMARY LANGUAGE ENGLISH   LEARNER PREFERENCE PRIMARY DEMONSTRATION   ANSWERED BY Patient   RELATIONSHIP SELF       Abuse Screening:  Abuse Screening Questionnaire 9/21/2020   Do you ever feel afraid of your partner? N   Are you in a relationship with someone who physically or mentally threatens you? N   Is it safe for you to go home? Y       Fall Risk  Fall Risk Assessment, last 12 mths 9/21/2020   Able to walk? Yes   Fall in past 12 months? No   Fall with injury? -   Number of falls in past 12 months -   Fall Risk Score -       Pt currently taking Anticoagulant therapy? no    Coordination of Care:  1. Have you been to the ER, urgent care clinic since your last visit? Hospitalized since your last visit? no    2. Have you seen or consulted any other health care providers outside of the [address] [place] [address] since your last visit? Include any pap smears or colon screening.  no

## 2020-09-21 NOTE — PROGRESS NOTES
HISTORY OF PRESENT ILLNESS  Elaina Richmond is a [de-identified] y.o. male. Follow-up   Pertinent negatives include no chest pain, no abdominal pain, no headaches and no shortness of breath. Pacemaker Check   Pertinent negatives include no chest pain, no abdominal pain, no headaches and no shortness of breath. Patient presents for a scheduled followup visit. He has a history of intermittent high-grade AV block, status post to dual-chamber permanent pacemaker in 2007. He also has a history of hypertension, Brief episodes approximately atrial fibrillation, and a mildly depressed LV systolic function,  Which returned to low normal on echocardiogram in June 2013 showing an ejection fraction of 50-55%. The patient underwent a pacemaker generator exchange in May 2016 with a [employer] MRI safe device after his device has reached elective replacement indicator. The patient was last seen in the office 6 months ago. Since last visit he continues to feel well. He denies any major change in his activity level. No significant heart palpitations, dizziness or syncope. No chest pain at rest or with exertion. No orthopnea, PND shortness of breath or leg swelling. He remains very active for his age. Past Medical History:   Diagnosis Date    Anxiety     AV block     intermittent, high-grade    Bilateral inguinal hernia     Cardiac echocardiogram 06/25/2013    EF 50-55%. No WMA. Mild LVH. Gr 1 DDfx. RVE.  AKANKSHA. Mild TR. No significant chg from study of 6/8/11.  Cardiac nuclear imaging test 10/07/2009    Likely inferior & anterior septal artifact. EF 55%. Mild septal WMA could be related to pacemaker.  Cardiomyopathy ([address]     Diverticulosis of colon 11/10/2015    Dysphagia     Multiple esophageal dilations.  Essential hypertension with goal blood pressure less than 140/90     HLD (hyperlipidemia)     Hx of colonoscopy 11/10/2015    Dr. Pat Jones; tubulovillous adenoma.    Zada Sprain Pacemaker 2007 [employer] dual-chamber device.  Paroxysmal atrial fibrillation (HCC)     single episode noted on routine device check      Current Outpatient Medications   Medication Sig Dispense Refill    lisinopriL (PRINIVIL, ZESTRIL) 2.5 mg tablet Take 3 tablets by mouth once daily 270 Tab 2    LORazepam (ATIVAN) 1 mg tablet Take 1 Tab by mouth every eight (8) hours as needed for Anxiety. Max Daily Amount: 3 mg. 60 Tab 0    butalbital-acetaminophen-caffeine (FIORICET, ESGIC) -40 mg per tablet Take 1 Tab by mouth every six (6) hours as needed for Headache. 30 Tab 0    cyclobenzaprine (FLEXERIL) 5 mg tablet TAKE 1-2 TABLETS BY MOUTH EVERY 8 HOURS AS NEEDED FOR MUSCLE SPASM 25 Tab 0    mometasone (NASONEX) 50 mcg/actuation nasal spray USE TWO SPRAYS IN EACH NOSTRIL DAILY 1 Container 5    VIT C/E/ZN/COPPR/LUTEIN/ZEAXAN (PRESERVISION AREDS 2 PO) Take  by mouth.  omeprazole (PRILOSEC) 20 mg capsule Take 20 mg by mouth daily as needed.  timolol (TIMOPTIC) 0.25 % ophthalmic solution Administer 1 Drop to both eyes two (2) times a day.  MULTIVITAMINS W-MINERALS/LUT (CENTRUM SILVER PO) Take  by mouth daily.  OMEGA-3 FATTY ACIDS (OMEGA 3 PO) Take 2,400 mg by mouth two (2) times a day.  coenzyme q10 100 mg Cap Take  by mouth daily.  metronidazole (METROGEL) 1 % topical gel Apply  to affected area daily. Use a thin layer to affected areas after washing       zinc 50 mg capsule Take  by mouth daily.  magnesium 250 mg Tab Take  by mouth daily.  selenium 100 mcg Cap Take  by mouth daily.  ascorbic acid (VITAMIN C) 500 mg tablet Take  by mouth two (2) times a day.  Ginkgo Biloba Leaf Extract 30 mg Cap Take  by mouth daily.          No Known Allergies     Social History     Tobacco Use    Smoking status: Former Smoker     Last attempt to quit: 1966     Years since quittin.1    Smokeless tobacco: Never Used   Substance Use Topics    Alcohol use: No     Comment: 03/16/1998 last time he had alcohol.  Drug use: No         Review of Systems   Constitutional: Negative for chills, fever and weight loss. HENT: Negative for nosebleeds. Eyes: Negative for blurred vision and double vision. Respiratory: Negative for cough, shortness of breath and wheezing. Cardiovascular: Negative for chest pain, palpitations, orthopnea, claudication, leg swelling and PND. Gastrointestinal: Negative for abdominal pain, heartburn, nausea and vomiting. Genitourinary: Negative for dysuria and hematuria. Musculoskeletal: Negative for falls and myalgias. Skin: Negative for rash. Neurological: Negative for dizziness, focal weakness and headaches. Endo/Heme/Allergies: Does not bruise/bleed easily. Psychiatric/Behavioral: Negative for substance abuse. Visit Vitals  /86 (BP 1 Location: Left arm, BP Patient Position: Sitting)   Pulse 78   Ht 5' 10\" (1.778 m)   Wt 80.7 kg (178 lb)   SpO2 97%   BMI 25.54 kg/m²      Physical Exam   Constitutional: He is oriented to person, place, and time. He appears well-developed and well-nourished. HENT:   Head: Normocephalic and atraumatic. Eyes: Conjunctivae are normal.   Neck: Neck supple. No JVD present. Carotid bruit is not present. Cardiovascular: Normal rate, regular rhythm, S1 normal, S2 normal and normal pulses. Exam reveals no gallop and no S3. No murmur heard. Pulmonary/Chest: Breath sounds normal. He has no wheezes. He has no rales. Abdominal: Soft. Bowel sounds are normal. There is no abdominal tenderness. Musculoskeletal:         General: No edema. Neurological: He is alert and oriented to person, place, and time. Skin: Skin is warm and dry. Pacemaker interrogation: Pacemaker generator at middle of life. Estimated longevity 4.5 years. No significant atrial arrhythmias. Pacing in the atrium 8 % in the ventricle 100%. Stable activity level. Scanned document for details.     ASSESSMENT and PLAN    Paroxysmal atrial fibrillation. Patient had a single episode of atrial fibrillation in November 2017 lasting for little less than 2 hours. No recurrent episodes since that time. He was asymptomatic to the arrhythmia. No need for anticoagulation at this time. This will be continued to be monitored every 3 months with routine device checks. High-grade AV block: Status post dual-chamber permanent pacemaker with recent generator exchange in May 2016. Patient has an MRI compatible device. Normal device function on interrogation today. He remains pacemaker dependent in the ventricle. His battery life is estimated at 4.5 years. Essential hypertension: Patient's blood pressure appears to be reasonably well controlled on his current medical regimen. Dyslipidemia. Patient has been managing this with diet control and takes a fish oil supplement. [facility] device check in 3 months. Followup in 6 months, sooner if needed.

## 2020-10-08 DIAGNOSIS — F41.9 ANXIETY: ICD-10-CM

## 2020-10-12 RX ORDER — LORAZEPAM 1 MG/1
1 TABLET ORAL
Qty: 60 TAB | Refills: 0 | Status: SHIPPED | OUTPATIENT
Start: 2020-10-12 | End: 2020-11-16 | Stop reason: SDUPTHER

## 2020-10-12 NOTE — TELEPHONE ENCOUNTER
Last Visit: 06/30/2020 with MD Cristal Arias Next Appointment: 01/19/2021 with MD Cristal Arias Previous Refill Encounter(s): 09/03/2020 per MD Lindsey #[phone] Requested Prescriptions Pending Prescriptions Disp Refills  LORazepam (ATIVAN) 1 mg tablet 60 Tab 0 Sig: Take 1 Tab by mouth every eight (8) hours as needed for Anxiety. Max Daily Amount: 3 mg.

## 2020-11-16 DIAGNOSIS — F41.9 ANXIETY: ICD-10-CM

## 2020-11-16 RX ORDER — LORAZEPAM 1 MG/1
1 TABLET ORAL
Qty: 60 TAB | Refills: 0 | Status: SHIPPED | OUTPATIENT
Start: 2020-11-16 | End: 2020-12-22 | Stop reason: SDUPTHER

## 2020-11-16 NOTE — TELEPHONE ENCOUNTER
LAST OV: 6/30/2020 LAST FILL per : 10/12/20 Reviewed report generated by the [facility]. Does not demonstrate aberrancies or inconsistencies with regard to the prescribing of controlled medications to this patient by other providers. LAST UDS: 8/9/19

## 2020-12-16 ENCOUNTER — OFFICE VISIT (OUTPATIENT)
Dept: CARDIOLOGY CLINIC | Age: 80
End: 2020-12-16
Payer: MEDICARE

## 2020-12-16 DIAGNOSIS — I44.30 AV BLOCK: ICD-10-CM

## 2020-12-16 DIAGNOSIS — I42.9 CARDIOMYOPATHY, UNSPECIFIED TYPE (HCC): ICD-10-CM

## 2020-12-16 DIAGNOSIS — Z95.0 PACEMAKER: ICD-10-CM

## 2020-12-16 DIAGNOSIS — I48.0 PAROXYSMAL ATRIAL FIBRILLATION (HCC): Primary | ICD-10-CM

## 2020-12-16 PROCEDURE — 93296 REM INTERROG EVL PM/IDS: CPT | Performed by: INTERNAL MEDICINE

## 2020-12-16 PROCEDURE — 93294 REM INTERROG EVL PM/LDLS PM: CPT | Performed by: INTERNAL MEDICINE

## 2020-12-22 DIAGNOSIS — F41.9 ANXIETY: ICD-10-CM

## 2020-12-22 RX ORDER — LORAZEPAM 1 MG/1
1 TABLET ORAL
Qty: 60 TAB | Refills: 0 | Status: SHIPPED | OUTPATIENT
Start: 2020-12-22 | End: 2021-01-19 | Stop reason: SDUPTHER

## 2021-01-12 ENCOUNTER — APPOINTMENT (OUTPATIENT)
Dept: INTERNAL MEDICINE CLINIC | Age: 81
End: 2021-01-12

## 2021-01-12 ENCOUNTER — HOSPITAL ENCOUNTER (OUTPATIENT)
Dept: LAB | Age: 81
Discharge: HOME | End: 2021-01-12
Payer: MEDICARE

## 2021-01-12 DIAGNOSIS — I48.0 PAROXYSMAL ATRIAL FIBRILLATION (HCC): ICD-10-CM

## 2021-01-12 DIAGNOSIS — Z80.42 FH: PROSTATE CANCER: ICD-10-CM

## 2021-01-12 DIAGNOSIS — S22.41XS CLOSED FRACTURE OF MULTIPLE RIBS OF RIGHT SIDE, SEQUELA: ICD-10-CM

## 2021-01-12 DIAGNOSIS — M25.511 ACUTE PAIN OF RIGHT SHOULDER: ICD-10-CM

## 2021-01-12 DIAGNOSIS — K21.9 GASTROESOPHAGEAL REFLUX DISEASE WITHOUT ESOPHAGITIS: ICD-10-CM

## 2021-01-12 DIAGNOSIS — G44.219 EPISODIC TENSION-TYPE HEADACHE, NOT INTRACTABLE: ICD-10-CM

## 2021-01-12 DIAGNOSIS — I44.30 AV BLOCK: ICD-10-CM

## 2021-01-12 DIAGNOSIS — I42.9 CARDIOMYOPATHY, UNSPECIFIED TYPE (HCC): ICD-10-CM

## 2021-01-12 DIAGNOSIS — I10 ESSENTIAL HYPERTENSION: ICD-10-CM

## 2021-01-12 DIAGNOSIS — R97.20 ELEVATED PSA: ICD-10-CM

## 2021-01-12 DIAGNOSIS — Z95.0 PACEMAKER: ICD-10-CM

## 2021-01-12 DIAGNOSIS — F41.9 ANXIETY: ICD-10-CM

## 2021-01-12 DIAGNOSIS — D12.2 ADENOMATOUS POLYP OF ASCENDING COLON: ICD-10-CM

## 2021-01-12 DIAGNOSIS — E78.5 HYPERLIPIDEMIA, UNSPECIFIED HYPERLIPIDEMIA TYPE: ICD-10-CM

## 2021-01-12 LAB
ALBUMIN SERPL-MCNC: 4.1 G/DL (ref 3.4–5)
ALBUMIN/GLOB SERPL: 1.3 {RATIO} (ref 0.8–1.7)
ALP SERPL-CCNC: 63 U/L (ref 45–117)
ALT SERPL-CCNC: 25 U/L (ref 16–61)
ANION GAP SERPL CALC-SCNC: 6 MMOL/L (ref 3–18)
APPEARANCE UR: CLEAR
AST SERPL-CCNC: 18 U/L (ref 10–38)
BASOPHILS # BLD: 0.1 K/UL (ref 0–0.1)
BASOPHILS NFR BLD: 1 % (ref 0–2)
BILIRUB SERPL-MCNC: 0.6 MG/DL (ref 0.2–1)
BILIRUB UR QL: NEGATIVE
BUN SERPL-MCNC: 14 MG/DL (ref 7–18)
BUN/CREAT SERPL: 15 (ref 12–20)
CALCIUM SERPL-MCNC: 9.1 MG/DL (ref 8.5–10.1)
CHLORIDE SERPL-SCNC: 99 MMOL/L (ref 100–111)
CHOLEST SERPL-MCNC: 187 MG/DL
CO2 SERPL-SCNC: 31 MMOL/L (ref 21–32)
COLOR UR: YELLOW
CREAT SERPL-MCNC: 0.95 MG/DL (ref 0.6–1.3)
DIFFERENTIAL METHOD BLD: ABNORMAL
EOSINOPHIL # BLD: 0.4 K/UL (ref 0–0.4)
EOSINOPHIL NFR BLD: 6 % (ref 0–5)
ERYTHROCYTE [DISTWIDTH] IN BLOOD BY AUTOMATED COUNT: 13.1 % (ref 11.6–14.5)
GLOBULIN SER CALC-MCNC: 3.2 G/DL (ref 2–4)
GLUCOSE SERPL-MCNC: 100 MG/DL (ref 74–99)
GLUCOSE UR STRIP.AUTO-MCNC: NEGATIVE MG/DL
HCT VFR BLD AUTO: 42.8 % (ref 36–48)
HDLC SERPL-MCNC: 53 MG/DL (ref 40–60)
HDLC SERPL: 3.5 {RATIO} (ref 0–5)
HGB BLD-MCNC: 14.4 G/DL (ref 13–16)
HGB UR QL STRIP: NEGATIVE
KETONES UR QL STRIP.AUTO: NEGATIVE MG/DL
LDLC SERPL CALC-MCNC: 114.8 MG/DL (ref 0–100)
LEUKOCYTE ESTERASE UR QL STRIP.AUTO: NEGATIVE
LIPID PROFILE,FLP: ABNORMAL
LYMPHOCYTES # BLD: 2.5 K/UL (ref 0.9–3.6)
LYMPHOCYTES NFR BLD: 37 % (ref 21–52)
MAGNESIUM SERPL-MCNC: 2.2 MG/DL (ref 1.6–2.6)
MCH RBC QN AUTO: 30.7 PG (ref 24–34)
MCHC RBC AUTO-ENTMCNC: 33.6 G/DL (ref 31–37)
MCV RBC AUTO: 91.3 FL (ref 74–97)
MONOCYTES # BLD: 0.7 K/UL (ref 0.05–1.2)
MONOCYTES NFR BLD: 10 % (ref 3–10)
NEUTS SEG # BLD: 3.1 K/UL (ref 1.8–8)
NEUTS SEG NFR BLD: 46 % (ref 40–73)
NITRITE UR QL STRIP.AUTO: NEGATIVE
PH UR STRIP: 7.5 [PH] (ref 5–8)
PLATELET # BLD AUTO: 243 K/UL (ref 135–420)
PMV BLD AUTO: 9.1 FL (ref 9.2–11.8)
POTASSIUM SERPL-SCNC: 4.2 MMOL/L (ref 3.5–5.5)
PROT SERPL-MCNC: 7.3 G/DL (ref 6.4–8.2)
PROT UR STRIP-MCNC: NEGATIVE MG/DL
PSA SERPL-MCNC: 1 NG/ML (ref 0–4)
RBC # BLD AUTO: 4.69 M/UL (ref 4.7–5.5)
SODIUM SERPL-SCNC: 136 MMOL/L (ref 136–145)
SP GR UR REFRACTOMETRY: 1.01 (ref 1–1.03)
TRIGL SERPL-MCNC: 96 MG/DL (ref ?–150)
TSH SERPL DL<=0.05 MIU/L-ACNC: 1.91 UIU/ML (ref 0.36–3.74)
UROBILINOGEN UR QL STRIP.AUTO: 0.2 EU/DL (ref 0.2–1)
VLDLC SERPL CALC-MCNC: 19.2 MG/DL
WBC # BLD AUTO: 6.7 K/UL (ref 4.6–13.2)

## 2021-01-12 PROCEDURE — 36415 COLL VENOUS BLD VENIPUNCTURE: CPT

## 2021-01-12 PROCEDURE — 85025 COMPLETE CBC W/AUTO DIFF WBC: CPT

## 2021-01-12 PROCEDURE — 84153 ASSAY OF PSA TOTAL: CPT

## 2021-01-12 PROCEDURE — 84443 ASSAY THYROID STIM HORMONE: CPT

## 2021-01-12 PROCEDURE — 80061 LIPID PANEL: CPT

## 2021-01-12 PROCEDURE — 83735 ASSAY OF MAGNESIUM: CPT

## 2021-01-12 PROCEDURE — 80053 COMPREHEN METABOLIC PANEL: CPT

## 2021-01-12 PROCEDURE — 81003 URINALYSIS AUTO W/O SCOPE: CPT

## 2021-01-19 ENCOUNTER — OFFICE VISIT (OUTPATIENT)
Dept: INTERNAL MEDICINE CLINIC | Age: 81
End: 2021-01-19
Payer: MEDICARE

## 2021-01-19 ENCOUNTER — TELEPHONE (OUTPATIENT)
Dept: INTERNAL MEDICINE CLINIC | Age: 81
End: 2021-01-19

## 2021-01-19 VITALS
WEIGHT: 175 LBS | TEMPERATURE: 97.9 F | DIASTOLIC BLOOD PRESSURE: 80 MMHG | HEART RATE: 80 BPM | BODY MASS INDEX: 25.05 KG/M2 | HEIGHT: 70 IN | OXYGEN SATURATION: 97 % | SYSTOLIC BLOOD PRESSURE: 132 MMHG | RESPIRATION RATE: 16 BRPM

## 2021-01-19 DIAGNOSIS — I48.0 PAROXYSMAL ATRIAL FIBRILLATION (HCC): ICD-10-CM

## 2021-01-19 DIAGNOSIS — Z80.42 FH: PROSTATE CANCER: ICD-10-CM

## 2021-01-19 DIAGNOSIS — R73.01 ELEVATED FASTING GLUCOSE: ICD-10-CM

## 2021-01-19 DIAGNOSIS — Z95.0 PACEMAKER: ICD-10-CM

## 2021-01-19 DIAGNOSIS — K44.9 HIATAL HERNIA: ICD-10-CM

## 2021-01-19 DIAGNOSIS — E78.5 HYPERLIPIDEMIA, UNSPECIFIED HYPERLIPIDEMIA TYPE: ICD-10-CM

## 2021-01-19 DIAGNOSIS — I42.9 CARDIOMYOPATHY, UNSPECIFIED TYPE (HCC): ICD-10-CM

## 2021-01-19 DIAGNOSIS — G89.29 CHRONIC RIGHT SHOULDER PAIN: ICD-10-CM

## 2021-01-19 DIAGNOSIS — F41.9 ANXIETY: ICD-10-CM

## 2021-01-19 DIAGNOSIS — K21.9 GASTROESOPHAGEAL REFLUX DISEASE WITHOUT ESOPHAGITIS: ICD-10-CM

## 2021-01-19 DIAGNOSIS — I44.30 AV BLOCK: ICD-10-CM

## 2021-01-19 DIAGNOSIS — M25.511 CHRONIC RIGHT SHOULDER PAIN: ICD-10-CM

## 2021-01-19 DIAGNOSIS — I10 ESSENTIAL HYPERTENSION: Primary | ICD-10-CM

## 2021-01-19 PROCEDURE — G8427 DOCREV CUR MEDS BY ELIG CLIN: HCPCS | Performed by: INTERNAL MEDICINE

## 2021-01-19 PROCEDURE — G8536 NO DOC ELDER MAL SCRN: HCPCS | Performed by: INTERNAL MEDICINE

## 2021-01-19 PROCEDURE — G8510 SCR DEP NEG, NO PLAN REQD: HCPCS | Performed by: INTERNAL MEDICINE

## 2021-01-19 PROCEDURE — 1101F PT FALLS ASSESS-DOCD LE1/YR: CPT | Performed by: INTERNAL MEDICINE

## 2021-01-19 PROCEDURE — G8754 DIAS BP LESS 90: HCPCS | Performed by: INTERNAL MEDICINE

## 2021-01-19 PROCEDURE — G8752 SYS BP LESS 140: HCPCS | Performed by: INTERNAL MEDICINE

## 2021-01-19 PROCEDURE — 99214 OFFICE O/P EST MOD 30 MIN: CPT | Performed by: INTERNAL MEDICINE

## 2021-01-19 PROCEDURE — G8419 CALC BMI OUT NRM PARAM NOF/U: HCPCS | Performed by: INTERNAL MEDICINE

## 2021-01-19 PROCEDURE — G0463 HOSPITAL OUTPT CLINIC VISIT: HCPCS | Performed by: INTERNAL MEDICINE

## 2021-01-19 RX ORDER — LORAZEPAM 1 MG/1
1 TABLET ORAL
Qty: 60 TAB | Refills: 0 | Status: SHIPPED | OUTPATIENT
Start: 2021-01-19 | End: 2021-02-23 | Stop reason: SDUPTHER

## 2021-01-19 RX ORDER — BUTALBITAL, ACETAMINOPHEN AND CAFFEINE 50; 325; 40 MG/1; MG/1; MG/1
1 TABLET ORAL
Qty: 30 TAB | Refills: 0 | Status: SHIPPED | OUTPATIENT
Start: 2021-01-19 | End: 2021-07-20 | Stop reason: SDUPTHER

## 2021-01-19 RX ORDER — CYCLOBENZAPRINE HCL 5 MG
TABLET ORAL
Qty: 25 TAB | Refills: 0 | Status: SHIPPED | OUTPATIENT
Start: 2021-01-19 | End: 2021-07-20 | Stop reason: SDUPTHER

## 2021-01-19 NOTE — TELEPHONE ENCOUNTER
Please request recent eye exam from Dr. Lizz Mendoza . Patient reports being seen in every 4 months . Thank you.

## 2021-01-19 NOTE — PATIENT INSTRUCTIONS
Heart-Healthy Diet: Care Instructions  Your Care Instructions     A heart-healthy diet has lots of vegetables, fruits, nuts, beans, and whole grains, and is low in salt. It limits foods that are high in saturated fat, such as meats, cheeses, and fried foods. It may be hard to change your diet, but even small changes can lower your risk of heart attack and heart disease. Follow-up care is a key part of your treatment and safety. Be sure to make and go to all appointments, and call your doctor if you are having problems. It's also a good idea to know your test results and keep a list of the medicines you take. How can you care for yourself at home? Watch your portions  · Learn what a serving is. A \"serving\" and a \"portion\" are not always the same thing. Make sure that you are not eating larger portions than are recommended. For example, a serving of pasta is ½ cup. A serving size of meat is 2 to 3 ounces. A 3-ounce serving is about the size of a deck of cards. Measure serving sizes until you are good at [place]" them. Keep in mind that restaurants often serve portions that are 2 or 3 times the size of one serving. · To keep your energy level up and keep you from feeling hungry, eat often but in smaller portions. · Eat only the number of calories you need to stay at a healthy weight. If you need to lose weight, eat fewer calories than your body burns (through exercise and other physical activity). Eat more fruits and vegetables  · Eat a variety of fruit and vegetables every day. Dark green, deep orange, red, or yellow fruits and vegetables are especially good for you. Examples include spinach, carrots, peaches, and berries. · Keep carrots, celery, and other veggies handy for snacks. Buy fruit that is in season and store it where you can see it so that you will be tempted to eat it. · Cook dishes that have a lot of veggies in them, such as stir-fries and soups.   Limit saturated and trans fat  · Read food labels, and try to avoid saturated and trans fats. They increase your risk of heart disease. · Use olive or canola oil when you cook. · Bake, broil, grill, or steam foods instead of frying them. · Choose lean meats instead of high-fat meats such as hot dogs and sausages. Cut off all visible fat when you prepare meat. · Eat fish, skinless poultry, and meat alternatives such as soy products instead of high-fat meats. Soy products, such as tofu, may be especially good for your heart. · Choose low-fat or fat-free milk and dairy products. Eat foods high in fiber  · Eat a variety of grain products every day. Include whole-grain foods that have lots of fiber and nutrients. Examples of whole-grain foods include oats, whole wheat bread, and brown rice. · Buy whole-grain breads and cereals, instead of white bread or pastries. Limit salt and sodium  · Limit how much salt and sodium you eat to help lower your blood pressure. · Taste food before you salt it. Add only a little salt when you think you need it. With time, your taste buds will adjust to less salt. · Eat fewer snack items, fast foods, and other high-salt, processed foods. Check food labels for the amount of sodium in packaged foods. · Choose low-sodium versions of canned goods (such as soups, vegetables, and beans). Limit sugar  · Limit drinks and foods with added sugar. These include candy, desserts, and soda pop. Limit alcohol  · Limit alcohol to no more than 2 drinks a day for men and 1 drink a day for women. Too much alcohol can cause health problems. When should you call for help? Watch closely for changes in your health, and be sure to contact your doctor if:    · You would like help planning heart-healthy meals. Where can you learn more? Go to http://www.[employer]bigail [employer].com/  Enter V137 in the search box to learn more about \"Heart-Healthy Diet: Care Instructions. \"  Current as of: August 22, 2019               Content Version: 12.6  © 7360-6931 [employer]. Care instructions adapted under license by [employer] (which disclaims liability or warranty for this information). If you have questions about a medical condition or this instruction, always ask your healthcare professional. [address] any warranty or liability for your use of this information.

## 2021-01-19 NOTE — PROGRESS NOTES
1. Have you been to the ER, urgent care clinic or hospitalized since your last visit? NO.     2. Have you seen or consulted any other health care providers outside of the [facility] since your last visit (Include any pap smears or colon screening)? NO

## 2021-01-23 PROBLEM — R97.20 ELEVATED PSA: Status: RESOLVED | Noted: 2019-10-12 | Resolved: 2021-01-23

## 2021-01-23 PROBLEM — M25.511 ACUTE PAIN OF RIGHT SHOULDER: Status: RESOLVED | Noted: 2020-07-03 | Resolved: 2021-01-23

## 2021-01-23 PROBLEM — G89.29 CHRONIC RIGHT SHOULDER PAIN: Status: ACTIVE | Noted: 2020-07-03

## 2021-01-23 NOTE — PROGRESS NOTES
HPI:   Thelma Gimenez is a 79y.o. year old male who presents today for a physical exam. has a history of hypertension, hyperlipidemia, AV block s/p pacemaker, paroxysmal atrial fibrillation, and anxiety. He reports strictly following social distancing guidelines, and always wears a mask when outside. 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 He is otherwise without new complaints. He has a history of hypertension, treated with lisinopril. He does monitor his blood pressure regularly, and is currently taking 3.25 mg in the morning and evening. He reports that his blood pressure in mainly 120-130/ 70-80 at home. He has a history of intermittent high grade AV block, and underwent placement of a dual chamber Medtronic pacemaker in 2007. He underwent pacemaker generator exchange in 5/2016 after his device reached elective replacement indicator. A Medtronic MRI safe device was installed. He has a history of a single episode of atrial fibrillation noted on device check several years ago. He underwent follow-up appointment with Dr. Demetra Hopper on 7/30/2018, and interrogation of his pacemaker during the visit revealed a single episode of atrial fibrillation in November 2017 lasting for less than 2 hours. There were no recurrent episodes since that time, and he was asymptomatic during the episode. He was noted in the past to have mildly decreased LV function, but a repeat echocardiogram in 6/2013 showed normal size and lower limits of normal function with EF 50-55%; no wall motion abnormalities, and grade 1 diastolic dysfunction. He is followed by Dr. Demetra Hopper. 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 He also was walking 2.5 miles each day. He denies any chest pain, shortness of breath at rest or with exertion, palpitations, lightheadedness, or edema.  He has a history of dyslipidemia, managed by diet in the past.    In 4/2017 he developed lightheadedness after painting outside for several hours. He took an Ativan because he thought he was experiencing anxiety. However, his symptoms persisted and he began to feel like he was going to pass out so he called 911 for help. When EMS arrived, his symptoms had resolved but he was noted to have a low blood pressure of approximately 112/67. He was taken to [facility] for evaluation and was asymptomatic upon arrival. He admitted to not eating or drinking anything for the four hour period that he was outside, and acknowledged that the day was quite warm. Evaluation in the ED included an EKG which showed a paced rhythm, serial troponins which were negative, and blood work showing WBC 14.2, Hb 14.5, Hct 42.7, creatinine 1.3/ eGFR 51.7. Chest x-ray showed no acute process, but did note an incidental pulmonary nodule over the left lower lobe. A chest CT scan was obtained (5/2017) showing mild subsegmental atelectasis or scarring at the left lung base without focal pulmonary nodule. He has a history of a GI bleeding in 1997 secondary to NSAID use for headaches. He also has a history of GERD and dysphagia and has undergone multiple esophageal dilatations in the past by Dr. Josephine Lee. In 11/2015, he had an upper endoscopy which showed a small hiatal hernia in the cardia, a Schatzki's ring at the gastroesophageal junction, which was dilated, and otherwise normal stomach and duodenum. He also had a screening colonoscopy which revealed mild colonic diverticulosis and a 7 mm polyp in the proximal ascending colon (pathology: tubulovillous adenoma). He had a follow-up colonoscopy in 3/2019 by Dr. Josephine Lee which showed two sessile 4 mm polyps in the cecum and ascending colon (pathology: tubular adenomas). He also simultaneously had an upper endoscopy showing normal stomach/duodenum, a small hiatal hernia in the cardia, and a Schatzki's ring (dilated).  He denies any abdominal pain, nausea, vomiting, melena, hematochezia, or change in bowel movements. In 12/2019, he reported right chest wall pain after a tree stump fell on his chest in 10/2019. Right rib series were obtained (12/2019) showing nondisplaced acute fractures of the anterior ribs 8 and 10. He reports improvement in the pain today. He does report developing right shoulder pain after using a chain saw to cut down trees in 2/2020. He was evaluated by Dr. Matt Godinez in 3/2020, and received a cortisone injection with improvement. He has a history of anxiety, and uses lorazepam occasionally. He also reports he uses cyclobenzaprine occasionally for back spasms which occur at times due to straining his back with excessive physical work. He also uses Fioricet occasionally for tension type headaches. He states that he will only take one fourth of a pill when needed. Past Medical History:   Diagnosis Date    Anxiety     AV block     intermittent, high-grade    Bilateral inguinal hernia     Cardiac echocardiogram 06/25/2013    EF 50-55%. No WMA. Mild LVH. Gr 1 DDfx. RVE.  AKANKSHA. Mild TR. No significant chg from study of 6/8/11.  Cardiac nuclear imaging test 10/07/2009    Likely inferior & anterior septal artifact. EF 55%. Mild septal WMA could be related to pacemaker.  Cardiomyopathy ([address]     Diverticulosis of colon 11/10/2015    Dysphagia     Multiple esophageal dilations.  Essential hypertension with goal blood pressure less than 140/90     HLD (hyperlipidemia)     Hx of colonoscopy 11/10/2015    Dr. Rhonda Corona; tubulovillous adenoma.  Pacemaker 2007    Medtronic dual-chamber device.     Paroxysmal atrial fibrillation (HCC)     single episode noted on routine device check     Past Surgical History:   Procedure Laterality Date    COLONOSCOPY N/A 3/29/2019    COLONOSCOPY with polypectomy performed by Lisa Saldana MD at [address] [place] [address], COLON, DIAGNOSTIC      HX CATARACT REMOVAL      HX PACEMAKER  05/29/07 Medtronic Versa dual-chamber pacemaker for intermittent high-grade AV block and symptomatic bradycardia.  HX PACEMAKER  5/3/16    [place] CARDIAC SURG PROCEDURE UNLIST       Current Outpatient Medications   Medication Sig    cyclobenzaprine (FLEXERIL) 5 mg tablet TAKE 1-2 TABLETS BY MOUTH EVERY 8 HOURS AS NEEDED FOR MUSCLE SPASM    LORazepam (ATIVAN) 1 mg tablet Take 1 Tab by mouth every eight (8) hours as needed for Anxiety. Max Daily Amount: 3 mg.  butalbital-acetaminophen-caffeine (FIORICET, ESGIC) -40 mg per tablet Take 1 Tab by mouth every six (6) hours as needed for Headache.  lisinopriL (PRINIVIL, ZESTRIL) 2.5 mg tablet Take 3 tablets by mouth once daily    mometasone (NASONEX) 50 mcg/actuation nasal spray USE TWO SPRAYS IN EACH NOSTRIL DAILY    VIT C/E/ZN/COPPR/LUTEIN/ZEAXAN (PRESERVISION AREDS 2 PO) Take  by mouth.  omeprazole (PRILOSEC) 20 mg capsule Take 20 mg by mouth daily as needed.  timolol (TIMOPTIC) 0.25 % ophthalmic solution Administer 1 Drop to both eyes two (2) times a day.  MULTIVITAMINS W-MINERALS/LUT (CENTRUM SILVER PO) Take  by mouth daily.  OMEGA-3 FATTY ACIDS (OMEGA 3 PO) Take 2,400 mg by mouth two (2) times a day.  coenzyme q10 100 mg Cap Take  by mouth daily.  metronidazole (METROGEL) 1 % topical gel Apply  to affected area daily. Use a thin layer to affected areas after washing     zinc 50 mg capsule Take  by mouth daily.  magnesium 250 mg Tab Take  by mouth daily.  selenium 100 mcg Cap Take  by mouth daily.  ascorbic acid (VITAMIN C) 500 mg tablet Take  by mouth two (2) times a day.  Ginkgo Biloba Leaf Extract 30 mg Cap Take  by mouth daily. No current facility-administered medications for this visit. Allergies and Intolerances:   No Known Allergies     Family History: His brother  from metastatic prostate cancer. His mother lived until the age of 80, although she had CAD. His father  at the age of 59 from a CVA.  He was an alcoholic. He has five sisters that are all healthy. Family History   Problem Relation Age of Onset    Heart Surgery Mother     Stroke Father     Alcohol abuse Father      Social History:   He  reports that he quit smoking about 54 years ago. He has never used smokeless tobacco. He smoked 1 ppd for 2 years, and then quit (1966). He is retired  and worked for the [employer]. He lives with his girlfriend and has one son. Social History     Substance and Sexual Activity   Alcohol Use No    Comment: 03/16/1998 last time he had alcohol. Immunization History:  Immunization History   Administered Date(s) Administered    (RETIRED) Pneumococcal Vaccine (Unspecified Type) 01/01/2007    Influenza High Dose Vaccine PF 01/10/2013, 11/16/2015, 12/09/2016, 10/02/2017    Influenza Vaccine 12/31/2013, 11/20/2014    Influenza Vaccine (Tri) Adjuvanted (>65 Yrs FLUAD TRI [id number]) 11/20/2018, 10/08/2019    Influenza, Quadrivalent, Adjuvanted (>65 Yrs FLUAD QUAD [id number]) 09/14/2020    Pneumococcal Conjugate (PCV-13) 08/03/2015    Pneumococcal Polysaccharide (PPSV-23) 01/01/2007    TDAP Vaccine 07/12/2012    Zoster 07/25/2007    Zoster Recombinant 05/15/2019, 08/01/2019       Review of Systems:   As above included in HPI. Otherwise 11 point review of systems negative including constitutional, skin, HENT, eyes, respiratory, cardiovascular, gastrointestinal, genitourinary, musculoskeletal, endocrine, hematologic, allergy, and neurologic. Physical:   Vitals:   BP: 132/80  HR: 80  WT: 175 lb (79.4 kg)  BMI:  24.11 kg/m2      Exam:     Patient appears in no apparent distress. Affect is appropriate. HEENT: PERRLA, anicteric, oropharynx clear, no JVD, adenopathy or thyromegaly. No carotid bruits or radiated murmur. Lungs: clear to auscultation, no wheezes, rhonchi, or rales. Heart: regular rate and rhythm.  No murmur, rubs, gallops  Abdomen: soft, nontender, nondistended, normal bowel sounds, no hepatosplenomegaly or masses. Extremities: without edema. Review of Data:  Labs:  Hospital Outpatient Visit on 01/12/2021   Component Date Value Ref Range Status    WBC 01/12/2021 6.7  4.6 - 13.2 K/uL Final    RBC 01/12/2021 4.69* 4.70 - 5.50 M/uL Final    HGB 01/12/2021 14.4  13.0 - 16.0 g/dL Final    HCT 01/12/2021 42.8  36.0 - 48.0 % Final    MCV 01/12/2021 91.3  74.0 - 97.0 FL Final    MCH 01/12/2021 30.7  24.0 - 34.0 PG Final    MCHC 01/12/2021 33.6  31.0 - 37.0 g/dL Final    RDW 01/12/2021 13.1  11.6 - 14.5 % Final    PLATELET 37/31/8889 775  135 - 420 K/uL Final    MPV 01/12/2021 9.1* 9.2 - 11.8 FL Final    NEUTROPHILS 01/12/2021 46  40 - 73 % Final    LYMPHOCYTES 01/12/2021 37  21 - 52 % Final    MONOCYTES 01/12/2021 10  3 - 10 % Final    EOSINOPHILS 01/12/2021 6* 0 - 5 % Final    BASOPHILS 01/12/2021 1  0 - 2 % Final    ABS. NEUTROPHILS 01/12/2021 3.1  1.8 - 8.0 K/UL Final    ABS. LYMPHOCYTES 01/12/2021 2.5  0.9 - 3.6 K/UL Final    ABS. MONOCYTES 01/12/2021 0.7  0.05 - 1.2 K/UL Final    ABS. EOSINOPHILS 01/12/2021 0.4  0.0 - 0.4 K/UL Final    ABS.  BASOPHILS 01/12/2021 0.1  0.0 - 0.1 K/UL Final    DF 01/12/2021 AUTOMATED    Final    LIPID PROFILE 01/12/2021        Final    Cholesterol, total 01/12/2021 187  <200 MG/DL Final    Triglyceride 01/12/2021 96  <150 MG/DL Final    HDL Cholesterol 01/12/2021 53  40 - 60 MG/DL Final    LDL, calculated 01/12/2021 114.8* 0 - 100 MG/DL Final    VLDL, calculated 01/12/2021 19.2  MG/DL Final    CHOL/HDL Ratio 01/12/2021 3.5  0 - 5.0   Final    Magnesium 01/12/2021 2.2  1.6 - 2.6 mg/dL Final    Sodium 01/12/2021 136  136 - 145 mmol/L Final    Potassium 01/12/2021 4.2  3.5 - 5.5 mmol/L Final    Chloride 01/12/2021 99* 100 - 111 mmol/L Final    CO2 01/12/2021 31  21 - 32 mmol/L Final    Anion gap 01/12/2021 6  3.0 - 18 mmol/L Final    Glucose 01/12/2021 100* 74 - 99 mg/dL Final    BUN 01/12/2021 14  7.0 - 18 MG/DL Final    Creatinine 01/12/2021 0.95  0.6 - 1.3 MG/DL Final    BUN/Creatinine ratio 01/12/2021 15  12 - 20   Final    GFR est AA 01/12/2021 >60  >60 ml/min/1.73m2 Final    GFR est non-AA 01/12/2021 >60  >60 ml/min/1.73m2 Final    Calcium 01/12/2021 9.1  8.5 - 10.1 MG/DL Final    Bilirubin, total 01/12/2021 0.6  0.2 - 1.0 MG/DL Final    ALT (SGPT) 01/12/2021 25  16 - 61 U/L Final    AST (SGOT) 01/12/2021 18  10 - 38 U/L Final    Alk. phosphatase 01/12/2021 63  45 - 117 U/L Final    Protein, total 01/12/2021 7.3  6.4 - 8.2 g/dL Final    Albumin 01/12/2021 4.1  3.4 - 5.0 g/dL Final    Globulin 01/12/2021 3.2  2.0 - 4.0 g/dL Final    A-G Ratio 01/12/2021 1.3  0.8 - 1.7   Final    TSH 01/12/2021 1.91  0.36 - 3.74 uIU/mL Final    Prostate Specific Ag 01/12/2021 1.0  0.0 - 4.0 ng/mL Final    Color 01/12/2021 YELLOW    Final    Appearance 01/12/2021 CLEAR    Final    Specific gravity 01/12/2021 1.008  1.005 - 1.030   Final    pH (UA) 01/12/2021 7.5  5.0 - 8.0   Final    Protein 01/12/2021 Negative  NEG mg/dL Final    Glucose 01/12/2021 Negative  NEG mg/dL Final    Ketone 01/12/2021 Negative  NEG mg/dL Final    Bilirubin 01/12/2021 Negative  NEG   Final    Blood 01/12/2021 Negative  NEG   Final    Urobilinogen 01/12/2021 0.2  0.2 - 1.0 EU/dL Final    Nitrites 01/12/2021 Negative  NEG   Final    Leukocyte Esterase 01/12/2021 Negative  NEG   Final     Health Maintenance:  Screening:    Colorectal: colonoscopy (3/2019) tubular adenomas. Dr. Valeria Pedersen. No further screening needed. Depression: none   DM (HbA1c/FPG):  (1/2021)   Hepatitis C: N/A   Falls: none   DEXA: N/A   PSA/WEN: PSA 1.0 (1/2021)   Glaucoma: regular eye exams with Dr. Aryan Celeste for glaucoma q4 months.    Smoking: none   Vitamin D: 45.0 (10/2019)   Medicare Wellness: 6/30/2020      Impression:  Patient Active Problem List   Diagnosis Code    AV block I44.30    Pacemaker Z95.0    Cardiomyopathy ([address] I42.9    Paroxysmal atrial fibrillation (HCC) I48.0    HLD (hyperlipidemia) E78.5    FH: prostate cancer Z80.42    Bilateral inguinal hernia K40.20    Anxiety F41.9    Essential hypertension I10    Adenomatous polyp of ascending colon D12.2    Gastroesophageal reflux disease without esophagitis K21.9    Hiatal hernia K44.9    Elevated PSA R97.20    Headache, tension type, episodic G44.219    Closed fracture of multiple ribs of right side S22.41XA    Acute pain of right shoulder M25.511       Plan:  1. Hypertension. Blood pressure remains well controlled today on lisinopril 3.75 mg bid. He continues to monitor his blood pressure carefully at home. Renal function remains normal with creatinine 0.95/ eGFR >60. Mildly decreased LV function in 2013, but posing no limitiations on activity. Continue to follow. 2. AV block, intermittent high grade. S/P Medtronic dual chamber pacemaker with generator change in 5/2016 with an MRI safe device installed. Interrogated and followed every 3 months by Dr. Delma Moura, last 6/2020. Echocardiogram with low normal LV function in 2013. 3. Paroxysmal atrial fibrillation. Single episode noted on device check several years ago. Had recurrence noted on device check in 11/2017, lasting approximately 2 hours, and he was asymptomatic. No recurrence since that time. Anti-coagulation felt not to be needed unless develops more frequent or prolonged episodes. Monitored closely every three months with routine device checks. Continue to follow. 4. Hyperlipidemia. His age > 76 does not place him in one of the four statin benefit groups as per new AHA/ACC guidelines. Also, with no evidence of ASCVD. LDL improved today to 114 and HDL 53. Patient not wishing to begin therapy with a statin. Emphasized importance of continued lifestyle modifications, including diet and exercise. Continue to follow and repeat lipid panel at next visit. 5. Presyncope.  Related to dehydration secondary to physical exertion in the heat without adequate hydration. Reports no further symptoms. Monitors his blood pressure daily and has been controlled at his baseline. Encouraged to continue drinking plenty of fluids. Follow. 6. GERD/dysphagia. Repeat upper endoscopy (3/2019) with esophageal dilatation for Schatzki's ring. Small hiatal hernia. Symptoms well controlled with omeprazole. Follow. 7. Family history of prostate cancer. Patient quite concerned about his own risk given that his brother  quite rapidly from metastatic disease. PSA level had remained quite low in the 0.6-0.8 range, but increased acutely to 2.6 in 10/2019. Have discussed the controversy of continuing prostatic cancer screening after age 75, but patient still requesting to continue PSA testing. Remains normal today at 1.0.  8. Anemia. Remains normal today. Iron studies, B12, folate, peripheral smear and gammopathy panel in 10/2019 normal.  Follow. 9. Anxiety. Patient is quite anxious about his health. Reassured that he is living a healthy lifestyle and doing well. Continue lorazepam as needed. 10. Right chest wall pain. Patient had fallen on a tree stump in 2019 while cutting down trees. Sustained injury to his right anterior chest wall with persistent pain for 10 weeks. Right anterior rib series showed nondisplaced acute fractures of anterior ribs 8 and 10, but no effusion or pneumothorax to suggest underlying lung injury. Reports resolution of pain today. 11. Right shoulder pain. Reports developed recurrence of pain in right shoulder in 2020 after using chain saw to cut down trees. Evaluated by Dr. Bibi Seo and received cortisone injection with improvement. Will also use cyclobenzaprine to help with intermittent discomfort. 12. Elevated fasting glucose. Will check HbA1c at next visit. 13. Health maintenance. Already received influenza vaccine. Completed 2/2 doses of Shingrix vaccine. Other immunizations up to date. Colonoscopy completed.  Continue regular eye exams with Dr. Malena Motley. Will request report. Vitamin D level normal.  Medicare wellness visit up to date. Counseled patient regarding strict mitigation measures for COVID-19, including wearing a mask, social distancing and diligent hand washing, as recommended by the CDC. Discussed importance of proceeding with COVID 19 vaccine when available. Patient understands recommendations and agrees with plan. Follow-up in 6 months.

## 2021-02-23 DIAGNOSIS — F41.9 ANXIETY: ICD-10-CM

## 2021-02-23 RX ORDER — LORAZEPAM 1 MG/1
1 TABLET ORAL
Qty: 60 TAB | Refills: 0 | Status: SHIPPED | OUTPATIENT
Start: 2021-02-23 | End: 2021-04-01 | Stop reason: SDUPTHER

## 2021-03-26 ENCOUNTER — OFFICE VISIT (OUTPATIENT)
Dept: CARDIOLOGY CLINIC | Age: 81
End: 2021-03-26
Payer: MEDICARE

## 2021-03-26 VITALS
SYSTOLIC BLOOD PRESSURE: 150 MMHG | HEIGHT: 70 IN | DIASTOLIC BLOOD PRESSURE: 90 MMHG | BODY MASS INDEX: 25.91 KG/M2 | HEART RATE: 80 BPM | WEIGHT: 181 LBS | OXYGEN SATURATION: 95 %

## 2021-03-26 DIAGNOSIS — Z95.0 PACEMAKER: ICD-10-CM

## 2021-03-26 DIAGNOSIS — I44.30 AV BLOCK: ICD-10-CM

## 2021-03-26 DIAGNOSIS — I48.0 PAROXYSMAL ATRIAL FIBRILLATION (HCC): ICD-10-CM

## 2021-03-26 DIAGNOSIS — I10 ESSENTIAL HYPERTENSION: Primary | ICD-10-CM

## 2021-03-26 PROCEDURE — G8755 DIAS BP > OR = 90: HCPCS | Performed by: INTERNAL MEDICINE

## 2021-03-26 PROCEDURE — G8427 DOCREV CUR MEDS BY ELIG CLIN: HCPCS | Performed by: INTERNAL MEDICINE

## 2021-03-26 PROCEDURE — G8536 NO DOC ELDER MAL SCRN: HCPCS | Performed by: INTERNAL MEDICINE

## 2021-03-26 PROCEDURE — 1101F PT FALLS ASSESS-DOCD LE1/YR: CPT | Performed by: INTERNAL MEDICINE

## 2021-03-26 PROCEDURE — 99214 OFFICE O/P EST MOD 30 MIN: CPT | Performed by: INTERNAL MEDICINE

## 2021-03-26 PROCEDURE — G8510 SCR DEP NEG, NO PLAN REQD: HCPCS | Performed by: INTERNAL MEDICINE

## 2021-03-26 PROCEDURE — G8753 SYS BP > OR = 140: HCPCS | Performed by: INTERNAL MEDICINE

## 2021-03-26 PROCEDURE — G8419 CALC BMI OUT NRM PARAM NOF/U: HCPCS | Performed by: INTERNAL MEDICINE

## 2021-03-26 PROCEDURE — 93280 PM DEVICE PROGR EVAL DUAL: CPT | Performed by: INTERNAL MEDICINE

## 2021-03-26 NOTE — PROGRESS NOTES
Chelsea Barrow presents today for   Chief Complaint   Patient presents with    Follow-up     6 month f/u    Pacemaker Check     Medtronic       Chelsea Barrow preferred language for health care discussion is english/other. Is someone accompanying this pt? no    Is the patient using any DME equipment during [address]? no    Depression Screening:  3 most recent PHQ Screens 3/26/2021   Little interest or pleasure in doing things Not at all   Feeling down, depressed, irritable, or hopeless Not at all   Total Score PHQ 2 0       Learning Assessment:  Learning Assessment 3/26/2021   PRIMARY LEARNER Patient   HIGHEST LEVEL OF EDUCATION - PRIMARY LEARNER  -   BARRIERS PRIMARY LEARNER -   CO-LEARNER CAREGIVER -   PRIMARY LANGUAGE ENGLISH   LEARNER PREFERENCE PRIMARY DEMONSTRATION   ANSWERED BY patient   RELATIONSHIP SELF       Abuse Screening:  Abuse Screening Questionnaire 3/26/2021   Do you ever feel afraid of your partner? N   Are you in a relationship with someone who physically or mentally threatens you? N   Is it safe for you to go home? Y       Fall Risk  Fall Risk Assessment, last 12 mths 3/26/2021   Able to walk? Yes   Fall in past 12 months? 0   Do you feel unsteady? 0   Are you worried about falling 0   Number of falls in past 12 months -   Fall with injury? -       Pt currently taking Anticoagulant therapy? no    Coordination of Care:  1. Have you been to the ER, urgent care clinic since your last visit? Hospitalized since your last visit? no    2. Have you seen or consulted any other health care providers outside of the [address] [place] [address] since your last visit? Include any pap smears or colon screening.  no

## 2021-03-26 NOTE — PROGRESS NOTES
HISTORY OF PRESENT ILLNESS  Katharine Tran is a 79 y.o. male. Follow-up  Pertinent negatives include no chest pain, no abdominal pain, no headaches and no shortness of breath. Pacemaker Check  Pertinent negatives include no chest pain, no abdominal pain, no headaches and no shortness of breath. Patient presents for a scheduled followup visit. He has a history of intermittent high-grade AV block, status post to dual-chamber permanent pacemaker in 2007. He also has a history of hypertension, Brief episodes approximately atrial fibrillation, and a mildly depressed LV systolic function,  Which returned to low normal on echocardiogram in June 2013 showing an ejection fraction of 50-55%. The patient underwent a pacemaker generator exchange in May 2016 with a [employer] MRI safe device after his device has reached elective replacement indicator. The patient was last seen in the office 6 months ago. Since last visit he continues to feel well. He denies any major change in his activity level. He recently cut down 11 tall trees this winter without any difficulty. No significant heart palpitations, dizziness or syncope. No chest pain at rest or with exertion. No orthopnea, PND shortness of breath or leg swelling. Past Medical History:   Diagnosis Date    Anxiety     AV block     intermittent, high-grade    Bilateral inguinal hernia     Cardiac echocardiogram 06/25/2013    EF 50-55%. No WMA. Mild LVH. Gr 1 DDfx. RVE.  AKANKSHA. Mild TR. No significant chg from study of 6/8/11.  Cardiac nuclear imaging test 10/07/2009    Likely inferior & anterior septal artifact. EF 55%. Mild septal WMA could be related to pacemaker.  Cardiomyopathy ([address]     Diverticulosis of colon 11/10/2015    Dysphagia     Multiple esophageal dilations.     Essential hypertension with goal blood pressure less than 140/90     HLD (hyperlipidemia)     Hx of colonoscopy 11/10/2015    Dr. Seldon Oppenheim; tubulovillous adenoma.  Pacemaker 2007    Medtronic dual-chamber device.  Paroxysmal atrial fibrillation (HCC)     single episode noted on routine device check      Current Outpatient Medications   Medication Sig Dispense Refill    LORazepam (ATIVAN) 1 mg tablet Take 1 Tab by mouth every eight (8) hours as needed for Anxiety. Max Daily Amount: 3 mg. 60 Tab 0    cyclobenzaprine (FLEXERIL) 5 mg tablet TAKE 1-2 TABLETS BY MOUTH EVERY 8 HOURS AS NEEDED FOR MUSCLE SPASM 25 Tab 0    butalbital-acetaminophen-caffeine (FIORICET, ESGIC) -40 mg per tablet Take 1 Tab by mouth every six (6) hours as needed for Headache. 30 Tab 0    lisinopriL (PRINIVIL, ZESTRIL) 2.5 mg tablet Take 3 tablets by mouth once daily 270 Tab 2    mometasone (NASONEX) 50 mcg/actuation nasal spray USE TWO SPRAYS IN EACH NOSTRIL DAILY 1 Container 5    VIT C/E/ZN/COPPR/LUTEIN/ZEAXAN (PRESERVISION AREDS 2 PO) Take  by mouth.  omeprazole (PRILOSEC) 20 mg capsule Take 20 mg by mouth daily as needed.  timolol (TIMOPTIC) 0.25 % ophthalmic solution Administer 1 Drop to both eyes two (2) times a day.  MULTIVITAMINS W-MINERALS/LUT (CENTRUM SILVER PO) Take  by mouth daily.  OMEGA-3 FATTY ACIDS (OMEGA 3 PO) Take 2,400 mg by mouth two (2) times a day.  coenzyme q10 100 mg Cap Take  by mouth daily.  metronidazole (METROGEL) 1 % topical gel Apply  to affected area daily. Use a thin layer to affected areas after washing       zinc 50 mg capsule Take  by mouth daily.  magnesium 250 mg Tab Take  by mouth daily.  selenium 100 mcg Cap Take  by mouth daily.  ascorbic acid (VITAMIN C) 500 mg tablet Take  by mouth two (2) times a day.  Ginkgo Biloba Leaf Extract 30 mg Cap Take  by mouth daily. No Known Allergies     Social History     Tobacco Use    Smoking status: Former Smoker     Quit date: 1966     Years since quittin.7    Smokeless tobacco: Never Used   Substance Use Topics    Alcohol use:  No Comment: 03/16/1998 last time he had alcohol.  Drug use: No         Review of Systems   Constitutional: Negative for chills, fever and weight loss. HENT: Negative for nosebleeds. Eyes: Negative for blurred vision and double vision. Respiratory: Negative for cough, shortness of breath and wheezing. Cardiovascular: Negative for chest pain, palpitations, orthopnea, claudication, leg swelling and PND. Gastrointestinal: Negative for abdominal pain, heartburn, nausea and vomiting. Genitourinary: Negative for dysuria and hematuria. Musculoskeletal: Negative for falls and myalgias. Skin: Negative for rash. Neurological: Negative for dizziness, focal weakness and headaches. Endo/Heme/Allergies: Does not bruise/bleed easily. Psychiatric/Behavioral: Negative for substance abuse. Visit Vitals  BP (!) 150/90 (BP 1 Location: Right upper arm, BP Patient Position: Sitting, BP Cuff Size: Adult)   Pulse 80   Ht 5' 10\" (1.778 m)   Wt 82.1 kg (181 lb)   SpO2 95%   BMI 25.97 kg/m²      Physical Exam   Constitutional: He is oriented to person, place, and time. He appears well-developed and well-nourished. HENT:   Head: Normocephalic and atraumatic. Eyes: Conjunctivae are normal.   Neck: Neck supple. No JVD present. Carotid bruit is not present. Cardiovascular: Normal rate, regular rhythm, S1 normal, S2 normal and normal pulses. Exam reveals no gallop and no S3. No murmur heard. Pulmonary/Chest: Breath sounds normal. He has no wheezes. He has no rales. Abdominal: Soft. Bowel sounds are normal. There is no abdominal tenderness. Musculoskeletal:         General: No edema. Neurological: He is alert and oriented to person, place, and time. Skin: Skin is warm and dry. Pacemaker interrogation: Pacemaker generator at middle of life. Estimated longevity 5 years. No significant atrial arrhythmias. Pacing in the atrium for % in the ventricle 100%. Stable activity level.   Scanned document for details. ASSESSMENT and PLAN    Paroxysmal atrial fibrillation. Patient had a single episode of atrial fibrillation in November 2017 lasting for little less than 2 hours. No recurrent episodes since that time. He was asymptomatic to the arrhythmia. No need for anticoagulation at this time. This will be continued to be monitored every 3 months with routine device checks. High-grade AV block: Status post dual-chamber permanent pacemaker with recent generator exchange in May 2016. Patient has an MRI compatible device. Normal device function on interrogation today. He remains pacemaker dependent in the ventricle. His battery life is now estimated at 5 years. Essential hypertension: Patient's blood pressure is mildly elevated today in our office. He states he checks it at home regularly and is usually well controlled. He has been on a stable regimen of lisinopril now for several years. This is followed closely by his PCP. Dyslipidemia. Patient has been managing this with diet control and takes a fish oil supplement. [facility] device check in 3 months. Followup in 6 months, sooner if needed.

## 2021-04-01 DIAGNOSIS — F41.9 ANXIETY: ICD-10-CM

## 2021-04-01 RX ORDER — LORAZEPAM 1 MG/1
1 TABLET ORAL
Qty: 60 TAB | Refills: 0 | Status: SHIPPED | OUTPATIENT
Start: 2021-04-01 | End: 2021-05-10

## 2021-04-13 NOTE — PROGRESS NOTES
4.5 years on battery. Lead impedance and threshold is WNL. A paced 6%V paced is 99%. 1 Afib episode lasted 6 seconds, monitor only.

## 2021-05-10 DIAGNOSIS — F41.9 ANXIETY: ICD-10-CM

## 2021-05-10 RX ORDER — LORAZEPAM 1 MG/1
TABLET ORAL
Qty: 60 TAB | Refills: 0 | Status: SHIPPED | OUTPATIENT
Start: 2021-05-10 | End: 2021-06-14 | Stop reason: SDUPTHER

## 2021-05-10 NOTE — TELEPHONE ENCOUNTER
[facility] as 4[facility]1/21 for a 20 d/s.      Last Visit: 1/19/21 with MD Vick Mata  Next Appointment: 7/20/21 with MD Vick Mata  Previous Refill Encounter(s): 4/1/21 #60    Requested Prescriptions     Pending Prescriptions Disp Refills    LORazepam (ATIVAN) 1 mg tablet [Pharmacy Med Name: LORAZEPAM 1 MG TABLET] 60 Tab 0     Sig: TAKE 1 TABLET BY MOUTH EVERY 8 HOURS AS NEEDED FOR ANXIETY MAX 3 TABS DAILY

## 2021-06-14 DIAGNOSIS — F41.9 ANXIETY: ICD-10-CM

## 2021-06-14 RX ORDER — LORAZEPAM 1 MG/1
1 TABLET ORAL
Qty: 60 TABLET | Refills: 0 | Status: SHIPPED | OUTPATIENT
Start: 2021-06-14 | End: 2021-07-20 | Stop reason: SDUPTHER

## 2021-06-14 NOTE — TELEPHONE ENCOUNTER
[facility] for Ativan as 5/10/21 for a 20 d/s. UDS done 8/9/19  CSA signed 8/13/19    Last Visit: 1/19/21 with MD Ana Yoon  Next Appointment: 7/20/21 with MD Ana Yoon  Previous Refill Encounter(s): 5/10/21 #60    Requested Prescriptions     Pending Prescriptions Disp Refills    LORazepam (ATIVAN) 1 mg tablet 60 Tablet 0     Sig: Take 1 Tablet by mouth every eight (8) hours as needed for Anxiety. Max Daily Amount: 3 mg.

## 2021-06-30 ENCOUNTER — OFFICE VISIT (OUTPATIENT)
Dept: CARDIOLOGY CLINIC | Age: 81
End: 2021-06-30
Payer: MEDICARE

## 2021-06-30 DIAGNOSIS — Z95.0 PACEMAKER: ICD-10-CM

## 2021-06-30 DIAGNOSIS — I44.30 AV BLOCK: Primary | ICD-10-CM

## 2021-06-30 PROCEDURE — 93294 REM INTERROG EVL PM/LDLS PM: CPT | Performed by: INTERNAL MEDICINE

## 2021-07-01 NOTE — PROGRESS NOTES
Dual Chamber Pacemaker. 4 years left on battery. Lead impedance and threshold WNL. A paced 2.9 %, V paced 99 %. 1 nonsustained VT, monitored only . Lasting 1 sec.

## 2021-07-14 ENCOUNTER — HOSPITAL ENCOUNTER (OUTPATIENT)
Dept: LAB | Age: 81
Discharge: HOME | End: 2021-07-14
Payer: MEDICARE

## 2021-07-14 ENCOUNTER — APPOINTMENT (OUTPATIENT)
Dept: INTERNAL MEDICINE CLINIC | Age: 81
End: 2021-07-14

## 2021-07-14 DIAGNOSIS — K44.9 HIATAL HERNIA: ICD-10-CM

## 2021-07-14 DIAGNOSIS — Z80.42 FH: PROSTATE CANCER: ICD-10-CM

## 2021-07-14 DIAGNOSIS — M25.511 CHRONIC RIGHT SHOULDER PAIN: ICD-10-CM

## 2021-07-14 DIAGNOSIS — F41.9 ANXIETY: ICD-10-CM

## 2021-07-14 DIAGNOSIS — G89.29 CHRONIC RIGHT SHOULDER PAIN: ICD-10-CM

## 2021-07-14 DIAGNOSIS — I44.30 AV BLOCK: ICD-10-CM

## 2021-07-14 DIAGNOSIS — I42.9 CARDIOMYOPATHY, UNSPECIFIED TYPE (HCC): ICD-10-CM

## 2021-07-14 DIAGNOSIS — K21.9 GASTROESOPHAGEAL REFLUX DISEASE WITHOUT ESOPHAGITIS: ICD-10-CM

## 2021-07-14 DIAGNOSIS — Z95.0 PACEMAKER: ICD-10-CM

## 2021-07-14 DIAGNOSIS — I10 ESSENTIAL HYPERTENSION: ICD-10-CM

## 2021-07-14 DIAGNOSIS — E78.5 HYPERLIPIDEMIA, UNSPECIFIED HYPERLIPIDEMIA TYPE: ICD-10-CM

## 2021-07-14 DIAGNOSIS — I48.0 PAROXYSMAL ATRIAL FIBRILLATION (HCC): ICD-10-CM

## 2021-07-14 DIAGNOSIS — R73.01 ELEVATED FASTING GLUCOSE: ICD-10-CM

## 2021-07-14 LAB
ALBUMIN SERPL-MCNC: 4.1 G/DL (ref 3.4–5)
ALBUMIN/GLOB SERPL: 1.5 {RATIO} (ref 0.8–1.7)
ALP SERPL-CCNC: 51 U/L (ref 45–117)
ALT SERPL-CCNC: 25 U/L (ref 16–61)
ANION GAP SERPL CALC-SCNC: 10 MMOL/L (ref 3–18)
AST SERPL-CCNC: 17 U/L (ref 10–38)
BILIRUB SERPL-MCNC: 0.5 MG/DL (ref 0.2–1)
BUN SERPL-MCNC: 13 MG/DL (ref 7–18)
BUN/CREAT SERPL: 16 (ref 12–20)
CALCIUM SERPL-MCNC: 9 MG/DL (ref 8.5–10.1)
CHLORIDE SERPL-SCNC: 99 MMOL/L (ref 100–111)
CHOLEST SERPL-MCNC: 219 MG/DL
CO2 SERPL-SCNC: 26 MMOL/L (ref 21–32)
CREAT SERPL-MCNC: 0.81 MG/DL (ref 0.6–1.3)
EST. AVERAGE GLUCOSE BLD GHB EST-MCNC: 117 MG/DL
GLOBULIN SER CALC-MCNC: 2.8 G/DL (ref 2–4)
GLUCOSE SERPL-MCNC: 94 MG/DL (ref 74–99)
HBA1C MFR BLD: 5.7 % (ref 4.2–5.6)
HDLC SERPL-MCNC: 65 MG/DL (ref 40–60)
HDLC SERPL: 3.4 {RATIO} (ref 0–5)
LDLC SERPL CALC-MCNC: 136.4 MG/DL (ref 0–100)
LIPID PROFILE,FLP: ABNORMAL
MAGNESIUM SERPL-MCNC: 2.4 MG/DL (ref 1.6–2.6)
POTASSIUM SERPL-SCNC: 4.2 MMOL/L (ref 3.5–5.5)
PROT SERPL-MCNC: 6.9 G/DL (ref 6.4–8.2)
SODIUM SERPL-SCNC: 135 MMOL/L (ref 136–145)
TRIGL SERPL-MCNC: 88 MG/DL (ref ?–150)
VLDLC SERPL CALC-MCNC: 17.6 MG/DL

## 2021-07-14 PROCEDURE — 83036 HEMOGLOBIN GLYCOSYLATED A1C: CPT

## 2021-07-14 PROCEDURE — 80053 COMPREHEN METABOLIC PANEL: CPT

## 2021-07-14 PROCEDURE — 36415 COLL VENOUS BLD VENIPUNCTURE: CPT

## 2021-07-14 PROCEDURE — 83735 ASSAY OF MAGNESIUM: CPT

## 2021-07-14 PROCEDURE — 80061 LIPID PANEL: CPT

## 2021-07-20 ENCOUNTER — OFFICE VISIT (OUTPATIENT)
Dept: INTERNAL MEDICINE CLINIC | Age: 81
End: 2021-07-20
Payer: MEDICARE

## 2021-07-20 VITALS
HEART RATE: 73 BPM | HEIGHT: 70 IN | BODY MASS INDEX: 24.88 KG/M2 | WEIGHT: 173.8 LBS | DIASTOLIC BLOOD PRESSURE: 82 MMHG | RESPIRATION RATE: 16 BRPM | OXYGEN SATURATION: 97 % | TEMPERATURE: 97.3 F | SYSTOLIC BLOOD PRESSURE: 138 MMHG

## 2021-07-20 DIAGNOSIS — K21.9 GASTROESOPHAGEAL REFLUX DISEASE WITHOUT ESOPHAGITIS: ICD-10-CM

## 2021-07-20 DIAGNOSIS — G89.29 CHRONIC RIGHT SHOULDER PAIN: ICD-10-CM

## 2021-07-20 DIAGNOSIS — M25.511 CHRONIC RIGHT SHOULDER PAIN: ICD-10-CM

## 2021-07-20 DIAGNOSIS — I42.9 CARDIOMYOPATHY, UNSPECIFIED TYPE (HCC): ICD-10-CM

## 2021-07-20 DIAGNOSIS — F41.9 ANXIETY: ICD-10-CM

## 2021-07-20 DIAGNOSIS — R73.01 IMPAIRED FASTING GLUCOSE: ICD-10-CM

## 2021-07-20 DIAGNOSIS — Z00.00 MEDICARE ANNUAL WELLNESS VISIT, SUBSEQUENT: ICD-10-CM

## 2021-07-20 DIAGNOSIS — R73.03 PREDIABETES: ICD-10-CM

## 2021-07-20 DIAGNOSIS — Z13.31 SCREENING FOR DEPRESSION: ICD-10-CM

## 2021-07-20 DIAGNOSIS — I48.0 PAROXYSMAL ATRIAL FIBRILLATION (HCC): ICD-10-CM

## 2021-07-20 DIAGNOSIS — I10 ESSENTIAL HYPERTENSION: Primary | ICD-10-CM

## 2021-07-20 DIAGNOSIS — E78.5 HYPERLIPIDEMIA, UNSPECIFIED HYPERLIPIDEMIA TYPE: ICD-10-CM

## 2021-07-20 DIAGNOSIS — Z71.89 ADVANCED DIRECTIVES, COUNSELING/DISCUSSION: ICD-10-CM

## 2021-07-20 DIAGNOSIS — Z12.5 PROSTATE CANCER SCREENING: ICD-10-CM

## 2021-07-20 PROCEDURE — G8510 SCR DEP NEG, NO PLAN REQD: HCPCS | Performed by: INTERNAL MEDICINE

## 2021-07-20 PROCEDURE — G0439 PPPS, SUBSEQ VISIT: HCPCS | Performed by: INTERNAL MEDICINE

## 2021-07-20 PROCEDURE — 99214 OFFICE O/P EST MOD 30 MIN: CPT | Performed by: INTERNAL MEDICINE

## 2021-07-20 PROCEDURE — 99497 ADVNCD CARE PLAN 30 MIN: CPT | Performed by: INTERNAL MEDICINE

## 2021-07-20 PROCEDURE — G8427 DOCREV CUR MEDS BY ELIG CLIN: HCPCS | Performed by: INTERNAL MEDICINE

## 2021-07-20 PROCEDURE — G8420 CALC BMI NORM PARAMETERS: HCPCS | Performed by: INTERNAL MEDICINE

## 2021-07-20 PROCEDURE — G8536 NO DOC ELDER MAL SCRN: HCPCS | Performed by: INTERNAL MEDICINE

## 2021-07-20 PROCEDURE — G8752 SYS BP LESS 140: HCPCS | Performed by: INTERNAL MEDICINE

## 2021-07-20 PROCEDURE — G0463 HOSPITAL OUTPT CLINIC VISIT: HCPCS | Performed by: INTERNAL MEDICINE

## 2021-07-20 PROCEDURE — 1101F PT FALLS ASSESS-DOCD LE1/YR: CPT | Performed by: INTERNAL MEDICINE

## 2021-07-20 PROCEDURE — G8754 DIAS BP LESS 90: HCPCS | Performed by: INTERNAL MEDICINE

## 2021-07-20 RX ORDER — LISINOPRIL 2.5 MG/1
5 TABLET ORAL 2 TIMES DAILY
Qty: 1 TABLET | Refills: 0
Start: 2021-07-20 | End: 2021-08-11 | Stop reason: SDUPTHER

## 2021-07-20 RX ORDER — CYCLOBENZAPRINE HCL 5 MG
TABLET ORAL
Qty: 25 TABLET | Refills: 0 | Status: SHIPPED | OUTPATIENT
Start: 2021-07-20 | End: 2022-01-25 | Stop reason: SDUPTHER

## 2021-07-20 RX ORDER — BUTALBITAL, ACETAMINOPHEN AND CAFFEINE 50; 325; 40 MG/1; MG/1; MG/1
1 TABLET ORAL
Qty: 30 TABLET | Refills: 0 | Status: SHIPPED | OUTPATIENT
Start: 2021-07-20 | End: 2022-01-25 | Stop reason: SDUPTHER

## 2021-07-20 RX ORDER — LORAZEPAM 1 MG/1
1 TABLET ORAL
Qty: 60 TABLET | Refills: 0 | Status: SHIPPED | OUTPATIENT
Start: 2021-07-20 | End: 2021-08-24 | Stop reason: SDUPTHER

## 2021-07-20 NOTE — ACP (ADVANCE CARE PLANNING)
Advance Care Planning     Advance Care Planning (ACP) Physician/NP/PA Conversation      Date of Conversation: 7/20/2021  Conducted with: Patient with Decision Making Capacity    Healthcare Decision Maker:     Primary Decision Maker: Heather Ferrer - Other Non-relative - [phone]    Secondary Decision Maker: Alise Barney - Life Partner - [phone]  Click here to complete [address] including selection of the Healthcare Decision Maker Relationship (ie \"Primary\")  Today we documented Decision Maker(s) consistent with Legal Next of Kin hierarchy. Care Preferences:    Hospitalization: \"If your health worsens and it becomes clear that your chance of recovery is unlikely, what would be your preference regarding hospitalization? \"  The patient would prefer hospitalization. Ventilation: \"If you were unable to breathe on your own and your chance of recovery was unlikely, what would be your preference about the use of a ventilator (breathing machine) if it was available to you? \"   The patient would desire the use of a ventilator. Patient specifies should only be short-term and if meaningful recovery is possible. Resuscitation: \"In the event your heart stopped as a result of an underlying serious health condition, would you want attempts to be made to restart your heart, or would you prefer a natural death? \"   Yes, attempt to resuscitate.     Additional topics discussed: treatment goals, benefit/burden of treatment options, ventilation preferences, hospitalization preferences, resuscitation preferences and end of life care preferences (vegetative state/imminent death)    Conversation Outcomes / Follow-Up Plan:   ACP complete - no further action today  Reviewed DNR/DNI and patient elects Full Code (Attempt Resuscitation)     Length of Voluntary ACP Conversation in minutes:  16 minutes    Marcell Franz MD

## 2021-07-20 NOTE — PROGRESS NOTES
This is the Subsequent Medicare Annual Wellness Exam, performed 12 months or more after the Initial AWV or the last Subsequent AWV    I have reviewed the patient's medical history in detail and updated the computerized patient record. Assessment/Plan   Education and counseling provided:  Are appropriate based on today's review and evaluation  End-of-Life planning (with patient's consent)  Influenza Vaccine  Colorectal cancer screening tests  Cardiovascular screening blood test  Screening for glaucoma  Diabetes screening test  Medical nutrition therapy for individuals with diabetes or renal disease    1. Essential hypertension  -     CBC WITH AUTOMATED DIFF; Future  -     HEMOGLOBIN A1C WITH EAG; Future  -     LIPID PANEL; Future  -     MAGNESIUM; Future  -     METABOLIC PANEL, COMPREHENSIVE; Future  -     TSH 3RD GENERATION; Future  -     URINALYSIS W/MICROSCOPIC; Future  2. Paroxysmal atrial fibrillation (HCC)  -     CBC WITH AUTOMATED DIFF; Future  -     HEMOGLOBIN A1C WITH EAG; Future  -     LIPID PANEL; Future  -     MAGNESIUM; Future  -     METABOLIC PANEL, COMPREHENSIVE; Future  -     TSH 3RD GENERATION; Future  -     URINALYSIS W/MICROSCOPIC; Future  3. Cardiomyopathy, unspecified type ([facility] [address]  -     CBC WITH AUTOMATED DIFF; Future  -     HEMOGLOBIN A1C WITH EAG; Future  -     LIPID PANEL; Future  -     MAGNESIUM; Future  -     METABOLIC PANEL, COMPREHENSIVE; Future  -     TSH 3RD GENERATION; Future  -     URINALYSIS W/MICROSCOPIC; Future  4. Hyperlipidemia, unspecified hyperlipidemia type  -     CBC WITH AUTOMATED DIFF; Future  -     HEMOGLOBIN A1C WITH EAG; Future  -     LIPID PANEL; Future  -     MAGNESIUM; Future  -     METABOLIC PANEL, COMPREHENSIVE; Future  -     TSH 3RD GENERATION; Future  -     URINALYSIS W/MICROSCOPIC; Future  5. Impaired fasting glucose  -     CBC WITH AUTOMATED DIFF; Future  -     HEMOGLOBIN A1C WITH EAG; Future  -     LIPID PANEL; Future  -     MAGNESIUM;  Future  - METABOLIC PANEL, COMPREHENSIVE; Future  -     TSH 3RD GENERATION; Future  -     URINALYSIS W/MICROSCOPIC; Future  6. Prediabetes  -     CBC WITH AUTOMATED DIFF; Future  -     HEMOGLOBIN A1C WITH EAG; Future  -     LIPID PANEL; Future  -     MAGNESIUM; Future  -     METABOLIC PANEL, COMPREHENSIVE; Future  -     TSH 3RD GENERATION; Future  -     URINALYSIS W/MICROSCOPIC; Future  7. Chronic right shoulder pain  -     CBC WITH AUTOMATED DIFF; Future  -     HEMOGLOBIN A1C WITH EAG; Future  -     LIPID PANEL; Future  -     MAGNESIUM; Future  -     METABOLIC PANEL, COMPREHENSIVE; Future  -     TSH 3RD GENERATION; Future  -     URINALYSIS W/MICROSCOPIC; Future  8. Gastroesophageal reflux disease without esophagitis  -     CBC WITH AUTOMATED DIFF; Future  -     HEMOGLOBIN A1C WITH EAG; Future  -     LIPID PANEL; Future  -     MAGNESIUM; Future  -     METABOLIC PANEL, COMPREHENSIVE; Future  -     TSH 3RD GENERATION; Future  -     URINALYSIS W/MICROSCOPIC; Future  9. Anxiety  -     LORazepam (ATIVAN) 1 mg tablet; Take 1 Tablet by mouth every eight (8) hours as needed for Anxiety. Max Daily Amount: 3 mg., Normal, Disp-60 Tablet, R-0  -     CBC WITH AUTOMATED DIFF; Future  -     HEMOGLOBIN A1C WITH EAG; Future  -     LIPID PANEL; Future  -     MAGNESIUM; Future  -     METABOLIC PANEL, COMPREHENSIVE; Future  -     TSH 3RD GENERATION; Future  -     URINALYSIS W/MICROSCOPIC; Future  10. Medicare annual wellness visit, subsequent  -     ADVANCE CARE PLANNING FIRST 30 MINS  11. Advanced directives, counseling/discussion  -     ADVANCE CARE PLANNING FIRST 27 MINS  12. Screening for depression  13. Prostate cancer screening  -     PSA SCREENING (SCREENING);  Future       Depression Risk Factor Screening     3 most recent PHQ Screens 7/20/2021   Little interest or pleasure in doing things Not at all   Feeling down, depressed, irritable, or hopeless Not at all   Total Score PHQ 2 0       Alcohol Risk Screen    Do you average more than 1 drink per night or more than 7 drinks a week: No    In the past three months have you have had more than 4 drinks containing alcohol on one occasion: No        Functional Ability and Level of Safety    Hearing: Hearing is decreased but patient not wishing further evaluation. Activities of Daily Living: The home contains: no safety equipment. Patient does total self care      Ambulation: with no difficulty     Fall Risk:  Fall Risk Assessment, last 12 mths 7/20/2021   Able to walk? Yes   Fall in past 12 months? 0   Do you feel unsteady? 0   Are you worried about falling 0   Number of falls in past 12 months -   Fall with injury? -      Abuse Screen:  Patient is not abused       Cognitive Screening    Has your family/caregiver stated any concerns about your memory: no     Cognitive Screening: None performed today    Health Maintenance Due   There are no preventive care reminders to display for this patient.     Patient Care Team   Patient Care Team:  Mary Carrillo MD as PCP - General (Internal Medicine)  Mary Carrillo MD as PCP - [facility]  Chencho Arana MD (Cardiology)  Rupesh Ross MD (Gastroenterology)  Sigrid Machuca MD (Ophthalmology)  Allen Li MD (Dermatology)  Royden Mortimer, MD (Orthopedic Surgery)  Sandip Ellington MD (Neurosurgery)    History     Patient Active Problem List   Diagnosis Code    AV block I44.30    Pacemaker Z95.0    Cardiomyopathy [facility]) I42.9    Paroxysmal atrial fibrillation ([facility] [address] I48.0    HLD (hyperlipidemia) E78.5    FH: prostate cancer Z80.42    Bilateral inguinal hernia K40.20    Anxiety F41.9    Essential hypertension I10    Adenomatous polyp of ascending colon D12.2    Gastroesophageal reflux disease without esophagitis K21.9    Hiatal hernia K44.9    Headache, tension type, episodic G44.219    History of fractured rib Z87.81    Chronic right shoulder pain M25.511, G89.29    Impaired fasting glucose R73.01    Prediabetes R73.03     Past Medical History:   Diagnosis Date    Anxiety     AV block     intermittent, high-grade    Bilateral inguinal hernia     Cardiac echocardiogram 06/25/2013    EF 50-55%. No WMA. Mild LVH. Gr 1 DDfx. RVE.  AKANKSHA. Mild TR. No significant chg from study of 6/8/11.  Cardiac nuclear imaging test 10/07/2009    Likely inferior & anterior septal artifact. EF 55%. Mild septal WMA could be related to pacemaker.  Cardiomyopathy ([address]     Diverticulosis of colon 11/10/2015    Dysphagia     Multiple esophageal dilations.  Essential hypertension with goal blood pressure less than 140/90     HLD (hyperlipidemia)     Hx of colonoscopy 11/10/2015    Dr. Kim Hall; tubulovillous adenoma.  Pacemaker 2007    Medtronic dual-chamber device.  Paroxysmal atrial fibrillation (HCC)     single episode noted on routine device check      Past Surgical History:   Procedure Laterality Date    COLONOSCOPY N/A 3/29/2019    COLONOSCOPY with polypectomy performed by Madhu Acuña MD at [facility] ENDOSCOPY    ENDOSCOPY, COLON, DIAGNOSTIC      HX CATARACT REMOVAL      HX PACEMAKER  05/29/07    Medtronic Versa dual-chamber pacemaker for intermittent high-grade AV block and symptomatic bradycardia.  HX PACEMAKER  5/3/16    NM CARDIAC SURG PROCEDURE UNLIST       Current Outpatient Medications   Medication Sig Dispense Refill    cyclobenzaprine (FLEXERIL) 5 mg tablet TAKE 1-2 TABLETS BY MOUTH EVERY 8 HOURS AS NEEDED FOR MUSCLE SPASM 25 Tablet 0    LORazepam (ATIVAN) 1 mg tablet Take 1 Tablet by mouth every eight (8) hours as needed for Anxiety. Max Daily Amount: 3 mg. 60 Tablet 0    butalbital-acetaminophen-caffeine (FIORICET, ESGIC) -40 mg per tablet Take 1 Tablet by mouth every six (6) hours as needed for Headache. 30 Tablet 0    lisinopriL (PRINIVIL, ZESTRIL) 2.5 mg tablet Take 2 Tablets by mouth two (2) times a day.  1 Tablet 0    VIT C/E/ZN/COPPR/LUTEIN/ZEAXAN (PRESERVISION AREDS 2 PO) Take  by mouth.  omeprazole (PRILOSEC) 20 mg capsule Take 20 mg by mouth daily as needed.  timolol (TIMOPTIC) 0.25 % ophthalmic solution Administer 1 Drop to both eyes two (2) times a day.  MULTIVITAMINS W-MINERALS/LUT (CENTRUM SILVER PO) Take  by mouth daily.  OMEGA-3 FATTY ACIDS (OMEGA 3 PO) Take 2,400 mg by mouth two (2) times a day.  coenzyme q10 100 mg Cap Take  by mouth daily.  metronidazole (METROGEL) 1 % topical gel Apply  to affected area daily. Use a thin layer to affected areas after washing       zinc 50 mg capsule Take  by mouth daily.  magnesium 250 mg Tab Take  by mouth daily.  selenium 100 mcg Cap Take  by mouth daily.  ascorbic acid (VITAMIN C) 500 mg tablet Take  by mouth two (2) times a day. No Known Allergies    Family History   Problem Relation Age of Onset    Heart Surgery Mother     Stroke Father     Alcohol abuse Father      Social History     Tobacco Use    Smoking status: Former Smoker     Quit date: 1966     Years since quittin.0    Smokeless tobacco: Never Used   Substance Use Topics    Alcohol use: No     Comment: 1998 last time he had alcohol.          Marie Patterson MD

## 2021-07-20 NOTE — PATIENT INSTRUCTIONS
Heart-Healthy Diet: Care Instructions  Your Care Instructions     A heart-healthy diet has lots of vegetables, fruits, nuts, beans, and whole grains, and is low in salt. It limits foods that are high in saturated fat, such as meats, cheeses, and fried foods. It may be hard to change your diet, but even small changes can lower your risk of heart attack and heart disease. Follow-up care is a key part of your treatment and safety. Be sure to make and go to all appointments, and call your doctor if you are having problems. It's also a good idea to know your test results and keep a list of the medicines you take. How can you care for yourself at home? Watch your portions  · Learn what a serving is. A \"serving\" and a \"portion\" are not always the same thing. Make sure that you are not eating larger portions than are recommended. For example, a serving of pasta is ½ cup. A serving size of meat is 2 to 3 ounces. A 3-ounce serving is about the size of a deck of cards. Measure serving sizes until you are good at [place]" them. Keep in mind that restaurants often serve portions that are 2 or 3 times the size of one serving. · To keep your energy level up and keep you from feeling hungry, eat often but in smaller portions. · Eat only the number of calories you need to stay at a healthy weight. If you need to lose weight, eat fewer calories than your body burns (through exercise and other physical activity). Eat more fruits and vegetables  · Eat a variety of fruit and vegetables every day. Dark green, deep orange, red, or yellow fruits and vegetables are especially good for you. Examples include spinach, carrots, peaches, and berries. · Keep carrots, celery, and other veggies handy for snacks. Buy fruit that is in season and store it where you can see it so that you will be tempted to eat it. · Cook dishes that have a lot of veggies in them, such as stir-fries and soups.   Limit saturated and trans fat  · Read food labels, and try to avoid saturated and trans fats. They increase your risk of heart disease. · Use olive or canola oil when you cook. · Bake, broil, grill, or steam foods instead of frying them. · Choose lean meats instead of high-fat meats such as hot dogs and sausages. Cut off all visible fat when you prepare meat. · Eat fish, skinless poultry, and meat alternatives such as soy products instead of high-fat meats. Soy products, such as tofu, may be especially good for your heart. · Choose low-fat or fat-free milk and dairy products. Eat foods high in fiber  · Eat a variety of grain products every day. Include whole-grain foods that have lots of fiber and nutrients. Examples of whole-grain foods include oats, whole wheat bread, and brown rice. · Buy whole-grain breads and cereals, instead of white bread or pastries. Limit salt and sodium  · Limit how much salt and sodium you eat to help lower your blood pressure. · Taste food before you salt it. Add only a little salt when you think you need it. With time, your taste buds will adjust to less salt. · Eat fewer snack items, fast foods, and other high-salt, processed foods. Check food labels for the amount of sodium in packaged foods. · Choose low-sodium versions of canned goods (such as soups, vegetables, and beans). Limit sugar  · Limit drinks and foods with added sugar. These include candy, desserts, and soda pop. Limit alcohol  · Limit alcohol to no more than 2 drinks a day for men and 1 drink a day for women. Too much alcohol can cause health problems. When should you call for help? Watch closely for changes in your health, and be sure to contact your doctor if:    · You would like help planning heart-healthy meals. Where can you learn more? Go to http://www.Publisha.com/  Enter V137 in the search box to learn more about \"Heart-Healthy Diet: Care Instructions. \"  Current as of: August 22, 2019               Content Version: 12.6  © 0475-0169 [employer]. Care instructions adapted under license by [employer] (which disclaims liability or warranty for this information). If you have questions about a medical condition or this instruction, always ask your healthcare professional. [address] any warranty or liability for your use of this information. Learning About Low-Sodium Foods  What foods are low in sodium? The foods you eat contain nutrients, such as vitamins and minerals. Sodium is a nutrient. Your body needs the right amount to stay healthy and work as it should. You can use the list below to help you make choices about which foods to eat. Fruits  · Fresh, frozen, canned, or dried fruit  Vegetables  · Fresh or frozen vegetables, with no added salt  · Canned vegetables, low-sodium or with no added salt  Grains  · Bagels without salted tops  · Cereal with no added salt  · Corn tortillas  · Crackers with no added salt  · Oatmeal, cooked without salt  · Popcorn with no salt  · Pasta and noodles, cooked without salt  · Rice, cooked without salt  · Unsalted pretzels  Dairy and dairy alternatives  · Butter, unsalted  · Cream cheese  · Ice cream  · Milk  · Soy milk  Meats and other protein foods  · Beans and peas, canned with no salt  · Eggs  · Fresh fish (not smoked)  · Fresh meats (not smoked or cured)  · Nuts and nut butter, prepared without salt  · Poultry, not packaged with sodium solution  · Tofu, unseasoned  · Tuna, canned without salt  Seasonings  · Garlic  · Herbs and spices  · Lemon juice  · Mustard  · Olive oil  · Salt-free seasoning mixes  · Vinegar  Work with your doctor to find out how much of this nutrient you need. Depending on your health, you may need more or less of it in your diet. Where can you learn more?   Go to http://www.gray.com/  Enter S460 in the search box to learn more about \"Learning About Low-Sodium Foods.\"  Current as of: August 22, 2019               Content Version: 12.6  © 4740-8345 [employer] Care instructions adapted under license by [employer] (which disclaims liability or warranty for this information). If you have questions about a medical condition or this instruction, always ask your healthcare professional. [address] any warranty or liability for your use of this information. Low Sodium Diet (2,000 Milligram): Care Instructions  Your Care Instructions     Too much sodium causes your body to hold on to extra water. This can raise your blood pressure and force your heart and kidneys to work harder. In very serious cases, this could cause you to be put in the hospital. It might even be life-threatening. By limiting sodium, you will feel better and lower your risk of serious problems. The most common source of sodium is salt. People get most of the salt in their diet from canned, prepared, and packaged foods. Fast food and restaurant meals also are very high in sodium. Your doctor will probably limit your sodium to less than 2,000 milligrams (mg) a day. This limit counts all the sodium in prepared and packaged foods and any salt you add to your food. Follow-up care is a key part of your treatment and safety. Be sure to make and go to all appointments, and call your doctor if you are having problems. It's also a good idea to know your test results and keep a list of the medicines you take. How can you care for yourself at home? Read food labels  · Read labels on cans and food packages. The labels tell you how much sodium is in each serving. Make sure that you look at the serving size. If you eat more than the serving size, you have eaten more sodium. · Food labels also tell you the Percent Daily Value for sodium. Choose products with low Percent Daily Values for sodium.   · Be aware that sodium can come in forms other than salt, including monosodium glutamate (MSG), sodium citrate, and sodium bicarbonate (baking soda). MSG is often added to Asian food. When you eat out, you can sometimes ask for food without MSG or added salt. Buy low-sodium foods  · Buy foods that are labeled \"unsalted\" (no salt added), \"sodium-free\" (less than 5 mg of sodium per serving), or \"low-sodium\" (less than 140 mg of sodium per serving). Foods labeled \"reduced-sodium\" and \"light sodium\" may still have too much sodium. Be sure to read the label to see how much sodium you are getting. · Buy fresh vegetables, or frozen vegetables without added sauces. Buy low-sodium versions of canned vegetables, soups, and other canned goods. Prepare low-sodium meals  · Cut back on the amount of salt you use in cooking. This will help you adjust to the taste. Do not add salt after cooking. One teaspoon of salt has about 2,300 mg of sodium. · Take the salt shaker off the table. · Flavor your food with garlic, lemon juice, onion, vinegar, herbs, and spices. Do not use soy sauce, lite soy sauce, steak sauce, onion salt, garlic salt, celery salt, mustard, or ketchup on your food. · Use low-sodium salad dressings, sauces, and ketchup. Or make your own salad dressings and sauces without adding salt. · Use less salt (or none) when recipes call for it. You can often use half the salt a recipe calls for without losing flavor. Other foods such as rice, pasta, and grains do not need added salt. · Rinse canned vegetables, and cook them in fresh water. This removes somebut not allof the salt. · Avoid water that is naturally high in sodium or that has been treated with water softeners, which add sodium. Call your local water company to find out the sodium content of your water supply. If you buy bottled water, read the label and choose a sodium-free brand. Avoid high-sodium foods  · Avoid eating:  ? Smoked, cured, salted, and canned meat, fish, and poultry.   ? Ham, lema, hot dogs, and luncheon meats.  ? Regular, hard, and processed cheese and regular peanut butter. ? Crackers with salted tops, and other salted snack foods such as pretzels, chips, and salted popcorn. ? Frozen prepared meals, unless labeled low-sodium. ? Canned and dried soups, broths, and bouillon, unless labeled sodium-free or low-sodium. ? Canned vegetables, unless labeled sodium-free or low-sodium. ? Godwin Kevin fries, pizza, tacos, and other fast foods. ? Pickles, olives, ketchup, and other condiments, especially soy sauce, unless labeled sodium-free or low-sodium. Where can you learn more? Go to http://www.gray.com/  Enter V843 in the search box to learn more about \"Low Sodium Diet (2,000 Milligram): Care Instructions. \"  Current as of: August 22, 2019               Content Version: 12.6  © 2361-2785 [employer]. Care instructions adapted under license by [employer] (which disclaims liability or warranty for this information). If you have questions about a medical condition or this instruction, always ask your healthcare professional. [place] [address] any warranty or liability for your use of this information. Learning About Diabetes Food Guidelines  Your Care Instructions     Meal planning is important to manage diabetes. It helps keep your blood sugar at a target level (which you set with your doctor). You don't have to eat special foods. You can eat what your family eats, including sweets once in a while. But you do have to pay attention to how often you eat and how much you eat of certain foods. You may want to work with a dietitian or a certified diabetes educator (CDE) to help you plan meals and snacks. A dietitian or CDE can also help you lose weight if that is one of your goals. What should you know about eating carbs? Managing the amount of carbohydrate (carbs) you eat is an important part of healthy meals when you have diabetes. Carbohydrate is found in many foods. · Learn which foods have carbs. And learn the amounts of carbs in different foods. ? Bread, cereal, pasta, and rice have about 15 grams of carbs in a serving. A serving is 1 slice of bread (1 ounce), ½ cup of cooked cereal, or 1/3 cup of cooked pasta or rice. ? Fruits have 15 grams of carbs in a serving. A serving is 1 small fresh fruit, such as an apple or orange; ½ of a banana; ½ cup of cooked or canned fruit; ½ cup of fruit juice; 1 cup of melon or raspberries; or 2 tablespoons of dried fruit. ? Milk and no-sugar-added yogurt have 15 grams of carbs in a serving. A serving is 1 cup of milk or 2/3 cup of no-sugar-added yogurt. ? Starchy vegetables have 15 grams of carbs in a serving. A serving is ½ cup of mashed potatoes or sweet potato; 1 cup winter squash; ½ of a small baked potato; ½ cup of cooked beans; or ½ cup cooked corn or green peas. · Learn how much carbs to eat each day and at each meal. A dietitian or CDE can teach you how to keep track of the amount of carbs you eat. This is called carbohydrate counting. · If you are not sure how to count carbohydrate grams, use the Plate Method to plan meals. It is a good, quick way to make sure that you have a balanced meal. It also helps you spread carbs throughout the day. ? Divide your plate by types of foods. Put non-starchy vegetables on half the plate, meat or other protein food on one-quarter of the plate, and a grain or starchy vegetable in the final quarter of the plate. To this you can add a small piece of fruit and 1 cup of milk or yogurt, depending on how many carbs you are supposed to eat at a meal.  · Try to eat about the same amount of carbs at each meal. Do not \"save up\" your daily allowance of carbs to eat at one meal.  · Proteins have very little or no carbs per serving. Examples of proteins are beef, chicken, turkey, fish, eggs, tofu, cheese, cottage cheese, and peanut butter.  A serving size of meat is 3 ounces, which is about the size of a deck of cards. Examples of meat substitute serving sizes (equal to 1 ounce of meat) are 1/4 cup of cottage cheese, 1 egg, 1 tablespoon of peanut butter, and ½ cup of tofu. How can you eat out and still eat healthy? · Learn to estimate the serving sizes of foods that have carbohydrate. If you measure food at home, it will be easier to estimate the amount in a serving of restaurant food. · If the meal you order has too much carbohydrate (such as potatoes, corn, or baked beans), ask to have a low-carbohydrate food instead. Ask for a salad or green vegetables. · If you use insulin, check your blood sugar before and after eating out to help you plan how much to eat in the future. · If you eat more carbohydrate at a meal than you had planned, take a walk or do other exercise. This will help lower your blood sugar. What else should you know? · Limit saturated fat, such as the fat from meat and dairy products. This is a healthy choice because people who have diabetes are at higher risk of heart disease. So choose lean cuts of meat and nonfat or low-fat dairy products. Use olive or canola oil instead of butter or shortening when cooking. · Don't skip meals. Your blood sugar may drop too low if you skip meals and take insulin or certain medicines for diabetes. · Check with your doctor before you drink alcohol. Alcohol can cause your blood sugar to drop too low. Alcohol can also cause a bad reaction if you take certain diabetes medicines. Follow-up care is a key part of your treatment and safety. Be sure to make and go to all appointments, and call your doctor if you are having problems. It's also a good idea to know your test results and keep a list of the medicines you take. Where can you learn more? Go to http://www.Lemonwise.com/  Enter I147 in the search box to learn more about \"Learning About Diabetes Food Guidelines. \"  Current as of: December 20, 2019 Content Version: 12.6  © 1765-6948 [employer]. Care instructions adapted under license by [employer] (which disclaims liability or warranty for this information). If you have questions about a medical condition or this instruction, always ask your healthcare professional. [address] any warranty or liability for your use of this information. Learning About Meal Planning for Diabetes  Why plan your meals? Meal planning can be a key part of managing diabetes. Planning meals and snacks with the right balance of carbohydrate, protein, and fat can help you keep your blood sugar at the target level you set with your doctor. You don't have to eat special foods. You can eat what your family eats, including sweets once in a while. But you do have to pay attention to how often you eat and how much you eat of certain foods. You may want to work with a dietitian or a certified diabetes educator. He or she can give you tips and meal ideas and can answer your questions about meal planning. This health professional can also help you reach a healthy weight if that is one of your goals. What plan is right for you? Your dietitian or diabetes educator may suggest that you start with the plate format or carbohydrate counting. The plate format  The plate format is a simple way to help you manage how you eat. You plan meals by learning how much space each food should take on a plate. Using the plate format helps you spread carbohydrate throughout the day. It can make it easier to keep your blood sugar level within your target range. It also helps you see if you're eating healthy portion sizes. To use the plate format, you put non-starchy vegetables on half your plate. Add meat or meat substitutes on one-quarter of the plate. Put a grain or starchy vegetable (such as brown rice or a potato) on the final quarter of the plate.  You can add a small piece of fruit and some low-fat or fat-free milk or yogurt, depending on your carbohydrate goal for each meal.  Here are some tips for using the plate format:  · Make sure that you are not using an oversized plate. A 9-inch plate is best. Many restaurants use larger plates. · Get used to using the plate format at home. Then you can use it when you eat out. · Write down your questions about using the plate format. Talk to your doctor, a dietitian, or a diabetes educator about your concerns. Carbohydrate counting  With carbohydrate counting, you plan meals based on the amount of carbohydrate in each food. Carbohydrate raises blood sugar higher and more quickly than any other nutrient. It is found in desserts, breads and cereals, and fruit. It's also found in starchy vegetables such as potatoes and corn, grains such as rice and pasta, and milk and yogurt. Spreading carbohydrate throughout the day helps keep your blood sugar levels within your target range. Your daily amount depends on several things, including your weight, how active you are, which diabetes medicines you take, and what your goals are for your blood sugar levels. A registered dietitian or diabetes educator can help you plan how much carbohydrate to include in each meal and snack. A guideline for your daily amount of carbohydrate is:  · 45 to 60 grams at each meal. That's about the same as 3 to 4 carbohydrate servings. · 15 to 20 grams at each snack. That's about the same as 1 carbohydrate serving. The Nutrition Facts label on packaged foods tells you how much carbohydrate is in a serving of the food. First, look at the serving size on the food label. Is that the amount you eat in a serving? All of the nutrition information on a food label is based on that serving size. So if you eat more or less than that, you'll need to adjust the other numbers. Total carbohydrate is the next thing you need to look for on the label.  If you count carbohydrate servings, one serving of carbohydrate is 15 grams. For foods that don't come with labels, such as fresh fruits and vegetables, you'll need a guide that lists carbohydrate in these foods. Ask your doctor, dietitian, or diabetes educator about books or other nutrition guides you can use. If you take insulin, you need to know how many grams of carbohydrate are in a meal. This lets you know how much rapid-acting insulin to take before you eat. If you use an insulin pump, you get a constant rate of insulin during the day. So the pump must be programmed at meals to give you extra insulin to cover the rise in blood sugar after meals. When you know how much carbohydrate you will eat, you can take the right amount of insulin. Or, if you always use the same amount of insulin, you need to make sure that you eat the same amount of carbohydrate at meals. If you need more help to understand carbohydrate counting and food labels, ask your doctor, dietitian, or diabetes educator. How do you get started with meal planning? Here are some tips to get started:  · Plan your meals a week at a time. Don't forget to include snacks too. · Use cookbooks or online recipes to plan several main meals. Plan some quick meals for busy nights. You also can double some recipes that freeze well. Then you can save half for other busy nights when you don't have time to cook. · Make sure you have the ingredients you need for your recipes. If you're running low on basic items, put these items on your shopping list too. · List foods that you use to make breakfasts, lunches, and snacks. List plenty of fruits and vegetables. · Post this list on the refrigerator. Add to it as you think of more things you need. · Take the list to the store to do your weekly shopping. Follow-up care is a key part of your treatment and safety. Be sure to make and go to all appointments, and call your doctor if you are having problems.  It's also a good idea to know your test results and keep a list of the medicines you take. Where can you learn more? Go to http://www.[employer]RP [employer].com/  Enter Q696 in the search box to learn more about \"Learning About Meal Planning for Diabetes. \"  Current as of: December 20, 2019               Content Version: 12.6  © 5305-9558 [email or web address]. Care instructions adapted under license by [employer] (which disclaims liability or warranty for this information). If you have questions about a medical condition or this instruction, always ask your healthcare professional. [address] any warranty or liability for your use of this information. Medicare Wellness Visit, Male    The best way to improve and maintain good health is to have a healthy lifestyle by eating a well-balanced diet, exercising regularly, limiting alcohol and stopping smoking. Regular visits with your physician or non-physician health care provider also support your good health. Preventive screening tests can find health problems before they become diseases or illnesses. Preventive services such as immunizations prevent serious infections. All people over age 72 should have a Pneumovax and a Prevnar-13 shot to prevent potentially life threatening infections with the pneumococcus bacteria, a common cause of pneumonia. These are once in a lifetime unless you and your provider decide differently. All people over 65 should have a yearly influenza vaccine or \"flu\" shot. This does not prevent infection with cold viruses but has been proven to prevent hospitalization and death from influenza. Although Medicare part B \"regular Medicare\" currently only covers tetanus vaccination in the context of an injury, a tetanus vaccine (Tdap or Td) is recommended every 10 years. A shingles vaccine is recommended once in a lifetime after age 61. The Shingles vaccine is also not covered by Medicare part B.     Note, however, that both the Shingles vaccine and Tdap/Td are generally covered by secondary carriers. Please check your coverage and out of pocket expenses. Consider contacting your local health department because it may stock these vaccines for a reasonable charge. We currently have documentation of the following immunization history for you:  Immunization History   Administered Date(s) Administered    (RETIRED) Pneumococcal Vaccine (Unspecified Type) 01/01/2007    Covid-19, MODERNA, Mrna, Lnp-s, Pf, 100mcg/0.5mL 04/02/2021, 04/30/2021    Influenza High Dose Vaccine PF 01/10/2013, 11/16/2015, 12/09/2016, 10/02/2017    Influenza Vaccine 12/31/2013, 11/20/2014    Influenza Vaccine (Tri) Adjuvanted (>65 Yrs FLUAD TRI 42219) 11/20/2018, 10/08/2019    Influenza, Quadrivalent, Adjuvanted (>65 Yrs FLUAD QUAD [id number]) 09/14/2020    Pneumococcal Conjugate (PCV-13) 08/03/2015    Pneumococcal Polysaccharide (PPSV-23) 01/01/2007    TDAP Vaccine 07/12/2012    Zoster 07/25/2007    Zoster Recombinant 05/15/2019, 08/01/2019       Screening for infection with Hepatitis C is recommended for anyone born between [birth year] through [address]. The table at the bottom of this document indicates the status of this and other screening services. Screening for diabetes mellitus with a blood sugar test (glucose) should be done at least every 3 years until age 79. You and your health care provider may decide whether to continue screening after age 79. The most recent blood glucose we have on file for you is:   Lab Results   Component Value Date/Time    Glucose 94 07/14/2021 08:34 AM       Glaucoma is a disease of the eye due to increased ocular pressure that can lead to blindness. People with risk factors for glaucoma ( race, diabetes, family history) should be screened at least every 2 years by an eye professional. This may be covered annually if indicated as determined by you and your doctor.     Cardiovascular screening tests that check for elevated lipids or cholesterol (fatty part of blood) which can lead to heart disease and strokes should be done every 4-6 years through age 79. You and your health care provider may decide whether to continue screening after age 79. The most recent lipid panel we have on file for you is:   Lab Results   Component Value Date/Time    Cholesterol, total 219 (H) 07/14/2021 08:34 AM    HDL Cholesterol 65 (H) 07/14/2021 08:34 AM    LDL, calculated 136.4 (H) 07/14/2021 08:34 AM    VLDL, calculated 17.6 07/14/2021 08:34 AM    Triglyceride 88 07/14/2021 08:34 AM    CHOL/HDL Ratio 3.4 07/14/2021 08:34 AM       Colorectal cancer screening that evaluates for blood or polyps in your colon for people with average risk should be done yearly as a stool test, every five years as a flexible sigmoidoscope or every 10 years as a colonoscopy up to age 76. You and your health care provider may decide whether to continue screening after age 76. Men up to age 76 may elect to screen for prostate cancer with a blood test called a PSA at certain intervals, depending on their personal and family history. This decision is between the patient and his provider. The most recent PSA values we have on file for you are:  Lab Results   Component Value Date/Time    Prostate Specific Ag 1.0 01/12/2021 08:12 AM    Prostate Specific Ag 1.0 06/23/2020 09:39 AM    Prostate Specific Ag 1.9 10/08/2019 01:04 PM       If you have been a smoker or had family history of abdominal aortic aneurysms, you and your provider may decide to schedule an ultrasound test of your aorta.  Our records show this was done on:  n/a    People who have smoked the equivalent of 1 pack per day for 30 years or more may benefit from screening for lung cancer with a yearly low dose CT scan until they have been non smokers for 15 years or competing health conditions render this unlikely to be beneficial. Our records show: n/a    Your Medicare Wellness Exam is recommended annually.     Here is a list of your current Health Maintenance items with a due date:  Health Maintenance   Topic Date Due    Flu Vaccine (1) 09/01/2021    DTaP/Tdap/Td series (2 - Td or Tdap) 07/12/2022    Medicare Yearly Exam  07/21/2022    Shingrix Vaccine Age 50>  Completed    COVID-19 Vaccine  Completed    Pneumococcal 65+ years  Completed

## 2021-07-20 NOTE — PROGRESS NOTES
1. Have you been to the ER, urgent care clinic or hospitalized since your last visit? NO.     2. Have you seen or consulted any other health care providers outside of the [address] [place] [address] since your last visit (Include any pap smears or colon screening)?  NO

## 2021-07-24 PROBLEM — R73.01 IMPAIRED FASTING GLUCOSE: Status: ACTIVE | Noted: 2021-07-24

## 2021-07-24 PROBLEM — Z87.81 HISTORY OF FRACTURED RIB: Status: ACTIVE | Noted: 2019-12-29

## 2021-07-24 PROBLEM — R73.03 PREDIABETES: Status: ACTIVE | Noted: 2021-07-24
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
## 2021-08-11 ENCOUNTER — PATIENT MESSAGE (OUTPATIENT)
Dept: INTERNAL MEDICINE CLINIC | Age: 81
End: 2021-08-11

## 2021-08-11 RX ORDER — LISINOPRIL 2.5 MG/1
5 TABLET ORAL 2 TIMES DAILY
Qty: 360 TABLET | Refills: 3 | Status: SHIPPED | OUTPATIENT
Start: 2021-08-11 | End: 2022-08-13

## 2021-08-24 DIAGNOSIS — F41.9 ANXIETY: ICD-10-CM

## 2021-08-24 RX ORDER — LORAZEPAM 1 MG/1
1 TABLET ORAL
Qty: 60 TABLET | Refills: 0 | Status: SHIPPED | OUTPATIENT
Start: 2021-08-24 | End: 2021-09-28 | Stop reason: SDUPTHER

## 2021-08-24 NOTE — TELEPHONE ENCOUNTER
[facility] date for Ativan as 7/20/21 for a 20 d/s. UDS done 8/9/19  CSA signed 8/13/19    Last Visit: 7/20/21 with MD Bushra Andrade  Next Appointment: 1/25/22 with MD Bushra Andrade  Previous Refill Encounter(s): 7/20/21 #60    Requested Prescriptions     Pending Prescriptions Disp Refills    LORazepam (ATIVAN) 1 mg tablet 60 Tablet 0     Sig: Take 1 Tablet by mouth every eight (8) hours as needed for Anxiety. Max Daily Amount: 3 mg.

## 2021-09-27 ENCOUNTER — OFFICE VISIT (OUTPATIENT)
Dept: CARDIOLOGY CLINIC | Age: 81
End: 2021-09-27
Payer: MEDICARE

## 2021-09-27 VITALS
HEIGHT: 70 IN | DIASTOLIC BLOOD PRESSURE: 82 MMHG | BODY MASS INDEX: 24.2 KG/M2 | WEIGHT: 169 LBS | HEART RATE: 80 BPM | OXYGEN SATURATION: 96 % | SYSTOLIC BLOOD PRESSURE: 126 MMHG

## 2021-09-27 DIAGNOSIS — I10 ESSENTIAL HYPERTENSION: Primary | ICD-10-CM

## 2021-09-27 DIAGNOSIS — I44.30 AV BLOCK: ICD-10-CM

## 2021-09-27 DIAGNOSIS — I48.0 PAROXYSMAL ATRIAL FIBRILLATION (HCC): ICD-10-CM

## 2021-09-27 DIAGNOSIS — Z95.0 PACEMAKER: ICD-10-CM

## 2021-09-27 PROCEDURE — G8510 SCR DEP NEG, NO PLAN REQD: HCPCS | Performed by: INTERNAL MEDICINE

## 2021-09-27 PROCEDURE — 93280 PM DEVICE PROGR EVAL DUAL: CPT | Performed by: INTERNAL MEDICINE

## 2021-09-27 PROCEDURE — G8427 DOCREV CUR MEDS BY ELIG CLIN: HCPCS | Performed by: INTERNAL MEDICINE

## 2021-09-27 PROCEDURE — 1101F PT FALLS ASSESS-DOCD LE1/YR: CPT | Performed by: INTERNAL MEDICINE

## 2021-09-27 PROCEDURE — G8420 CALC BMI NORM PARAMETERS: HCPCS | Performed by: INTERNAL MEDICINE

## 2021-09-27 PROCEDURE — G8752 SYS BP LESS 140: HCPCS | Performed by: INTERNAL MEDICINE

## 2021-09-27 PROCEDURE — G8754 DIAS BP LESS 90: HCPCS | Performed by: INTERNAL MEDICINE

## 2021-09-27 PROCEDURE — 99214 OFFICE O/P EST MOD 30 MIN: CPT | Performed by: INTERNAL MEDICINE

## 2021-09-27 PROCEDURE — G8536 NO DOC ELDER MAL SCRN: HCPCS | Performed by: INTERNAL MEDICINE

## 2021-09-27 NOTE — PROGRESS NOTES
Roseline Richard presents today for   Chief Complaint   Patient presents with    Follow-up     6 month follow up   [address] [facility] preferred language for health care discussion is english/other. Is someone accompanying this pt? no    Is the patient using any DME equipment during [address]? no    Depression Screening:  3 most recent PHQ Screens 9/27/2021   Little interest or pleasure in doing things Not at all   Feeling down, depressed, irritable, or hopeless Not at all   Total Score PHQ 2 0       Learning Assessment:  Learning Assessment 9/27/2021   PRIMARY LEARNER Patient   HIGHEST LEVEL OF EDUCATION - PRIMARY LEARNER  -   BARRIERS PRIMARY LEARNER -   CO-LEARNER CAREGIVER -   PRIMARY LANGUAGE ENGLISH   LEARNER PREFERENCE PRIMARY DEMONSTRATION   ANSWERED BY patient   RELATIONSHIP SELF       Abuse Screening:  Abuse Screening Questionnaire 9/27/2021   Do you ever feel afraid of your partner? N   Are you in a relationship with someone who physically or mentally threatens you? N   Is it safe for you to go home? Y       Fall Risk  Fall Risk Assessment, last 12 mths 9/27/2021   Able to walk? Yes   Fall in past 12 months? 0   Do you feel unsteady? 0   Are you worried about falling 0   Number of falls in past 12 months -   Fall with injury? -           Pt currently taking Anticoagulant therapy? no    Pt currently taking Antiplatelet therapy ? no      Coordination of Care:  1. Have you been to the ER, urgent care clinic since your last visit? Hospitalized since your last visit? no    2. Have you seen or consulted any other health care providers outside of the [facility] since your last visit? Include any pap smears or colon screening.  no

## 2021-09-27 NOTE — PROGRESS NOTES
HISTORY OF PRESENT ILLNESS  Lena Nesbitt is a 79 y.o. male. Follow-up  Pertinent negatives include no chest pain, no abdominal pain, no headaches and no shortness of breath. Patient presents for a scheduled followup visit. He has a history of intermittent high-grade AV block, status post to dual-chamber permanent pacemaker in 2007. He also has a history of hypertension, Brief episodes approximately atrial fibrillation, and a mildly depressed LV systolic function,  Which returned to low normal on echocardiogram in June 2013 showing an ejection fraction of 50-55%. The patient underwent a pacemaker generator exchange in May 2016 with a Medtronic MRI safe device after his device has reached elective replacement indicator. The patient was last seen in the office 6 months ago. Since last visit he continues to feel well. He denies any major change in his activity level. Denies any heart palpitations, dizziness nor syncope. No leg swelling, no orthopnea, no PND. His blood pressure has been running higher recently, so his PCP increase his lisinopril dose up to 5 mg twice daily. Past Medical History:   Diagnosis Date    Anxiety     AV block     intermittent, high-grade    Bilateral inguinal hernia     Cardiac echocardiogram 06/25/2013    EF 50-55%. No WMA. Mild LVH. Gr 1 DDfx. RVE.  AKANKSHA. Mild TR. No significant chg from study of 6/8/11.  Cardiac nuclear imaging test 10/07/2009    Likely inferior & anterior septal artifact. EF 55%. Mild septal WMA could be related to pacemaker.  Cardiomyopathy ([address]     Diverticulosis of colon 11/10/2015    Dysphagia     Multiple esophageal dilations.  Essential hypertension with goal blood pressure less than 140/90     HLD (hyperlipidemia)     Hx of colonoscopy 11/10/2015    Dr. Nadege Blake; tubulovillous adenoma.  Pacemaker 2007    Medtronic dual-chamber device.     Paroxysmal atrial fibrillation (HCC)     single episode noted on routine device check      Current Outpatient Medications   Medication Sig Dispense Refill    LORazepam (ATIVAN) 1 mg tablet Take 1 Tablet by mouth every eight (8) hours as needed for Anxiety. Max Daily Amount: 3 mg. 60 Tablet 0    lisinopriL (PRINIVIL, ZESTRIL) 2.5 mg tablet Take 2 Tablets by mouth two (2) times a day. 360 Tablet 3    cyclobenzaprine (FLEXERIL) 5 mg tablet TAKE 1-2 TABLETS BY MOUTH EVERY 8 HOURS AS NEEDED FOR MUSCLE SPASM 25 Tablet 0    butalbital-acetaminophen-caffeine (FIORICET, ESGIC) -40 mg per tablet Take 1 Tablet by mouth every six (6) hours as needed for Headache. 30 Tablet 0    VIT C/E/ZN/COPPR/LUTEIN/ZEAXAN (PRESERVISION AREDS 2 PO) Take  by mouth.  omeprazole (PRILOSEC) 20 mg capsule Take 20 mg by mouth daily as needed.  timolol (TIMOPTIC) 0.25 % ophthalmic solution Administer 1 Drop to both eyes two (2) times a day.  MULTIVITAMINS W-MINERALS/LUT (CENTRUM SILVER PO) Take  by mouth daily.  OMEGA-3 FATTY ACIDS (OMEGA 3 PO) Take 2,400 mg by mouth two (2) times a day.  coenzyme q10 100 mg Cap Take  by mouth daily.  metronidazole (METROGEL) 1 % topical gel Apply  to affected area daily. Use a thin layer to affected areas after washing       zinc 50 mg capsule Take  by mouth daily.  magnesium 250 mg Tab Take  by mouth daily.  selenium 100 mcg Cap Take  by mouth daily.  ascorbic acid (VITAMIN C) 500 mg tablet Take  by mouth two (2) times a day. No Known Allergies     Social History     Tobacco Use    Smoking status: Former Smoker     Quit date: 1966     Years since quittin.3    Smokeless tobacco: Never Used   Substance Use Topics    Alcohol use: No     Comment: 1998 last time he had alcohol.  Drug use: No         Review of Systems   Constitutional: Negative for chills, fever and weight loss. HENT: Negative for nosebleeds. Eyes: Negative for blurred vision and double vision.    Respiratory: Negative for cough, shortness of breath and wheezing. Cardiovascular: Negative for chest pain, palpitations, orthopnea, claudication, leg swelling and PND. Gastrointestinal: Negative for abdominal pain, heartburn, nausea and vomiting. Genitourinary: Negative for dysuria and hematuria. Musculoskeletal: Negative for falls and myalgias. Skin: Negative for rash. Neurological: Negative for dizziness, focal weakness and headaches. Endo/Heme/Allergies: Does not bruise/bleed easily. Psychiatric/Behavioral: Negative for substance abuse. Visit Vitals  /82 (BP 1 Location: Left upper arm, BP Patient Position: Sitting, BP Cuff Size: Small adult)   Pulse 80   Ht 5' 10\" (1.778 m)   Wt 76.7 kg (169 lb)   SpO2 96%   BMI 24.25 kg/m²      Physical Exam  Constitutional:       Appearance: He is well-developed. HENT:      Head: Normocephalic and atraumatic. Eyes:      Conjunctiva/sclera: Conjunctivae normal.   Neck:      Vascular: No carotid bruit or JVD. Cardiovascular:      Rate and Rhythm: Normal rate and regular rhythm. Pulses: Normal pulses. Heart sounds: S1 normal and S2 normal. No murmur heard. No gallop. No S3 sounds. Pulmonary:      Breath sounds: Normal breath sounds. No wheezing or rales. Abdominal:      General: Bowel sounds are normal.      Palpations: Abdomen is soft. Tenderness: There is no abdominal tenderness. Musculoskeletal:         General: No swelling or deformity. Cervical back: Neck supple. Skin:     General: Skin is warm and dry. Neurological:      General: No focal deficit present. Mental Status: He is alert and oriented to person, place, and time. Psychiatric:         Mood and Affect: Mood normal.       Pacemaker interrogation: Pacemaker generator at middle of life. Estimated longevity 3.5 years. No significant atrial arrhythmias. Pacing in the atrium 6 % in the ventricle 100%. Stable activity level. Scanned document for details.     ASSESSMENT and PLAN    Essential hypertension: Patient's blood pressure is now better controlled on a high dose of lisinopril 5 mg twice daily. I will continue this. Is being followed closely by his PCP. Paroxysmal atrial fibrillation. Patient had a single episode of atrial fibrillation in November 2017 lasting for little less than 2 hours. No recurrent episodes since that time. He was asymptomatic to the arrhythmia. No need for anticoagulation at this time. This will be continued to be monitored every 3 months with routine device checks. 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 Patient has an MRI compatible device. Normal device function on interrogation today. He remains pacemaker dependent in the ventricle. His battery life is now estimated at 3.5 years. Dyslipidemia. Patient has been managing this with diet control and takes a fish oil supplement. [facility] device check in 3 months. Followup in 6 months, sooner if needed.

## 2021-09-28 DIAGNOSIS — F41.9 ANXIETY: ICD-10-CM

## 2021-09-28 RX ORDER — LORAZEPAM 1 MG/1
1 TABLET ORAL
Qty: 60 TABLET | Refills: 0 | Status: SHIPPED | OUTPATIENT
Start: 2021-09-28 | End: 2021-11-01 | Stop reason: SDUPTHER

## 2021-09-28 NOTE — TELEPHONE ENCOUNTER
[facility]21 for a 20 d/s. UDS done 8/9/19  CSA signed 8/13/19    Last Visit: 7/20/21 with MD Mariusz Wei  Next Appointment: 1/25/22 with MD Mariusz Wei  Previous Refill Encounter(s): 8/24/21 #60    Requested Prescriptions     Pending Prescriptions Disp Refills    LORazepam (ATIVAN) 1 mg tablet 60 Tablet 0     Sig: Take 1 Tablet by mouth every eight (8) hours as needed for Anxiety. Max Daily Amount: 3 mg.

## 2021-11-01 DIAGNOSIS — F41.9 ANXIETY: ICD-10-CM

## 2021-11-01 RX ORDER — LORAZEPAM 1 MG/1
1 TABLET ORAL
Qty: 60 TABLET | Refills: 0 | Status: SHIPPED | OUTPATIENT
Start: 2021-11-01 | End: 2021-12-06 | Stop reason: SDUPTHER

## 2021-11-01 NOTE — TELEPHONE ENCOUNTER
[facility]28/21 for a 20 d/s. UDS done 8/9/19  CSA signed 8/13/19    Last Visit: 7/20/21 with MD Maria Luz Loving  Next Appointment: 1/25/22 with MD Lindsey  Previous Refill Encounter(s): 9/28/21 #60    Requested Prescriptions     Pending Prescriptions Disp Refills    LORazepam (ATIVAN) 1 mg tablet 60 Tablet 0     Sig: Take 1 Tablet by mouth every eight (8) hours as needed for Anxiety. Max Daily Amount: 3 mg.

## 2021-12-06 DIAGNOSIS — F41.9 ANXIETY: ICD-10-CM

## 2021-12-06 RX ORDER — LORAZEPAM 1 MG/1
1 TABLET ORAL
Qty: 60 TABLET | Refills: 0 | Status: SHIPPED | OUTPATIENT
Start: 2021-12-06 | End: 2022-01-07 | Stop reason: SDUPTHER

## 2021-12-29 ENCOUNTER — OFFICE VISIT (OUTPATIENT)
Dept: CARDIOLOGY CLINIC | Age: 81
End: 2021-12-29
Payer: MEDICARE

## 2021-12-29 DIAGNOSIS — Z95.0 PACEMAKER: ICD-10-CM

## 2021-12-29 DIAGNOSIS — I48.0 PAROXYSMAL ATRIAL FIBRILLATION (HCC): Primary | ICD-10-CM

## 2021-12-29 DIAGNOSIS — I44.30 AV BLOCK: ICD-10-CM

## 2021-12-29 PROCEDURE — 93294 REM INTERROG EVL PM/LDLS PM: CPT | Performed by: INTERNAL MEDICINE

## 2022-01-07 DIAGNOSIS — F41.9 ANXIETY: ICD-10-CM

## 2022-01-07 RX ORDER — LORAZEPAM 1 MG/1
1 TABLET ORAL
Qty: 60 TABLET | Refills: 0 | Status: SHIPPED | OUTPATIENT
Start: 2022-01-07 | End: 2022-01-25 | Stop reason: SDUPTHER

## 2022-01-07 NOTE — TELEPHONE ENCOUNTER
[facility] 12[facility]6/21 for a 20 d/s. UDS done 8/9/19  CSA signed 8/13/19    Last Visit: 7/20/21 with MD Ritika Kelley  Next Appointment: 1/25/22 with MD Ritika Kelley  Previous Refill Encounter(s): 12/6/21 #60    Requested Prescriptions     Pending Prescriptions Disp Refills    LORazepam (ATIVAN) 1 mg tablet 60 Tablet 0     Sig: Take 1 Tablet by mouth every eight (8) hours as needed for Anxiety. Max Daily Amount: 3 mg.

## 2022-01-18 ENCOUNTER — HOSPITAL ENCOUNTER (OUTPATIENT)
Dept: LAB | Age: 82
Discharge: HOME | End: 2022-01-18
Payer: MEDICARE

## 2022-01-18 ENCOUNTER — APPOINTMENT (OUTPATIENT)
Dept: INTERNAL MEDICINE CLINIC | Age: 82
End: 2022-01-18

## 2022-01-18 DIAGNOSIS — R73.01 IMPAIRED FASTING GLUCOSE: ICD-10-CM

## 2022-01-18 DIAGNOSIS — F41.9 ANXIETY: ICD-10-CM

## 2022-01-18 DIAGNOSIS — R73.03 PREDIABETES: ICD-10-CM

## 2022-01-18 DIAGNOSIS — Z12.5 PROSTATE CANCER SCREENING: ICD-10-CM

## 2022-01-18 DIAGNOSIS — G89.29 CHRONIC RIGHT SHOULDER PAIN: ICD-10-CM

## 2022-01-18 DIAGNOSIS — E78.5 HYPERLIPIDEMIA, UNSPECIFIED HYPERLIPIDEMIA TYPE: ICD-10-CM

## 2022-01-18 DIAGNOSIS — I42.9 CARDIOMYOPATHY, UNSPECIFIED TYPE (HCC): ICD-10-CM

## 2022-01-18 DIAGNOSIS — M25.511 CHRONIC RIGHT SHOULDER PAIN: ICD-10-CM

## 2022-01-18 DIAGNOSIS — I48.0 PAROXYSMAL ATRIAL FIBRILLATION (HCC): ICD-10-CM

## 2022-01-18 DIAGNOSIS — K21.9 GASTROESOPHAGEAL REFLUX DISEASE WITHOUT ESOPHAGITIS: ICD-10-CM

## 2022-01-18 DIAGNOSIS — I10 ESSENTIAL HYPERTENSION: ICD-10-CM

## 2022-01-18 LAB
ALBUMIN SERPL-MCNC: 4.1 G/DL (ref 3.4–5)
ALBUMIN/GLOB SERPL: 1.3 {RATIO} (ref 0.8–1.7)
ALP SERPL-CCNC: 67 U/L (ref 45–117)
ALT SERPL-CCNC: 23 U/L (ref 16–61)
ANION GAP SERPL CALC-SCNC: 5 MMOL/L (ref 3–18)
APPEARANCE UR: CLEAR
AST SERPL-CCNC: 20 U/L (ref 10–38)
BASOPHILS # BLD: 0.1 K/UL (ref 0–0.1)
BASOPHILS NFR BLD: 1 % (ref 0–2)
BILIRUB SERPL-MCNC: 0.6 MG/DL (ref 0.2–1)
BILIRUB UR QL: NEGATIVE
BUN SERPL-MCNC: 14 MG/DL (ref 7–18)
BUN/CREAT SERPL: 18 (ref 12–20)
CALCIUM SERPL-MCNC: 9.7 MG/DL (ref 8.5–10.1)
CHLORIDE SERPL-SCNC: 98 MMOL/L (ref 100–111)
CHOLEST SERPL-MCNC: 197 MG/DL
CO2 SERPL-SCNC: 30 MMOL/L (ref 21–32)
COLOR UR: YELLOW
CREAT SERPL-MCNC: 0.79 MG/DL (ref 0.6–1.3)
DIFFERENTIAL METHOD BLD: ABNORMAL
EOSINOPHIL # BLD: 0.5 K/UL (ref 0–0.4)
EOSINOPHIL NFR BLD: 6 % (ref 0–5)
ERYTHROCYTE [DISTWIDTH] IN BLOOD BY AUTOMATED COUNT: 12.6 % (ref 11.6–14.5)
EST. AVERAGE GLUCOSE BLD GHB EST-MCNC: 117 MG/DL
GLOBULIN SER CALC-MCNC: 3.1 G/DL (ref 2–4)
GLUCOSE SERPL-MCNC: 87 MG/DL (ref 74–99)
GLUCOSE UR STRIP.AUTO-MCNC: NEGATIVE MG/DL
HBA1C MFR BLD: 5.7 % (ref 4.2–5.6)
HCT VFR BLD AUTO: 43.1 % (ref 36–48)
HDLC SERPL-MCNC: 54 MG/DL (ref 40–60)
HDLC SERPL: 3.6 {RATIO} (ref 0–5)
HGB BLD-MCNC: 14.3 G/DL (ref 13–16)
HGB UR QL STRIP: NEGATIVE
IMM GRANULOCYTES # BLD AUTO: 0 K/UL (ref 0–0.04)
IMM GRANULOCYTES NFR BLD AUTO: 0 % (ref 0–0.5)
KETONES UR QL STRIP.AUTO: ABNORMAL MG/DL
LDLC SERPL CALC-MCNC: 123 MG/DL (ref 0–100)
LEUKOCYTE ESTERASE UR QL STRIP.AUTO: NEGATIVE
LIPID PROFILE,FLP: ABNORMAL
LYMPHOCYTES # BLD: 2.8 K/UL (ref 0.9–3.6)
LYMPHOCYTES NFR BLD: 37 % (ref 21–52)
MAGNESIUM SERPL-MCNC: 2.5 MG/DL (ref 1.6–2.6)
MCH RBC QN AUTO: 30.7 PG (ref 24–34)
MCHC RBC AUTO-ENTMCNC: 33.2 G/DL (ref 31–37)
MCV RBC AUTO: 92.5 FL (ref 78–100)
MONOCYTES # BLD: 0.7 K/UL (ref 0.05–1.2)
MONOCYTES NFR BLD: 9 % (ref 3–10)
NEUTS SEG # BLD: 3.6 K/UL (ref 1.8–8)
NEUTS SEG NFR BLD: 47 % (ref 40–73)
NITRITE UR QL STRIP.AUTO: NEGATIVE
NRBC # BLD: 0 K/UL (ref 0–0.01)
NRBC BLD-RTO: 0 PER 100 WBC
PH UR STRIP: 7.5 [PH] (ref 5–8)
PLATELET # BLD AUTO: 244 K/UL (ref 135–420)
PMV BLD AUTO: 8.9 FL (ref 9.2–11.8)
POTASSIUM SERPL-SCNC: 4.5 MMOL/L (ref 3.5–5.5)
PROT SERPL-MCNC: 7.2 G/DL (ref 6.4–8.2)
PROT UR STRIP-MCNC: NEGATIVE MG/DL
PSA SERPL-MCNC: 1.3 NG/ML (ref 0–4)
RBC # BLD AUTO: 4.66 M/UL (ref 4.35–5.65)
SODIUM SERPL-SCNC: 133 MMOL/L (ref 136–145)
SP GR UR REFRACTOMETRY: 1.01 (ref 1–1.03)
TRIGL SERPL-MCNC: 100 MG/DL (ref ?–150)
TSH SERPL DL<=0.05 MIU/L-ACNC: 3.25 UIU/ML (ref 0.36–3.74)
UROBILINOGEN UR QL STRIP.AUTO: 0.2 EU/DL (ref 0.2–1)
VLDLC SERPL CALC-MCNC: 20 MG/DL
WBC # BLD AUTO: 7.5 K/UL (ref 4.6–13.2)

## 2022-01-18 PROCEDURE — 36415 COLL VENOUS BLD VENIPUNCTURE: CPT

## 2022-01-18 PROCEDURE — 80061 LIPID PANEL: CPT

## 2022-01-18 PROCEDURE — 83036 HEMOGLOBIN GLYCOSYLATED A1C: CPT

## 2022-01-18 PROCEDURE — 80053 COMPREHEN METABOLIC PANEL: CPT

## 2022-01-18 PROCEDURE — 85025 COMPLETE CBC W/AUTO DIFF WBC: CPT

## 2022-01-18 PROCEDURE — 84153 ASSAY OF PSA TOTAL: CPT

## 2022-01-18 PROCEDURE — 84443 ASSAY THYROID STIM HORMONE: CPT

## 2022-01-18 PROCEDURE — 83735 ASSAY OF MAGNESIUM: CPT

## 2022-01-18 PROCEDURE — 81003 URINALYSIS AUTO W/O SCOPE: CPT

## 2022-01-24 NOTE — PROGRESS NOTES
dual Pacemaker. 4 years left on battery. Lead impedance and threshold WNL. A paced 4.5 %, V paced 99 %. 2 nonsustained VT, monitored only.

## 2022-01-24 NOTE — PROGRESS NOTES
1. \"Have you been to the ER, urgent care clinic since your last visit? Hospitalized since your last visit? \" No    2. \"Have you seen or consulted any other health care providers outside of the [address] [place] [address] since your last visit? \" No     3. For patients aged 39-70: Has the patient had a colonoscopy / FIT/ Cologuard? NA - based on age      If the patient is female:    4. For patients aged 41-77: Has the patient had a mammogram within the past 2 years? NA - based on age or sex  See top three    5. For patients aged 21-65: Has the patient had a pap smear?  NA - based on age or sex

## 2022-01-25 ENCOUNTER — OFFICE VISIT (OUTPATIENT)
Dept: INTERNAL MEDICINE CLINIC | Age: 82
End: 2022-01-25
Payer: MEDICARE

## 2022-01-25 VITALS
RESPIRATION RATE: 16 BRPM | BODY MASS INDEX: 23.34 KG/M2 | SYSTOLIC BLOOD PRESSURE: 126 MMHG | HEIGHT: 70 IN | TEMPERATURE: 97.7 F | HEART RATE: 74 BPM | WEIGHT: 163 LBS | DIASTOLIC BLOOD PRESSURE: 82 MMHG

## 2022-01-25 DIAGNOSIS — K21.9 GASTROESOPHAGEAL REFLUX DISEASE WITHOUT ESOPHAGITIS: ICD-10-CM

## 2022-01-25 DIAGNOSIS — E78.5 HYPERLIPIDEMIA, UNSPECIFIED HYPERLIPIDEMIA TYPE: ICD-10-CM

## 2022-01-25 DIAGNOSIS — R73.03 PREDIABETES: ICD-10-CM

## 2022-01-25 DIAGNOSIS — F41.9 ANXIETY: ICD-10-CM

## 2022-01-25 DIAGNOSIS — I10 ESSENTIAL HYPERTENSION: Primary | ICD-10-CM

## 2022-01-25 DIAGNOSIS — I42.9 CARDIOMYOPATHY, UNSPECIFIED TYPE (HCC): ICD-10-CM

## 2022-01-25 DIAGNOSIS — I48.0 PAROXYSMAL ATRIAL FIBRILLATION (HCC): ICD-10-CM

## 2022-01-25 DIAGNOSIS — Z79.899 CHRONIC PRESCRIPTION BENZODIAZEPINE USE: ICD-10-CM

## 2022-01-25 DIAGNOSIS — R73.01 IMPAIRED FASTING GLUCOSE: ICD-10-CM

## 2022-01-25 PROCEDURE — G8510 SCR DEP NEG, NO PLAN REQD: HCPCS | Performed by: INTERNAL MEDICINE

## 2022-01-25 PROCEDURE — 1101F PT FALLS ASSESS-DOCD LE1/YR: CPT | Performed by: INTERNAL MEDICINE

## 2022-01-25 PROCEDURE — G8427 DOCREV CUR MEDS BY ELIG CLIN: HCPCS | Performed by: INTERNAL MEDICINE

## 2022-01-25 PROCEDURE — G8754 DIAS BP LESS 90: HCPCS | Performed by: INTERNAL MEDICINE

## 2022-01-25 PROCEDURE — G8420 CALC BMI NORM PARAMETERS: HCPCS | Performed by: INTERNAL MEDICINE

## 2022-01-25 PROCEDURE — 99214 OFFICE O/P EST MOD 30 MIN: CPT | Performed by: INTERNAL MEDICINE

## 2022-01-25 PROCEDURE — G8536 NO DOC ELDER MAL SCRN: HCPCS | Performed by: INTERNAL MEDICINE

## 2022-01-25 PROCEDURE — G8752 SYS BP LESS 140: HCPCS | Performed by: INTERNAL MEDICINE

## 2022-01-25 PROCEDURE — G0463 HOSPITAL OUTPT CLINIC VISIT: HCPCS | Performed by: INTERNAL MEDICINE

## 2022-01-25 RX ORDER — BUTALBITAL, ACETAMINOPHEN AND CAFFEINE 50; 325; 40 MG/1; MG/1; MG/1
1 TABLET ORAL
Qty: 30 TABLET | Refills: 0 | Status: SHIPPED | OUTPATIENT
Start: 2022-01-25

## 2022-01-25 RX ORDER — CYCLOBENZAPRINE HCL 5 MG
TABLET ORAL
Qty: 25 TABLET | Refills: 0 | Status: SHIPPED | OUTPATIENT
Start: 2022-01-25

## 2022-01-25 RX ORDER — LORAZEPAM 1 MG/1
1 TABLET ORAL
Qty: 60 TABLET | Refills: 0 | Status: SHIPPED | OUTPATIENT
Start: 2022-01-25 | End: 2022-03-21 | Stop reason: SDUPTHER

## 2022-01-25 NOTE — PATIENT INSTRUCTIONS
Heart-Healthy Diet: Care Instructions  Your Care Instructions     A heart-healthy diet has lots of vegetables, fruits, nuts, beans, and whole grains, and is low in salt. It limits foods that are high in saturated fat, such as meats, cheeses, and fried foods. It may be hard to change your diet, but even small changes can lower your risk of heart attack and heart disease. Follow-up care is a key part of your treatment and safety. Be sure to make and go to all appointments, and call your doctor if you are having problems. It's also a good idea to know your test results and keep a list of the medicines you take. How can you care for yourself at home? Watch your portions  · Learn what a serving is. A \"serving\" and a \"portion\" are not always the same thing. Make sure that you are not eating larger portions than are recommended. For example, a serving of pasta is ½ cup. A serving size of meat is 2 to 3 ounces. A 3-ounce serving is about the size of a deck of cards. Measure serving sizes until you are good at [place]" them. Keep in mind that restaurants often serve portions that are 2 or 3 times the size of one serving. · To keep your energy level up and keep you from feeling hungry, eat often but in smaller portions. · Eat only the number of calories you need to stay at a healthy weight. If you need to lose weight, eat fewer calories than your body burns (through exercise and other physical activity). Eat more fruits and vegetables  · Eat a variety of fruit and vegetables every day. Dark green, deep orange, red, or yellow fruits and vegetables are especially good for you. Examples include spinach, carrots, peaches, and berries. · Keep carrots, celery, and other veggies handy for snacks. Buy fruit that is in season and store it where you can see it so that you will be tempted to eat it. · Cook dishes that have a lot of veggies in them, such as stir-fries and soups.   Limit saturated and trans fat  · Read food labels, and try to avoid saturated and trans fats. They increase your risk of heart disease. · Use olive or canola oil when you cook. · Bake, broil, grill, or steam foods instead of frying them. · Choose lean meats instead of high-fat meats such as hot dogs and sausages. Cut off all visible fat when you prepare meat. · Eat fish, skinless poultry, and meat alternatives such as soy products instead of high-fat meats. Soy products, such as tofu, may be especially good for your heart. · Choose low-fat or fat-free milk and dairy products. Eat foods high in fiber  · Eat a variety of grain products every day. Include whole-grain foods that have lots of fiber and nutrients. Examples of whole-grain foods include oats, whole wheat bread, and brown rice. · Buy whole-grain breads and cereals, instead of white bread or pastries. Limit salt and sodium  · Limit how much salt and sodium you eat to help lower your blood pressure. · Taste food before you salt it. Add only a little salt when you think you need it. With time, your taste buds will adjust to less salt. · Eat fewer snack items, fast foods, and other high-salt, processed foods. Check food labels for the amount of sodium in packaged foods. · Choose low-sodium versions of canned goods (such as soups, vegetables, and beans). Limit sugar  · Limit drinks and foods with added sugar. These include candy, desserts, and soda pop. Limit alcohol  · Limit alcohol to no more than 2 drinks a day for men and 1 drink a day for women. Too much alcohol can cause health problems. When should you call for help? Watch closely for changes in your health, and be sure to contact your doctor if:    · You would like help planning heart-healthy meals. Where can you learn more? Go to http://www.WineShop.com/  Enter V137 in the search box to learn more about \"Heart-Healthy Diet: Care Instructions. \"  Current as of: August 22, 2019               Content Version: 12.6  © 4147-5635 [employer]. Care instructions adapted under license by [employer] (which disclaims liability or warranty for this information). If you have questions about a medical condition or this instruction, always ask your healthcare professional. [address] any warranty or liability for your use of this information. Learning About Low-Sodium Foods  What foods are low in sodium? The foods you eat contain nutrients, such as vitamins and minerals. Sodium is a nutrient. Your body needs the right amount to stay healthy and work as it should. You can use the list below to help you make choices about which foods to eat. Fruits  · Fresh, frozen, canned, or dried fruit  Vegetables  · Fresh or frozen vegetables, with no added salt  · Canned vegetables, low-sodium or with no added salt  Grains  · Bagels without salted tops  · Cereal with no added salt  · Corn tortillas  · Crackers with no added salt  · Oatmeal, cooked without salt  · Popcorn with no salt  · Pasta and noodles, cooked without salt  · Rice, cooked without salt  · Unsalted pretzels  Dairy and dairy alternatives  · Butter, unsalted  · Cream cheese  · Ice cream  · Milk  · Soy milk  Meats and other protein foods  · Beans and peas, canned with no salt  · Eggs  · Fresh fish (not smoked)  · Fresh meats (not smoked or cured)  · Nuts and nut butter, prepared without salt  · Poultry, not packaged with sodium solution  · Tofu, unseasoned  · Tuna, canned without salt  Seasonings  · Garlic  · Herbs and spices  · Lemon juice  · Mustard  · Olive oil  · Salt-free seasoning mixes  · Vinegar  Work with your doctor to find out how much of this nutrient you need. Depending on your health, you may need more or less of it in your diet. Where can you learn more?   Go to http://www.gray.com/  Enter S460 in the search box to learn more about \"Learning About Low-Sodium Foods.\"  Current as of: August 22, 2019               Content Version: 12.6  © 0723-8748 [employer]. Care instructions adapted under license by [employer] (which disclaims liability or warranty for this information). If you have questions about a medical condition or this instruction, always ask your healthcare professional. [address] any warranty or liability for your use of this information. Low Sodium Diet (2,000 Milligram): Care Instructions  Your Care Instructions     Too much sodium causes your body to hold on to extra water. This can raise your blood pressure and force your heart and kidneys to work harder. In very serious cases, this could cause you to be put in the hospital. It might even be life-threatening. By limiting sodium, you will feel better and lower your risk of serious problems. The most common source of sodium is salt. People get most of the salt in their diet from canned, prepared, and packaged foods. Fast food and restaurant meals also are very high in sodium. Your doctor will probably limit your sodium to less than 2,000 milligrams (mg) a day. This limit counts all the sodium in prepared and packaged foods and any salt you add to your food. Follow-up care is a key part of your treatment and safety. Be sure to make and go to all appointments, and call your doctor if you are having problems. It's also a good idea to know your test results and keep a list of the medicines you take. How can you care for yourself at home? Read food labels  · Read labels on cans and food packages. The labels tell you how much sodium is in each serving. Make sure that you look at the serving size. If you eat more than the serving size, you have eaten more sodium. · Food labels also tell you the Percent Daily Value for sodium. Choose products with low Percent Daily Values for sodium.   · Be aware that sodium can come in forms other than salt, including monosodium glutamate (MSG), sodium citrate, and sodium bicarbonate (baking soda). MSG is often added to Asian food. When you eat out, you can sometimes ask for food without MSG or added salt. Buy low-sodium foods  · Buy foods that are labeled \"unsalted\" (no salt added), \"sodium-free\" (less than 5 mg of sodium per serving), or \"low-sodium\" (less than 140 mg of sodium per serving). Foods labeled \"reduced-sodium\" and \"light sodium\" may still have too much sodium. Be sure to read the label to see how much sodium you are getting. · Buy fresh vegetables, or frozen vegetables without added sauces. Buy low-sodium versions of canned vegetables, soups, and other canned goods. Prepare low-sodium meals  · Cut back on the amount of salt you use in cooking. This will help you adjust to the taste. Do not add salt after cooking. One teaspoon of salt has about 2,300 mg of sodium. · Take the salt shaker off the table. · Flavor your food with garlic, lemon juice, onion, vinegar, herbs, and spices. Do not use soy sauce, lite soy sauce, steak sauce, onion salt, garlic salt, celery salt, mustard, or ketchup on your food. · Use low-sodium salad dressings, sauces, and ketchup. Or make your own salad dressings and sauces without adding salt. · Use less salt (or none) when recipes call for it. You can often use half the salt a recipe calls for without losing flavor. Other foods such as rice, pasta, and grains do not need added salt. · Rinse canned vegetables, and cook them in fresh water. This removes somebut not allof the salt. · Avoid water that is naturally high in sodium or that has been treated with water softeners, which add sodium. Call your local water company to find out the sodium content of your water supply. If you buy bottled water, read the label and choose a sodium-free brand. Avoid high-sodium foods  · Avoid eating:  ? Smoked, cured, salted, and canned meat, fish, and poultry.   ? Ham, lema, hot dogs, and luncheon meats.  ? Regular, hard, and processed cheese and regular peanut butter. ? Crackers with salted tops, and other salted snack foods such as pretzels, chips, and salted popcorn. ? Frozen prepared meals, unless labeled low-sodium. ? Canned and dried soups, broths, and bouillon, unless labeled sodium-free or low-sodium. ? Canned vegetables, unless labeled sodium-free or low-sodium. ? [place] fries, pizza, tacos, and other fast foods. ? Pickles, olives, ketchup, and other condiments, especially soy sauce, unless labeled sodium-free or low-sodium. Where can you learn more? Go to http://www.gray.com/  Enter V843 in the search box to learn more about \"Low Sodium Diet (2,000 Milligram): Care Instructions. \"  Current as of: August 22, 2019               Content Version: 12.6  © 7072-2171 [employer]. Care instructions adapted under license by [employer] (which disclaims liability or warranty for this information). If you have questions about a medical condition or this instruction, always ask your healthcare professional. [place] [address] any warranty or liability for your use of this information. High Cholesterol: Care Instructions  Your Care Instructions     Cholesterol is a type of fat in your blood. It is needed for many body functions, such as making new cells. Cholesterol is made by your body. It also comes from food you eat. High cholesterol means that you have too much of the fat in your blood. This raises your risk of a heart attack and stroke. LDL and HDL are part of your total cholesterol. LDL is the \"bad\" cholesterol. High LDL can raise your risk for heart disease, heart attack, and stroke. HDL is the \"good\" cholesterol. It helps clear bad cholesterol from the body. High HDL is linked with a lower risk of heart disease, heart attack, and stroke.   Your cholesterol levels help your doctor find out your risk for having a heart attack or stroke. You and your doctor can talk about whether you need to lower your risk and what treatment is best for you. A heart-healthy lifestyle along with medicines can help lower your cholesterol and your risk. The way you choose to lower your risk will depend on how high your risk is for heart attack and stroke. It will also depend on how you feel about taking medicines. Follow-up care is a key part of your treatment and safety. Be sure to make and go to all appointments, and call your doctor if you are having problems. It's also a good idea to know your test results and keep a list of the medicines you take. How can you care for yourself at home? · Eat a variety of foods every day. Good choices include fruits, vegetables, whole grains (like oatmeal), dried beans and peas, nuts and seeds, soy products (like tofu), and fat-free or low-fat dairy products. · Replace butter, margarine, and hydrogenated or partially hydrogenated oils with olive and canola oils. (Canola oil margarine without trans fat is fine.)  · Replace red meat with fish, poultry, and soy protein (like tofu). · Limit processed and packaged foods like chips, crackers, and cookies. · Bake, broil, or steam foods. Don't osborn them. · Be physically active. Get at least 30 minutes of exercise on most days of the week. Walking is a good choice. You also may want to do other activities, such as running, swimming, cycling, or playing tennis or team sports. · Stay at a healthy weight or lose weight by making the changes in eating and physical activity listed above. Losing just a small amount of weight, even 5 to 10 pounds, can reduce your risk for having a heart attack or stroke. · Do not smoke. When should you call for help? Watch closely for changes in your health, and be sure to contact your doctor if:    · You need help making lifestyle changes. · You have questions about your medicine. Where can you learn more?   Go to http://www.gray.com/  Enter T106 in the search box to learn more about \"High Cholesterol: Care Instructions. \"  Current as of: December 16, 2019               Content Version: 12.6  © 7175-4891 [employer]. Care instructions adapted under license by [employer] (which disclaims liability or warranty for this information). If you have questions about a medical condition or this instruction, always ask your healthcare professional. [address] any warranty or liability for your use of this information.

## 2022-03-18 PROBLEM — Z87.81 HISTORY OF FRACTURED RIB: Status: ACTIVE | Noted: 2019-12-29

## 2022-03-18 PROBLEM — R73.03 PREDIABETES: Status: ACTIVE | Noted: 2021-07-24

## 2022-03-19 PROBLEM — G89.29 CHRONIC RIGHT SHOULDER PAIN: Status: ACTIVE | Noted: 2020-07-03

## 2022-03-19 PROBLEM — G44.219 HEADACHE, TENSION TYPE, EPISODIC: Status: ACTIVE | Noted: 2019-10-12

## 2022-03-19 PROBLEM — K44.9 HIATAL HERNIA: Status: ACTIVE | Noted: 2019-04-07

## 2022-03-19 PROBLEM — M25.511 CHRONIC RIGHT SHOULDER PAIN: Status: ACTIVE | Noted: 2020-07-03

## 2022-03-19 PROBLEM — R73.01 IMPAIRED FASTING GLUCOSE: Status: ACTIVE | Noted: 2021-07-24

## 2022-03-21 DIAGNOSIS — F41.9 ANXIETY: ICD-10-CM

## 2022-03-21 RX ORDER — LORAZEPAM 1 MG/1
1 TABLET ORAL
Qty: 60 TABLET | Refills: 0 | Status: SHIPPED | OUTPATIENT
Start: 2022-03-21 | End: 2022-04-27 | Stop reason: SDUPTHER

## 2022-03-21 NOTE — TELEPHONE ENCOUNTER
[facility]22 for a 20 d/s. Last Visit: 1/25/22 with MD Pete Starr  Next Appointment: 9/21/22 with MD Pete Starr  Previous Refill Encounter(s): 1/25/22 #60    Requested Prescriptions     Pending Prescriptions Disp Refills    LORazepam (ATIVAN) 1 mg tablet 60 Tablet 0     Sig: Take 1 Tablet by mouth every eight (8) hours as needed for Anxiety. Max Daily Amount: 3 mg.

## 2022-03-28 ENCOUNTER — OFFICE VISIT (OUTPATIENT)
Dept: CARDIOLOGY CLINIC | Age: 82
End: 2022-03-28
Payer: MEDICARE

## 2022-03-28 VITALS
HEIGHT: 70 IN | OXYGEN SATURATION: 97 % | HEART RATE: 84 BPM | WEIGHT: 168 LBS | DIASTOLIC BLOOD PRESSURE: 82 MMHG | SYSTOLIC BLOOD PRESSURE: 128 MMHG | BODY MASS INDEX: 24.05 KG/M2

## 2022-03-28 DIAGNOSIS — I44.30 AV BLOCK: ICD-10-CM

## 2022-03-28 DIAGNOSIS — I48.0 PAROXYSMAL ATRIAL FIBRILLATION (HCC): ICD-10-CM

## 2022-03-28 DIAGNOSIS — Z95.0 PACEMAKER: ICD-10-CM

## 2022-03-28 DIAGNOSIS — I10 ESSENTIAL HYPERTENSION: Primary | ICD-10-CM

## 2022-03-28 PROCEDURE — G8510 SCR DEP NEG, NO PLAN REQD: HCPCS | Performed by: INTERNAL MEDICINE

## 2022-03-28 PROCEDURE — G8754 DIAS BP LESS 90: HCPCS | Performed by: INTERNAL MEDICINE

## 2022-03-28 PROCEDURE — 93280 PM DEVICE PROGR EVAL DUAL: CPT | Performed by: INTERNAL MEDICINE

## 2022-03-28 PROCEDURE — G8536 NO DOC ELDER MAL SCRN: HCPCS | Performed by: INTERNAL MEDICINE

## 2022-03-28 PROCEDURE — G8752 SYS BP LESS 140: HCPCS | Performed by: INTERNAL MEDICINE

## 2022-03-28 PROCEDURE — G8420 CALC BMI NORM PARAMETERS: HCPCS | Performed by: INTERNAL MEDICINE

## 2022-03-28 PROCEDURE — 99214 OFFICE O/P EST MOD 30 MIN: CPT | Performed by: INTERNAL MEDICINE

## 2022-03-28 PROCEDURE — G8427 DOCREV CUR MEDS BY ELIG CLIN: HCPCS | Performed by: INTERNAL MEDICINE

## 2022-03-28 PROCEDURE — 1101F PT FALLS ASSESS-DOCD LE1/YR: CPT | Performed by: INTERNAL MEDICINE

## 2022-03-28 NOTE — PROGRESS NOTES
Yolanda Shadoe presents today for   Chief Complaint   Patient presents with    Follow-up     6 month       Yolanda Wilson preferred language for health care discussion is english/other. Is someone accompanying this pt? no    Is the patient using any DME equipment during [address]? no    Depression Screening:  3 most recent PHQ Screens 3/28/2022   Little interest or pleasure in doing things Not at all   Feeling down, depressed, irritable, or hopeless Not at all   Total Score PHQ 2 0       Learning Assessment:  Learning Assessment 3/28/2022   PRIMARY LEARNER Patient   HIGHEST LEVEL OF EDUCATION - PRIMARY LEARNER  -   BARRIERS PRIMARY LEARNER -   CO-LEARNER CAREGIVER -   PRIMARY LANGUAGE ENGLISH   LEARNER PREFERENCE PRIMARY DEMONSTRATION   ANSWERED BY patient   RELATIONSHIP SELF       Abuse Screening:  Abuse Screening Questionnaire 3/28/2022   Do you ever feel afraid of your partner? N   Are you in a relationship with someone who physically or mentally threatens you? N   Is it safe for you to go home? Y       Fall Risk  Fall Risk Assessment, last 12 mths 3/28/2022   Able to walk? Yes   Fall in past 12 months? 0   Do you feel unsteady? 0   Are you worried about falling 0   Number of falls in past 12 months -   Fall with injury? -           Pt currently taking Anticoagulant therapy? no    Pt currently taking Antiplatelet therapy ? no      Coordination of Care:  1. Have you been to the ER, urgent care clinic since your last visit? Hospitalized since your last visit? no    2. Have you seen or consulted any other health care providers outside of the [address] [place] [address] since your last visit? Include any pap smears or colon screening.  no

## 2022-03-28 NOTE — PROGRESS NOTES
HISTORY OF PRESENT ILLNESS  Jenifer Bowden is a 79 y.o. male. Follow-up  Pertinent negatives include no chest pain, no abdominal pain, no headaches and no shortness of breath. Patient presents for a scheduled followup visit. He has a history of intermittent high-grade AV block, status post to dual-chamber permanent pacemaker in 2007. He also has a history of hypertension, Brief episodes approximately atrial fibrillation, and a mildly depressed LV systolic function,  Which returned to low normal on echocardiogram in June 2013 showing an ejection fraction of 50-55%. The patient underwent a pacemaker generator exchange in May 2016 with a [employer] MRI safe device after his device has reached elective replacement indicator. The patient was last seen in the office 6 months ago. Since last visit he continues to feel well. He admits he was not as active over the winter this year as he typically is, however he has not noted any heart palpitations, dizziness or syncope. No chest pain, shortness of breath, leg swelling, orthopnea, PND. Past Medical History:   Diagnosis Date    Anxiety     AV block     intermittent, high-grade    Bilateral inguinal hernia     Cardiac echocardiogram 06/25/2013    EF 50-55%. No WMA. Mild LVH. Gr 1 DDfx. RVE.  AKANKSHA. Mild TR. No significant chg from study of 6/8/11.  Cardiac nuclear imaging test 10/07/2009    Likely inferior & anterior septal artifact. EF 55%. Mild septal WMA could be related to pacemaker.  Cardiomyopathy ([address]     Diverticulosis of colon 11/10/2015    Dysphagia     Multiple esophageal dilations.  Essential hypertension with goal blood pressure less than 140/90     HLD (hyperlipidemia)     Hx of colonoscopy 11/10/2015    Dr. Patience Christensen; tubulovillous adenoma.  Pacemaker 2007    Medtronic dual-chamber device.     Paroxysmal atrial fibrillation (HCC)     single episode noted on routine device check      Current Outpatient Medications   Medication Sig Dispense Refill    LORazepam (ATIVAN) 1 mg tablet Take 1 Tablet by mouth every eight (8) hours as needed for Anxiety. Max Daily Amount: 3 mg. 60 Tablet 0    butalbital-acetaminophen-caffeine (FIORICET, ESGIC) -40 mg per tablet Take 1 Tablet by mouth every six (6) hours as needed for Headache. 30 Tablet 0    cyclobenzaprine (FLEXERIL) 5 mg tablet TAKE 1-2 TABLETS BY MOUTH EVERY 8 HOURS AS NEEDED FOR MUSCLE SPASM 25 Tablet 0    lisinopriL (PRINIVIL, ZESTRIL) 2.5 mg tablet Take 2 Tablets by mouth two (2) times a day. 360 Tablet 3    VIT C/E/ZN/COPPR/LUTEIN/ZEAXAN (PRESERVISION AREDS 2 PO) Take  by mouth.  omeprazole (PRILOSEC) 20 mg capsule Take 20 mg by mouth daily as needed.  timolol (TIMOPTIC) 0.25 % ophthalmic solution Administer 1 Drop to both eyes two (2) times a day.  MULTIVITAMINS W-MINERALS/LUT (CENTRUM SILVER PO) Take  by mouth daily.  OMEGA-3 FATTY ACIDS (OMEGA 3 PO) Take 2,400 mg by mouth two (2) times a day.  coenzyme q10 100 mg Cap Take  by mouth daily.  metronidazole (METROGEL) 1 % topical gel Apply  to affected area daily. Use a thin layer to affected areas after washing       zinc 50 mg capsule Take  by mouth daily.  magnesium 250 mg Tab Take  by mouth daily.  selenium 100 mcg Cap Take  by mouth daily.  ascorbic acid (VITAMIN C) 500 mg tablet Take  by mouth two (2) times a day. No Known Allergies     Social History     Tobacco Use    Smoking status: Former Smoker     Quit date: 1966     Years since quittin.7    Smokeless tobacco: Never Used   Vaping Use    Vaping Use: Never used   Substance Use Topics    Alcohol use: No     Comment: 1998 last time he had alcohol.  Drug use: No       Family History   Problem Relation Age of Onset    Heart Surgery Mother     Stroke Father     Alcohol abuse Father          Review of Systems   Constitutional: Negative for chills, fever and weight loss. HENT: Negative for nosebleeds. Eyes: Negative for blurred vision and double vision. Respiratory: Negative for cough, shortness of breath and wheezing. Cardiovascular: Negative for chest pain, palpitations, orthopnea, claudication, leg swelling and PND. Gastrointestinal: Negative for abdominal pain, heartburn, nausea and vomiting. Genitourinary: Negative for dysuria and hematuria. Musculoskeletal: Negative for falls and myalgias. Skin: Negative for rash. Neurological: Negative for dizziness, focal weakness and headaches. Endo/Heme/Allergies: Does not bruise/bleed easily. Psychiatric/Behavioral: Negative for substance abuse. Visit Vitals  /82 (BP 1 Location: Left upper arm, BP Patient Position: Sitting, BP Cuff Size: Adult)   Pulse 84   Ht 5' 10\" (1.778 m)   Wt 76.2 kg (168 lb)   SpO2 97%   BMI 24.11 kg/m²      Physical Exam  Constitutional:       Appearance: He is well-developed. HENT:      Head: Normocephalic and atraumatic. Eyes:      Conjunctiva/sclera: Conjunctivae normal.   Neck:      Vascular: No carotid bruit or JVD. Cardiovascular:      Rate and Rhythm: Normal rate and regular rhythm. Pulses: Normal pulses. Heart sounds: S1 normal and S2 normal. No murmur heard. No gallop. No S3 sounds. Pulmonary:      Breath sounds: Normal breath sounds. No wheezing or rales. Abdominal:      General: Bowel sounds are normal.      Palpations: Abdomen is soft. Tenderness: There is no abdominal tenderness. Musculoskeletal:         General: No swelling or deformity. Cervical back: Neck supple. Skin:     General: Skin is warm and dry. Neurological:      General: No focal deficit present. Mental Status: He is alert and oriented to person, place, and time. Psychiatric:         Mood and Affect: Mood normal.       Pacemaker interrogation: Pacemaker generator at middle of life. Estimated longevity remains that 3.5 years. No significant atrial arrhythmias. Pacing in the atrium 5 % in the ventricle 100%. Stable activity level. Scanned document for details. ASSESSMENT and PLAN    Essential hypertension: Patient's blood pressure remains well controlled on  lisinopril 5 mg twice daily. This is followed closely by his PCP. He can continue this as monotherapy. Paroxysmal atrial fibrillation. Patient had a single episode of atrial fibrillation in November 2017 lasting for little less than 2 hours. No recurrent episodes since that time. He was asymptomatic to the arrhythmia. No need for anticoagulation at this time. This will be continued to be monitored every 3 months with routine device checks. 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 Patient has an MRI compatible device. Normal device function on interrogation today. He remains pacemaker dependent in the ventricle. His battery life remains at 3.5 years. Dyslipidemia. Patient has been managing this with diet control and takes a fish oil supplement. [facility] device check in 3 months. Followup in 6 months, sooner if needed.

## 2022-04-27 DIAGNOSIS — F41.9 ANXIETY: ICD-10-CM

## 2022-04-27 RX ORDER — LORAZEPAM 1 MG/1
1 TABLET ORAL
Qty: 60 TABLET | Refills: 0 | Status: SHIPPED | OUTPATIENT
Start: 2022-04-27 | End: 2022-06-02 | Stop reason: SDUPTHER

## 2022-04-27 NOTE — TELEPHONE ENCOUNTER
[facility]22 for a 20 d/s. Last Visit: 1/25/22 with MD Ann Marie Girard  Next Appointment: 9/21/22 with MD Ann Marie Girard  Previous Refill Encounter(s): 3/21/22 #60    Requested Prescriptions     Pending Prescriptions Disp Refills    LORazepam (ATIVAN) 1 mg tablet 60 Tablet 0     Sig: Take 1 Tablet by mouth every eight (8) hours as needed for Anxiety. Max Daily Amount: 3 mg.

## 2022-05-23 ENCOUNTER — TELEPHONE (OUTPATIENT)
Dept: INTERNAL MEDICINE CLINIC | Age: 82
End: 2022-05-23

## 2022-05-23 NOTE — TELEPHONE ENCOUNTER
----- Message from Albina Islas sent at 5/23/2022 10:24 AM EDT -----  Subject: Message to Provider    QUESTIONS  Information for Provider? Pt said he wants antibotics called in to his   pharmacy for his sinus infection. Pt said he needs something to break up   this mucas. Pt said he would [facility]. Please   let him know if this is possible or if he needs to be seen in person   first. Pt said he has not taken anything for it yet.  ---------------------------------------------------------------------------  --------------  CALL BACK INFO  What is the best way for the office to contact you? OK to leave message on   voicemail  Preferred Call Back Phone Number? [phone]  ---------------------------------------------------------------------------  --------------  SCRIPT ANSWERS  Relationship to Patient?  Self

## 2022-06-02 DIAGNOSIS — F41.9 ANXIETY: ICD-10-CM

## 2022-06-02 RX ORDER — LORAZEPAM 1 MG/1
1 TABLET ORAL
Qty: 60 TABLET | Refills: 0 | Status: SHIPPED | OUTPATIENT
Start: 2022-06-02 | End: 2022-07-12 | Stop reason: SDUPTHER

## 2022-06-02 NOTE — TELEPHONE ENCOUNTER
[facility]27/22 for a 20 d/s. Last Visit: 1/25/22 with MD Matt Hill  Next Appointment: 9/21/22 with MD Matt Hill  Previous Refill Encounter(s): 4/27/22 #60    Requested Prescriptions     Pending Prescriptions Disp Refills    LORazepam (ATIVAN) 1 mg tablet 60 Tablet 0     Sig: Take 1 Tablet by mouth every eight (8) hours as needed for Anxiety. Max Daily Amount: 3 mg.          For Memo Shah in place:    Recommendation Provided To:    Intervention Detail: New Rx: 1, reason: Patient Preference   Gap Closed?:    Intervention Accepted By:   Briana Odell Time Spent (min): 5

## 2022-06-29 ENCOUNTER — OFFICE VISIT (OUTPATIENT)
Dept: CARDIOLOGY CLINIC | Age: 82
End: 2022-06-29
Payer: MEDICARE

## 2022-06-29 DIAGNOSIS — Z95.0 PACEMAKER: ICD-10-CM

## 2022-06-29 DIAGNOSIS — I44.30 AV BLOCK: Primary | ICD-10-CM

## 2022-06-29 DIAGNOSIS — I48.0 PAROXYSMAL ATRIAL FIBRILLATION (HCC): ICD-10-CM

## 2022-06-29 PROCEDURE — 93296 REM INTERROG EVL PM/IDS: CPT | Performed by: INTERNAL MEDICINE

## 2022-06-29 PROCEDURE — 93294 REM INTERROG EVL PM/LDLS PM: CPT | Performed by: INTERNAL MEDICINE

## 2022-07-12 DIAGNOSIS — F41.9 ANXIETY: ICD-10-CM

## 2022-07-12 RX ORDER — LORAZEPAM 1 MG/1
1 TABLET ORAL
Qty: 30 TABLET | Refills: 0 | Status: SHIPPED | OUTPATIENT
Start: 2022-07-12 | End: 2022-08-04 | Stop reason: SDUPTHER

## 2022-07-12 NOTE — TELEPHONE ENCOUNTER
[facility] Ativan as 6/2/22 for a 20 d/s. Last Visit: 1/25/22 with MD Radhika Koo  Next Appointment: 9/21/22 with MD Radhika Koo  Previous Refill Encounter(s): 6/2/22 #60    Requested Prescriptions     Pending Prescriptions Disp Refills    LORazepam (ATIVAN) 1 mg tablet 60 Tablet 0     Sig: Take 1 Tablet by mouth every eight (8) hours as needed for Anxiety. Max Daily Amount: 3 mg.          For Memo Shah in place:    Recommendation Provided To:    Intervention Detail: New Rx: 1, reason: Patient Preference   Gap Closed?:    Intervention Accepted By:   Eleuterio Time Spent (min): 5

## 2022-07-12 NOTE — TELEPHONE ENCOUNTER
Requested Prescriptions     Signed Prescriptions Disp Refills    LORazepam (ATIVAN) 1 mg tablet 30 Tablet 0     Sig: Take 1 Tablet by mouth every eight (8) hours as needed for Anxiety. Max Daily Amount: 3 mg.      Authorizing Provider: Destiny Salgado

## 2022-08-04 DIAGNOSIS — F41.9 ANXIETY: ICD-10-CM

## 2022-08-04 RX ORDER — LORAZEPAM 1 MG/1
1 TABLET ORAL
Qty: 30 TABLET | Refills: 0 | Status: SHIPPED | OUTPATIENT
Start: 2022-08-04 | End: 2022-09-01 | Stop reason: SDUPTHER

## 2022-08-04 NOTE — TELEPHONE ENCOUNTER
[facility]ivan as 7[facility]12/22 for a 10 d/s. Last Visit: 1/25/22 with MD Winnie Nichols  Next Appointment: 9/21/22 with MD Winnie Nichols  Previous Refill Encounter(s): 7/12/22 #30    Requested Prescriptions     Pending Prescriptions Disp Refills    LORazepam (ATIVAN) 1 mg tablet 30 Tablet 0     Sig: Take 1 Tablet by mouth every eight (8) hours as needed for Anxiety. Max Daily Amount: 3 mg. For [facility] in place:   Recommendation Provided To:    Intervention Detail: New Rx: 1, reason: Patient Preference  Gap Closed?:   Intervention Accepted By:   Time Spent (min): 5

## 2022-08-04 NOTE — PROGRESS NOTES
I have personally seen and evaluated the device findings. Interrogation reviewed and I agree with assessment.     Carloz Mccormick

## 2022-09-01 DIAGNOSIS — F41.9 ANXIETY: ICD-10-CM

## 2022-09-01 RX ORDER — LORAZEPAM 1 MG/1
1 TABLET ORAL
Qty: 30 TABLET | Refills: 0 | Status: SHIPPED | OUTPATIENT
Start: 2022-09-01 | End: 2022-09-26 | Stop reason: SDUPTHER

## 2022-09-01 NOTE — TELEPHONE ENCOUNTER
[facility]ivan as 8[facility]4/22 for a 10 d/s. Last Visit: 1/25/22 with MD Winnie Nichols  Next Appointment: 9/21/22 with MD Winnie Nichols  Previous Refill Encounter(s): 8/4/22 #30    Requested Prescriptions     Pending Prescriptions Disp Refills    LORazepam (ATIVAN) 1 mg tablet 30 Tablet 0     Sig: Take 1 Tablet by mouth every eight (8) hours as needed for Anxiety. Max Daily Amount: 3 mg. For [address] in place:   Recommendation Provided To:    Intervention Detail: New Rx: 1, reason: Patient Preference  Gap Closed?:   Intervention Accepted By:   Time Spent (min): 5

## 2022-09-14 ENCOUNTER — HOSPITAL ENCOUNTER (OUTPATIENT)
Dept: LAB | Age: 82
Discharge: HOME | End: 2022-09-14
Payer: MEDICARE

## 2022-09-14 ENCOUNTER — APPOINTMENT (OUTPATIENT)
Dept: INTERNAL MEDICINE CLINIC | Age: 82
End: 2022-09-14

## 2022-09-14 DIAGNOSIS — E78.5 HYPERLIPIDEMIA, UNSPECIFIED HYPERLIPIDEMIA TYPE: ICD-10-CM

## 2022-09-14 DIAGNOSIS — Z79.899 CHRONIC PRESCRIPTION BENZODIAZEPINE USE: ICD-10-CM

## 2022-09-14 DIAGNOSIS — R73.01 IMPAIRED FASTING GLUCOSE: ICD-10-CM

## 2022-09-14 DIAGNOSIS — I42.9 CARDIOMYOPATHY, UNSPECIFIED TYPE (HCC): ICD-10-CM

## 2022-09-14 DIAGNOSIS — R73.03 PREDIABETES: ICD-10-CM

## 2022-09-14 DIAGNOSIS — I48.0 PAROXYSMAL ATRIAL FIBRILLATION (HCC): ICD-10-CM

## 2022-09-14 DIAGNOSIS — I10 ESSENTIAL HYPERTENSION: ICD-10-CM

## 2022-09-14 DIAGNOSIS — F41.9 ANXIETY: ICD-10-CM

## 2022-09-14 LAB
ALBUMIN SERPL-MCNC: 4.2 G/DL (ref 3.4–5)
ALBUMIN/GLOB SERPL: 1.4 {RATIO} (ref 0.8–1.7)
ALP SERPL-CCNC: 57 U/L (ref 45–117)
ALT SERPL-CCNC: 22 U/L (ref 16–61)
ANION GAP SERPL CALC-SCNC: 7 MMOL/L (ref 3–18)
AST SERPL-CCNC: 18 U/L (ref 10–38)
BILIRUB SERPL-MCNC: 0.6 MG/DL (ref 0.2–1)
BUN SERPL-MCNC: 25 MG/DL (ref 7–18)
BUN/CREAT SERPL: 33 (ref 12–20)
CALCIUM SERPL-MCNC: 9.8 MG/DL (ref 8.5–10.1)
CHLORIDE SERPL-SCNC: 100 MMOL/L (ref 100–111)
CHOLEST SERPL-MCNC: 181 MG/DL
CO2 SERPL-SCNC: 30 MMOL/L (ref 21–32)
CREAT SERPL-MCNC: 0.76 MG/DL (ref 0.6–1.3)
EST. AVERAGE GLUCOSE BLD GHB EST-MCNC: 114 MG/DL
GLOBULIN SER CALC-MCNC: 2.9 G/DL (ref 2–4)
GLUCOSE SERPL-MCNC: 90 MG/DL (ref 74–99)
HBA1C MFR BLD: 5.6 % (ref 4.2–5.6)
HDLC SERPL-MCNC: 48 MG/DL (ref 40–60)
HDLC SERPL: 3.8 {RATIO} (ref 0–5)
LDLC SERPL CALC-MCNC: 118.4 MG/DL (ref 0–100)
LIPID PROFILE,FLP: ABNORMAL
MAGNESIUM SERPL-MCNC: 2.4 MG/DL (ref 1.6–2.6)
POTASSIUM SERPL-SCNC: 4.5 MMOL/L (ref 3.5–5.5)
PROT SERPL-MCNC: 7.1 G/DL (ref 6.4–8.2)
SODIUM SERPL-SCNC: 137 MMOL/L (ref 136–145)
TRIGL SERPL-MCNC: 73 MG/DL (ref ?–150)
VLDLC SERPL CALC-MCNC: 14.6 MG/DL

## 2022-09-14 PROCEDURE — 80053 COMPREHEN METABOLIC PANEL: CPT

## 2022-09-14 PROCEDURE — 36415 COLL VENOUS BLD VENIPUNCTURE: CPT

## 2022-09-14 PROCEDURE — 80307 DRUG TEST PRSMV CHEM ANLYZR: CPT

## 2022-09-14 PROCEDURE — 80061 LIPID PANEL: CPT

## 2022-09-14 PROCEDURE — 83036 HEMOGLOBIN GLYCOSYLATED A1C: CPT

## 2022-09-14 PROCEDURE — 83735 ASSAY OF MAGNESIUM: CPT

## 2022-09-15 LAB
AMPHETAMINES UR QL SCN: NEGATIVE NG/ML
BARBITURATES UR QL SCN: NEGATIVE NG/ML
BENZODIAZ UR QL SCN: NEGATIVE NG/ML
BZE UR QL SCN: NEGATIVE NG/ML
CANNABINOIDS UR QL SCN: NEGATIVE NG/ML
CREAT UR-MCNC: 49.5 MG/DL (ref 20–300)
FENTANYL+NORFENTANYL UR QL SCN: NEGATIVE PG/ML
MEPERIDINE UR QL: NEGATIVE NG/ML
METHADONE UR QL SCN: NEGATIVE NG/ML
OPIATES UR QL SCN: NEGATIVE NG/ML
OXYCODONE+OXYMORPHONE UR QL SCN: NEGATIVE NG/ML
PCP UR QL: NEGATIVE NG/ML
PH UR: 7.9 [PH] (ref 4.5–8.9)
PLEASE NOTE:, 733157: NORMAL
PROPOXYPH UR QL SCN: NEGATIVE NG/ML
SP GR UR: 1.01
TRAMADOL UR QL SCN: NEGATIVE NG/ML

## 2022-09-21 ENCOUNTER — OFFICE VISIT (OUTPATIENT)
Dept: INTERNAL MEDICINE CLINIC | Age: 82
End: 2022-09-21
Payer: MEDICARE

## 2022-09-21 VITALS
SYSTOLIC BLOOD PRESSURE: 138 MMHG | HEIGHT: 70 IN | BODY MASS INDEX: 22.9 KG/M2 | HEART RATE: 70 BPM | RESPIRATION RATE: 16 BRPM | TEMPERATURE: 97 F | DIASTOLIC BLOOD PRESSURE: 82 MMHG | WEIGHT: 160 LBS | OXYGEN SATURATION: 97 %

## 2022-09-21 DIAGNOSIS — F41.9 ANXIETY: ICD-10-CM

## 2022-09-21 DIAGNOSIS — I48.0 PAROXYSMAL ATRIAL FIBRILLATION (HCC): ICD-10-CM

## 2022-09-21 DIAGNOSIS — E55.9 VITAMIN D DEFICIENCY, UNSPECIFIED: ICD-10-CM

## 2022-09-21 DIAGNOSIS — Z71.89 ADVANCED DIRECTIVES, COUNSELING/DISCUSSION: ICD-10-CM

## 2022-09-21 DIAGNOSIS — R73.03 PREDIABETES: ICD-10-CM

## 2022-09-21 DIAGNOSIS — Z95.0 PACEMAKER: ICD-10-CM

## 2022-09-21 DIAGNOSIS — I10 ESSENTIAL HYPERTENSION: Primary | ICD-10-CM

## 2022-09-21 DIAGNOSIS — Z12.5 PROSTATE CANCER SCREENING: ICD-10-CM

## 2022-09-21 DIAGNOSIS — E78.5 HYPERLIPIDEMIA, UNSPECIFIED HYPERLIPIDEMIA TYPE: ICD-10-CM

## 2022-09-21 DIAGNOSIS — Z79.899 CHRONIC PRESCRIPTION BENZODIAZEPINE USE: ICD-10-CM

## 2022-09-21 DIAGNOSIS — I42.9 CARDIOMYOPATHY, UNSPECIFIED TYPE (HCC): ICD-10-CM

## 2022-09-21 DIAGNOSIS — Z00.00 MEDICARE ANNUAL WELLNESS VISIT, SUBSEQUENT: ICD-10-CM

## 2022-09-21 DIAGNOSIS — Z13.31 SCREENING FOR DEPRESSION: ICD-10-CM

## 2022-09-21 PROCEDURE — 1123F ACP DISCUSS/DSCN MKR DOCD: CPT | Performed by: INTERNAL MEDICINE

## 2022-09-21 PROCEDURE — 99497 ADVNCD CARE PLAN 30 MIN: CPT | Performed by: INTERNAL MEDICINE

## 2022-09-21 PROCEDURE — G8420 CALC BMI NORM PARAMETERS: HCPCS | Performed by: INTERNAL MEDICINE

## 2022-09-21 PROCEDURE — 1101F PT FALLS ASSESS-DOCD LE1/YR: CPT | Performed by: INTERNAL MEDICINE

## 2022-09-21 PROCEDURE — G0439 PPPS, SUBSEQ VISIT: HCPCS | Performed by: INTERNAL MEDICINE

## 2022-09-21 PROCEDURE — 99214 OFFICE O/P EST MOD 30 MIN: CPT | Performed by: INTERNAL MEDICINE

## 2022-09-21 PROCEDURE — G8536 NO DOC ELDER MAL SCRN: HCPCS | Performed by: INTERNAL MEDICINE

## 2022-09-21 PROCEDURE — G0463 HOSPITAL OUTPT CLINIC VISIT: HCPCS | Performed by: INTERNAL MEDICINE

## 2022-09-21 PROCEDURE — G8752 SYS BP LESS 140: HCPCS | Performed by: INTERNAL MEDICINE

## 2022-09-21 PROCEDURE — G8754 DIAS BP LESS 90: HCPCS | Performed by: INTERNAL MEDICINE

## 2022-09-21 PROCEDURE — G8427 DOCREV CUR MEDS BY ELIG CLIN: HCPCS | Performed by: INTERNAL MEDICINE

## 2022-09-21 PROCEDURE — G8510 SCR DEP NEG, NO PLAN REQD: HCPCS | Performed by: INTERNAL MEDICINE

## 2022-09-21 RX ORDER — LORAZEPAM 1 MG/1
1 TABLET ORAL
Qty: 30 TABLET | Refills: 0 | Status: CANCELLED | OUTPATIENT
Start: 2022-09-21

## 2022-09-21 NOTE — PATIENT INSTRUCTIONS
Heart-Healthy Diet: Care Instructions  Your Care Instructions     A heart-healthy diet has lots of vegetables, fruits, nuts, beans, and whole grains, and is low in salt. It limits foods that are high in saturated fat, such as meats, cheeses, and fried foods. It may be hard to change your diet, but even small changes can lower your risk of heart attack and heart disease. Follow-up care is a key part of your treatment and safety. Be sure to make and go to all appointments, and call your doctor if you are having problems. It's also a good idea to know your test results and keep a list of the medicines you take. How can you care for yourself at home? Watch your portions  Learn what a serving is. A \"serving\" and a \"portion\" are not always the same thing. Make sure that you are not eating larger portions than are recommended. For example, a serving of pasta is ½ cup. A serving size of meat is 2 to 3 ounces. A 3-ounce serving is about the size of a deck of cards. Measure serving sizes until you are good at [place]" them. Keep in mind that restaurants often serve portions that are 2 or 3 times the size of one serving. To keep your energy level up and keep you from feeling hungry, eat often but in smaller portions. Eat only the number of calories you need to stay at a healthy weight. If you need to lose weight, eat fewer calories than your body burns (through exercise and other physical activity). Eat more fruits and vegetables  Eat a variety of fruit and vegetables every day. Dark green, deep orange, red, or yellow fruits and vegetables are especially good for you. Examples include spinach, carrots, peaches, and berries. Keep carrots, celery, and other veggies handy for snacks. Buy fruit that is in season and store it where you can see it so that you will be tempted to eat it. Cook dishes that have a lot of veggies in them, such as stir-fries and soups.   Limit saturated and trans fat  Read food labels, and try to avoid saturated and trans fats. They increase your risk of heart disease. Use olive or canola oil when you cook. Bake, broil, grill, or steam foods instead of frying them. Choose lean meats instead of high-fat meats such as hot dogs and sausages. Cut off all visible fat when you prepare meat. Eat fish, skinless poultry, and meat alternatives such as soy products instead of high-fat meats. Soy products, such as tofu, may be especially good for your heart. Choose low-fat or fat-free milk and dairy products. Eat foods high in fiber  Eat a variety of grain products every day. Include whole-grain foods that have lots of fiber and nutrients. Examples of whole-grain foods include oats, whole wheat bread, and brown rice. Buy whole-grain breads and cereals, instead of white bread or pastries. Limit salt and sodium  Limit how much salt and sodium you eat to help lower your blood pressure. Taste food before you salt it. Add only a little salt when you think you need it. With time, your taste buds will adjust to less salt. Eat fewer snack items, fast foods, and other high-salt, processed foods. Check food labels for the amount of sodium in packaged foods. Choose low-sodium versions of canned goods (such as soups, vegetables, and beans). Limit sugar  Limit drinks and foods with added sugar. These include candy, desserts, and soda pop. Limit alcohol  Limit alcohol to no more than 2 drinks a day for men and 1 drink a day for women. Too much alcohol can cause health problems. When should you call for help? Watch closely for changes in your health, and be sure to contact your doctor if:    You would like help planning heart-healthy meals. Where can you learn more? Go to http://www.godinez.com/  Enter V137 in the search box to learn more about \"Heart-Healthy Diet: Care Instructions. \"  Current as of: August 22, 2019               Content Version: 12.6  © 5344-2657 [employer], Incorporated. Care instructions adapted under license by [employer] (which disclaims liability or warranty for this information). If you have questions about a medical condition or this instruction, always ask your healthcare professional. [address] any warranty or liability for your use of this information. Learning About Low-Sodium Foods  What foods are low in sodium? The foods you eat contain nutrients, such as vitamins and minerals. Sodium is a nutrient. Your body needs the right amount to stay healthy and work as it should. You can use the list below to help you make choices about which foods to eat. Fruits  Fresh, frozen, canned, or dried fruit  Vegetables  Fresh or frozen vegetables, with no added salt  Canned vegetables, low-sodium or with no added salt  Grains  Bagels without salted tops  Cereal with no added salt  Corn tortillas  Crackers with no added salt  Oatmeal, cooked without salt  Popcorn with no salt  Pasta and noodles, cooked without salt  Rice, cooked without salt  Unsalted pretzels  Dairy and dairy alternatives  Butter, unsalted  Cream cheese  Ice cream  Milk  Soy milk  Meats and other protein foods  Beans and peas, canned with no salt  Eggs  Fresh fish (not smoked)  Fresh meats (not smoked or cured)  Nuts and nut butter, prepared without salt  Poultry, not packaged with sodium solution  Tofu, unseasoned  Tuna, canned without salt  Seasonings  Garlic  Herbs and spices  Lemon juice  Mustard  Olive oil  Salt-free seasoning mixes  Vinegar  Work with your doctor to find out how much of this nutrient you need. Depending on your health, you may need more or less of it in your diet. Where can you learn more? Go to http://www.gray.com/  Enter S460 in the search box to learn more about \"Learning About Low-Sodium Foods. \"  Current as of: August 22, 2019               Content Version: 12.6  © 7750-1477 [employer]. Care instructions adapted under license by [employer] (which disclaims liability or warranty for this information). If you have questions about a medical condition or this instruction, always ask your healthcare professional. [address] any warranty or liability for your use of this information. Low Sodium Diet (2,000 Milligram): Care Instructions  Your Care Instructions     Too much sodium causes your body to hold on to extra water. This can raise your blood pressure and force your heart and kidneys to work harder. In very serious cases, this could cause you to be put in the hospital. It might even be life-threatening. By limiting sodium, you will feel better and lower your risk of serious problems. The most common source of sodium is salt. People get most of the salt in their diet from canned, prepared, and packaged foods. Fast food and restaurant meals also are very high in sodium. Your doctor will probably limit your sodium to less than 2,000 milligrams (mg) a day. This limit counts all the sodium in prepared and packaged foods and any salt you add to your food. Follow-up care is a key part of your treatment and safety. Be sure to make and go to all appointments, and call your doctor if you are having problems. It's also a good idea to know your test results and keep a list of the medicines you take. How can you care for yourself at home? Read food labels  Read labels on cans and food packages. The labels tell you how much sodium is in each serving. Make sure that you look at the serving size. If you eat more than the serving size, you have eaten more sodium. Food labels also tell you the Percent Daily Value for sodium. Choose products with low Percent Daily Values for sodium. Be aware that sodium can come in forms other than salt, including monosodium glutamate (MSG), sodium citrate, and sodium bicarbonate (baking soda). MSG is often added to Asian food.  When you eat out, you can sometimes ask for food without MSG or added salt. Buy low-sodium foods  Buy foods that are labeled \"unsalted\" (no salt added), \"sodium-free\" (less than 5 mg of sodium per serving), or \"low-sodium\" (less than 140 mg of sodium per serving). Foods labeled \"reduced-sodium\" and \"light sodium\" may still have too much sodium. Be sure to read the label to see how much sodium you are getting. Buy fresh vegetables, or frozen vegetables without added sauces. Buy low-sodium versions of canned vegetables, soups, and other canned goods. Prepare low-sodium meals  Cut back on the amount of salt you use in cooking. This will help you adjust to the taste. Do not add salt after cooking. One teaspoon of salt has about 2,300 mg of sodium. Take the salt shaker off the table. Flavor your food with garlic, lemon juice, onion, vinegar, herbs, and spices. Do not use soy sauce, lite soy sauce, steak sauce, onion salt, garlic salt, celery salt, mustard, or ketchup on your food. Use low-sodium salad dressings, sauces, and ketchup. Or make your own salad dressings and sauces without adding salt. Use less salt (or none) when recipes call for it. You can often use half the salt a recipe calls for without losing flavor. Other foods such as rice, pasta, and grains do not need added salt. Rinse canned vegetables, and cook them in fresh water. This removes some--but not all--of the salt. Avoid water that is naturally high in sodium or that has been treated with water softeners, which add sodium. Call your local water company to find out the sodium content of your water supply. If you buy bottled water, read the label and choose a sodium-free brand. Avoid high-sodium foods  Avoid eating:  Smoked, cured, salted, and canned meat, fish, and poultry. Ham, lema, hot dogs, and luncheon meats. Regular, hard, and processed cheese and regular peanut butter.   Crackers with salted tops, and other salted snack foods such as pretzels, chips, and salted popcorn. Frozen prepared meals, unless labeled low-sodium. Canned and dried soups, broths, and bouillon, unless labeled sodium-free or low-sodium. Canned vegetables, unless labeled sodium-free or low-sodium. [place] fries, pizza, tacos, and other fast foods. Pickles, olives, ketchup, and other condiments, especially soy sauce, unless labeled sodium-free or low-sodium. Where can you learn more? Go to http://www.gray.com/  Enter V843 in the search box to learn more about \"Low Sodium Diet (2,000 Milligram): Care Instructions. \"  Current as of: August 22, 2019               Content Version: 12.6  © 4445-8699 [employer]. Care instructions adapted under license by [employer] (which disclaims liability or warranty for this information). If you have questions about a medical condition or this instruction, always ask your healthcare professional. [place] [address] any warranty or liability for your use of this information. High Cholesterol: Care Instructions  Your Care Instructions     Cholesterol is a type of fat in your blood. It is needed for many body functions, such as making new cells. Cholesterol is made by your body. It also comes from food you eat. High cholesterol means that you have too much of the fat in your blood. This raises your risk of a heart attack and stroke. LDL and HDL are part of your total cholesterol. LDL is the \"bad\" cholesterol. High LDL can raise your risk for heart disease, heart attack, and stroke. HDL is the \"good\" cholesterol. It helps clear bad cholesterol from the body. High HDL is linked with a lower risk of heart disease, heart attack, and stroke. Your cholesterol levels help your doctor find out your risk for having a heart attack or stroke. You and your doctor can talk about whether you need to lower your risk and what treatment is best for you.   A heart-healthy lifestyle along with medicines can help lower your cholesterol and your risk. The way you choose to lower your risk will depend on how high your risk is for heart attack and stroke. It will also depend on how you feel about taking medicines. Follow-up care is a key part of your treatment and safety. Be sure to make and go to all appointments, and call your doctor if you are having problems. It's also a good idea to know your test results and keep a list of the medicines you take. How can you care for yourself at home? Eat a variety of foods every day. Good choices include fruits, vegetables, whole grains (like oatmeal), dried beans and peas, nuts and seeds, soy products (like tofu), and fat-free or low-fat dairy products. Replace butter, margarine, and hydrogenated or partially hydrogenated oils with olive and canola oils. (Canola oil margarine without trans fat is fine.)  Replace red meat with fish, poultry, and soy protein (like tofu). Limit processed and packaged foods like chips, crackers, and cookies. Bake, broil, or steam foods. Don't osborn them. Be physically active. Get at least 30 minutes of exercise on most days of the week. Walking is a good choice. You also may want to do other activities, such as running, swimming, cycling, or playing tennis or team sports. Stay at a healthy weight or lose weight by making the changes in eating and physical activity listed above. Losing just a small amount of weight, even 5 to 10 pounds, can reduce your risk for having a heart attack or stroke. Do not smoke. When should you call for help? Watch closely for changes in your health, and be sure to contact your doctor if:    You need help making lifestyle changes. You have questions about your medicine. Where can you learn more? Go to http://www.gray.com/  Enter F548 in the search box to learn more about \"High Cholesterol: Care Instructions. \"  Current as of: December 16, 2019               Content Version: 12.6  © 8607-5798 [employer]. Care instructions adapted under license by [employer] (which disclaims liability or warranty for this information). If you have questions about a medical condition or this instruction, always ask your healthcare professional. [address] any warranty or liability for your use of this information. Medicare Wellness Visit, Male    The best way to improve and maintain good health is to have a healthy lifestyle by eating a well-balanced diet, exercising regularly, limiting alcohol and stopping smoking. Regular visits with your physician or non-physician health care provider also support your good health. Preventive screening tests can find health problems before they become diseases or illnesses. Preventive services such as immunizations prevent serious infections. All people over age 72 should have a Pneumovax and a Prevnar-13 shot to prevent potentially life threatening infections with the pneumococcus bacteria, a common cause of pneumonia. These are once in a lifetime unless you and your provider decide differently. All people over 65 should have a yearly influenza vaccine or \"flu\" shot. This does not prevent infection with cold viruses but has been proven to prevent hospitalization and death from influenza. Although Medicare part B \"regular Medicare\" currently only covers tetanus vaccination in the context of an injury, a tetanus vaccine (Tdap or Td) is recommended every 10 years. A shingles vaccine is recommended once in a lifetime after age 61. The Shingles vaccine is also not covered by Medicare part B. Note, however, that both the Shingles vaccine and Tdap/Td are generally covered by secondary carriers. Please check your coverage and out of pocket expenses. Consider contacting your local health department because it may stock these vaccines for a reasonable charge.     We currently have documentation of the following immunization history for you:  Immunization History   Administered Date(s) Administered    (RETIRED) Pneumococcal Vaccine (Unspecified Type) 01/01/2007    COVID-19, MODERNA BLUE border, Primary or Immunocompromised, (age 18y+), IM, 100 mcg/0.5mL 04/02/2021, 04/30/2021    COVID-19, MODERNA Booster BLUE border, (age 18y+), IM, 50mcg/0.25mL 10/28/2021    Influenza High Dose Vaccine PF 01/10/2013, 11/16/2015, 12/09/2016, 10/02/2017    Influenza Vaccine 12/31/2013, 11/20/2014    Influenza Vaccine (Tri) Adjuvanted (>65 Yrs FLUAD TRI 62004) 11/20/2018, 10/08/2019    Influenza, FLUAD, (age 72 y+), Adjuvanted 09/14/2020    Pneumococcal Conjugate (PCV-13) 08/03/2015    Pneumococcal Polysaccharide (PPSV-23) 01/01/2007    TDAP Vaccine 07/12/2012    Zoster 07/25/2007    Zoster Recombinant 05/15/2019, 08/01/2019       Screening for infection with Hepatitis C is recommended for anyone born between [birth year] through [address]. The table at the bottom of this document indicates the status of this and other screening services. Screening for diabetes mellitus with a blood sugar test (glucose) should be done at least every 3 years until age 79. You and your health care provider may decide whether to continue screening after age 79. The most recent blood glucose we have on file for you is:   Lab Results   Component Value Date/Time    Glucose 90 09/14/2022 07:57 AM       Glaucoma is a disease of the eye due to increased ocular pressure that can lead to blindness. People with risk factors for glaucoma ( race, diabetes, family history) should be screened at least every 2 years by an eye professional. This may be covered annually if indicated as determined by you and your doctor. Cardiovascular screening tests that check for elevated lipids or cholesterol (fatty part of blood) which can lead to heart disease and strokes should be done every 4-6 years through age 79.  You and your health care provider may decide whether to continue screening after age 79. The most recent lipid panel we have on file for you is:   Lab Results   Component Value Date/Time    Cholesterol, total 181 09/14/2022 07:57 AM    HDL Cholesterol 48 09/14/2022 07:57 AM    LDL, calculated 118.4 (H) 09/14/2022 07:57 AM    VLDL, calculated 14.6 09/14/2022 07:57 AM    Triglyceride 73 09/14/2022 07:57 AM    CHOL/HDL Ratio 3.8 09/14/2022 07:57 AM       Colorectal cancer screening that evaluates for blood or polyps in your colon for people with average risk should be done yearly as a stool test, every five years as a flexible sigmoidoscope or every 10 years as a colonoscopy up to age 76. You and your health care provider may decide whether to continue screening after age 76. Men up to age 76 may elect to screen for prostate cancer with a blood test called a PSA at certain intervals, depending on their personal and family history. This decision is between the patient and his provider. The most recent PSA values we have on file for you are:  Lab Results   Component Value Date/Time    Prostate Specific Ag 1.3 01/18/2022 07:57 AM    Prostate Specific Ag 1.0 01/12/2021 08:12 AM    Prostate Specific Ag 1.0 06/23/2020 09:39 AM       If you have been a smoker or had family history of abdominal aortic aneurysms, you and your provider may decide to schedule an ultrasound test of your aorta. Our records show this was done on: n/a    People who have smoked the equivalent of 1 pack per day for 30 years or more may benefit from screening for lung cancer with a yearly low dose CT scan until they have been non smokers for 15 years or competing health conditions render this unlikely to be beneficial. Our records show:n/a    Your Medicare Wellness Exam is recommended annually.     Here is a list of your current Health Maintenance items with a due date:  Health Maintenance   Topic Date Due    COVID-19 Vaccine (4 - Booster for Moderna series) 02/28/2022    DTaP/Tdap/Td series (2 - Td or Tdap) 07/12/2022    Flu Vaccine (1) 08/01/2022    Depression Screen  03/28/2023    Medicare Yearly Exam  09/22/2023    Shingrix Vaccine Age 50>  Completed    Pneumococcal 65+ years  Completed

## 2022-09-21 NOTE — PROGRESS NOTES
This is the Subsequent Medicare Annual Wellness Exam, performed 12 months or more after the Initial AWV or the last Subsequent AWV    I have reviewed the patient's medical history in detail and updated the computerized patient record. Assessment/Plan   Education and counseling provided:  Are appropriate based on today's review and evaluation  End-of-Life planning (with patient's consent)  Influenza Vaccine  Cardiovascular screening blood test  Screening for glaucoma  Diabetes screening test  Updated COVID-19 booster dose    1. Essential hypertension  -     CBC WITH AUTOMATED DIFF; Future  -     MAGNESIUM; Future  -     METABOLIC PANEL, COMPREHENSIVE; Future  -     URINALYSIS W/MICROSCOPIC; Future  2. Paroxysmal atrial fibrillation (HCC)  -     MAGNESIUM; Future  -     METABOLIC PANEL, COMPREHENSIVE; Future  3. Cardiomyopathy, unspecified type ([address]  -     MAGNESIUM; Future  -     METABOLIC PANEL, COMPREHENSIVE; Future  4. Hyperlipidemia, unspecified hyperlipidemia type  -     LIPID PANEL; Future  -     MAGNESIUM; Future  -     METABOLIC PANEL, COMPREHENSIVE; Future  5. Prediabetes  -     HEMOGLOBIN A1C WITH EAG; Future  -     MAGNESIUM; Future  -     METABOLIC PANEL, COMPREHENSIVE; Future  6. Chronic prescription benzodiazepine use  7. Pacemaker  8. Anxiety  9. Medicare annual wellness visit, subsequent  -     ADVANCE CARE PLANNING FIRST 30 MINS  10. Advanced directives, counseling/discussion  -     ADVANCE CARE PLANNING FIRST 27 MINS  11. Screening for depression  12. Prostate cancer screening  -     PSA SCREENING (SCREENING); Future  13.  Vitamin D deficiency, unspecified  -     VITAMIN D, 25 HYDROXY; Future       Depression Risk Factor Screening     3 most recent PHQ Screens 9/21/2022   Little interest or pleasure in doing things Not at all   Feeling down, depressed, irritable, or hopeless Not at all   Total Score PHQ 2 0       Alcohol & Drug Abuse Risk Screen    Do you average more than 1 drink per night or more than 7 drinks a week: No    In the past three months have you have had more than 4 drinks containing alcohol on one occasion: No          Functional Ability and Level of Safety    Hearing: The patient needs further evaluation. Activities of Daily Living: The home contains: no safety equipment. Patient does total self care      Ambulation: with no difficulty     Fall Risk:  Fall Risk Assessment, last 12 mths 9/21/2022   Able to walk? Yes   Fall in past 12 months? 0   Do you feel unsteady? 0   Are you worried about falling 0   Number of falls in past 12 months -   Fall with injury?  -      Abuse Screen:  Patient is not abused       Cognitive Screening    Has your family/caregiver stated any concerns about your memory: no     Cognitive Screening: None performed today    Health Maintenance Due     Health Maintenance Due   Topic Date Due    COVID-19 Vaccine (4 - Booster for Moderna series) 02/28/2022    DTaP/Tdap/Td series (2 - Td or Tdap) 07/12/2022    Flu Vaccine (1) 08/01/2022       Patient Care Team   Patient Care Team:  Eugene Nieto MD as PCP - General (Internal Medicine Physician)  Eugene Nieto MD as PCP - [facility] Empaneled Provider  Sourav Silva MD (Cardiovascular Disease Physician)  Paco Prabhakar MD (Gastroenterology)  Briseida Machuca MD (Ophthalmology)  Yohan Lindsey MD (Dermatology Physician)  Catarino Garza MD (Orthopedic Surgery)  Harvey Potter MD (Neurosurgery)    History     Patient Active Problem List   Diagnosis Code    AV block I44.30    Pacemaker Z95.0    Cardiomyopathy ([address] I42.9    Paroxysmal atrial fibrillation ([address] I48.0    HLD (hyperlipidemia) E78.5    FH: prostate cancer Z80.42    Bilateral inguinal hernia K40.20    Anxiety F41.9    Essential hypertension I10    Adenomatous polyp of ascending colon D12.2    Gastroesophageal reflux disease without esophagitis K21.9    Hiatal hernia K44.9    Headache, tension type, episodic G44.219    History of fractured rib Z87.81    Chronic right shoulder pain M25.511, G89.29    Impaired fasting glucose R73.01    Prediabetes R73.03     Past Medical History:   Diagnosis Date    Anxiety     AV block     intermittent, high-grade    Bilateral inguinal hernia     Cardiac echocardiogram 06/25/2013    EF 50-55%. No WMA. Mild LVH. Gr 1 DDfx. RVE.  AKANKSHA. Mild TR. No significant chg from study of 6/8/11. Cardiac nuclear imaging test 10/07/2009    Likely inferior & anterior septal artifact. EF 55%. Mild septal WMA could be related to pacemaker. Cardiomyopathy [facility])     Diverticulosis of colon 11/10/2015    Dysphagia     Multiple esophageal dilations. Essential hypertension with goal blood pressure less than 140/90     HLD (hyperlipidemia)     Hx of colonoscopy 11/10/2015    Dr. Analilia Summers; tubulovillous adenoma. Pacemaker 2007    Medtronic dual-chamber device. Paroxysmal atrial fibrillation (HCC)     single episode noted on routine device check      Past Surgical History:   Procedure Laterality Date    COLONOSCOPY N/A 3/29/2019    COLONOSCOPY with polypectomy performed by Luis Enriquez MD at [address] [place] [address], [place], DIAGNOSTIC      HX CATARACT REMOVAL      HX PACEMAKER  05/29/07    Medtronic Versa dual-chamber pacemaker for intermittent high-grade AV block and symptomatic bradycardia. HX PACEMAKER  5/3/16    [place] CARDIAC SURG PROCEDURE UNLIST       Current Outpatient Medications   Medication Sig Dispense Refill    LORazepam (ATIVAN) 1 mg tablet Take 1 Tablet by mouth every eight (8) hours as needed for Anxiety. Max Daily Amount: 3 mg. 30 Tablet 0    lisinopriL (PRINIVIL, ZESTRIL) 2.5 mg tablet Take 2 tablets by mouth twice daily 360 Tablet 1    butalbital-acetaminophen-caffeine (FIORICET, ESGIC) -40 mg per tablet Take 1 Tablet by mouth every six (6) hours as needed for Headache.  30 Tablet 0    cyclobenzaprine (FLEXERIL) 5 mg tablet TAKE 1-2 TABLETS BY MOUTH EVERY 8 HOURS AS NEEDED FOR MUSCLE SPASM 25 Tablet 0    VIT C/E/ZN/COPPR/LUTEIN/ZEAXAN (PRESERVISION AREDS 2 PO) Take  by mouth. omeprazole (PRILOSEC) 20 mg capsule Take 20 mg by mouth daily as needed. timolol (TIMOPTIC) 0.25 % ophthalmic solution Administer 1 Drop to both eyes two (2) times a day. MULTIVITAMINS W-MINERALS/LUT (CENTRUM SILVER PO) Take  by mouth daily. OMEGA-3 FATTY ACIDS (OMEGA 3 PO) Take 2,400 mg by mouth two (2) times a day. coenzyme q10 100 mg Cap Take  by mouth daily. metroNIDAZOLE (METROGEL) 1 % topical gel Apply  to affected area daily. Use a thin layer to affected areas after washing       zinc 50 mg capsule Take  by mouth daily. magnesium 250 mg Tab Take  by mouth daily. selenium 100 mcg Cap Take  by mouth daily. ascorbic acid, vitamin C, (VITAMIN C) 500 mg tablet Take  by mouth two (2) times a day. No Known Allergies    Family History   Problem Relation Age of Onset    Heart Surgery Mother     Stroke Father     Alcohol abuse Father      Social History     Tobacco Use    Smoking status: Former     Types: Cigarettes     Quit date: 1966     Years since quittin.1    Smokeless tobacco: Never   Substance Use Topics    Alcohol use: No     Comment: 1998 last time he had alcohol.          Jessica Berg MD

## 2022-09-21 NOTE — ACP (ADVANCE CARE PLANNING)
Advance Care Planning     Advance Care Planning (ACP) Physician/NP/PA Conversation      Date of Conversation: 9/21/2022  Conducted with: Patient with Decision Making Capacity    Healthcare Decision Maker:     Primary Decision Maker: Caleb Reza - Other Non-relative - [phone]    Secondary Decision Maker: Logan Carreon - Life Partner - [phone]  Click here to complete [employer] including selection of the Healthcare Decision Maker Relationship (ie \"Primary\")      Today we documented Decision Maker(s) consistent with ACP documents on file. Care Preferences:    Hospitalization: \"If your health worsens and it becomes clear that your chance of recovery is unlikely, what would be your preference regarding hospitalization? \"  The patient would prefer hospitalization. Ventilation: \"If you were unable to breathe on your own and your chance of recovery was unlikely, what would be your preference about the use of a ventilator (breathing machine) if it was available to you? \"   The patient would desire the use of a ventilator. Resuscitation: \"In the event your heart stopped as a result of an underlying serious health condition, would you want attempts to be made to restart your heart, or would you prefer a natural death? \"   Yes, attempt to resuscitate.     Additional topics discussed: treatment goals, benefit/burden of treatment options, ventilation preferences, hospitalization preferences, resuscitation preferences, and end of life care preferences (vegetative state/imminent death)    Conversation Outcomes / Follow-Up Plan:   ACP complete - no further action today  Reviewed DNR/DNI and patient elects Full Code (Attempt Resuscitation)     Length of Voluntary ACP Conversation in minutes:  16 minutes    Niels Tamayo MD

## 2022-09-21 NOTE — PROGRESS NOTES
1. \"Have you been to the ER, urgent care clinic since your last visit? Hospitalized since your last visit? \"  5/26/22 [facility]     2. \"Have you seen or consulted any other health care providers outside of the [address] [place] [address] since your last visit? \" No     3. For patients aged 39-70: Has the patient had a colonoscopy / FIT/ Cologuard? NA - based on age      If the patient is female:    4. For patients aged 41-77: Has the patient had a mammogram within the past 2 years? NA - based on age or sex      11. For patients aged 21-65: Has the patient had a pap smear?  NA - based on age or sex
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
## 2022-09-26 ENCOUNTER — OFFICE VISIT (OUTPATIENT)
Dept: CARDIOLOGY CLINIC | Age: 82
End: 2022-09-26
Payer: MEDICARE

## 2022-09-26 VITALS
HEART RATE: 81 BPM | BODY MASS INDEX: 23.05 KG/M2 | HEIGHT: 70 IN | SYSTOLIC BLOOD PRESSURE: 138 MMHG | DIASTOLIC BLOOD PRESSURE: 80 MMHG | OXYGEN SATURATION: 96 % | WEIGHT: 161 LBS

## 2022-09-26 DIAGNOSIS — I48.0 PAROXYSMAL ATRIAL FIBRILLATION (HCC): Primary | ICD-10-CM

## 2022-09-26 DIAGNOSIS — I44.30 AV BLOCK: ICD-10-CM

## 2022-09-26 DIAGNOSIS — E78.5 HYPERLIPIDEMIA, UNSPECIFIED HYPERLIPIDEMIA TYPE: ICD-10-CM

## 2022-09-26 DIAGNOSIS — I10 ESSENTIAL HYPERTENSION: ICD-10-CM

## 2022-09-26 PROCEDURE — G8427 DOCREV CUR MEDS BY ELIG CLIN: HCPCS | Performed by: INTERNAL MEDICINE

## 2022-09-26 PROCEDURE — 99214 OFFICE O/P EST MOD 30 MIN: CPT | Performed by: INTERNAL MEDICINE

## 2022-09-26 PROCEDURE — G8754 DIAS BP LESS 90: HCPCS | Performed by: INTERNAL MEDICINE

## 2022-09-26 PROCEDURE — 93288 INTERROG EVL PM/LDLS PM IP: CPT | Performed by: INTERNAL MEDICINE

## 2022-09-26 PROCEDURE — 1101F PT FALLS ASSESS-DOCD LE1/YR: CPT | Performed by: INTERNAL MEDICINE

## 2022-09-26 PROCEDURE — G8536 NO DOC ELDER MAL SCRN: HCPCS | Performed by: INTERNAL MEDICINE

## 2022-09-26 PROCEDURE — G8510 SCR DEP NEG, NO PLAN REQD: HCPCS | Performed by: INTERNAL MEDICINE

## 2022-09-26 PROCEDURE — G8420 CALC BMI NORM PARAMETERS: HCPCS | Performed by: INTERNAL MEDICINE

## 2022-09-26 PROCEDURE — G8752 SYS BP LESS 140: HCPCS | Performed by: INTERNAL MEDICINE

## 2022-09-26 PROCEDURE — 1123F ACP DISCUSS/DSCN MKR DOCD: CPT | Performed by: INTERNAL MEDICINE

## 2022-09-26 NOTE — PROGRESS NOTES
HISTORY OF PRESENT ILLNESS  Sujatha Katz is a 79 y.o. male. Follow-up  Pertinent negatives include no chest pain, no abdominal pain, no headaches and no shortness of breath. Patient presents for a scheduled followup visit. He has a history of intermittent high-grade AV block, status post to dual-chamber permanent pacemaker in 2007. He also has a history of hypertension, Brief episodes approximately atrial fibrillation, and a mildly depressed LV systolic function,  Which returned to low normal on echocardiogram in June 2013 showing an ejection fraction of 50-55%. The patient underwent a pacemaker generator exchange in May 2016 with a Medtronic MRI safe device after his device has reached elective replacement indicator. The patient was last seen in the office 6 months ago. Since last visit he continues to feel well. He denies any major change in his activity tolerance. No new chest pain, shortness of breath, dizziness, nor syncope. No leg swelling, orthopnea, or PND. Past Medical History:   Diagnosis Date    Anxiety     AV block     intermittent, high-grade    Bilateral inguinal hernia     Cardiac echocardiogram 06/25/2013    EF 50-55%. No WMA. Mild LVH. Gr 1 DDfx. RVE.  AKANKSHA. Mild TR. No significant chg from study of 6/8/11. Cardiac nuclear imaging test 10/07/2009    Likely inferior & anterior septal artifact. EF 55%. Mild septal WMA could be related to pacemaker. Cardiomyopathy [facility])     Diverticulosis of colon 11/10/2015    Dysphagia     Multiple esophageal dilations. Essential hypertension with goal blood pressure less than 140/90     HLD (hyperlipidemia)     Hx of colonoscopy 11/10/2015    Dr. Salena Prakash; tubulovillous adenoma. Pacemaker 2007    Medtronic dual-chamber device.     Paroxysmal atrial fibrillation (HCC)     single episode noted on routine device check      Current Outpatient Medications   Medication Sig Dispense Refill    LORazepam (ATIVAN) 1 mg tablet Take 1 Tablet by mouth every eight (8) hours as needed for Anxiety. Max Daily Amount: 3 mg. 30 Tablet 0    lisinopriL (PRINIVIL, ZESTRIL) 2.5 mg tablet Take 2 tablets by mouth twice daily 360 Tablet 1    butalbital-acetaminophen-caffeine (FIORICET, ESGIC) -40 mg per tablet Take 1 Tablet by mouth every six (6) hours as needed for Headache. 30 Tablet 0    cyclobenzaprine (FLEXERIL) 5 mg tablet TAKE 1-2 TABLETS BY MOUTH EVERY 8 HOURS AS NEEDED FOR MUSCLE SPASM 25 Tablet 0    VIT C/E/ZN/COPPR/LUTEIN/ZEAXAN (PRESERVISION AREDS 2 PO) Take  by mouth. omeprazole (PRILOSEC) 20 mg capsule Take 20 mg by mouth daily as needed. timolol (TIMOPTIC) 0.25 % ophthalmic solution Administer 1 Drop to both eyes two (2) times a day. MULTIVITAMINS W-MINERALS/LUT (CENTRUM SILVER PO) Take  by mouth daily. OMEGA-3 FATTY ACIDS (OMEGA 3 PO) Take 2,400 mg by mouth two (2) times a day. coenzyme q10 100 mg Cap Take  by mouth daily. metroNIDAZOLE (METROGEL) 1 % topical gel Apply  to affected area daily. Use a thin layer to affected areas after washing       zinc 50 mg capsule Take  by mouth daily. magnesium 250 mg Tab Take  by mouth daily. selenium 100 mcg Cap Take  by mouth daily. ascorbic acid, vitamin C, (VITAMIN C) 500 mg tablet Take  by mouth two (2) times a day. No Known Allergies     Social History     Tobacco Use    Smoking status: Former     Types: Cigarettes     Quit date: 1966     Years since quittin.2    Smokeless tobacco: Never   Vaping Use    Vaping Use: Never used   Substance Use Topics    Alcohol use: No     Comment: 1998 last time he had alcohol. Drug use: No       Family History   Problem Relation Age of Onset    Heart Surgery Mother     Stroke Father     Alcohol abuse Father          Review of Systems   Constitutional:  Negative for chills, fever and weight loss. HENT:  Negative for nosebleeds.     Eyes:  Negative for blurred vision and double vision. Respiratory:  Negative for cough, shortness of breath and wheezing. Cardiovascular:  Negative for chest pain, palpitations, orthopnea, claudication, leg swelling and PND. Gastrointestinal:  Negative for abdominal pain, heartburn, nausea and vomiting. Genitourinary:  Negative for dysuria and hematuria. Musculoskeletal:  Negative for falls and myalgias. Skin:  Negative for rash. Neurological:  Negative for dizziness, focal weakness and headaches. Endo/Heme/Allergies:  Does not bruise/bleed easily. Psychiatric/Behavioral:  Negative for substance abuse. Visit Vitals  /80 (BP 1 Location: Left upper arm, BP Patient Position: Sitting, BP Cuff Size: Adult)   Pulse 81   Ht 5' 10\" (1.778 m)   Wt 73 kg (161 lb)   SpO2 96%   BMI 23.10 kg/m²      Physical Exam  Constitutional:       Appearance: He is well-developed. HENT:      Head: Normocephalic and atraumatic. Eyes:      Conjunctiva/sclera: Conjunctivae normal.   Neck:      Vascular: No carotid bruit or JVD. Cardiovascular:      Rate and Rhythm: Normal rate and regular rhythm. Pulses: Normal pulses. Heart sounds: S1 normal and S2 normal. No murmur heard. No gallop. No S3 sounds. Pulmonary:      Breath sounds: Normal breath sounds. No wheezing or rales. Abdominal:      General: Bowel sounds are normal.      Palpations: Abdomen is soft. Tenderness: There is no abdominal tenderness. Musculoskeletal:         General: No swelling or deformity. Cervical back: Neck supple. Skin:     General: Skin is warm and dry. Neurological:      General: No focal deficit present. Mental Status: He is alert and oriented to person, place, and time. Psychiatric:         Mood and Affect: Mood normal.     Pacemaker interrogation: Pacemaker generator at middle of life. Estimated longevity remains that 3 years. No significant atrial arrhythmias. Pacing in the atrium 8 % in the ventricle 100%. Stable activity level. Scanned document for details. ASSESSMENT and PLAN    Essential hypertension: Patient's blood pressure remains well controlled on  lisinopril 5 mg twice daily. This is followed closely by his PCP. He can continue this as monotherapy. Paroxysmal atrial fibrillation. Patient had a single episode of atrial fibrillation in November 2017 lasting for little less than 2 hours. No recurrent episodes since that time. He was asymptomatic to the arrhythmia. No need for anticoagulation at this time. This will be continued to be monitored every 3 months with routine device checks. 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 Patient has an MRI compatible device. Normal device function on interrogation today. He remains pacemaker dependent in the ventricle. His battery life remains at 3 years. Dyslipidemia. Patient has been managing this with diet control and takes a fish oil supplement. CareLink device check in 3 months. Followup in 6 months, sooner if needed.

## 2022-09-26 NOTE — PROGRESS NOTES
Robert Thurston presents today for   Chief Complaint   Patient presents with    Follow-up     6 month    Pacemaker Check       Robert Thurston preferred language for health care discussion is english/other. Is someone accompanying this pt? no    Is the patient using any DME equipment during [address]? no    Depression Screening:  3 most recent PHQ Screens 9/26/2022   Little interest or pleasure in doing things Not at all   Feeling down, depressed, irritable, or hopeless Not at all   Total Score PHQ 2 0       Learning Assessment:  Learning Assessment 9/26/2022   PRIMARY LEARNER Patient   HIGHEST LEVEL OF EDUCATION - PRIMARY LEARNER  -   BARRIERS PRIMARY LEARNER -   CO-LEARNER CAREGIVER -   PRIMARY LANGUAGE ENGLISH   LEARNER PREFERENCE PRIMARY DEMONSTRATION   ANSWERED BY patient   RELATIONSHIP SELF       Abuse Screening:  Abuse Screening Questionnaire 9/26/2022   Do you ever feel afraid of your partner? N   Are you in a relationship with someone who physically or mentally threatens you? N   Is it safe for you to go home? Y       Fall Risk  Fall Risk Assessment, last 12 mths 9/26/2022   Able to walk? Yes   Fall in past 12 months? 0   Do you feel unsteady? 0   Are you worried about falling 0   Number of falls in past 12 months -   Fall with injury? -           Pt currently taking Anticoagulant therapy? no    Pt currently taking Antiplatelet therapy ? no      Coordination of Care:  1. Have you been to the ER, urgent care clinic since your last visit? Hospitalized since your last visit? no    2. Have you seen or consulted any other health care providers outside of the [address] [place] [address] since your last visit? Include any pap smears or colon screening.  no

## 2022-10-24 DIAGNOSIS — F41.9 ANXIETY: ICD-10-CM

## 2022-10-25 RX ORDER — LORAZEPAM 1 MG/1
1 TABLET ORAL
Qty: 30 TABLET | Refills: 0 | Status: SHIPPED | OUTPATIENT
Start: 2022-10-25 | End: 2022-11-18 | Stop reason: SDUPTHER

## 2022-10-25 NOTE — TELEPHONE ENCOUNTER
[facility] Ativan as 9[facility]27/22 for a 10 d/s. Last Visit: 9/21/22 with MD Rosie Cavazos  Next Appointment: 3/22/23 with MD Rosie Cavazos  Previous Refill Encounter(s): 9/26/22 #30    Requested Prescriptions     Pending Prescriptions Disp Refills    LORazepam (ATIVAN) 1 mg tablet 30 Tablet 0     Sig: Take 1 Tablet by mouth every eight (8) hours as needed for Anxiety. Max Daily Amount: 3 mg. For [address] in place:   Recommendation Provided To:    Intervention Detail: New Rx: 1, reason: Patient Preference  Gap Closed?:   Intervention Accepted By:   Time Spent (min): 5

## 2022-11-18 DIAGNOSIS — F41.9 ANXIETY: ICD-10-CM

## 2022-11-18 NOTE — TELEPHONE ENCOUNTER
[facility] 11/18/2022    The last fill date for Lorazepam 1 mg was 10/25/2022 for a 10 d/s qty 30      Last UDS: 9/14/2022    CSA Last signed: Not on file        PCP: Amando Mello MD    Last appt: 9/21/2022  Future Appointments   Date Time Provider Lizzy Benson   2/1/2023  1:45 PM CSI, PACER [facility]   3/15/2023  9:35 AM IO LAB VISIT [facility]   3/22/2023  2:30 PM Amando Mello MD [facility]   4/10/2023  2:20 PM Giovanna Dunn MD [facility]       Requested Prescriptions     Pending Prescriptions Disp Refills    LORazepam (ATIVAN) 1 mg tablet 30 Tablet 0     Sig: Take 1 Tablet by mouth every eight (8) hours as needed for Anxiety. Max Daily Amount: 3 mg.

## 2022-11-19 RX ORDER — LORAZEPAM 1 MG/1
1 TABLET ORAL
Qty: 30 TABLET | Refills: 0 | Status: SHIPPED | OUTPATIENT
Start: 2022-11-19

## 2022-12-14 DIAGNOSIS — F41.9 ANXIETY: ICD-10-CM

## 2022-12-14 RX ORDER — LORAZEPAM 1 MG/1
1 TABLET ORAL
Qty: 30 TABLET | Refills: 0 | Status: SHIPPED | OUTPATIENT
Start: 2022-12-14

## 2022-12-14 NOTE — TELEPHONE ENCOUNTER
Reviewed report generated by the [facility]. Does not demonstrate aberrancies or inconsistencies with regard to the prescribing of controlled medications to this patient by other providers. Last filled 11/19/2022 per . Last UDS 9/14/2022 . Will need to sign an updated controlled substance agreement at next visit.

## 2022-12-20 NOTE — TELEPHONE ENCOUNTER
CSA printed out and placed in folder.  Will have patient sign at next appointment-reminder note added to patients next appointment 3/22/23

## 2023-01-05 DIAGNOSIS — F41.9 ANXIETY: ICD-10-CM

## 2023-01-05 RX ORDER — LORAZEPAM 1 MG/1
1 TABLET ORAL
Qty: 30 TABLET | Refills: 0 | Status: SHIPPED | OUTPATIENT
Start: 2023-01-05

## 2023-01-05 NOTE — TELEPHONE ENCOUNTER
Reviewed report generated by the [facility]. Does not demonstrate aberrancies or inconsistencies with regard to the prescribing of controlled medications to this patient by other providers. Last filled 12/14/2022 per . Last UDS 9/14/2022. Will need to sign an updated controlled substance agreement at next visit.

## 2023-01-05 NOTE — TELEPHONE ENCOUNTER
LAST fill per chart: 12/14/22 30 tabs  Last uds: none  Last CSA signed: 8/20/19    FYI note from 12/20/22  CSA printed out and placed in folder.  Will have patient sign at next appointment-reminder note added to patients next appointment 3/22/23

## 2023-01-26 DIAGNOSIS — F41.9 ANXIETY: ICD-10-CM

## 2023-01-26 RX ORDER — LORAZEPAM 1 MG/1
1 TABLET ORAL
Qty: 30 TABLET | Refills: 0 | Status: SHIPPED | OUTPATIENT
Start: 2023-01-26

## 2023-01-26 NOTE — TELEPHONE ENCOUNTER
Reviewed report generated by the [facility]. Does not demonstrate aberrancies or inconsistencies with regard to the prescribing of controlled medications to this patient by other providers. Last filled 1/5/2023 per . Will refill today with earliest fill date of 2/5/2023. Last UDS 9/14/2022. Will need to sign an updated controlled substance agreement at next visit. Stable.

## 2023-01-26 NOTE — TELEPHONE ENCOUNTER
Last Fill per chart: 1/5/23 30 tabs 10 day supply  CSA Signed: none  Last UDS: none    CSA printed out and placed in folder.  Will have patient sign at next appointment-reminder note added to patients next appointment 3/22/23

## 2023-02-01 ENCOUNTER — OFFICE VISIT (OUTPATIENT)
Dept: CARDIOLOGY CLINIC | Age: 83
End: 2023-02-01
Payer: MEDICARE

## 2023-02-01 DIAGNOSIS — Z95.0 PACEMAKER: ICD-10-CM

## 2023-02-01 DIAGNOSIS — I48.0 PAROXYSMAL ATRIAL FIBRILLATION (HCC): Primary | ICD-10-CM

## 2023-02-01 DIAGNOSIS — I44.30 AV BLOCK: ICD-10-CM

## 2023-02-02 PROCEDURE — 93294 REM INTERROG EVL PM/LDLS PM: CPT | Performed by: INTERNAL MEDICINE

## 2023-02-02 PROCEDURE — 93296 REM INTERROG EVL PM/IDS: CPT | Performed by: INTERNAL MEDICINE

## 2023-02-02 NOTE — PROGRESS NOTES
Carelink: Dual Pacemaker. 3 years left on battery. Lead impedance and threshold WNL. A paced 6.4 %, V paced 99 %. 1 nonsustained VT. Monitored only.

## 2023-02-04 DIAGNOSIS — Z12.5 PROSTATE CANCER SCREENING: Primary | ICD-10-CM

## 2023-02-04 DIAGNOSIS — R73.03 PREDIABETES: Primary | ICD-10-CM

## 2023-02-04 DIAGNOSIS — I10 ESSENTIAL HYPERTENSION: Primary | ICD-10-CM

## 2023-02-05 DIAGNOSIS — E78.5 HYPERLIPIDEMIA, UNSPECIFIED HYPERLIPIDEMIA TYPE: Primary | ICD-10-CM

## 2023-02-05 DIAGNOSIS — E55.9 VITAMIN D DEFICIENCY, UNSPECIFIED: Primary | ICD-10-CM

## 2023-02-07 DIAGNOSIS — I42.9 CARDIOMYOPATHY, UNSPECIFIED TYPE (HCC): ICD-10-CM

## 2023-02-07 DIAGNOSIS — I48.0 PAROXYSMAL ATRIAL FIBRILLATION (HCC): ICD-10-CM

## 2023-02-07 DIAGNOSIS — I10 ESSENTIAL HYPERTENSION: Primary | ICD-10-CM

## 2023-03-04 DIAGNOSIS — F41.9 ANXIETY DISORDER, UNSPECIFIED: ICD-10-CM

## 2023-03-06 RX ORDER — LORAZEPAM 1 MG/1
1 TABLET ORAL EVERY 8 HOURS PRN
Qty: 30 TABLET | Refills: 0 | Status: SHIPPED | OUTPATIENT
Start: 2023-03-06 | End: 2023-04-05

## 2023-03-06 NOTE — TELEPHONE ENCOUNTER
PCP: Michael Vazquez MD    Last appt: [unfilled]  Future Appointments   Date Time Provider Tonja Geronimo   3/15/2023  9:35 AM IOC LAB VISIT [facility] BS AMB   3/22/2023  2:30 PM Michael Vazquez MD [facility] BS AMB   4/10/2023  2:20 PM Mindy Rodriguez MD [facility] BS AMB       Requested Prescriptions     Pending Prescriptions Disp Refills    LORazepam (ATIVAN) 1 MG tablet [Pharmacy Med Name: LORAZEPAM 1 MG TABLET] 30 tablet 0     Sig: TAKE 1 TABLET BY MOUTH EVERY 8 HOURS AS NEEDED FOR ANXIETY MAX DAILY AMOUNT: 3 MG     Last Fill per chart: 1/26/23 30 d/s  CSA Signed: none  Last UDS: none     CSA has been printed out and placed in folder.  Will have patient sign at next appointment-reminder note added to patients next appointment 3/22/23

## 2023-03-15 ENCOUNTER — HOSPITAL ENCOUNTER (OUTPATIENT)
Facility: HOSPITAL | Age: 83
Setting detail: SPECIMEN
Discharge: HOME | End: 2023-03-18
Payer: MEDICARE

## 2023-03-15 ENCOUNTER — NURSE ONLY (OUTPATIENT)
Age: 83
End: 2023-03-15

## 2023-03-15 DIAGNOSIS — E55.9 VITAMIN D DEFICIENCY, UNSPECIFIED: ICD-10-CM

## 2023-03-15 DIAGNOSIS — I10 ESSENTIAL HYPERTENSION: Primary | ICD-10-CM

## 2023-03-15 DIAGNOSIS — E78.5 HYPERLIPIDEMIA, UNSPECIFIED HYPERLIPIDEMIA TYPE: ICD-10-CM

## 2023-03-15 DIAGNOSIS — I10 ESSENTIAL HYPERTENSION: ICD-10-CM

## 2023-03-15 DIAGNOSIS — I42.9 CARDIOMYOPATHY, UNSPECIFIED TYPE (HCC): ICD-10-CM

## 2023-03-15 DIAGNOSIS — I48.0 PAROXYSMAL ATRIAL FIBRILLATION (HCC): ICD-10-CM

## 2023-03-15 DIAGNOSIS — R73.03 PREDIABETES: ICD-10-CM

## 2023-03-15 DIAGNOSIS — Z12.5 PROSTATE CANCER SCREENING: ICD-10-CM

## 2023-03-15 LAB
25(OH)D3 SERPL-MCNC: 45.7 NG/ML (ref 30–100)
APPEARANCE UR: CLEAR
BACTERIA URNS QL MICRO: NEGATIVE /HPF
BASOPHILS # BLD: 0.1 K/UL (ref 0–0.1)
BASOPHILS NFR BLD: 1 % (ref 0–2)
BILIRUB UR QL: NEGATIVE
CHOLEST SERPL-MCNC: 204 MG/DL
COLOR UR: YELLOW
DIFFERENTIAL METHOD BLD: ABNORMAL
EOSINOPHIL # BLD: 0.3 K/UL (ref 0–0.4)
EOSINOPHIL NFR BLD: 4 % (ref 0–5)
EPITH CASTS URNS QL MICRO: NORMAL /LPF (ref 0–5)
ERYTHROCYTE [DISTWIDTH] IN BLOOD BY AUTOMATED COUNT: 12.6 % (ref 11.6–14.5)
EST. AVERAGE GLUCOSE BLD GHB EST-MCNC: 117 MG/DL
GLUCOSE UR STRIP.AUTO-MCNC: NEGATIVE MG/DL
HBA1C MFR BLD: 5.7 % (ref 4.2–5.6)
HCT VFR BLD AUTO: 47 % (ref 36–48)
HDLC SERPL-MCNC: 58 MG/DL (ref 40–60)
HDLC SERPL: 3.5 (ref 0–5)
HGB BLD-MCNC: 15.8 G/DL (ref 13–16)
HGB UR QL STRIP: NEGATIVE
IMM GRANULOCYTES # BLD AUTO: 0.1 K/UL (ref 0–0.04)
IMM GRANULOCYTES NFR BLD AUTO: 1 % (ref 0–0.5)
KETONES UR QL STRIP.AUTO: NEGATIVE MG/DL
LDLC SERPL CALC-MCNC: 128.4 MG/DL (ref 0–100)
LEUKOCYTE ESTERASE UR QL STRIP.AUTO: NEGATIVE
LIPID PANEL: ABNORMAL
LYMPHOCYTES # BLD: 2.8 K/UL (ref 0.9–3.6)
LYMPHOCYTES NFR BLD: 36 % (ref 21–52)
MAGNESIUM SERPL-MCNC: 2.4 MG/DL (ref 1.6–2.6)
MCH RBC QN AUTO: 31.2 PG (ref 24–34)
MCHC RBC AUTO-ENTMCNC: 33.6 G/DL (ref 31–37)
MCV RBC AUTO: 92.7 FL (ref 78–100)
MONOCYTES # BLD: 0.8 K/UL (ref 0.05–1.2)
MONOCYTES NFR BLD: 10 % (ref 3–10)
NEUTS SEG # BLD: 3.7 K/UL (ref 1.8–8)
NEUTS SEG NFR BLD: 49 % (ref 40–73)
NITRITE UR QL STRIP.AUTO: NEGATIVE
NRBC # BLD: 0 K/UL (ref 0–0.01)
NRBC BLD-RTO: 0 PER 100 WBC
PH UR STRIP: 7.5 (ref 5–8)
PLATELET # BLD AUTO: 219 K/UL (ref 135–420)
PMV BLD AUTO: 9 FL (ref 9.2–11.8)
PROT UR STRIP-MCNC: NEGATIVE MG/DL
PSA SERPL-MCNC: 2 NG/ML (ref 0–4)
RBC # BLD AUTO: 5.07 M/UL (ref 4.35–5.65)
RBC #/AREA URNS HPF: NORMAL /HPF (ref 0–5)
SP GR UR REFRACTOMETRY: 1.01 (ref 1–1.03)
TRIGL SERPL-MCNC: 88 MG/DL
UROBILINOGEN UR QL STRIP.AUTO: 0.2 EU/DL (ref 0.2–1)
VLDLC SERPL CALC-MCNC: 17.6 MG/DL
WBC # BLD AUTO: 7.7 K/UL (ref 4.6–13.2)
WBC URNS QL MICRO: NEGATIVE /HPF (ref 0–4)

## 2023-03-15 PROCEDURE — 85025 COMPLETE CBC W/AUTO DIFF WBC: CPT

## 2023-03-15 PROCEDURE — 36415 COLL VENOUS BLD VENIPUNCTURE: CPT

## 2023-03-15 PROCEDURE — 80053 COMPREHEN METABOLIC PANEL: CPT

## 2023-03-15 PROCEDURE — 81001 URINALYSIS AUTO W/SCOPE: CPT

## 2023-03-15 PROCEDURE — 84153 ASSAY OF PSA TOTAL: CPT

## 2023-03-15 PROCEDURE — 82306 VITAMIN D 25 HYDROXY: CPT

## 2023-03-15 PROCEDURE — 80061 LIPID PANEL: CPT

## 2023-03-15 PROCEDURE — 83036 HEMOGLOBIN GLYCOSYLATED A1C: CPT

## 2023-03-15 PROCEDURE — 83735 ASSAY OF MAGNESIUM: CPT

## 2023-03-16 ENCOUNTER — TELEPHONE (OUTPATIENT)
Age: 83
End: 2023-03-16

## 2023-03-16 DIAGNOSIS — I10 ESSENTIAL HYPERTENSION: Primary | ICD-10-CM

## 2023-03-16 NOTE — TELEPHONE ENCOUNTER
Please see if the lab can add a CMP to those that were performed yesterday. If not, will need to obtain at visit. Thanks.

## 2023-03-17 LAB
ALBUMIN SERPL-MCNC: 4.4 G/DL (ref 3.4–5)
ALBUMIN/GLOB SERPL: 1.4 (ref 0.8–1.7)
ALP SERPL-CCNC: 65 U/L (ref 45–117)
ALT SERPL-CCNC: 21 U/L (ref 16–61)
ANION GAP SERPL CALC-SCNC: 5 MMOL/L (ref 3–18)
AST SERPL-CCNC: 18 U/L (ref 10–38)
BILIRUB SERPL-MCNC: 0.6 MG/DL (ref 0.2–1)
BUN SERPL-MCNC: 23 MG/DL (ref 7–18)
BUN/CREAT SERPL: 25 (ref 12–20)
CALCIUM SERPL-MCNC: 9.7 MG/DL (ref 8.5–10.1)
CHLORIDE SERPL-SCNC: 101 MMOL/L (ref 100–111)
CO2 SERPL-SCNC: 29 MMOL/L (ref 21–32)
CREAT SERPL-MCNC: 0.93 MG/DL (ref 0.6–1.3)
GLOBULIN SER CALC-MCNC: 3.1 G/DL (ref 2–4)
GLUCOSE SERPL-MCNC: 101 MG/DL (ref 74–99)
POTASSIUM SERPL-SCNC: 4.7 MMOL/L (ref 3.5–5.5)
PROT SERPL-MCNC: 7.5 G/DL (ref 6.4–8.2)
SODIUM SERPL-SCNC: 135 MMOL/L (ref 136–145)

## 2023-03-22 ENCOUNTER — OFFICE VISIT (OUTPATIENT)
Age: 83
End: 2023-03-22
Payer: MEDICARE

## 2023-03-22 VITALS
HEART RATE: 87 BPM | WEIGHT: 165 LBS | HEIGHT: 70 IN | OXYGEN SATURATION: 97 % | BODY MASS INDEX: 23.62 KG/M2 | DIASTOLIC BLOOD PRESSURE: 78 MMHG | TEMPERATURE: 97 F | RESPIRATION RATE: 16 BRPM | SYSTOLIC BLOOD PRESSURE: 138 MMHG

## 2023-03-22 DIAGNOSIS — I42.9 CARDIOMYOPATHY, UNSPECIFIED TYPE (HCC): ICD-10-CM

## 2023-03-22 DIAGNOSIS — Z12.5 ENCOUNTER FOR SCREENING FOR MALIGNANT NEOPLASM OF PROSTATE: ICD-10-CM

## 2023-03-22 DIAGNOSIS — R79.9 ABNORMAL FINDING OF BLOOD CHEMISTRY, UNSPECIFIED: ICD-10-CM

## 2023-03-22 DIAGNOSIS — R73.03 PREDIABETES: ICD-10-CM

## 2023-03-22 DIAGNOSIS — I10 ESSENTIAL HYPERTENSION: Primary | ICD-10-CM

## 2023-03-22 DIAGNOSIS — E78.5 HYPERLIPIDEMIA, UNSPECIFIED HYPERLIPIDEMIA TYPE: ICD-10-CM

## 2023-03-22 DIAGNOSIS — I48.0 PAROXYSMAL ATRIAL FIBRILLATION (HCC): ICD-10-CM

## 2023-03-22 DIAGNOSIS — Z95.0 PACEMAKER: ICD-10-CM

## 2023-03-22 DIAGNOSIS — Z80.42 FH: PROSTATE CANCER: ICD-10-CM

## 2023-03-22 DIAGNOSIS — F41.9 ANXIETY: ICD-10-CM

## 2023-03-22 PROCEDURE — 1036F TOBACCO NON-USER: CPT | Performed by: INTERNAL MEDICINE

## 2023-03-22 PROCEDURE — 99215 OFFICE O/P EST HI 40 MIN: CPT | Performed by: INTERNAL MEDICINE

## 2023-03-22 PROCEDURE — 3078F DIAST BP <80 MM HG: CPT | Performed by: INTERNAL MEDICINE

## 2023-03-22 PROCEDURE — 1123F ACP DISCUSS/DSCN MKR DOCD: CPT | Performed by: INTERNAL MEDICINE

## 2023-03-22 PROCEDURE — 3074F SYST BP LT 130 MM HG: CPT | Performed by: INTERNAL MEDICINE

## 2023-03-22 PROCEDURE — G8420 CALC BMI NORM PARAMETERS: HCPCS | Performed by: INTERNAL MEDICINE

## 2023-03-22 PROCEDURE — G8427 DOCREV CUR MEDS BY ELIG CLIN: HCPCS | Performed by: INTERNAL MEDICINE

## 2023-03-22 PROCEDURE — G8484 FLU IMMUNIZE NO ADMIN: HCPCS | Performed by: INTERNAL MEDICINE

## 2023-03-22 RX ORDER — ESCITALOPRAM OXALATE 10 MG/1
10 TABLET ORAL DAILY
Qty: 90 TABLET | Refills: 1 | Status: SHIPPED | OUTPATIENT
Start: 2023-03-22

## 2023-03-22 ASSESSMENT — PATIENT HEALTH QUESTIONNAIRE - PHQ9
SUM OF ALL RESPONSES TO PHQ QUESTIONS 1-9: 0
SUM OF ALL RESPONSES TO PHQ QUESTIONS 1-9: 0
1. LITTLE INTEREST OR PLEASURE IN DOING THINGS: 0
2. FEELING DOWN, DEPRESSED OR HOPELESS: 0
SUM OF ALL RESPONSES TO PHQ9 QUESTIONS 1 & 2: 0
SUM OF ALL RESPONSES TO PHQ QUESTIONS 1-9: 0
SUM OF ALL RESPONSES TO PHQ QUESTIONS 1-9: 0

## 2023-03-22 NOTE — PATIENT INSTRUCTIONS
medicines can help lower your cholesterol and your risk. The way you choose to lower your risk will depend on how high your risk is for heart attack and stroke. It will also depend on how you feel about taking medicines. Follow-up care is a key part of your treatment and safety. Be sure to make and go to all appointments, and call your doctor if you are having problems. It's also a good idea to know your test results and keep a list of the medicines you take. How can you care for yourself at home? Eat a variety of foods every day. Good choices include fruits, vegetables, whole grains (like oatmeal), dried beans and peas, nuts and seeds, soy products (like tofu), and fat-free or low-fat dairy products. Replace butter, margarine, and hydrogenated or partially hydrogenated oils with olive and canola oils. (Canola oil margarine without trans fat is fine.)  Replace red meat with fish, poultry, and soy protein (like tofu). Limit processed and packaged foods like chips, crackers, and cookies. Bake, broil, or steam foods. Don't guzmán them. Be physically active. Get at least 30 minutes of exercise on most days of the week. Walking is a good choice. You also may want to do other activities, such as running, swimming, cycling, or playing tennis or team sports. Stay at a healthy weight or lose weight by making the changes in eating and physical activity listed above. Losing just a small amount of weight, even 5 to 10 pounds, can reduce your risk for having a heart attack or stroke. Do not smoke. When should you call for help? Watch closely for changes in your health, and be sure to contact your doctor if:    You need help making lifestyle changes. You have questions about your medicine. Where can you learn more? Go to http://www.gray.com/  Enter T781 in the search box to learn more about \"High Cholesterol: Care Instructions. \"  Current as of: December 16, 2019               Content

## 2023-03-22 NOTE — PROGRESS NOTES
Reno Taylor presents today for   Chief Complaint   Patient presents with    Hypertension     6 month follow up        1. \"Have you been to the ER, urgent care clinic since your last visit? Hospitalized since your last visit? \" no    2. \"Have you seen or consulted any other health care providers outside of the [address] [place] [address] since your last visit? \" no     3. For patients aged 39-70: Has the patient had a colonoscopy / FIT/ Cologuard? NA - based on age      If the patient is female:    4. For patients aged 41-77: Has the patient had a mammogram within the past 2 years? NA - based on age or sex      11. For patients aged 21-65: Has the patient had a pap smear?  NA - based on age or sex
low blood pressure of approximately 112/67. He was taken to [facility] for evaluation and was asymptomatic upon arrival. He admitted to not eating or drinking anything for the four hour period that he was outside, and acknowledged that the day was quite warm. Evaluation in the ED included an EKG which showed a paced rhythm, serial troponins which were negative, and blood work showing WBC 14.2, Hb 14.5, Hct 42.7, creatinine 1.3/ eGFR 51.7. Chest x-ray showed no acute process, but did note an incidental pulmonary nodule over the left lower lobe. A chest CT scan was obtained (5/2017) showing mild subsegmental atelectasis or scarring at the left lung base without focal pulmonary nodule. He has a history of a GI bleeding in 1997 secondary to NSAID use for headaches. He also has a history of GERD and dysphagia and has undergone multiple esophageal dilatations in the past by Dr. Glenda Mae. In 11/2015, he had an upper endoscopy which showed a small hiatal hernia in the cardia, a Schatzki's ring at the gastroesophageal junction, which was dilated, and otherwise normal stomach and duodenum. He also had a screening colonoscopy which revealed mild colonic diverticulosis and a 7 mm polyp in the proximal ascending colon (pathology: tubulovillous adenoma). He had a follow-up colonoscopy in 3/2019 by Dr. Glenda Mae which showed two sessile 4 mm polyps in the cecum and ascending colon (pathology: tubular adenomas). He also simultaneously had an upper endoscopy showing normal stomach/duodenum, a small hiatal hernia in the cardia, and a Schatzki's ring (dilated). He denies any abdominal pain, nausea, vomiting, melena, hematochezia, or change in bowel movements. In 12/2019, he reported right chest wall pain after a tree stump fell on his chest in 10/2019. Right rib series were obtained (12/2019) showing nondisplaced acute fractures of the anterior ribs 8 and 10. He reports improvement in the pain today.  He does report developing

## 2023-03-30 ENCOUNTER — TELEPHONE (OUTPATIENT)
Age: 83
End: 2023-03-30

## 2023-03-30 NOTE — TELEPHONE ENCOUNTER
Called and spoke with patient. Decision made not to proceed with treatment with Lexapro due to concern for possible side effects and bad experience in the past with Paxil. Advised to call if his anxiety is increasing and will explore other options to help with management.

## 2023-03-30 NOTE — TELEPHONE ENCOUNTER
Patient came by and dropped off a letter he has written for Dr Nikolay Coleman with questions regarding medication. Letter placed in box.

## 2023-04-03 DIAGNOSIS — F41.9 ANXIETY DISORDER, UNSPECIFIED: ICD-10-CM

## 2023-04-03 RX ORDER — LORAZEPAM 1 MG/1
1 TABLET ORAL EVERY 8 HOURS PRN
Qty: 30 TABLET | Refills: 0 | Status: SHIPPED | OUTPATIENT
Start: 2023-04-03 | End: 2023-05-03

## 2023-04-03 NOTE — TELEPHONE ENCOUNTER
Reviewed report generated by the [facility]. Does not demonstrate aberrancies or inconsistencies with regard to the prescribing of controlled medications to this patient by other providers. Last filled 3/6/2023 per .

## 2023-04-03 NOTE — TELEPHONE ENCOUNTER
Last Fill per chart: 3/6/23  CSA Signed: 3/22/23   Last UDS: 9/14/22    PCP: Lacey Bowen MD    Last appt: [unfilled]  Future Appointments   Date Time Provider Tonja Geronimo   5/4/2023  3:00 PM Gudelia Akins MD [facility] BS AMB   9/21/2023  8:00 AM IOC LAB VISIT IO BS AMB   9/27/2023  2:30 PM Lacey Bowen MD [facility] AMB       Requested Prescriptions     Pending Prescriptions Disp Refills    LORazepam (ATIVAN) 1 MG tablet [Pharmacy Med Name: LORAZEPAM 1 MG TABLET] 30 tablet 0     Sig: TAKE 1 TABLET BY MOUTH EVERY 8 HOURS AS NEEDED FOR ANXIETY FOR UP TO 30 DAYS.  MAX DAILY AMOUNT: 3 MG

## 2023-05-01 DIAGNOSIS — F41.9 ANXIETY DISORDER, UNSPECIFIED: ICD-10-CM

## 2023-05-01 RX ORDER — LORAZEPAM 1 MG/1
1 TABLET ORAL EVERY 8 HOURS PRN
Qty: 30 TABLET | Refills: 0 | Status: SHIPPED | OUTPATIENT
Start: 2023-05-01 | End: 2023-05-31

## 2023-05-01 RX ORDER — CYCLOBENZAPRINE HCL 5 MG
TABLET ORAL
Qty: 25 TABLET | Refills: 0 | Status: SHIPPED | OUTPATIENT
Start: 2023-05-01

## 2023-05-30 ENCOUNTER — OFFICE VISIT (OUTPATIENT)
Age: 83
End: 2023-05-30
Payer: MEDICARE

## 2023-05-30 VITALS
OXYGEN SATURATION: 96 % | HEIGHT: 70 IN | DIASTOLIC BLOOD PRESSURE: 80 MMHG | WEIGHT: 170 LBS | BODY MASS INDEX: 24.34 KG/M2 | HEART RATE: 71 BPM | SYSTOLIC BLOOD PRESSURE: 130 MMHG

## 2023-05-30 DIAGNOSIS — E78.5 HYPERLIPIDEMIA, UNSPECIFIED HYPERLIPIDEMIA TYPE: ICD-10-CM

## 2023-05-30 DIAGNOSIS — F41.9 ANXIETY DISORDER, UNSPECIFIED: ICD-10-CM

## 2023-05-30 DIAGNOSIS — Z95.0 PACEMAKER: ICD-10-CM

## 2023-05-30 DIAGNOSIS — I44.30 AV BLOCK: ICD-10-CM

## 2023-05-30 DIAGNOSIS — I10 ESSENTIAL HYPERTENSION: Primary | ICD-10-CM

## 2023-05-30 PROCEDURE — 3075F SYST BP GE 130 - 139MM HG: CPT | Performed by: INTERNAL MEDICINE

## 2023-05-30 PROCEDURE — 1036F TOBACCO NON-USER: CPT | Performed by: INTERNAL MEDICINE

## 2023-05-30 PROCEDURE — G8420 CALC BMI NORM PARAMETERS: HCPCS | Performed by: INTERNAL MEDICINE

## 2023-05-30 PROCEDURE — 3079F DIAST BP 80-89 MM HG: CPT | Performed by: INTERNAL MEDICINE

## 2023-05-30 PROCEDURE — G8427 DOCREV CUR MEDS BY ELIG CLIN: HCPCS | Performed by: INTERNAL MEDICINE

## 2023-05-30 PROCEDURE — 1123F ACP DISCUSS/DSCN MKR DOCD: CPT | Performed by: INTERNAL MEDICINE

## 2023-05-30 PROCEDURE — 99214 OFFICE O/P EST MOD 30 MIN: CPT | Performed by: INTERNAL MEDICINE

## 2023-05-30 RX ORDER — SELENIUM 100 MCG
100 TABLET ORAL DAILY
COMMUNITY

## 2023-05-30 RX ORDER — ASCORBIC ACID 500 MG
500 TABLET ORAL DAILY
COMMUNITY

## 2023-05-30 RX ORDER — OMEPRAZOLE 20 MG/1
20 CAPSULE, DELAYED RELEASE ORAL DAILY
COMMUNITY

## 2023-05-30 RX ORDER — MULTIVITAMIN WITH IRON
250 TABLET ORAL DAILY
COMMUNITY

## 2023-05-30 RX ORDER — ZINC GLUCONATE 50 MG
50 TABLET ORAL DAILY
COMMUNITY

## 2023-05-30 ASSESSMENT — ANXIETY QUESTIONNAIRES
1. FEELING NERVOUS, ANXIOUS, OR ON EDGE: 0
6. BECOMING EASILY ANNOYED OR IRRITABLE: 0
2. NOT BEING ABLE TO STOP OR CONTROL WORRYING: 0
7. FEELING AFRAID AS IF SOMETHING AWFUL MIGHT HAPPEN: 0
4. TROUBLE RELAXING: 0
3. WORRYING TOO MUCH ABOUT DIFFERENT THINGS: 0
5. BEING SO RESTLESS THAT IT IS HARD TO SIT STILL: 0
GAD7 TOTAL SCORE: 0

## 2023-05-30 ASSESSMENT — PATIENT HEALTH QUESTIONNAIRE - PHQ9
2. FEELING DOWN, DEPRESSED OR HOPELESS: 0
SUM OF ALL RESPONSES TO PHQ QUESTIONS 1-9: 0
1. LITTLE INTEREST OR PLEASURE IN DOING THINGS: 0
SUM OF ALL RESPONSES TO PHQ QUESTIONS 1-9: 0
SUM OF ALL RESPONSES TO PHQ QUESTIONS 1-9: 0
SUM OF ALL RESPONSES TO PHQ9 QUESTIONS 1 & 2: 0
SUM OF ALL RESPONSES TO PHQ QUESTIONS 1-9: 0

## 2023-05-30 ASSESSMENT — ENCOUNTER SYMPTOMS
SHORTNESS OF BREATH: 0
COUGH: 0
NAUSEA: 0
ABDOMINAL DISTENTION: 0
VOMITING: 0
ABDOMINAL PAIN: 0
SORE THROAT: 0

## 2023-05-30 NOTE — PROGRESS NOTES
05/30/23     Sacha Augustin  is a 79 y.o. male     Chief Complaint   Patient presents with    Follow-up     6 month    Device Check       HPI  Patient presents for a scheduled followup visit. He has a history of intermittent high-grade AV block, status post to dual-chamber permanent pacemaker in 2007. He also has a history of hypertension, Brief episodes approximately atrial fibrillation, and a mildly depressed LV systolic function,  Which returned to low normal on echocardiogram in June 2013 showing an ejection fraction of 50-55%. The patient underwent a pacemaker generator exchange in May 2016 with a Medtronic MRI safe device after his device has reached elective replacement indicator. The patient was last seen in the office 8 months ago since last visit, he denies any major change in his activity tolerance. No chest pain, leg swelling, orthopnea or PND. No heart palpitations, dizziness, nor syncope. No new shortness of breath at rest or with exertion. Past Medical History:   Diagnosis Date    Anxiety     AV block     intermittent, high-grade    Bilateral inguinal hernia     Cardiomyopathy ([address]     Diverticulosis of colon 11/10/2015    Dysphagia     Multiple esophageal dilations. Echocardiogram abnormal 06/25/2013    EF 50-55%. No WMA. Mild LVH. Gr 1 DDfx. RVE.  ESME. Mild TR. No significant chg from study of 6/8/11. Equivocal myocardial perfusion imaging 10/07/2009    Likely inferior & anterior septal artifact. EF 55%. Mild septal WMA could be related to pacemaker. Essential hypertension with goal blood pressure less than 140/90     HLD (hyperlipidemia)     Hx of colonoscopy 11/10/2015    Dr. Suze Allen; tubulovillous adenoma. Pacemaker 2007    Medtronic dual-chamber device.     Paroxysmal atrial fibrillation (HCC)     single episode noted on routine device check     Current Outpatient Medications   Medication Sig Dispense Refill    Multiple Vitamins-Minerals (PRESERVISION

## 2023-05-30 NOTE — PROGRESS NOTES
Jareth Dixon presents today for   Chief Complaint   Patient presents with    Follow-up     6 month    Device Check       Jareth Dixon preferred language for health care discussion is english/other. Is someone accompanying this pt? no    Is the patient using any DME equipment during OV? no    Depression Screening:  Depression: Not at risk    PHQ-2 Score: 0        Learning Assessment:  Who is the primary learner? Patient    What is the preferred language for health care of the primary learner? ENGLISH    How does the primary learner prefer to learn new concepts? DEMONSTRATION    Answered By patient    Relationship to Learner SELF           Pt currently taking Anticoagulant therapy? no    Pt currently taking Antiplatelet therapy ? no      Coordination of Care:  1. Have you been to the ER, urgent care clinic since your last visit? Hospitalized since your last visit? no    2. Have you seen or consulted any other health care providers outside of the [address] [place] [address] since your last visit? Include any pap smears or colon screening.  no

## 2023-05-31 RX ORDER — LORAZEPAM 1 MG/1
1 TABLET ORAL EVERY 8 HOURS PRN
Qty: 30 TABLET | Refills: 0 | Status: SHIPPED | OUTPATIENT
Start: 2023-05-31 | End: 2023-06-30

## 2023-05-31 NOTE — TELEPHONE ENCOUNTER
Reviewed report generated by the [facility]. Does not demonstrate aberrancies or inconsistencies with regard to the prescribing of controlled medications to this patient by other providers. Last filled 5/1/2023 per .

## 2023-06-29 DIAGNOSIS — F41.9 ANXIETY DISORDER, UNSPECIFIED: ICD-10-CM

## 2023-06-29 RX ORDER — LORAZEPAM 1 MG/1
TABLET ORAL
Qty: 30 TABLET | Refills: 0 | Status: SHIPPED | OUTPATIENT
Start: 2023-06-29 | End: 2023-07-29

## 2023-07-31 DIAGNOSIS — F41.9 ANXIETY DISORDER, UNSPECIFIED: ICD-10-CM

## 2023-08-01 RX ORDER — LORAZEPAM 1 MG/1
TABLET ORAL
Qty: 30 TABLET | Refills: 0 | Status: SHIPPED | OUTPATIENT
Start: 2023-08-01 | End: 2023-08-31

## 2023-08-01 NOTE — TELEPHONE ENCOUNTER
Reviewed report generated by the [facility]. Does not demonstrate aberrancies or inconsistencies with regard to the prescribing of controlled medications to this patient by other providers. Last filled 6/29/2023 per . Last UDS 3/22/2023. Will need to sign an updated controlled substance agreement at next visit.

## 2023-08-01 NOTE — TELEPHONE ENCOUNTER
PCP: Kat Li MD          Future Appointments   Date Time Provider [address]   8/30/2023  9:00 AM CSI, ALDAR [facility] BS AMB   9/21/2023  8:00 AM IOC LAB VISIT IO BS AMB   9/27/2023  2:30 PM Kat Li MD [facility] BS AMB   11/30/2023  3:00 PM Radha Aguilar MD [facility] BS AMB

## 2023-08-30 ENCOUNTER — OFFICE VISIT (OUTPATIENT)
Age: 83
End: 2023-08-30
Payer: MEDICARE

## 2023-08-30 DIAGNOSIS — Z95.0 PACEMAKER: ICD-10-CM

## 2023-08-30 DIAGNOSIS — I48.0 PAROXYSMAL ATRIAL FIBRILLATION (HCC): ICD-10-CM

## 2023-08-30 DIAGNOSIS — I44.30 AV BLOCK: Primary | ICD-10-CM

## 2023-09-02 DIAGNOSIS — Z51.81 MEDICATION MONITORING ENCOUNTER: ICD-10-CM

## 2023-09-02 DIAGNOSIS — R73.03 PREDIABETES: Primary | ICD-10-CM

## 2023-09-02 DIAGNOSIS — F41.9 ANXIETY DISORDER, UNSPECIFIED: ICD-10-CM

## 2023-09-05 RX ORDER — LORAZEPAM 1 MG/1
TABLET ORAL
Qty: 30 TABLET | Refills: 0 | Status: SHIPPED | OUTPATIENT
Start: 2023-09-05 | End: 2023-10-05

## 2023-09-05 NOTE — TELEPHONE ENCOUNTER
PCP: Jef Saucedo MD    Last refill per chart:  LORazepam (ATIVAN) 1 mg tablet 30 Tablet 0 1/26/2023     Sig - Route: Take 1 Tablet by mouth every eight (8) hours as needed for Anxiety. Max Daily Amount: 3 mg. - Oral    Sent to pharmacy as: LORazepam 1 mg tablet (ATIVAN)    Notes to Pharmacy: Do not fill until 2/5/2023.     E-Prescribing Status: Receipt confirmed by pharmacy (1/26/2023  6:39 PM EST)        Future Appointments   Date Time Provider [facility] [address]   9/21/2023  8:15 AM [facility] LAB VISIT [facility] BS AMB   9/27/2023  2:30 PM Jef Saucedo MD [facility] BS AMB   11/30/2023  3:00 PM Elaine Oden MD [facility] BS AMB

## 2023-09-05 NOTE — TELEPHONE ENCOUNTER
Reviewed report generated by the [facility]. Does not demonstrate aberrancies or inconsistencies with regard to the prescribing of controlled medications to this patient by other providers. Last filled 8/1/2023 per . Last UDS 9/14/2022. Will repeat at next lab appointment. Order placed.

## 2023-09-18 PROCEDURE — 93296 REM INTERROG EVL PM/IDS: CPT | Performed by: INTERNAL MEDICINE

## 2023-09-18 PROCEDURE — 93294 REM INTERROG EVL PM/LDLS PM: CPT | Performed by: INTERNAL MEDICINE

## 2023-09-18 NOTE — PROGRESS NOTES
Carelink:  dual Pacemaker. 2.5 years left on battery. Lead impedance and threshold WNL. A paced 7.5 %, V paced 99 %.  2 nonsustained VT episodes 1 sec long

## 2023-09-21 ENCOUNTER — HOSPITAL ENCOUNTER (OUTPATIENT)
Facility: HOSPITAL | Age: 83
Setting detail: SPECIMEN
Discharge: HOME | End: 2023-09-21
Payer: MEDICARE

## 2023-09-21 DIAGNOSIS — E78.5 HYPERLIPIDEMIA, UNSPECIFIED HYPERLIPIDEMIA TYPE: ICD-10-CM

## 2023-09-21 DIAGNOSIS — Z12.5 ENCOUNTER FOR SCREENING FOR MALIGNANT NEOPLASM OF PROSTATE: ICD-10-CM

## 2023-09-21 DIAGNOSIS — Z95.0 PACEMAKER: ICD-10-CM

## 2023-09-21 DIAGNOSIS — I48.0 PAROXYSMAL ATRIAL FIBRILLATION (HCC): ICD-10-CM

## 2023-09-21 DIAGNOSIS — I42.9 CARDIOMYOPATHY, UNSPECIFIED TYPE (HCC): ICD-10-CM

## 2023-09-21 DIAGNOSIS — R79.9 ABNORMAL FINDING OF BLOOD CHEMISTRY, UNSPECIFIED: ICD-10-CM

## 2023-09-21 DIAGNOSIS — Z80.42 FH: PROSTATE CANCER: ICD-10-CM

## 2023-09-21 DIAGNOSIS — Z51.81 MEDICATION MONITORING ENCOUNTER: ICD-10-CM

## 2023-09-21 DIAGNOSIS — I10 ESSENTIAL HYPERTENSION: ICD-10-CM

## 2023-09-21 DIAGNOSIS — R73.03 PREDIABETES: ICD-10-CM

## 2023-09-21 LAB
ALBUMIN SERPL-MCNC: 3.8 G/DL (ref 3.4–5)
ALBUMIN/GLOB SERPL: 1.2 (ref 0.8–1.7)
ALP SERPL-CCNC: 61 U/L (ref 45–117)
ALT SERPL-CCNC: 21 U/L (ref 16–61)
ANION GAP SERPL CALC-SCNC: 5 MMOL/L (ref 3–18)
APPEARANCE UR: CLEAR
AST SERPL-CCNC: 21 U/L (ref 10–38)
BACTERIA URNS QL MICRO: NEGATIVE /HPF
BASOPHILS # BLD: 0.1 K/UL (ref 0–0.1)
BASOPHILS NFR BLD: 1 % (ref 0–2)
BILIRUB SERPL-MCNC: 0.8 MG/DL (ref 0.2–1)
BILIRUB UR QL: NEGATIVE
BUN SERPL-MCNC: 12 MG/DL (ref 7–18)
BUN/CREAT SERPL: 14 (ref 12–20)
CALCIUM SERPL-MCNC: 9.5 MG/DL (ref 8.5–10.1)
CHLORIDE SERPL-SCNC: 97 MMOL/L (ref 100–111)
CHOLEST SERPL-MCNC: 190 MG/DL
CO2 SERPL-SCNC: 30 MMOL/L (ref 21–32)
COLOR UR: YELLOW
CREAT SERPL-MCNC: 0.86 MG/DL (ref 0.6–1.3)
CREAT UR-MCNC: 37 MG/DL (ref 30–125)
DIFFERENTIAL METHOD BLD: ABNORMAL
EOSINOPHIL # BLD: 0.5 K/UL (ref 0–0.4)
EOSINOPHIL NFR BLD: 6 % (ref 0–5)
EPITH CASTS URNS QL MICRO: NEGATIVE /LPF (ref 0–5)
ERYTHROCYTE [DISTWIDTH] IN BLOOD BY AUTOMATED COUNT: 12.6 % (ref 11.6–14.5)
EST. AVERAGE GLUCOSE BLD GHB EST-MCNC: 108 MG/DL
GLOBULIN SER CALC-MCNC: 3.3 G/DL (ref 2–4)
GLUCOSE SERPL-MCNC: 94 MG/DL (ref 74–99)
GLUCOSE UR STRIP.AUTO-MCNC: NEGATIVE MG/DL
HBA1C MFR BLD: 5.4 % (ref 4.2–5.6)
HCT VFR BLD AUTO: 42 % (ref 36–48)
HDLC SERPL-MCNC: 51 MG/DL (ref 40–60)
HDLC SERPL: 3.7 (ref 0–5)
HGB BLD-MCNC: 14.1 G/DL (ref 13–16)
HGB UR QL STRIP: NEGATIVE
IMM GRANULOCYTES # BLD AUTO: 0 K/UL (ref 0–0.04)
IMM GRANULOCYTES NFR BLD AUTO: 0 % (ref 0–0.5)
KETONES UR QL STRIP.AUTO: NEGATIVE MG/DL
LDLC SERPL CALC-MCNC: 121.2 MG/DL (ref 0–100)
LEUKOCYTE ESTERASE UR QL STRIP.AUTO: NEGATIVE
LIPID PANEL: ABNORMAL
LYMPHOCYTES # BLD: 2.9 K/UL (ref 0.9–3.6)
LYMPHOCYTES NFR BLD: 38 % (ref 21–52)
MAGNESIUM SERPL-MCNC: 2.2 MG/DL (ref 1.6–2.6)
MCH RBC QN AUTO: 31 PG (ref 24–34)
MCHC RBC AUTO-ENTMCNC: 33.6 G/DL (ref 31–37)
MCV RBC AUTO: 92.3 FL (ref 78–100)
MICROALBUMIN UR-MCNC: <0.5 MG/DL (ref 0–3)
MICROALBUMIN/CREAT UR-RTO: NORMAL MG/G (ref 0–30)
MONOCYTES # BLD: 0.8 K/UL (ref 0.05–1.2)
MONOCYTES NFR BLD: 11 % (ref 3–10)
NEUTS SEG # BLD: 3.3 K/UL (ref 1.8–8)
NEUTS SEG NFR BLD: 44 % (ref 40–73)
NITRITE UR QL STRIP.AUTO: NEGATIVE
NRBC # BLD: 0 K/UL (ref 0–0.01)
NRBC BLD-RTO: 0 PER 100 WBC
PH UR STRIP: 7 (ref 5–8)
PLATELET # BLD AUTO: 211 K/UL (ref 135–420)
PMV BLD AUTO: 9.1 FL (ref 9.2–11.8)
POTASSIUM SERPL-SCNC: 4.3 MMOL/L (ref 3.5–5.5)
PROT SERPL-MCNC: 7.1 G/DL (ref 6.4–8.2)
PROT UR STRIP-MCNC: NEGATIVE MG/DL
PSA SERPL-MCNC: 2.1 NG/ML (ref 0–4)
RBC # BLD AUTO: 4.55 M/UL (ref 4.35–5.65)
RBC #/AREA URNS HPF: NEGATIVE /HPF (ref 0–5)
SODIUM SERPL-SCNC: 132 MMOL/L (ref 136–145)
SP GR UR REFRACTOMETRY: 1 (ref 1–1.03)
TRIGL SERPL-MCNC: 89 MG/DL
UROBILINOGEN UR QL STRIP.AUTO: 0.2 EU/DL (ref 0.2–1)
VLDLC SERPL CALC-MCNC: 17.8 MG/DL
WBC # BLD AUTO: 7.6 K/UL (ref 4.6–13.2)
WBC URNS QL MICRO: NEGATIVE /HPF (ref 0–4)

## 2023-09-21 PROCEDURE — 83735 ASSAY OF MAGNESIUM: CPT

## 2023-09-21 PROCEDURE — 82043 UR ALBUMIN QUANTITATIVE: CPT

## 2023-09-21 PROCEDURE — 80061 LIPID PANEL: CPT

## 2023-09-21 PROCEDURE — 81001 URINALYSIS AUTO W/SCOPE: CPT

## 2023-09-21 PROCEDURE — 80307 DRUG TEST PRSMV CHEM ANLYZR: CPT

## 2023-09-21 PROCEDURE — 36415 COLL VENOUS BLD VENIPUNCTURE: CPT

## 2023-09-21 PROCEDURE — 85025 COMPLETE CBC W/AUTO DIFF WBC: CPT

## 2023-09-21 PROCEDURE — 80053 COMPREHEN METABOLIC PANEL: CPT

## 2023-09-21 PROCEDURE — G0103 PSA SCREENING: HCPCS

## 2023-09-21 PROCEDURE — 83036 HEMOGLOBIN GLYCOSYLATED A1C: CPT

## 2023-09-21 PROCEDURE — 82570 ASSAY OF URINE CREATININE: CPT

## 2023-09-27 ENCOUNTER — OFFICE VISIT (OUTPATIENT)
Age: 83
End: 2023-09-27

## 2023-09-27 VITALS
OXYGEN SATURATION: 98 % | HEIGHT: 70 IN | BODY MASS INDEX: 24.77 KG/M2 | TEMPERATURE: 98.2 F | RESPIRATION RATE: 16 BRPM | DIASTOLIC BLOOD PRESSURE: 78 MMHG | HEART RATE: 79 BPM | WEIGHT: 173 LBS | SYSTOLIC BLOOD PRESSURE: 134 MMHG

## 2023-09-27 DIAGNOSIS — Z71.89 ACP (ADVANCE CARE PLANNING): ICD-10-CM

## 2023-09-27 DIAGNOSIS — Z95.0 PACEMAKER: ICD-10-CM

## 2023-09-27 DIAGNOSIS — I10 ESSENTIAL HYPERTENSION: Primary | ICD-10-CM

## 2023-09-27 DIAGNOSIS — F41.1 GAD (GENERALIZED ANXIETY DISORDER): ICD-10-CM

## 2023-09-27 DIAGNOSIS — I42.9 CARDIOMYOPATHY, UNSPECIFIED TYPE (HCC): ICD-10-CM

## 2023-09-27 DIAGNOSIS — I48.0 PAROXYSMAL ATRIAL FIBRILLATION (HCC): ICD-10-CM

## 2023-09-27 DIAGNOSIS — I10 WHITE COAT SYNDROME WITH HYPERTENSION: ICD-10-CM

## 2023-09-27 DIAGNOSIS — E78.00 PURE HYPERCHOLESTEROLEMIA: ICD-10-CM

## 2023-09-27 DIAGNOSIS — R73.03 PREDIABETES: ICD-10-CM

## 2023-09-27 DIAGNOSIS — Z00.00 MEDICARE ANNUAL WELLNESS VISIT, SUBSEQUENT: ICD-10-CM

## 2023-09-27 LAB — DRUGS UR: NORMAL

## 2023-09-27 SDOH — ECONOMIC STABILITY: HOUSING INSECURITY
IN THE LAST 12 MONTHS, WAS THERE A TIME WHEN YOU DID NOT HAVE A STEADY PLACE TO SLEEP OR SLEPT IN A SHELTER (INCLUDING NOW)?: NO

## 2023-09-27 SDOH — ECONOMIC STABILITY: INCOME INSECURITY: HOW HARD IS IT FOR YOU TO PAY FOR THE VERY BASICS LIKE FOOD, HOUSING, MEDICAL CARE, AND HEATING?: NOT HARD AT ALL

## 2023-09-27 SDOH — ECONOMIC STABILITY: FOOD INSECURITY: WITHIN THE PAST 12 MONTHS, THE FOOD YOU BOUGHT JUST DIDN'T LAST AND YOU DIDN'T HAVE MONEY TO GET MORE.: NEVER TRUE

## 2023-09-27 SDOH — ECONOMIC STABILITY: FOOD INSECURITY: WITHIN THE PAST 12 MONTHS, YOU WORRIED THAT YOUR FOOD WOULD RUN OUT BEFORE YOU GOT MONEY TO BUY MORE.: NEVER TRUE

## 2023-09-27 ASSESSMENT — PATIENT HEALTH QUESTIONNAIRE - PHQ9
2. FEELING DOWN, DEPRESSED OR HOPELESS: 0
SUM OF ALL RESPONSES TO PHQ QUESTIONS 1-9: 0
1. LITTLE INTEREST OR PLEASURE IN DOING THINGS: 0
SUM OF ALL RESPONSES TO PHQ QUESTIONS 1-9: 0
SUM OF ALL RESPONSES TO PHQ9 QUESTIONS 1 & 2: 0

## 2023-09-27 ASSESSMENT — LIFESTYLE VARIABLES
HOW MANY STANDARD DRINKS CONTAINING ALCOHOL DO YOU HAVE ON A TYPICAL DAY: PATIENT DOES NOT DRINK
HOW OFTEN DO YOU HAVE A DRINK CONTAINING ALCOHOL: NEVER

## 2023-09-27 NOTE — PATIENT INSTRUCTIONS
or this instruction, always ask your healthcare professional. [address] any warranty or liability for your use of this information. Personalized Preventive Plan for Codey Body - 9/27/2023  Medicare offers a range of preventive health benefits. Some of the tests and screenings are paid in full while other may be subject to a deductible, co-insurance, and/or copay. Some of these benefits include a comprehensive review of your medical history including lifestyle, illnesses that may run in your family, and various assessments and screenings as appropriate. After reviewing your medical record and screening and assessments performed today your provider may have ordered immunizations, labs, imaging, and/or referrals for you. A list of these orders (if applicable) as well as your Preventive Care list are included within your After Visit Summary for your review. Other Preventive Recommendations:    A preventive eye exam performed by an eye specialist is recommended every 1-2 years to screen for glaucoma; cataracts, macular degeneration, and other eye disorders. A preventive dental visit is recommended every 6 months. Try to get at least 150 minutes of exercise per week or 10,000 steps per day on a pedometer . Order or download the FREE \"Exercise & Physical Activity: Your Everyday Guide\" from [employer]. Call 6-777.560.1757 or search [employer] online. You need 1438-1961 mg of calcium and 4673-6333 IU of vitamin D per day. It is possible to meet your calcium requirement with diet alone, but a vitamin D supplement is usually necessary to meet this goal.  When exposed to the sun, use a sunscreen that protects against both UVA and UVB radiation with an SPF of 30 or greater. Reapply every 2 to 3 hours or after sweating, drying off with a towel, or swimming. Always wear a seat belt when traveling in a car.  Always wear a helmet when

## 2023-09-28 NOTE — ACP (ADVANCE CARE PLANNING)
Advance Care Planning     Advance Care Planning (ACP) Physician/NP/PA Conversation    Date of Conversation: 9/27/2023  Conducted with: Patient with Decision Making Capacity    Healthcare Decision Maker:      Primary Decision Maker: Gelnn Reynolds - Other - [phone]    Secondary Decision Maker: Kimo Alcantar - Domestic Partner - [phone]    Click here to complete Healthcare Decision Makers including selection of the Healthcare Decision Maker Relationship (ie \"Primary\")  Today we confirmed his healthcare decision-makers to be his  Anthony Jeff and his domestic partner    Care Preferences:    Hospitalization: \"If your health worsens and it becomes clear that your chance of recovery is unlikely, what would be your preference regarding hospitalization? \"  The patient would prefer hospitalization. Ventilation: \"If you were unable to breath on your own and your chance of recovery was unlikely, what would be your preference about the use of a ventilator (breathing machine) if it was available to you? \"  The patient would desire the use of a ventilator. Resuscitation: \"In the event your heart stopped as a result of an underlying serious health condition, would you want attempts made to restart your heart, or would you prefer a natural death? \"  Yes, attempt to resuscitate. treatment goals, benefit/burden of treatment options, ventilation preferences, hospitalization preferences, resuscitation preferences, and end of life care preferences (vegetative state/imminent death).   Patient stresses that he does not want to be kept alive if there is no hope for meaningful recovery    Conversation Outcomes / Follow-Up Plan:  ACP complete - no further action today  Reviewed DNR/DNI and patient elects Full Code (Attempt Resuscitation)    Length of Voluntary ACP Conversation in minutes:  16 minutes    Huber Garrido MD

## 2023-10-04 DIAGNOSIS — F41.9 ANXIETY DISORDER, UNSPECIFIED: ICD-10-CM

## 2023-10-04 RX ORDER — LORAZEPAM 1 MG/1
TABLET ORAL
Qty: 30 TABLET | Refills: 0 | Status: SHIPPED | OUTPATIENT
Start: 2023-10-04 | End: 2023-11-03

## 2023-10-04 NOTE — TELEPHONE ENCOUNTER
PCP: Liane Gonzalez MD    LAST REFILL PER CHART:    LORazepam (ATIVAN) 1 MG tablet  Edit       Summary: TAKE 1 TAB BY MOUTH EVERY 8 HOURS AS NEEDED FOR ANXIETY FOR UP TO 30 DAYS. MAX 3/24 HOURS, Disp-30 tablet, R-0  Normal   Start: 9/5/2023  End: 10/5/2023  Ord/Sold: 9/5/2023 (O)  Report  Taking:   Long-term:   Pharmacy: [place] [id number] [place] [facility], [address] -  [phone]  Med Dose History       Patient Sig: TAKE 1 TAB BY MOUTH EVERY 8 HOURS AS NEEDED FOR ANXIETY FOR UP TO 30 DAYS.  MAX 3/24 HOURS       Ordered on: 9/5/2023       Authorized by: Liane Gonzalez       Dispense: 30 tablet       Refills: 0 ordered        Future Appointments   Date Time Provider [address] [place] [address]   11/30/2023  3:00 PM Wilman Toledo MD [facility] BS AMB   3/6/2024  3:00 PM [facility], PACER [facility] BS AMB   3/21/2024  8:30 AM [facility] LAB VISIT [facility] BS AMB   3/27/2024  2:30 PM Liane Gonzalez MD [facility] BS AMB

## 2023-10-04 NOTE — TELEPHONE ENCOUNTER
Reviewed report generated by the [facility]. Does not demonstrate aberrancies or inconsistencies with regard to the prescribing of controlled medications to this patient by other providers. Last filled 9/5/2023 per .      CSA Signed: 3/22/2023   Last UDS: 9/21/2023

## 2023-11-02 DIAGNOSIS — F41.9 ANXIETY DISORDER, UNSPECIFIED: ICD-10-CM

## 2023-11-02 RX ORDER — LORAZEPAM 1 MG/1
TABLET ORAL
Qty: 30 TABLET | Refills: 0 | Status: SHIPPED | OUTPATIENT
Start: 2023-11-02 | End: 2023-12-02

## 2023-11-02 NOTE — TELEPHONE ENCOUNTER
PCP: Jp Guzman MD    LAST REFILL PER CHART:  LORazepam (ATIVAN) 1 MG tablet         Summary: TAKE 1 TAB BY MOUTH EVERY 8 HOURS AS NEEDED FOR ANXIETY FOR UP TO 30 DAYS. MAX 3/24 HOURS, Disp-30 tablet, R-0  Start: 10/4/2023  End: 11/3/2023  Pharmacy: [place] [id number] [place] [facility], [address] -  [phone]  Med Dose History       Patient Sig: TAKE 1 TAB BY MOUTH EVERY 8 HOURS AS NEEDED FOR ANXIETY FOR UP TO 30 DAYS.  MAX 3/24 HOURS       Ordered on: 10/4/2023       Authorized by: Jp Guzman       Dispense: 30 tablet       Refills: 0 ordered       Note to Pharmacy: Not to exceed 5 additional fills before 03/03/2024          Future Appointments   Date Time Provider [address] [place] [address]   11/30/2023  3:00 PM Randy Monahan MD [facility] BS AMB   3/6/2024  3:00 PM [facility], PACER [facility] BS AMB   3/21/2024  8:30 AM [facility] LAB VISIT [facility] BS AMB   3/27/2024  2:30 PM Jp Guzman MD [facility] BS AMB

## 2023-11-02 NOTE — TELEPHONE ENCOUNTER
Reviewed report generated by the [facility]. Does not demonstrate aberrancies or inconsistencies with regard to the prescribing of controlled medications to this patient by other providers. Last filled 10/4/2023 per .      CSA Signed: 3/22/2023   Last UDS: 9/21/2023

## 2023-11-30 ENCOUNTER — OFFICE VISIT (OUTPATIENT)
Age: 83
End: 2023-11-30
Payer: MEDICARE

## 2023-11-30 VITALS
SYSTOLIC BLOOD PRESSURE: 122 MMHG | HEART RATE: 78 BPM | OXYGEN SATURATION: 95 % | DIASTOLIC BLOOD PRESSURE: 78 MMHG | HEIGHT: 70 IN | WEIGHT: 180 LBS | BODY MASS INDEX: 25.77 KG/M2

## 2023-11-30 DIAGNOSIS — I10 ESSENTIAL HYPERTENSION: Primary | ICD-10-CM

## 2023-11-30 DIAGNOSIS — E78.00 PURE HYPERCHOLESTEROLEMIA: ICD-10-CM

## 2023-11-30 DIAGNOSIS — Z95.0 PACEMAKER: ICD-10-CM

## 2023-11-30 DIAGNOSIS — I44.30 AV BLOCK: ICD-10-CM

## 2023-11-30 PROCEDURE — 99214 OFFICE O/P EST MOD 30 MIN: CPT | Performed by: INTERNAL MEDICINE

## 2023-11-30 PROCEDURE — G8427 DOCREV CUR MEDS BY ELIG CLIN: HCPCS | Performed by: INTERNAL MEDICINE

## 2023-11-30 PROCEDURE — 3074F SYST BP LT 130 MM HG: CPT | Performed by: INTERNAL MEDICINE

## 2023-11-30 PROCEDURE — 1036F TOBACCO NON-USER: CPT | Performed by: INTERNAL MEDICINE

## 2023-11-30 PROCEDURE — G8419 CALC BMI OUT NRM PARAM NOF/U: HCPCS | Performed by: INTERNAL MEDICINE

## 2023-11-30 PROCEDURE — G8484 FLU IMMUNIZE NO ADMIN: HCPCS | Performed by: INTERNAL MEDICINE

## 2023-11-30 PROCEDURE — 1123F ACP DISCUSS/DSCN MKR DOCD: CPT | Performed by: INTERNAL MEDICINE

## 2023-11-30 PROCEDURE — 3078F DIAST BP <80 MM HG: CPT | Performed by: INTERNAL MEDICINE

## 2023-11-30 ASSESSMENT — PATIENT HEALTH QUESTIONNAIRE - PHQ9
SUM OF ALL RESPONSES TO PHQ QUESTIONS 1-9: 0
2. FEELING DOWN, DEPRESSED OR HOPELESS: 0
SUM OF ALL RESPONSES TO PHQ QUESTIONS 1-9: 0
SUM OF ALL RESPONSES TO PHQ9 QUESTIONS 1 & 2: 0
1. LITTLE INTEREST OR PLEASURE IN DOING THINGS: 0

## 2023-11-30 ASSESSMENT — ENCOUNTER SYMPTOMS
COUGH: 0
ABDOMINAL DISTENTION: 0
NAUSEA: 0
VOMITING: 0
ABDOMINAL PAIN: 0
SORE THROAT: 0
SHORTNESS OF BREATH: 0

## 2023-11-30 NOTE — PROGRESS NOTES
[facility] presents today for   Chief Complaint   Patient presents with    [facility] preferred language for health care discussion is english/other. Is someone accompanying this pt? no    Is the patient using any DME equipment during OV? no    Depression Screening:  Depression: Not at risk (11/30/2023)    PHQ-2     PHQ-2 Score: 0        Learning Assessment:  Who is the primary learner? Patient    What is the preferred language for health care of the primary learner? ENGLISH    How does the primary learner prefer to learn new concepts? DEMONSTRATION    Answered By patient    Relationship to Learner SELF           Pt currently taking Anticoagulant therapy? no    Pt currently taking Antiplatelet therapy ? no      Coordination of Care:  1. Have you been to the ER, urgent care clinic since your last visit? Hospitalized since your last visit? no    2. Have you seen or consulted any other health care providers outside of the [address] [place] [address] since your last visit? Include any pap smears or colon screening.  no
details. Assessment / Plan:   Primary hypertension: Patient's blood pressure remains well controlled on  lisinopril 5 mg twice daily. This is followed closely by his PCP. Paroxysmal atrial fibrillation. Patient had a single episode of atrial fibrillation in November 2017 lasting for little less than 2 hours. No recurrent episodes since that time. No need for anticoagulation at this time. His rhythms have been monitored with every 3 month with serial device checks. 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 Patient has an MRI compatible device. Normal device function on interrogation today. He remains pacemaker dependent in the ventricle. His battery life is 2.5 years. Dyslipidemia. Patient has been managing this with diet control and takes a fish oil supplement. CareLink device check in 3 months. Followup in 6 months, sooner if needed.         Luly Painting MD

## 2023-12-01 DIAGNOSIS — F41.9 ANXIETY DISORDER, UNSPECIFIED: ICD-10-CM

## 2023-12-01 RX ORDER — LORAZEPAM 1 MG/1
TABLET ORAL
Qty: 30 TABLET | Refills: 0 | Status: SHIPPED | OUTPATIENT
Start: 2023-12-01 | End: 2023-12-31

## 2023-12-01 NOTE — TELEPHONE ENCOUNTER
Reviewed report generated by the [facility]. Does not demonstrate aberrancies or inconsistencies with regard to the prescribing of controlled medications to this patient by other providers. Last filled 11/2/2023 per .      CSA Signed: 3/22/2023   Last UDS: 9/21/2023

## 2023-12-28 DIAGNOSIS — F41.9 ANXIETY DISORDER, UNSPECIFIED: ICD-10-CM

## 2023-12-28 RX ORDER — LORAZEPAM 1 MG/1
TABLET ORAL
Qty: 30 TABLET | Refills: 0 | Status: SHIPPED | OUTPATIENT
Start: 2023-12-28 | End: 2024-01-27

## 2023-12-28 NOTE — TELEPHONE ENCOUNTER
Reviewed report generated by the [facility]. Does not demonstrate aberrancies or inconsistencies with regard to the prescribing of controlled medications to this patient by other providers. Last filled 12/1/2023 per .      CSA Signed: 3/22/2023   Last UDS: 9/21/2023

## 2024-01-25 DIAGNOSIS — F41.9 ANXIETY DISORDER, UNSPECIFIED: ICD-10-CM

## 2024-01-25 RX ORDER — LORAZEPAM 1 MG/1
TABLET ORAL
Qty: 30 TABLET | Refills: 0 | Status: SHIPPED | OUTPATIENT
Start: 2024-01-25 | End: 2024-02-24

## 2024-01-25 NOTE — TELEPHONE ENCOUNTER
Reviewed report generated by the [place] [facility]. Does not demonstrate aberrancies or inconsistencies with regard to the prescribing of controlled medications to this patient by other providers.    Last filled 12/28/2023 per .     CSA Signed: 3/22/2023  Last UDS: 9/21/2023

## 2024-02-15 RX ORDER — LISINOPRIL 2.5 MG/1
TABLET ORAL
Qty: 360 TABLET | Refills: 3 | Status: SHIPPED | OUTPATIENT
Start: 2024-02-15

## 2024-02-15 NOTE — TELEPHONE ENCOUNTER
PCP: Edith Cameron MD    LAST REFILL PER CHART:  Medication:lisinopril (PRINIVIL;ZESTRIL) 2.5 MG tablet   Ordered On:02/14/2023  Instructions:Take 2 tablets by mouth twice daily   Dispense:360 tablets  Refills:3      Future Appointments   Date Time Provider Department Center   3/6/2024  3:00 PM [facility], PACER [facility]   3/21/2024  8:30 AM [facility] LAB VISIT [facility]   3/27/2024  2:30 PM Edith Camreon MD [facility]   5/31/2024  2:20 PM Humberto Dewitt MD [facility] AMB

## 2024-02-22 DIAGNOSIS — F41.9 ANXIETY DISORDER, UNSPECIFIED: ICD-10-CM

## 2024-02-23 RX ORDER — LORAZEPAM 1 MG/1
TABLET ORAL
Qty: 30 TABLET | Refills: 0 | Status: SHIPPED | OUTPATIENT
Start: 2024-02-23 | End: 2024-03-24

## 2024-02-23 NOTE — TELEPHONE ENCOUNTER
Reviewed report generated by the [place] [facility]. Does not demonstrate aberrancies or inconsistencies with regard to the prescribing of controlled medications to this patient by other providers.    Last filled 1/25/2024 per .     CSA Signed: 3/22/2023   Last UDS: 9/21/2023

## 2024-03-06 ENCOUNTER — NURSE ONLY (OUTPATIENT)
Age: 84
End: 2024-03-06
Payer: MEDICARE

## 2024-03-06 DIAGNOSIS — Z95.0 PACEMAKER: Primary | ICD-10-CM

## 2024-03-06 DIAGNOSIS — I44.30 AV BLOCK: ICD-10-CM

## 2024-03-11 PROCEDURE — 93296 REM INTERROG EVL PM/IDS: CPT | Performed by: INTERNAL MEDICINE

## 2024-03-11 PROCEDURE — 93294 REM INTERROG EVL PM/LDLS PM: CPT | Performed by: INTERNAL MEDICINE

## 2024-03-13 NOTE — RESULT ENCOUNTER NOTE
Device check personally reviewed by me.  Normal device function on interrogation.  No clinically significant arrhythmias.  See scanned interrogation document for complete details.

## 2024-03-21 ENCOUNTER — HOSPITAL ENCOUNTER (OUTPATIENT)
Facility: HOSPITAL | Age: 84
Setting detail: SPECIMEN
Discharge: HOME | End: 2024-03-21
Payer: MEDICARE

## 2024-03-21 DIAGNOSIS — I48.0 PAROXYSMAL ATRIAL FIBRILLATION (HCC): ICD-10-CM

## 2024-03-21 DIAGNOSIS — I10 WHITE COAT SYNDROME WITH HYPERTENSION: ICD-10-CM

## 2024-03-21 DIAGNOSIS — R73.03 PREDIABETES: ICD-10-CM

## 2024-03-21 DIAGNOSIS — I42.9 CARDIOMYOPATHY, UNSPECIFIED TYPE (HCC): ICD-10-CM

## 2024-03-21 DIAGNOSIS — E78.00 PURE HYPERCHOLESTEROLEMIA: ICD-10-CM

## 2024-03-21 DIAGNOSIS — Z95.0 PACEMAKER: ICD-10-CM

## 2024-03-21 DIAGNOSIS — I10 ESSENTIAL HYPERTENSION: ICD-10-CM

## 2024-03-21 LAB
ALBUMIN SERPL-MCNC: 4.1 G/DL (ref 3.4–5)
ALBUMIN/GLOB SERPL: 1.4 (ref 0.8–1.7)
ALP SERPL-CCNC: 59 U/L (ref 45–117)
ALT SERPL-CCNC: 21 U/L (ref 16–61)
ANION GAP SERPL CALC-SCNC: 7 MMOL/L (ref 3–18)
AST SERPL-CCNC: 21 U/L (ref 10–38)
BILIRUB SERPL-MCNC: 0.7 MG/DL (ref 0.2–1)
BUN SERPL-MCNC: 12 MG/DL (ref 7–18)
BUN/CREAT SERPL: 13 (ref 12–20)
CALCIUM SERPL-MCNC: 9.7 MG/DL (ref 8.5–10.1)
CHLORIDE SERPL-SCNC: 96 MMOL/L (ref 100–111)
CHOLEST SERPL-MCNC: 200 MG/DL
CO2 SERPL-SCNC: 29 MMOL/L (ref 21–32)
CREAT SERPL-MCNC: 0.94 MG/DL (ref 0.6–1.3)
EST. AVERAGE GLUCOSE BLD GHB EST-MCNC: 111 MG/DL
GLOBULIN SER CALC-MCNC: 3 G/DL (ref 2–4)
GLUCOSE SERPL-MCNC: 100 MG/DL (ref 74–99)
HBA1C MFR BLD: 5.5 % (ref 4.2–5.6)
HDLC SERPL-MCNC: 55 MG/DL (ref 40–60)
HDLC SERPL: 3.6 (ref 0–5)
LDLC SERPL CALC-MCNC: 129.2 MG/DL (ref 0–100)
LIPID PANEL: ABNORMAL
MAGNESIUM SERPL-MCNC: 2.1 MG/DL (ref 1.6–2.6)
POTASSIUM SERPL-SCNC: 4 MMOL/L (ref 3.5–5.5)
PROT SERPL-MCNC: 7.1 G/DL (ref 6.4–8.2)
SODIUM SERPL-SCNC: 132 MMOL/L (ref 136–145)
TRIGL SERPL-MCNC: 79 MG/DL
VLDLC SERPL CALC-MCNC: 15.8 MG/DL

## 2024-03-21 PROCEDURE — 36415 COLL VENOUS BLD VENIPUNCTURE: CPT

## 2024-03-21 PROCEDURE — 80061 LIPID PANEL: CPT

## 2024-03-21 PROCEDURE — 83735 ASSAY OF MAGNESIUM: CPT

## 2024-03-21 PROCEDURE — 80053 COMPREHEN METABOLIC PANEL: CPT

## 2024-03-21 PROCEDURE — 83036 HEMOGLOBIN GLYCOSYLATED A1C: CPT

## 2024-03-22 DIAGNOSIS — F41.9 ANXIETY DISORDER, UNSPECIFIED: ICD-10-CM

## 2024-03-22 RX ORDER — LORAZEPAM 1 MG/1
TABLET ORAL
Qty: 30 TABLET | Refills: 0 | Status: SHIPPED | OUTPATIENT
Start: 2024-03-22 | End: 2024-04-21

## 2024-03-22 NOTE — TELEPHONE ENCOUNTER
[facility]22[facility]2024    The last fill date for Lorazepam 1 Mg Tablet  was 2/23/2024 for a 10 d/s qty 30      Last UDS: 9/21/2023    CSA Last signed: 3/31/2023        PCP: Edith Cameron MD    Last appt: 9/27/2023  Future Appointments   Date Time Provider Department Center   4/9/2024  3:00 PM Edith Cameron MD [facility]   5/31/2024  2:20 PM Humberto Dewitt MD [facility] AMB   9/11/2024  1:45 PM [facility], PACER [facility] AMB       Requested Prescriptions     Pending Prescriptions Disp Refills    LORazepam (ATIVAN) 1 MG tablet [Pharmacy Med Name: LORAZEPAM 1 MG TABLET] 30 tablet 0     Sig: TAKE 1 TAB BY MOUTH EVERY 8 HOURS AS NEEDED FOR ANXIETY FOR UP TO 30 DAYS. MAX 3/24 HOURS

## 2024-04-09 ENCOUNTER — OFFICE VISIT (OUTPATIENT)
Age: 84
End: 2024-04-09

## 2024-04-09 VITALS
BODY MASS INDEX: 25.77 KG/M2 | OXYGEN SATURATION: 96 % | WEIGHT: 180 LBS | DIASTOLIC BLOOD PRESSURE: 82 MMHG | HEIGHT: 70 IN | TEMPERATURE: 99.2 F | SYSTOLIC BLOOD PRESSURE: 130 MMHG | HEART RATE: 70 BPM | RESPIRATION RATE: 14 BRPM

## 2024-04-09 DIAGNOSIS — M46.1 SACROILIITIS (HCC): ICD-10-CM

## 2024-04-09 DIAGNOSIS — I48.0 PAROXYSMAL ATRIAL FIBRILLATION (HCC): ICD-10-CM

## 2024-04-09 DIAGNOSIS — E78.00 PURE HYPERCHOLESTEROLEMIA: ICD-10-CM

## 2024-04-09 DIAGNOSIS — M85.88 OSTEOPENIA OF LUMBAR SPINE: ICD-10-CM

## 2024-04-09 DIAGNOSIS — Z95.0 PACEMAKER: ICD-10-CM

## 2024-04-09 DIAGNOSIS — F41.1 GAD (GENERALIZED ANXIETY DISORDER): ICD-10-CM

## 2024-04-09 DIAGNOSIS — I10 WHITE COAT SYNDROME WITH HYPERTENSION: Primary | ICD-10-CM

## 2024-04-09 DIAGNOSIS — I44.30 AV BLOCK: ICD-10-CM

## 2024-04-09 DIAGNOSIS — E66.3 OVERWEIGHT (BMI 25.0-29.9): ICD-10-CM

## 2024-04-09 DIAGNOSIS — Z12.5 PROSTATE CANCER SCREENING: ICD-10-CM

## 2024-04-09 DIAGNOSIS — M48.061 SPINAL STENOSIS OF LUMBAR REGION WITHOUT NEUROGENIC CLAUDICATION: ICD-10-CM

## 2024-04-09 DIAGNOSIS — R73.03 PREDIABETES: ICD-10-CM

## 2024-04-09 DIAGNOSIS — I42.9 CARDIOMYOPATHY, UNSPECIFIED TYPE (HCC): ICD-10-CM

## 2024-04-09 DIAGNOSIS — M43.16 ANTEROLISTHESIS OF LUMBAR SPINE: ICD-10-CM

## 2024-04-09 DIAGNOSIS — R93.7 ABNORMAL CT SCAN, LUMBAR SPINE: ICD-10-CM

## 2024-04-09 ASSESSMENT — PATIENT HEALTH QUESTIONNAIRE - PHQ9
SUM OF ALL RESPONSES TO PHQ QUESTIONS 1-9: 0
SUM OF ALL RESPONSES TO PHQ QUESTIONS 1-9: 0
1. LITTLE INTEREST OR PLEASURE IN DOING THINGS: NOT AT ALL
2. FEELING DOWN, DEPRESSED OR HOPELESS: NOT AT ALL
SUM OF ALL RESPONSES TO PHQ9 QUESTIONS 1 & 2: 0
SUM OF ALL RESPONSES TO PHQ QUESTIONS 1-9: 0
SUM OF ALL RESPONSES TO PHQ QUESTIONS 1-9: 0
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
## 2024-04-09 NOTE — PATIENT INSTRUCTIONS
your risk for heart disease, heart attack, and stroke. HDL is the \"good\" cholesterol. It helps clear bad cholesterol from the body. High HDL is linked with a lower risk of heart disease, heart attack, and stroke.  Your cholesterol levels help your doctor find out your risk for having a heart attack or stroke. You and your doctor can talk about whether you need to lower your risk and what treatment is best for you.  A heart-healthy lifestyle along with medicines can help lower your cholesterol and your risk. The way you choose to lower your risk will depend on how high your risk is for heart attack and stroke. It will also depend on how you feel about taking medicines.  Follow-up care is a key part of your treatment and safety. Be sure to make and go to all appointments, and call your doctor if you are having problems. It's also a good idea to know your test results and keep a list of the medicines you take.  How can you care for yourself at home?  Eat a variety of foods every day. Good choices include fruits, vegetables, whole grains (like oatmeal), dried beans and peas, nuts and seeds, soy products (like tofu), and fat-free or low-fat dairy products.  Replace butter, margarine, and hydrogenated or partially hydrogenated oils with olive and canola oils. (Canola oil margarine without trans fat is fine.)  Replace red meat with fish, poultry, and soy protein (like tofu).  Limit processed and packaged foods like chips, crackers, and cookies.  Bake, broil, or steam foods. Don't guzmán them.  Be physically active. Get at least 30 minutes of exercise on most days of the week. Walking is a good choice. You also may want to do other activities, such as running, swimming, cycling, or playing tennis or team sports.  Stay at a healthy weight or lose weight by making the changes in eating and physical activity listed above. Losing just a small amount of weight, even 5 to 10 pounds, can reduce your risk for having a heart attack or

## 2024-04-13 PROBLEM — M46.1 SACROILIITIS (HCC): Status: ACTIVE | Noted: 2024-04-13

## 2024-04-13 PROBLEM — Z87.81 HISTORY OF FRACTURED RIB: Status: RESOLVED | Noted: 2019-12-29 | Resolved: 2024-04-13

## 2024-04-13 PROBLEM — M48.061 SPINAL STENOSIS OF LUMBAR REGION WITHOUT NEUROGENIC CLAUDICATION: Status: ACTIVE | Noted: 2024-04-13

## 2024-04-13 PROBLEM — E66.3 OVERWEIGHT (BMI 25.0-29.9): Status: ACTIVE | Noted: 2024-04-13

## 2024-04-13 PROBLEM — M85.88 OSTEOPENIA OF LUMBAR SPINE: Status: ACTIVE | Noted: 2024-04-13

## 2024-04-13 PROBLEM — M43.16 ANTEROLISTHESIS OF LUMBAR SPINE: Status: ACTIVE | Noted: 2024-04-13

## 2024-04-17 ENCOUNTER — HOSPITAL ENCOUNTER (OUTPATIENT)
Facility: HOSPITAL | Age: 84
Discharge: HOME | End: 2024-04-20
Attending: INTERNAL MEDICINE
Payer: MEDICARE

## 2024-04-17 DIAGNOSIS — R93.7 ABNORMAL CT SCAN, LUMBAR SPINE: ICD-10-CM

## 2024-04-17 DIAGNOSIS — M85.88 OSTEOPENIA OF LUMBAR SPINE: ICD-10-CM

## 2024-04-17 PROCEDURE — 77080 DXA BONE DENSITY AXIAL: CPT

## 2024-04-19 DIAGNOSIS — F41.9 ANXIETY DISORDER, UNSPECIFIED: ICD-10-CM

## 2024-04-21 RX ORDER — LORAZEPAM 1 MG/1
1 TABLET ORAL EVERY 8 HOURS PRN
Qty: 135 TABLET | Refills: 0 | Status: SHIPPED | OUTPATIENT
Start: 2024-04-21 | End: 2024-07-20

## 2024-05-31 ENCOUNTER — OFFICE VISIT (OUTPATIENT)
Age: 84
End: 2024-05-31
Payer: MEDICARE

## 2024-05-31 VITALS
DIASTOLIC BLOOD PRESSURE: 82 MMHG | SYSTOLIC BLOOD PRESSURE: 132 MMHG | BODY MASS INDEX: 25.77 KG/M2 | OXYGEN SATURATION: 96 % | HEIGHT: 70 IN | WEIGHT: 180 LBS | HEART RATE: 84 BPM

## 2024-05-31 DIAGNOSIS — I44.30 AV BLOCK: ICD-10-CM

## 2024-05-31 DIAGNOSIS — I10 ESSENTIAL HYPERTENSION: ICD-10-CM

## 2024-05-31 DIAGNOSIS — I48.0 PAROXYSMAL ATRIAL FIBRILLATION (HCC): Primary | ICD-10-CM

## 2024-05-31 DIAGNOSIS — Z95.0 PACEMAKER: ICD-10-CM

## 2024-05-31 DIAGNOSIS — E78.00 PURE HYPERCHOLESTEROLEMIA: ICD-10-CM

## 2024-05-31 PROCEDURE — G8419 CALC BMI OUT NRM PARAM NOF/U: HCPCS | Performed by: INTERNAL MEDICINE

## 2024-05-31 PROCEDURE — 1036F TOBACCO NON-USER: CPT | Performed by: INTERNAL MEDICINE

## 2024-05-31 PROCEDURE — 99214 OFFICE O/P EST MOD 30 MIN: CPT | Performed by: INTERNAL MEDICINE

## 2024-05-31 PROCEDURE — G8427 DOCREV CUR MEDS BY ELIG CLIN: HCPCS | Performed by: INTERNAL MEDICINE

## 2024-05-31 PROCEDURE — 3075F SYST BP GE 130 - 139MM HG: CPT | Performed by: INTERNAL MEDICINE

## 2024-05-31 PROCEDURE — 3079F DIAST BP 80-89 MM HG: CPT | Performed by: INTERNAL MEDICINE

## 2024-05-31 PROCEDURE — 1123F ACP DISCUSS/DSCN MKR DOCD: CPT | Performed by: INTERNAL MEDICINE

## 2024-05-31 ASSESSMENT — ENCOUNTER SYMPTOMS
SHORTNESS OF BREATH: 0
SORE THROAT: 0
NAUSEA: 0
ABDOMINAL PAIN: 0
VOMITING: 0
ABDOMINAL DISTENTION: 0
COUGH: 0

## 2024-05-31 NOTE — PROGRESS NOTES
05/31/24     Sam Ruiz  is a 83 y.o. male     Chief Complaint   Patient presents with    Follow-up     6 month    Device Check     Medtronic       HPI  Patient presents for a scheduled followup office visit.  He has a history of intermittent high-grade AV block, status post to dual-chamber permanent pacemaker in 2007.  He also has a history of hypertension, Brief episodes approximately atrial fibrillation, and a mildly depressed LV systolic function,  Which returned to low normal on echocardiogram in June 2013 showing an ejection fraction of 50-55%. The patient underwent a pacemaker generator exchange in May 2016 with a Medtronic MRI safe device after his device has reached elective replacement indicator.      The patient was last seen in the office 6 months ago.  Since last visit he continues to deal with right hip pain and also back pains.  He recently had a cortisone injection and is feeling a little better.  He denies any heart palpitations, dizziness or syncope.  No chest pain or shortness of breath.  No leg swelling.    Past Medical History:   Diagnosis Date    Anxiety     AV block     intermittent, high-grade    Bilateral inguinal hernia     Cardiomyopathy (HCC)     Diverticulosis of colon 11/10/2015    Dysphagia     Multiple esophageal dilations.    Echocardiogram abnormal 06/25/2013    EF 50-55%.  No WMA.  Mild LVH.  Gr 1 DDfx.  RVE.  ESME.  Mild TR.  No significant chg from study of 6/8/11.    Equivocal myocardial perfusion imaging 10/07/2009    Likely inferior & anterior septal artifact.  EF 55%.  Mild septal WMA could be related to pacemaker.    Essential hypertension with goal blood pressure less than 140/90     HLD (hyperlipidemia)     Hx of colonoscopy 11/10/2015    Dr. Finch; tubulovillous adenoma.    Pacemaker 2007    Medtronic dual-chamber device.    Paroxysmal atrial fibrillation (HCC)     single episode noted on routine device check     Current Outpatient Medications   Medication Sig

## 2024-05-31 NOTE — PROGRESS NOTES
Sam Ruiz presents today for   Chief Complaint   Patient presents with    Follow-up     6 month       Sam Ruiz preferred language for health care discussion is english/other.    Is someone accompanying this pt? no    Is the patient using any DME equipment during OV? no    Depression Screening:  Depression: Not at risk (4/9/2024)    PHQ-2     PHQ-2 Score: 0        Learning Assessment:  Who is the primary learner? Patient    What is the preferred language for health care of the primary learner? ENGLISH    How does the primary learner prefer to learn new concepts? DEMONSTRATION    Answered By patient    Relationship to Learner SELF           Pt currently taking Anticoagulant therapy? no    Pt currently taking Antiplatelet therapy ? no      Coordination of Care:  1. Have you been to the ER, urgent care clinic since your last visit? Hospitalized since your last visit? no    2. Have you seen or consulted any other health care providers outside of the [facility] since your last visit? Include any pap smears or colon screening. no

## 2024-07-18 DIAGNOSIS — F41.9 ANXIETY DISORDER, UNSPECIFIED: ICD-10-CM

## 2024-07-18 DIAGNOSIS — Z51.81 MEDICATION MONITORING ENCOUNTER: Primary | ICD-10-CM

## 2024-07-18 RX ORDER — LORAZEPAM 1 MG/1
1 TABLET ORAL EVERY 8 HOURS PRN
Qty: 135 TABLET | Refills: 0 | Status: SHIPPED | OUTPATIENT
Start: 2024-07-18 | End: 2024-10-16

## 2024-07-18 NOTE — TELEPHONE ENCOUNTER
Reviewed report generated by the [place] [facility]. Does not demonstrate aberrancies or inconsistencies with regard to the prescribing of controlled medications to this patient by other providers.    Last filled 4/21/2024 per .     CSA Signed: 4/9/2024   Last UDS: 9/21/2023

## 2024-09-11 ENCOUNTER — NURSE ONLY (OUTPATIENT)
Age: 84
End: 2024-09-11
Payer: MEDICARE

## 2024-09-11 DIAGNOSIS — I44.30 AV BLOCK: ICD-10-CM

## 2024-09-11 DIAGNOSIS — Z95.0 PACEMAKER: Primary | ICD-10-CM

## 2024-09-11 PROCEDURE — 93296 REM INTERROG EVL PM/IDS: CPT | Performed by: INTERNAL MEDICINE

## 2024-09-11 PROCEDURE — 93294 REM INTERROG EVL PM/LDLS PM: CPT | Performed by: INTERNAL MEDICINE

## 2024-10-09 ENCOUNTER — HOSPITAL ENCOUNTER (OUTPATIENT)
Facility: HOSPITAL | Age: 84
Setting detail: SPECIMEN
Discharge: HOME | End: 2024-10-12
Payer: MEDICARE

## 2024-10-09 DIAGNOSIS — I10 WHITE COAT SYNDROME WITH HYPERTENSION: ICD-10-CM

## 2024-10-09 DIAGNOSIS — Z12.5 PROSTATE CANCER SCREENING: ICD-10-CM

## 2024-10-09 DIAGNOSIS — M85.88 OSTEOPENIA OF LUMBAR SPINE: ICD-10-CM

## 2024-10-09 DIAGNOSIS — Z51.81 MEDICATION MONITORING ENCOUNTER: ICD-10-CM

## 2024-10-09 DIAGNOSIS — I44.30 AV BLOCK: ICD-10-CM

## 2024-10-09 DIAGNOSIS — I48.0 PAROXYSMAL ATRIAL FIBRILLATION (HCC): ICD-10-CM

## 2024-10-09 DIAGNOSIS — F41.9 ANXIETY DISORDER, UNSPECIFIED: ICD-10-CM

## 2024-10-09 DIAGNOSIS — R73.03 PREDIABETES: ICD-10-CM

## 2024-10-09 DIAGNOSIS — E78.00 PURE HYPERCHOLESTEROLEMIA: ICD-10-CM

## 2024-10-09 LAB
25(OH)D3 SERPL-MCNC: 41.5 NG/ML (ref 30–100)
ALBUMIN SERPL-MCNC: 4.2 G/DL (ref 3.4–5)
ALBUMIN/GLOB SERPL: 1.6 (ref 0.8–1.7)
ALP SERPL-CCNC: 57 U/L (ref 45–117)
ALT SERPL-CCNC: 19 U/L (ref 16–61)
ANION GAP SERPL CALC-SCNC: 7 MMOL/L (ref 3–18)
APPEARANCE UR: CLEAR
AST SERPL-CCNC: 17 U/L (ref 10–38)
BACTERIA URNS QL MICRO: NEGATIVE /HPF
BASOPHILS # BLD: 0.1 K/UL (ref 0–0.1)
BASOPHILS NFR BLD: 1 % (ref 0–2)
BILIRUB SERPL-MCNC: 0.7 MG/DL (ref 0.2–1)
BILIRUB UR QL: NEGATIVE
BUN SERPL-MCNC: 13 MG/DL (ref 7–18)
BUN/CREAT SERPL: 15 (ref 12–20)
CALCIUM SERPL-MCNC: 9.8 MG/DL (ref 8.5–10.1)
CHLORIDE SERPL-SCNC: 96 MMOL/L (ref 100–111)
CHOLEST SERPL-MCNC: 216 MG/DL
CO2 SERPL-SCNC: 28 MMOL/L (ref 21–32)
COLOR UR: YELLOW
CREAT SERPL-MCNC: 0.85 MG/DL (ref 0.6–1.3)
DIFFERENTIAL METHOD BLD: ABNORMAL
EOSINOPHIL # BLD: 0.4 K/UL (ref 0–0.4)
EOSINOPHIL NFR BLD: 6 % (ref 0–5)
EPITH CASTS URNS QL MICRO: ABNORMAL /LPF (ref 0–5)
ERYTHROCYTE [DISTWIDTH] IN BLOOD BY AUTOMATED COUNT: 13 % (ref 11.6–14.5)
EST. AVERAGE GLUCOSE BLD GHB EST-MCNC: 114 MG/DL
GLOBULIN SER CALC-MCNC: 2.7 G/DL (ref 2–4)
GLUCOSE SERPL-MCNC: 96 MG/DL (ref 74–99)
GLUCOSE UR STRIP.AUTO-MCNC: NEGATIVE MG/DL
HBA1C MFR BLD: 5.6 % (ref 4.2–5.6)
HCT VFR BLD AUTO: 44.6 % (ref 36–48)
HDLC SERPL-MCNC: 60 MG/DL (ref 40–60)
HDLC SERPL: 3.6 (ref 0–5)
HGB BLD-MCNC: 14.9 G/DL (ref 13–16)
HGB UR QL STRIP: NEGATIVE
IMM GRANULOCYTES # BLD AUTO: 0.1 K/UL (ref 0–0.04)
IMM GRANULOCYTES NFR BLD AUTO: 1 % (ref 0–0.5)
KETONES UR QL STRIP.AUTO: ABNORMAL MG/DL
LDLC SERPL CALC-MCNC: 136.8 MG/DL (ref 0–100)
LEUKOCYTE ESTERASE UR QL STRIP.AUTO: NEGATIVE
LIPID PANEL: ABNORMAL
LYMPHOCYTES # BLD: 2.5 K/UL (ref 0.9–3.6)
LYMPHOCYTES NFR BLD: 34 % (ref 21–52)
MAGNESIUM SERPL-MCNC: 2.2 MG/DL (ref 1.6–2.6)
MCH RBC QN AUTO: 31.1 PG (ref 24–34)
MCHC RBC AUTO-ENTMCNC: 33.4 G/DL (ref 31–37)
MCV RBC AUTO: 93.1 FL (ref 78–100)
MONOCYTES # BLD: 0.9 K/UL (ref 0.05–1.2)
MONOCYTES NFR BLD: 12 % (ref 3–10)
NEUTS SEG # BLD: 3.5 K/UL (ref 1.8–8)
NEUTS SEG NFR BLD: 47 % (ref 40–73)
NITRITE UR QL STRIP.AUTO: NEGATIVE
NRBC # BLD: 0 K/UL (ref 0–0.01)
NRBC BLD-RTO: 0 PER 100 WBC
PH UR STRIP: 7.5 (ref 5–8)
PLATELET # BLD AUTO: 248 K/UL (ref 135–420)
PMV BLD AUTO: 9.2 FL (ref 9.2–11.8)
POTASSIUM SERPL-SCNC: 4.6 MMOL/L (ref 3.5–5.5)
PROT SERPL-MCNC: 6.9 G/DL (ref 6.4–8.2)
PROT UR STRIP-MCNC: NEGATIVE MG/DL
PSA SERPL-MCNC: 3.1 NG/ML (ref 0–4)
RBC # BLD AUTO: 4.79 M/UL (ref 4.35–5.65)
RBC #/AREA URNS HPF: ABNORMAL /HPF (ref 0–5)
SODIUM SERPL-SCNC: 131 MMOL/L (ref 136–145)
SP GR UR REFRACTOMETRY: 1.01 (ref 1–1.03)
TRIGL SERPL-MCNC: 96 MG/DL
UROBILINOGEN UR QL STRIP.AUTO: 0.2 EU/DL (ref 0.2–1)
VLDLC SERPL CALC-MCNC: 19.2 MG/DL
WBC # BLD AUTO: 7.4 K/UL (ref 4.6–13.2)
WBC URNS QL MICRO: ABNORMAL /HPF (ref 0–5)

## 2024-10-09 PROCEDURE — 80307 DRUG TEST PRSMV CHEM ANLYZR: CPT

## 2024-10-09 PROCEDURE — 85025 COMPLETE CBC W/AUTO DIFF WBC: CPT

## 2024-10-09 PROCEDURE — G0103 PSA SCREENING: HCPCS

## 2024-10-09 PROCEDURE — 81001 URINALYSIS AUTO W/SCOPE: CPT

## 2024-10-09 PROCEDURE — 36415 COLL VENOUS BLD VENIPUNCTURE: CPT

## 2024-10-09 PROCEDURE — 80053 COMPREHEN METABOLIC PANEL: CPT

## 2024-10-09 PROCEDURE — 82306 VITAMIN D 25 HYDROXY: CPT

## 2024-10-09 PROCEDURE — 80061 LIPID PANEL: CPT

## 2024-10-09 PROCEDURE — 83735 ASSAY OF MAGNESIUM: CPT

## 2024-10-09 PROCEDURE — 83036 HEMOGLOBIN GLYCOSYLATED A1C: CPT

## 2024-10-11 LAB — DRUGS UR: NORMAL

## 2024-10-14 DIAGNOSIS — F41.9 ANXIETY DISORDER, UNSPECIFIED: ICD-10-CM

## 2024-10-14 RX ORDER — LORAZEPAM 1 MG/1
1 TABLET ORAL EVERY 8 HOURS PRN
Qty: 135 TABLET | Refills: 0 | Status: SHIPPED | OUTPATIENT
Start: 2024-10-14 | End: 2025-01-12

## 2024-10-14 NOTE — TELEPHONE ENCOUNTER
Reviewed report generated by the [place] [facility]. Does not demonstrate aberrancies or inconsistencies with regard to the prescribing of controlled medications to this patient by other providers.    Last filled 7/18/2024 per .     CSA Signed: 4/9/2024  Last UDS: 10/9/2024

## 2024-10-16 ENCOUNTER — OFFICE VISIT (OUTPATIENT)
Facility: CLINIC | Age: 84
End: 2024-10-16

## 2024-10-16 VITALS
WEIGHT: 178 LBS | SYSTOLIC BLOOD PRESSURE: 154 MMHG | OXYGEN SATURATION: 96 % | RESPIRATION RATE: 16 BRPM | BODY MASS INDEX: 25.48 KG/M2 | DIASTOLIC BLOOD PRESSURE: 80 MMHG | HEART RATE: 86 BPM | HEIGHT: 70 IN | TEMPERATURE: 98.2 F

## 2024-10-16 DIAGNOSIS — I48.0 PAROXYSMAL ATRIAL FIBRILLATION (HCC): ICD-10-CM

## 2024-10-16 DIAGNOSIS — F41.1 GAD (GENERALIZED ANXIETY DISORDER): ICD-10-CM

## 2024-10-16 DIAGNOSIS — I44.30 AV BLOCK: ICD-10-CM

## 2024-10-16 DIAGNOSIS — R73.03 PREDIABETES: ICD-10-CM

## 2024-10-16 DIAGNOSIS — E66.3 OVERWEIGHT (BMI 25.0-29.9): ICD-10-CM

## 2024-10-16 DIAGNOSIS — Z71.89 ACP (ADVANCE CARE PLANNING): ICD-10-CM

## 2024-10-16 DIAGNOSIS — E78.00 PURE HYPERCHOLESTEROLEMIA: ICD-10-CM

## 2024-10-16 DIAGNOSIS — M46.1 SACROILIITIS (HCC): ICD-10-CM

## 2024-10-16 DIAGNOSIS — Z23 ENCOUNTER FOR IMMUNIZATION: ICD-10-CM

## 2024-10-16 DIAGNOSIS — Z00.00 MEDICARE ANNUAL WELLNESS VISIT, SUBSEQUENT: ICD-10-CM

## 2024-10-16 DIAGNOSIS — Z95.0 PACEMAKER: ICD-10-CM

## 2024-10-16 DIAGNOSIS — I10 WHITE COAT SYNDROME WITH HYPERTENSION: Primary | ICD-10-CM

## 2024-10-16 DIAGNOSIS — I42.9 CARDIOMYOPATHY, UNSPECIFIED TYPE (HCC): ICD-10-CM

## 2024-10-16 SDOH — ECONOMIC STABILITY: FOOD INSECURITY: WITHIN THE PAST 12 MONTHS, THE FOOD YOU BOUGHT JUST DIDN'T LAST AND YOU DIDN'T HAVE MONEY TO GET MORE.: NEVER TRUE

## 2024-10-16 SDOH — ECONOMIC STABILITY: FOOD INSECURITY: WITHIN THE PAST 12 MONTHS, YOU WORRIED THAT YOUR FOOD WOULD RUN OUT BEFORE YOU GOT MONEY TO BUY MORE.: NEVER TRUE

## 2024-10-16 SDOH — ECONOMIC STABILITY: INCOME INSECURITY: HOW HARD IS IT FOR YOU TO PAY FOR THE VERY BASICS LIKE FOOD, HOUSING, MEDICAL CARE, AND HEATING?: NOT HARD AT ALL

## 2024-10-16 ASSESSMENT — PATIENT HEALTH QUESTIONNAIRE - PHQ9
SUM OF ALL RESPONSES TO PHQ QUESTIONS 1-9: 0
SUM OF ALL RESPONSES TO PHQ QUESTIONS 1-9: 0
2. FEELING DOWN, DEPRESSED OR HOPELESS: NOT AT ALL
1. LITTLE INTEREST OR PLEASURE IN DOING THINGS: NOT AT ALL
SUM OF ALL RESPONSES TO PHQ QUESTIONS 1-9: 0
SUM OF ALL RESPONSES TO PHQ9 QUESTIONS 1 & 2: 0
SUM OF ALL RESPONSES TO PHQ QUESTIONS 1-9: 0

## 2024-10-16 NOTE — PROGRESS NOTES
Medicare Annual Wellness Visit    Sam Ruiz is here for Medicare AWV    Assessment & Plan   White coat syndrome with hypertension  -     Comprehensive Metabolic Panel; Future  -     Renal Function Panel; Future  Paroxysmal atrial fibrillation (HCC)  -     Magnesium; Future  AV block  -     Magnesium; Future  Pacemaker  -     Magnesium; Future  Cardiomyopathy, unspecified type (HCC)  Pure hypercholesterolemia  -     Lipid Panel; Future  Prediabetes  -     Hemoglobin A1C; Future  -     Microalbumin / Creatinine Urine Ratio; Future  AMAN (generalized anxiety disorder)  Sacroiliitis (HCC)  Overweight (BMI 25.0-29.9)  Medicare annual wellness visit, subsequent  ACP (advance care planning)  Encounter for immunization  -     Influenza, FLUAD Trivalent, (age 65 y+), IM, Preservative Free, 0.5mL    Recommendations for Preventive Services Due: see orders and patient instructions/AVS.  Recommended screening schedule for the next 5-10 years is provided to the patient in written form: see Patient Instructions/AVS.     Return in about 6 months (around 4/16/2025), or if symptoms worsen or fail to improve.     Subjective   See office progress note for details.      Patient's complete Health Risk Assessment and screening values have been reviewed and are found in Flowsheets. The following problems were reviewed today and where indicated follow up appointments were made and/or referrals ordered.    No Positive Risk Factors identified today.                                    Objective   Vitals:    10/16/24 1441 10/16/24 1531   BP: (!) 185/99 (!) 154/80   Pulse: 86    Resp: 16    Temp: 98.2 °F (36.8 °C)    TempSrc: Temporal    SpO2: 96%    Weight: 80.7 kg (178 lb)    Height: 1.778 m (5' 10\")       Body mass index is 25.54 kg/m².        See office progress note for details.            No Known Allergies  Prior to Visit Medications    Medication Sig Taking? Authorizing Provider   LORazepam (ATIVAN) 1 MG tablet Take 1 tablet

## 2024-10-16 NOTE — PROGRESS NOTES
Sam Ruiz presents today for   Chief Complaint   Patient presents with    Medicare AWV       \"Have you been to the ER, urgent care clinic since your last visit?  Hospitalized since your last visit?\"    NO    “Have you seen or consulted any other health care providers outside of [facility] since your last visit?”    NO

## 2024-10-16 NOTE — ACP (ADVANCE CARE PLANNING)
Advance Care Planning     Advance Care Planning (ACP) Physician/NP/PA Conversation    Date of Conversation: 10/16/2024  Conducted with: Patient with Decision Making Capacity    Healthcare Decision Maker:      Primary Decision Maker: Glenn Reynolds - Other - [phone]    Secondary Decision Maker: Tona Flores - Domestic Partner - [phone]    Click here to complete Healthcare Decision Makers including selection of the Healthcare Decision Maker Relationship (ie \"Primary\")  Today we confirmed that his  Glenn Reynolds is his primary decision-maker and his partner Tona Ramirez is a secondary decision-maker.     Care Preferences:    Hospitalization:  \"If your health worsens and it becomes clear that your chance of recovery is unlikely, what would be your preference regarding hospitalization?\"  The patient would prefer hospitalization.    Ventilation:  \"If you were unable to breath on your own and your chance of recovery was unlikely, what would be your preference about the use of a ventilator (breathing machine) if it was available to you?\"  The patient would desire the use of a ventilator.    Resuscitation:  \"In the event your heart stopped as a result of an underlying serious health condition, would you want attempts made to restart your heart, or would you prefer a natural death?\"  Yes, attempt to resuscitate.    treatment goals, benefit/burden of treatment options, ventilation preferences, hospitalization preferences, resuscitation preferences, and end of life care preferences (vegetative state/imminent death)    Conversation Outcomes / Follow-Up Plan:  ACP complete - no further action today Patient states that they would wish resuscitation efforts as long as meaningful recovery is possible and not deemed futile.    Reviewed DNR/DNI and patient elects Full Code (Attempt Resuscitation)    Length of Voluntary ACP Conversation in minutes:  16 minutes    Edith Cameron MD

## 2024-10-16 NOTE — PATIENT INSTRUCTIONS
Eat lean proteins. Heart-healthy proteins include seafood, lean meats and poultry, eggs, beans, peas, nuts, seeds, and soy products.     Limit drinks and foods with added sugar. These include candy, desserts, and soda pop.   Heart-healthy lifestyle    If your doctor recommends it, get more exercise. For many people, walking is a good choice. Or you may want to swim, bike, or do other activities. Bit by bit, increase the time you're active every day. Try for at least 30 minutes on most days of the week.     Try to quit or cut back on using tobacco and other nicotine products. This includes smoking and vaping. If you need help quitting, talk to your doctor about stop-smoking programs and medicines. These can increase your chances of quitting for good. Quitting is one of the most important things you can do to protect your heart. It is never too late to quit. Try to avoid secondhand smoke too.     Stay at a weight that's healthy for you. Talk to your doctor if you need help losing weight.     Try to get 7 to 9 hours of sleep each night.     Limit alcohol to 2 drinks a day for men and 1 drink a day for women. Too much alcohol can cause health problems.     Manage other health problems such as diabetes, high blood pressure, and high cholesterol. If you think you may have a problem with alcohol or drug use, talk to your doctor.   Medicines    Take your medicines exactly as prescribed. Call your doctor if you think you are having a problem with your medicine.     If your doctor recommends aspirin, take the amount directed each day. Make sure you take aspirin and not another kind of pain reliever, such as acetaminophen (Tylenol).   When should you call for help?   Call 911 if you have symptoms of a heart attack. These may include:    Chest pain or pressure, or a strange feeling in the chest.     Sweating.     Shortness of breath.     Pain, pressure, or a strange feeling in the back, neck, jaw, or upper belly or in one

## 2024-10-16 NOTE — PROGRESS NOTES
HPI:   Sam Ruiz is a 83 y.o. year old male who presents today for a physical exam.  He has a history of hypertension, hyperlipidemia, AV block s/p pacemaker, paroxysmal atrial fibrillation, and anxiety. He reports that he is doing reasonably well.     On 9/27/2023, he reported that in 4/2023, after lifting some heavy equipment, he developed right-sided buttock pain and stated that the pain had persisted since that time. He did note some improvement with stretching and with increased activity throughout the day.  However, due to the persistence, he underwent evaluation by Dr. Pittman on 10/9/2023.  Right hip x-ray showed severe osteoarthritis with superior bone-on-bone and osteophytes at the margin of the head of the acetabulum.  His symptoms were felt to be related to his SI joint, and physical therapy was recommended.  However, after 10 weeks of therapy, his pain seemed to worsen with development of low back pain.  He was seen by Dr. Pittman on 2/12/2024 and referred to Dr. Odell on 2/14/2024.  He was prescribed Robaxin and a CT lumbar spine (2/19/2024) showed mild anterolisthesis of L4 on 5 with high-grade moderate central canal stenosis; focal right lateral spondylosis at L5/S1 extending to the posterior foraminal right L5 nerve root; left SI joint fused, and the right SI joint mildly degenerative; evidence of osteopenia/osteoporosis without acute fracture.  On 3/27/2024, he received a right SI joint injection with improvement, but continued to experience right lower back pain and was referred for a right L5/S1 and S1 TFESI on 6/7/2024.  He received a repeat right SI joint injection on 7/30/2024, with some improvement.  He states that he feels that things are getting better and he has been able to resume working on his property, but can do so for only a limited number of hours in order to avoid aggravating his pain.  He has a follow-up visit scheduled with Dr. Duran on 10/31/2024.    He acknowledges that

## 2024-11-14 ENCOUNTER — HOSPITAL ENCOUNTER (OUTPATIENT)
Facility: HOSPITAL | Age: 84
Setting detail: SPECIMEN
Discharge: HOME | End: 2024-11-17
Payer: MEDICARE

## 2024-11-14 DIAGNOSIS — I10 WHITE COAT SYNDROME WITH HYPERTENSION: ICD-10-CM

## 2024-11-14 LAB
ALBUMIN SERPL-MCNC: 4 G/DL (ref 3.4–5)
ANION GAP SERPL CALC-SCNC: 3 MMOL/L (ref 3–18)
BUN SERPL-MCNC: 18 MG/DL (ref 7–18)
BUN/CREAT SERPL: 20 (ref 12–20)
CALCIUM SERPL-MCNC: 9.1 MG/DL (ref 8.5–10.1)
CHLORIDE SERPL-SCNC: 95 MMOL/L (ref 100–111)
CO2 SERPL-SCNC: 31 MMOL/L (ref 21–32)
CREAT SERPL-MCNC: 0.91 MG/DL (ref 0.6–1.3)
GLUCOSE SERPL-MCNC: 54 MG/DL (ref 74–99)
PHOSPHATE SERPL-MCNC: 3.6 MG/DL (ref 2.5–4.9)
POTASSIUM SERPL-SCNC: 4.6 MMOL/L (ref 3.5–5.5)
SODIUM SERPL-SCNC: 129 MMOL/L (ref 136–145)

## 2024-11-14 PROCEDURE — 80069 RENAL FUNCTION PANEL: CPT

## 2024-11-14 PROCEDURE — 36415 COLL VENOUS BLD VENIPUNCTURE: CPT

## 2024-11-15 ENCOUNTER — TELEPHONE (OUTPATIENT)
Facility: CLINIC | Age: 84
End: 2024-11-15

## 2024-11-15 ENCOUNTER — HOSPITAL ENCOUNTER (OUTPATIENT)
Facility: HOSPITAL | Age: 84
Setting detail: SPECIMEN
Discharge: HOME | End: 2024-11-18
Payer: MEDICARE

## 2024-11-15 DIAGNOSIS — E16.2 HYPOGLYCEMIA: ICD-10-CM

## 2024-11-15 DIAGNOSIS — E87.1 HYPONATREMIA: Primary | ICD-10-CM

## 2024-11-15 DIAGNOSIS — E87.1 HYPONATREMIA: ICD-10-CM

## 2024-11-15 LAB
ANION GAP SERPL CALC-SCNC: 6 MMOL/L (ref 3–18)
BUN SERPL-MCNC: 21 MG/DL (ref 7–18)
BUN/CREAT SERPL: 21 (ref 12–20)
CALCIUM SERPL-MCNC: 9.6 MG/DL (ref 8.5–10.1)
CHLORIDE SERPL-SCNC: 95 MMOL/L (ref 100–111)
CO2 SERPL-SCNC: 28 MMOL/L (ref 21–32)
CREAT SERPL-MCNC: 1 MG/DL (ref 0.6–1.3)
GLUCOSE SERPL-MCNC: 146 MG/DL (ref 74–99)
POTASSIUM SERPL-SCNC: 4 MMOL/L (ref 3.5–5.5)
SODIUM SERPL-SCNC: 129 MMOL/L (ref 136–145)

## 2024-11-15 PROCEDURE — 80048 BASIC METABOLIC PNL TOTAL CA: CPT

## 2024-11-15 PROCEDURE — 36415 COLL VENOUS BLD VENIPUNCTURE: CPT

## 2024-11-15 RX ORDER — LISINOPRIL 2.5 MG/1
7.5 TABLET ORAL 2 TIMES DAILY
Qty: 1 TABLET | Refills: 0
Start: 2024-11-15

## 2024-11-15 NOTE — TELEPHONE ENCOUNTER
Called and spoke with patient.  Reports improved blood pressure readings on lisinopril 7.5 mg twice daily.  Reports average readings 120-130/70-80.  Reviewed lab results with stable creatinine and potassium levels.  Worsening hyponatremia and patient reports drinking large amounts of tea during the day.  Discussed increasing sodium intake and limiting free water intake.  Unclear as to reason for low glucose level but suspect lab error as usually in the  range.  Had repeat nonfasting BMP drawn today to reassess.    Chart updated to reflect new lisinopril dose.

## 2024-11-15 NOTE — TELEPHONE ENCOUNTER
Hospital Outpatient Visit on 11/14/2024   Component Date Value Ref Range Status    Sodium 11/14/2024 129 (L)  136 - 145 mmol/L Final    Potassium 11/14/2024 4.6  3.5 - 5.5 mmol/L Final    Chloride 11/14/2024 95 (L)  100 - 111 mmol/L Final    CO2 11/14/2024 31  21 - 32 mmol/L Final    Anion Gap 11/14/2024 3  3.0 - 18 mmol/L Final    Glucose 11/14/2024 54 (LL)  74 - 99 mg/dL Final    BUN 11/14/2024 18  7.0 - 18 MG/DL Final    Creatinine 11/14/2024 0.91  0.6 - 1.3 MG/DL Final    BUN/Creatinine Ratio 11/14/2024 20  12 - 20   Final    Est, Glom Filt Rate 11/14/2024 83  >60 ml/min/1.73m2 Final    Calcium 11/14/2024 9.1  8.5 - 10.1 MG/DL Final    Phosphorus 11/14/2024 3.6  2.5 - 4.9 MG/DL Final    Albumin 11/14/2024 4.0  3.4 - 5.0 g/dL Final     Attempted to call patient to discuss lab results. Left message to call back.

## 2024-11-15 NOTE — PROGRESS NOTES
I received a call after hours regarding critical blood glucose of 54.  His serum sodium was also 129.  He was recently started on a very small dose of lisinopril 2.5 mg 2 tablets daily.  Pt notified and encouraged to seek medical attention urgently if he has sx of hyponatremia/hypoglycemia, which he said he did not last night.     BMP ordered for completeness.  Please have him schedule for nonfasting labs today to reassess his sodium and glucose.  Forwarding this message to Dr. Cameron.    Dr. Courtney Maxwell  [facility] [address], [address]  [place] [address]  Phone: [phone]  Fax: [phone]

## 2024-12-02 ENCOUNTER — OFFICE VISIT (OUTPATIENT)
Age: 84
End: 2024-12-02
Payer: MEDICARE

## 2024-12-02 VITALS
DIASTOLIC BLOOD PRESSURE: 72 MMHG | HEIGHT: 70 IN | OXYGEN SATURATION: 97 % | BODY MASS INDEX: 25.62 KG/M2 | WEIGHT: 179 LBS | HEART RATE: 82 BPM | SYSTOLIC BLOOD PRESSURE: 128 MMHG

## 2024-12-02 DIAGNOSIS — I10 ESSENTIAL HYPERTENSION: ICD-10-CM

## 2024-12-02 DIAGNOSIS — I48.0 PAROXYSMAL ATRIAL FIBRILLATION (HCC): ICD-10-CM

## 2024-12-02 DIAGNOSIS — Z95.0 PACEMAKER: Primary | ICD-10-CM

## 2024-12-02 DIAGNOSIS — E78.00 PURE HYPERCHOLESTEROLEMIA: ICD-10-CM

## 2024-12-02 DIAGNOSIS — I44.30 AV BLOCK: ICD-10-CM

## 2024-12-02 PROCEDURE — 3074F SYST BP LT 130 MM HG: CPT | Performed by: INTERNAL MEDICINE

## 2024-12-02 PROCEDURE — 3078F DIAST BP <80 MM HG: CPT | Performed by: INTERNAL MEDICINE

## 2024-12-02 PROCEDURE — G8427 DOCREV CUR MEDS BY ELIG CLIN: HCPCS | Performed by: INTERNAL MEDICINE

## 2024-12-02 PROCEDURE — 1126F AMNT PAIN NOTED NONE PRSNT: CPT | Performed by: INTERNAL MEDICINE

## 2024-12-02 PROCEDURE — G8482 FLU IMMUNIZE ORDER/ADMIN: HCPCS | Performed by: INTERNAL MEDICINE

## 2024-12-02 PROCEDURE — G8419 CALC BMI OUT NRM PARAM NOF/U: HCPCS | Performed by: INTERNAL MEDICINE

## 2024-12-02 PROCEDURE — 99214 OFFICE O/P EST MOD 30 MIN: CPT | Performed by: INTERNAL MEDICINE

## 2024-12-02 PROCEDURE — 93288 INTERROG EVL PM/LDLS PM IP: CPT | Performed by: INTERNAL MEDICINE

## 2024-12-02 PROCEDURE — 1159F MED LIST DOCD IN RCRD: CPT | Performed by: INTERNAL MEDICINE

## 2024-12-02 PROCEDURE — 1160F RVW MEDS BY RX/DR IN RCRD: CPT | Performed by: INTERNAL MEDICINE

## 2024-12-02 PROCEDURE — 1036F TOBACCO NON-USER: CPT | Performed by: INTERNAL MEDICINE

## 2024-12-02 PROCEDURE — 1123F ACP DISCUSS/DSCN MKR DOCD: CPT | Performed by: INTERNAL MEDICINE

## 2024-12-02 ASSESSMENT — ANXIETY QUESTIONNAIRES
2. NOT BEING ABLE TO STOP OR CONTROL WORRYING: NOT AT ALL
7. FEELING AFRAID AS IF SOMETHING AWFUL MIGHT HAPPEN: NOT AT ALL
1. FEELING NERVOUS, ANXIOUS, OR ON EDGE: NOT AT ALL
4. TROUBLE RELAXING: NOT AT ALL
3. WORRYING TOO MUCH ABOUT DIFFERENT THINGS: NOT AT ALL
6. BECOMING EASILY ANNOYED OR IRRITABLE: NOT AT ALL
5. BEING SO RESTLESS THAT IT IS HARD TO SIT STILL: NOT AT ALL
GAD7 TOTAL SCORE: 0

## 2024-12-02 ASSESSMENT — ENCOUNTER SYMPTOMS
COUGH: 0
VOMITING: 0
NAUSEA: 0
ABDOMINAL DISTENTION: 0
ABDOMINAL PAIN: 0
SHORTNESS OF BREATH: 0
SORE THROAT: 0

## 2024-12-02 ASSESSMENT — PATIENT HEALTH QUESTIONNAIRE - PHQ9
SUM OF ALL RESPONSES TO PHQ QUESTIONS 1-9: 0
2. FEELING DOWN, DEPRESSED OR HOPELESS: NOT AT ALL
SUM OF ALL RESPONSES TO PHQ QUESTIONS 1-9: 0
SUM OF ALL RESPONSES TO PHQ9 QUESTIONS 1 & 2: 0
1. LITTLE INTEREST OR PLEASURE IN DOING THINGS: NOT AT ALL

## 2024-12-02 NOTE — PROGRESS NOTES
Sam Ruiz presents today for   Chief Complaint   Patient presents with    Follow-up     6 month       Sam Ruiz preferred language for health care discussion is english/other.    Is someone accompanying this pt? no    Is the patient using any DME equipment during OV? no    Depression Screening:  Depression: Not at risk (12/2/2024)    PHQ-2     PHQ-2 Score: 0        Learning Assessment:  Who is the primary learner? Patient    What is the preferred language for health care of the primary learner? ENGLISH    How does the primary learner prefer to learn new concepts? DEMONSTRATION    Answered By patient    Relationship to Learner SELF           Pt currently taking Anticoagulant therapy? no    Pt currently taking Antiplatelet therapy ? no      Coordination of Care:  1. Have you been to the ER, urgent care clinic since your last visit? Hospitalized since your last visit? no    2. Have you seen or consulted any other health care providers outside of the [facility] since your last visit? Include any pap smears or colon screening. no    
Take 3 tablets by mouth 2 times daily 1 tablet 0    LORazepam (ATIVAN) 1 MG tablet Take 1 tablet by mouth every 8 hours as needed for Anxiety for up to 90 days. Max Daily Amount: 3 mg 135 tablet 0    Multiple Vitamins-Minerals (PRESERVISION AREDS 2 PO) Take 1 tablet by mouth daily      omeprazole (PRILOSEC) 20 MG delayed release capsule Take 1 capsule by mouth Daily      Multiple Vitamins-Minerals (CENTRUM SILVER PO) Take 1 tablet by mouth daily      Omega-3 Fatty Acids (FISH OIL PO) Take 2,400 mg by mouth daily      zinc 50 MG TABS tablet Take 1 tablet by mouth daily      magnesium (MAGNESIUM-OXIDE) 250 MG TABS tablet Take 1 tablet by mouth daily      Selenium 100 MCG TABS Take 100 mg by mouth daily      vitamin C (ASCORBIC ACID) 500 MG tablet Take 1 tablet by mouth daily      cyclobenzaprine (FLEXERIL) 5 MG tablet TAKE 1 TO 2 TABLETS BY MOUTH EVERY 8 HOURS AS NEEDED FOR MUSCLE SPASMS (Patient taking differently: Take 1 tablet by mouth as needed for Muscle spasms) 25 tablet 0    metroNIDAZOLE (METROGEL) 1 % gel Apply topically daily      timolol (TIMOPTIC) 0.25 % ophthalmic solution Apply 1 drop to eye 2 times daily       No current facility-administered medications for this visit.     No Known Allergies  Social History     Tobacco Use    Smoking status: Former     Current packs/day: 0.00     Types: Cigarettes     Quit date: 1966     Years since quittin.3    Smokeless tobacco: Never   Vaping Use    Vaping status: Never Used   Substance Use Topics    Alcohol use: No    Drug use: No     Family History   Problem Relation Age of Onset    Heart Surgery Mother     Alcohol Abuse Father     Stroke Father     No Known Problems Sister     No Known Problems Brother     No Known Problems Maternal Grandmother     No Known Problems Maternal Grandfather     No Known Problems Paternal Grandmother     No Known Problems Paternal Grandfather     No Known Problems Other          Review of Systems   Constitutional:  Negative

## 2025-01-08 ENCOUNTER — PATIENT MESSAGE (OUTPATIENT)
Facility: CLINIC | Age: 85
End: 2025-01-08

## 2025-01-08 RX ORDER — LISINOPRIL 2.5 MG/1
7.5 TABLET ORAL 2 TIMES DAILY
Qty: 540 TABLET | Refills: 3 | Status: SHIPPED | OUTPATIENT
Start: 2025-01-08

## 2025-01-08 RX ORDER — LISINOPRIL 5 MG/1
5 TABLET ORAL 3 TIMES DAILY
Qty: 270 TABLET | Refills: 3 | Status: CANCELLED | OUTPATIENT
Start: 2025-01-08

## 2025-01-10 DIAGNOSIS — F41.9 ANXIETY DISORDER, UNSPECIFIED: ICD-10-CM

## 2025-01-10 RX ORDER — LORAZEPAM 1 MG/1
TABLET ORAL
Qty: 135 TABLET | Refills: 0 | Status: SHIPPED | OUTPATIENT
Start: 2025-01-10 | End: 2025-04-10

## 2025-01-10 NOTE — TELEPHONE ENCOUNTER
Reviewed report generated by the [place] Prescription Monitoring Program. Does not demonstrate aberrancies or inconsistencies with regard to the prescribing of controlled medications to this patient by other providers.    Last filled 10/14/2024 per .     CSA Signed: 4/9/2024   Last UDS: 10/9/2024

## 2025-03-26 ENCOUNTER — CLINICAL SUPPORT (OUTPATIENT)
Age: 85
End: 2025-03-26
Payer: MEDICARE

## 2025-03-26 DIAGNOSIS — Z95.0 PACEMAKER: Primary | ICD-10-CM

## 2025-03-26 DIAGNOSIS — I44.30 AV BLOCK: ICD-10-CM

## 2025-03-26 PROCEDURE — 93294 REM INTERROG EVL PM/LDLS PM: CPT | Performed by: INTERNAL MEDICINE

## 2025-03-26 PROCEDURE — 93296 REM INTERROG EVL PM/IDS: CPT | Performed by: INTERNAL MEDICINE

## 2025-04-03 ENCOUNTER — RESULTS FOLLOW-UP (OUTPATIENT)
Age: 85
End: 2025-04-03

## 2025-04-10 DIAGNOSIS — F41.9 ANXIETY DISORDER, UNSPECIFIED: ICD-10-CM

## 2025-04-10 RX ORDER — LORAZEPAM 1 MG/1
TABLET ORAL
Qty: 135 TABLET | Refills: 0 | Status: SHIPPED | OUTPATIENT
Start: 2025-04-10 | End: 2025-07-09

## 2025-04-10 NOTE — TELEPHONE ENCOUNTER
PCP: Edith Cameron MD    LAST OFFICE VISIT: 10/16/2024  CSA Signed: 4/9/2024  Last UDS: 10/9/2024     [facility]  report reviewed  The last fill date was 01/10/2025 for a 90 d/s qty 135      LAST REFILL PER CHART:  Medication:LORazepam (ATIVAN) 1 MG tablet   Ordered On:01/10/2025  Instructions:TAKE 1 TAB BY MOUTH EVERY 8 HOURS AS NEEDED FOR ANXIETY FOR UP TO 90 DAYS. MAX 3MG PER DA   Dispense:135 tablets  Refills:0    Future Appointments   Date Time Provider Department Center   4/16/2025  8:15 AM IOC LAB VISIT [facility] DEP   4/23/2025  2:30 PM Edith Cameron MD [facility] DEP   6/2/2025  3:40 PM Humberto Dewitt MD [facility] BS AMB   9/17/2025  2:30 PM CSI, PACER [facility] BS AMB

## 2025-04-16 ENCOUNTER — HOSPITAL ENCOUNTER (OUTPATIENT)
Facility: HOSPITAL | Age: 85
Setting detail: SPECIMEN
Discharge: HOME | End: 2025-04-19
Payer: MEDICARE

## 2025-04-16 DIAGNOSIS — Z95.0 PACEMAKER: ICD-10-CM

## 2025-04-16 DIAGNOSIS — I10 WHITE COAT SYNDROME WITH HYPERTENSION: ICD-10-CM

## 2025-04-16 DIAGNOSIS — I48.0 PAROXYSMAL ATRIAL FIBRILLATION (HCC): ICD-10-CM

## 2025-04-16 DIAGNOSIS — I44.30 AV BLOCK: ICD-10-CM

## 2025-04-16 DIAGNOSIS — E78.00 PURE HYPERCHOLESTEROLEMIA: ICD-10-CM

## 2025-04-16 DIAGNOSIS — R73.03 PREDIABETES: ICD-10-CM

## 2025-04-16 LAB
ALBUMIN SERPL-MCNC: 3.9 G/DL (ref 3.4–5)
ALBUMIN/GLOB SERPL: 1.3 (ref 0.8–1.7)
ALP SERPL-CCNC: 59 U/L (ref 45–117)
ALT SERPL-CCNC: 21 U/L (ref 16–61)
ANION GAP SERPL CALC-SCNC: 5 MMOL/L (ref 3–18)
AST SERPL-CCNC: 19 U/L (ref 10–38)
BILIRUB SERPL-MCNC: 0.7 MG/DL (ref 0.2–1)
BUN SERPL-MCNC: 16 MG/DL (ref 7–18)
BUN/CREAT SERPL: 17 (ref 12–20)
CALCIUM SERPL-MCNC: 9.7 MG/DL (ref 8.5–10.1)
CHLORIDE SERPL-SCNC: 97 MMOL/L (ref 100–111)
CHOLEST SERPL-MCNC: 207 MG/DL
CO2 SERPL-SCNC: 30 MMOL/L (ref 21–32)
CREAT SERPL-MCNC: 0.96 MG/DL (ref 0.6–1.3)
CREAT UR-MCNC: 40 MG/DL (ref 30–125)
EST. AVERAGE GLUCOSE BLD GHB EST-MCNC: 111 MG/DL
GLOBULIN SER CALC-MCNC: 3 G/DL (ref 2–4)
GLUCOSE SERPL-MCNC: 92 MG/DL (ref 74–99)
HBA1C MFR BLD: 5.5 % (ref 4.2–5.6)
HDLC SERPL-MCNC: 54 MG/DL (ref 40–60)
HDLC SERPL: 3.8 (ref 0–5)
LDLC SERPL CALC-MCNC: 137.8 MG/DL (ref 0–100)
LIPID PANEL: ABNORMAL
MAGNESIUM SERPL-MCNC: 2.1 MG/DL (ref 1.6–2.6)
MICROALBUMIN UR-MCNC: <0.5 MG/DL (ref 0–3)
MICROALBUMIN/CREAT UR-RTO: NORMAL MG/G (ref 0–30)
POTASSIUM SERPL-SCNC: 4.9 MMOL/L (ref 3.5–5.5)
PROT SERPL-MCNC: 6.9 G/DL (ref 6.4–8.2)
SODIUM SERPL-SCNC: 132 MMOL/L (ref 136–145)
TRIGL SERPL-MCNC: 76 MG/DL
VLDLC SERPL CALC-MCNC: 15.2 MG/DL

## 2025-04-16 PROCEDURE — 80053 COMPREHEN METABOLIC PANEL: CPT

## 2025-04-16 PROCEDURE — 36415 COLL VENOUS BLD VENIPUNCTURE: CPT

## 2025-04-16 PROCEDURE — 80061 LIPID PANEL: CPT

## 2025-04-16 PROCEDURE — 82570 ASSAY OF URINE CREATININE: CPT

## 2025-04-16 PROCEDURE — 83735 ASSAY OF MAGNESIUM: CPT

## 2025-04-16 PROCEDURE — 82043 UR ALBUMIN QUANTITATIVE: CPT

## 2025-04-16 PROCEDURE — 83036 HEMOGLOBIN GLYCOSYLATED A1C: CPT

## 2025-04-23 ENCOUNTER — OFFICE VISIT (OUTPATIENT)
Facility: CLINIC | Age: 85
End: 2025-04-23

## 2025-04-23 DIAGNOSIS — Z12.5 PROSTATE CANCER SCREENING: ICD-10-CM

## 2025-04-23 DIAGNOSIS — R73.03 PREDIABETES: ICD-10-CM

## 2025-04-23 DIAGNOSIS — E66.3 OVERWEIGHT (BMI 25.0-29.9): ICD-10-CM

## 2025-04-23 DIAGNOSIS — E55.9 VITAMIN D DEFICIENCY: ICD-10-CM

## 2025-04-23 DIAGNOSIS — I44.30 AV BLOCK: ICD-10-CM

## 2025-04-23 DIAGNOSIS — I42.9 CARDIOMYOPATHY, UNSPECIFIED TYPE (HCC): ICD-10-CM

## 2025-04-23 DIAGNOSIS — E78.00 PURE HYPERCHOLESTEROLEMIA: ICD-10-CM

## 2025-04-23 DIAGNOSIS — Z95.0 PACEMAKER: ICD-10-CM

## 2025-04-23 DIAGNOSIS — M46.1 SACROILIITIS: ICD-10-CM

## 2025-04-23 DIAGNOSIS — I48.0 PAROXYSMAL ATRIAL FIBRILLATION (HCC): ICD-10-CM

## 2025-04-23 DIAGNOSIS — F41.1 GAD (GENERALIZED ANXIETY DISORDER): ICD-10-CM

## 2025-04-23 DIAGNOSIS — I10 WHITE COAT SYNDROME WITH HYPERTENSION: Primary | ICD-10-CM

## 2025-04-23 SDOH — ECONOMIC STABILITY: FOOD INSECURITY: WITHIN THE PAST 12 MONTHS, YOU WORRIED THAT YOUR FOOD WOULD RUN OUT BEFORE YOU GOT MONEY TO BUY MORE.: NEVER TRUE

## 2025-04-23 SDOH — ECONOMIC STABILITY: FOOD INSECURITY: WITHIN THE PAST 12 MONTHS, THE FOOD YOU BOUGHT JUST DIDN'T LAST AND YOU DIDN'T HAVE MONEY TO GET MORE.: NEVER TRUE

## 2025-04-23 ASSESSMENT — PATIENT HEALTH QUESTIONNAIRE - PHQ9
SUM OF ALL RESPONSES TO PHQ QUESTIONS 1-9: 0
2. FEELING DOWN, DEPRESSED OR HOPELESS: NOT AT ALL
1. LITTLE INTEREST OR PLEASURE IN DOING THINGS: NOT AT ALL
SUM OF ALL RESPONSES TO PHQ QUESTIONS 1-9: 0
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
## 2025-04-27 VITALS
HEART RATE: 77 BPM | HEIGHT: 70 IN | DIASTOLIC BLOOD PRESSURE: 62 MMHG | WEIGHT: 181 LBS | RESPIRATION RATE: 14 BRPM | BODY MASS INDEX: 25.91 KG/M2 | TEMPERATURE: 98.5 F | OXYGEN SATURATION: 96 % | SYSTOLIC BLOOD PRESSURE: 138 MMHG

## 2025-04-27 PROBLEM — G44.219 HEADACHE, TENSION TYPE, EPISODIC: Status: RESOLVED | Noted: 2019-10-12 | Resolved: 2025-04-27

## 2025-06-02 ENCOUNTER — OFFICE VISIT (OUTPATIENT)
Age: 85
End: 2025-06-02
Payer: MEDICARE

## 2025-06-02 VITALS
HEART RATE: 75 BPM | OXYGEN SATURATION: 95 % | WEIGHT: 180 LBS | DIASTOLIC BLOOD PRESSURE: 80 MMHG | HEIGHT: 70 IN | BODY MASS INDEX: 25.77 KG/M2 | SYSTOLIC BLOOD PRESSURE: 130 MMHG

## 2025-06-02 DIAGNOSIS — I44.30 AV BLOCK: ICD-10-CM

## 2025-06-02 DIAGNOSIS — I10 ESSENTIAL HYPERTENSION: ICD-10-CM

## 2025-06-02 DIAGNOSIS — E78.00 PURE HYPERCHOLESTEROLEMIA: ICD-10-CM

## 2025-06-02 DIAGNOSIS — I48.0 PAROXYSMAL ATRIAL FIBRILLATION (HCC): ICD-10-CM

## 2025-06-02 DIAGNOSIS — Z95.0 PACEMAKER: Primary | ICD-10-CM

## 2025-06-02 PROCEDURE — 3075F SYST BP GE 130 - 139MM HG: CPT | Performed by: INTERNAL MEDICINE

## 2025-06-02 PROCEDURE — 1123F ACP DISCUSS/DSCN MKR DOCD: CPT | Performed by: INTERNAL MEDICINE

## 2025-06-02 PROCEDURE — G8427 DOCREV CUR MEDS BY ELIG CLIN: HCPCS | Performed by: INTERNAL MEDICINE

## 2025-06-02 PROCEDURE — 1036F TOBACCO NON-USER: CPT | Performed by: INTERNAL MEDICINE

## 2025-06-02 PROCEDURE — G8419 CALC BMI OUT NRM PARAM NOF/U: HCPCS | Performed by: INTERNAL MEDICINE

## 2025-06-02 PROCEDURE — 1159F MED LIST DOCD IN RCRD: CPT | Performed by: INTERNAL MEDICINE

## 2025-06-02 PROCEDURE — 3079F DIAST BP 80-89 MM HG: CPT | Performed by: INTERNAL MEDICINE

## 2025-06-02 PROCEDURE — 1160F RVW MEDS BY RX/DR IN RCRD: CPT | Performed by: INTERNAL MEDICINE

## 2025-06-02 PROCEDURE — 1126F AMNT PAIN NOTED NONE PRSNT: CPT | Performed by: INTERNAL MEDICINE

## 2025-06-02 ASSESSMENT — ENCOUNTER SYMPTOMS
SORE THROAT: 0
VOMITING: 0
COUGH: 0
ABDOMINAL DISTENTION: 0
NAUSEA: 0
SHORTNESS OF BREATH: 0
ABDOMINAL PAIN: 0

## 2025-06-02 ASSESSMENT — PATIENT HEALTH QUESTIONNAIRE - PHQ9
SUM OF ALL RESPONSES TO PHQ QUESTIONS 1-9: 0
2. FEELING DOWN, DEPRESSED OR HOPELESS: NOT AT ALL
SUM OF ALL RESPONSES TO PHQ QUESTIONS 1-9: 0
SUM OF ALL RESPONSES TO PHQ QUESTIONS 1-9: 0
1. LITTLE INTEREST OR PLEASURE IN DOING THINGS: NOT AT ALL
SUM OF ALL RESPONSES TO PHQ QUESTIONS 1-9: 0

## 2025-06-02 NOTE — PROGRESS NOTES
Sam Ruiz presents today for   Chief Complaint   Patient presents with    Follow-up     6 month       Sam Ruiz preferred language for health care discussion is english/other.    Is someone accompanying this pt? no    Is the patient using any DME equipment during OV? no    Depression Screening:  Depression: Not at risk (6/2/2025)    PHQ-2     PHQ-2 Score: 0        Learning Assessment:  Who is the primary learner? Patient    What is the preferred language for health care of the primary learner? ENGLISH    How does the primary learner prefer to learn new concepts? DEMONSTRATION    Answered By patient    Relationship to Learner SELF           Pt currently taking Anticoagulant therapy? no    Pt currently taking Antiplatelet therapy ? no      Coordination of Care:  1. Have you been to the ER, urgent care clinic since your last visit? Hospitalized since your last visit? no    2. Have you seen or consulted any other health care providers outside of the [facility] since your last visit? Include any pap smears or colon screening. no    
  Eyes:  Negative for visual disturbance.   Respiratory:  Negative for cough and shortness of breath.    Cardiovascular:  Negative for chest pain, palpitations and leg swelling.   Gastrointestinal:  Negative for abdominal distention, abdominal pain, nausea and vomiting.   Endocrine: Negative for cold intolerance and heat intolerance.   Genitourinary:  Negative for dysuria.   Musculoskeletal:  Negative for arthralgias.   Skin:  Negative for rash.   Neurological:  Negative for dizziness, syncope, weakness and headaches.   Hematological:  Does not bruise/bleed easily.   Psychiatric/Behavioral:  Negative for suicidal ideas.          /80 (BP Site: Left Upper Arm, Patient Position: Sitting, BP Cuff Size: Medium Adult)   Pulse 75   Ht 1.778 m (5' 10\")   Wt 81.6 kg (180 lb)   SpO2 95%   BMI 25.83 kg/m²     Objective:   Physical Exam  Constitutional:       General: He is not in acute distress.  HENT:      Head: Normocephalic.   Neck:      Vascular: No carotid bruit or JVD.   Cardiovascular:      Rate and Rhythm: Normal rate and regular rhythm. No extrasystoles are present.     Heart sounds: No murmur heard.     No gallop.   Pulmonary:      Effort: Pulmonary effort is normal.      Breath sounds: No wheezing, rhonchi or rales.   Abdominal:      General: Bowel sounds are normal. There is no distension.      Palpations: Abdomen is soft.      Tenderness: There is no abdominal tenderness.   Musculoskeletal:         General: No swelling or deformity.   Skin:     General: Skin is warm and dry.      Findings: No rash.   Neurological:      General: No focal deficit present.      Mental Status: He is alert and oriented to person, place, and time.   Psychiatric:         Mood and Affect: Mood normal.         Behavior: Behavior normal.         Pacemaker interrogation: Normal functioning Medtronic dual-chamber MRI compatible pacemaker.  Battery life estimated at 12 months.  Pacing in the atrium 4% in the ventricle 99 %.  No

## 2025-07-09 DIAGNOSIS — F41.9 ANXIETY DISORDER, UNSPECIFIED: ICD-10-CM

## 2025-07-09 RX ORDER — LORAZEPAM 1 MG/1
TABLET ORAL
Qty: 135 TABLET | Refills: 0 | Status: SHIPPED | OUTPATIENT
Start: 2025-07-09 | End: 2025-10-07

## 2025-07-09 NOTE — TELEPHONE ENCOUNTER
Reviewed report generated by the [place] Prescription Monitoring Program. Does not demonstrate aberrancies or inconsistencies with regard to the prescribing of controlled medications to this patient by other providers.    Last filled lorazepam 6/16/2025 for 15 days per .  Last refill in office was 4/10/2025 for 135 tablets, but it appears at the pharmacy dispense 90 tablets and then an additional 45 tablets on 6/16/2025.    CSA: 04/23/2025  UDS: 10/9/2024

## (undated) DEVICE — BITE BLOCK ENDOSCP UNIV AD 6 TO 9.4 MM

## (undated) DEVICE — ESOPHAGEAL/PYLORIC/COLONIC/BILIARY WIREGUIDED BALLOON DILATATION CATHETER: Brand: CRE™ PRO

## (undated) DEVICE — GAUZE SPONGES,16 PLY: Brand: CURITY

## (undated) DEVICE — FCPS BX HOT LG 2.2MMX240CM

## (undated) DEVICE — GOWN ISOL IMPERV UNIV, DISP, OPEN BACK, BLUE --

## (undated) DEVICE — SOLUTION IRRIG 1000ML H2O STRL BLT

## (undated) DEVICE — CATHETER SUCT TR FL TIP 14FR W/ O CTRL

## (undated) DEVICE — ESOPHAGEAL BALLOON DILATATION CATHETER: Brand: CRE FIXED WIRE

## (undated) DEVICE — BASIN EMESIS 500CC ROSE 250/CS 60/PLT: Brand: MEDEGEN MEDICAL PRODUCTS, LLC

## (undated) DEVICE — MEDI-VAC NON-CONDUCTIVE SUCTION TUBING: Brand: CARDINAL HEALTH

## (undated) DEVICE — STERILE POLYISOPRENE POWDER-FREE SURGICAL GLOVES: Brand: PROTEXIS

## (undated) DEVICE — CAPTIVATOR COLD SNARE

## (undated) DEVICE — SYR 10ML LUER LOK 1/5ML GRAD --

## (undated) DEVICE — SYRINGE MED 25GA 3ML L5/8IN SUBQ PLAS W/ DETACH NDL SFTY

## (undated) DEVICE — (D)GLOVE EXAM LG NITRL NS -- DISC BY MFR NO SUB

## (undated) DEVICE — AIRLIFE™ NASAL OXYGEN CANNULA CURVED, FLARED TIP WITH 14 FOOT (4.3 M) CRUSH-RESISTANT TUBING, OVER-THE-EAR STYLE: Brand: AIRLIFE™

## (undated) DEVICE — FLEX ADVANTAGE 3000CC: Brand: FLEX ADVANTAGE

## (undated) DEVICE — ENDOSCOPY PUMP TUBING/ CAP SET: Brand: ERBE

## (undated) DEVICE — FLUFF AND POLYMER UNDERPAD,EXTRA HEAVY: Brand: WINGS

## (undated) DEVICE — SYRINGE MED 20ML STD CLR PLAS LUERLOCK TIP N CTRL DISP

## (undated) DEVICE — AIRLIFE™ NASAL OXYGEN CANNULA CURVED, NONFLARED TIP WITH 14 FOOT (4.3 M) CRUSH-RESISTANT TUBING, OVER-THE-EAR STYLE: Brand: AIRLIFE™

## (undated) DEVICE — CANNULA ORIG TL CLR W FOAM CUSHIONS AND 14FT SUPL TB 3 CHN

## (undated) DEVICE — SYR 50ML SLIP TIP NSAF LF STRL --

## (undated) DEVICE — DEVICE INFL 60ML 12ATM CONVENIENT LOK REL HNDL HI PRSS FLX

## (undated) DEVICE — MEDI-VAC SUCTION HIGH CAPACITY: Brand: CARDINAL HEALTH

## (undated) DEVICE — TRAP SPEC COLL POLYP POLYSTYR --